# Patient Record
Sex: MALE | Race: OTHER | HISPANIC OR LATINO | Employment: OTHER | URBAN - METROPOLITAN AREA
[De-identification: names, ages, dates, MRNs, and addresses within clinical notes are randomized per-mention and may not be internally consistent; named-entity substitution may affect disease eponyms.]

---

## 2017-01-16 RX ORDER — FENOFIBRATE 160 MG/1
TABLET ORAL
Qty: 90 TAB | Refills: 0 | Status: SHIPPED | OUTPATIENT
Start: 2017-01-16 | End: 2017-04-18 | Stop reason: SDUPTHER

## 2017-02-18 ENCOUNTER — PATIENT OUTREACH (OUTPATIENT)
Dept: HEALTH INFORMATION MANAGEMENT | Facility: OTHER | Age: 77
End: 2017-02-18

## 2017-02-18 NOTE — PROGRESS NOTES
2/18/17  -  Annual Wellness Visit Scheduling  1. Scheduling Status:Scheduled     CONFIRMED WEB IZ          Care Gap Scheduling (Attempt to Schedule EACH Overdue Care Gap!)     Health Maintenance Due   Topic Date Due   • IMM ZOSTER VACCINE  Will get vaccines at later time   • IMM PNEUMOCOCCAL 65+ (ADULT) LOW/MEDIUM RISK SERIES (2 of 2 - PPSV23)    • Annual Wellness Visit  Scheduled         MyChart Activation: Requested info by mail

## 2017-03-03 ENCOUNTER — OFFICE VISIT (OUTPATIENT)
Dept: MEDICAL GROUP | Facility: PHYSICIAN GROUP | Age: 77
End: 2017-03-03
Payer: MEDICARE

## 2017-03-03 VITALS
SYSTOLIC BLOOD PRESSURE: 124 MMHG | TEMPERATURE: 98.2 F | HEIGHT: 66 IN | HEART RATE: 66 BPM | BODY MASS INDEX: 30.53 KG/M2 | OXYGEN SATURATION: 96 % | DIASTOLIC BLOOD PRESSURE: 62 MMHG | WEIGHT: 190 LBS | RESPIRATION RATE: 16 BRPM

## 2017-03-03 DIAGNOSIS — Z01.818 PREOPERATIVE CLEARANCE: ICD-10-CM

## 2017-03-03 DIAGNOSIS — Z87.891 FORMER HEAVY CIGARETTE SMOKER (20-39 PER DAY): ICD-10-CM

## 2017-03-03 PROCEDURE — 1101F PT FALLS ASSESS-DOCD LE1/YR: CPT | Performed by: NURSE PRACTITIONER

## 2017-03-03 PROCEDURE — 1036F TOBACCO NON-USER: CPT | Performed by: NURSE PRACTITIONER

## 2017-03-03 PROCEDURE — 99213 OFFICE O/P EST LOW 20 MIN: CPT | Performed by: NURSE PRACTITIONER

## 2017-03-03 PROCEDURE — G8432 DEP SCR NOT DOC, RNG: HCPCS | Performed by: NURSE PRACTITIONER

## 2017-03-03 PROCEDURE — G8482 FLU IMMUNIZE ORDER/ADMIN: HCPCS | Performed by: NURSE PRACTITIONER

## 2017-03-03 PROCEDURE — 4040F PNEUMOC VAC/ADMIN/RCVD: CPT | Performed by: NURSE PRACTITIONER

## 2017-03-03 PROCEDURE — G8419 CALC BMI OUT NRM PARAM NOF/U: HCPCS | Performed by: NURSE PRACTITIONER

## 2017-03-03 ASSESSMENT — PATIENT HEALTH QUESTIONNAIRE - PHQ9: CLINICAL INTERPRETATION OF PHQ2 SCORE: 0

## 2017-03-03 NOTE — Clinical Note
March 3, 2017       Patient: Brian Lopez   YOB: 1940   Date of Visit: 3/3/2017         To Whom It May Concern:    It is my medical opinion that Brian Lopez may be cleared for surgery for total knee arthroplasty based on labs and EKG to be interpreted by the orthopedic surgeon and anesthesiologist.    If you have any questions or concerns, please don't hesitate to call [phone]          Sincerely,          SWAPNA Gandhi.  Electronically Signed

## 2017-03-03 NOTE — MR AVS SNAPSHOT
"        Brian Lopez   3/3/2017 8:40 AM   Office Visit   MRN: [id number]    Department:  [facility]   Dept Phone:  [phone]    Description:  Male : 1940   Provider:  SUDHIR Gandhi           Reason for Visit     Medical Clearance           Allergies as of 3/3/2017     No Known Allergies      You were diagnosed with     Preoperative clearance   [391694]       Former heavy cigarette smoker (20-39 per day)   [027426]         Vital Signs     Blood Pressure Pulse Temperature Respirations Height Weight    124/62 mmHg 66 36.8 °C (98.2 °F) 16 1.676 m (5' 5.98\") 86.183 kg (190 lb)    Body Mass Index Oxygen Saturation Smoking Status             30.68 kg/m2 96% Former Smoker         Basic Information     Date Of Birth Sex Race Ethnicity Preferred Language    1940 Male  or   Origin (Macedonian,Portuguese,American,Pravin, etc) English      Your appointments     Mar 07, 2017 10:00 AM   ANNUAL WELLNESS with SUDHIR Gandhi, [facility]    [address] [address]   [phone]              Problem List              ICD-10-CM Priority Class Noted - Resolved    Hypertension (Chronic) I10   2014 - Present    Hyperlipidemia (Chronic) E78.5   2014 - Present    Hypertriglyceridemia (Chronic) E78.1   2014 - Present    BPH (benign prostatic hyperplasia) (Chronic) N40.0   2014 - Present    S/P knee replacement -- left Z96.659   2014 - Present    OA (osteoarthritis) of knee Right M17.9   2014 - Present    BMI 30.0-30.9,adult Z68.30   2014 - Present    Former heavy cigarette smoker (20-39 per day) Z87.891   2014 - Present    Impacted cerumen of left ear H61.22   2016 - Present    Preoperative clearance Z01.818   3/3/2017 - Present      Health Maintenance        Date Due Completion Dates    IMM ZOSTER VACCINE 3/17/2000 ---    IMM PNEUMOCOCCAL " 65+ (ADULT) LOW/MEDIUM RISK SERIES (2 of 2 - PPSV23) 2/4/2016 2/4/2015    IMM DTaP/Tdap/Td Vaccine (2 - Td) 10/11/2024 10/11/2014    COLONOSCOPY 6/23/2025 6/23/2015, 5/28/2014            Current Immunizations     13-VALENT PCV PREVNAR 2/4/2015    Influenza TIV (IM) 11/9/2015    Influenza Vaccine Quad Inj (Preserved) 11/15/2016, 11/9/2015    Tdap Vaccine 10/11/2014      Below and/or attached are the medications your provider expects you to take. Review all of your home medications and newly ordered medications with your provider and/or pharmacist. Follow medication instructions as directed by your provider and/or pharmacist. Please keep your medication list with you and share with your provider. Update the information when medications are discontinued, doses are changed, or new medications (including over-the-counter products) are added; and carry medication information at all times in the event of emergency situations     Allergies:  No Known Allergies          Medications  Valid as of: March 03, 2017 -  9:35 AM    Generic Name Brand Name Tablet Size Instructions for use    Atenolol (Tab) TENORMIN 50 MG TAKE  ONE TABLET BY MOUTH EVERY MORNING        Calcium Carb-Cholecalciferol   Take  by mouth.        Calcium Carbonate-Vitamin D (Tab) Calcium Carbonate-Vitamin D 600-200 MG-UNIT Take  by mouth every morning.        Fenofibrate (Tab) TRIGLIDE 160 MG TAKE  ONE TABLET BY MOUTH DAILY        Multiple Vitamins-Minerals (Tab) THERAGRAN-M  Take 1 Tab by mouth every morning.        Naproxen Sod-Diphenhydramine   Take  by mouth.        Pravastatin Sodium (Tab) PRAVACHOL 40 MG TAKE ONE TABLET BY MOUTH EVERY DAY        Tamsulosin HCl (Cap) FLOMAX 0.4 MG Take 1 Cap by mouth every day.        .                 Medicines prescribed today were sent to:     [id number] - [place], [place] - [address]    [address] [address]    Phone: [phone] Fax: [phone]    Open 24 Hours?: No      Medication refill  instructions:       If your prescription bottle indicates you have medication refills left, it is not necessary to call your provider’s office. Please contact your pharmacy and they will refill your medication.    If your prescription bottle indicates you do not have any refills left, you may request refills at any time through one of the following ways: The online [employer] system (except Urgent Care), by calling your provider’s office, or by asking your pharmacy to contact your provider’s office with a refill request. Medication refills are processed only during regular business hours and may not be available until the next business day. Your provider may request additional information or to have a follow-up visit with you prior to refilling your medication.   *Please Note: Medication refills are assigned a new Rx number when refilled electronically. Your pharmacy may indicate that no refills were authorized even though a new prescription for the same medication is available at the pharmacy. Please request the medicine by name with the pharmacy before contacting your provider for a refill.        Other Notes About Your Plan     Please obtain Pathology Result from Colonoscopy [facility] Dr Jules 06/23/2015 and status if colon cancer is still present. Suggested code if Malignant Neoplasm of [place] Unspecified C18.9           [employer] Access Code: Activation code not generated  Current [employer] Status: Active

## 2017-03-04 NOTE — PROGRESS NOTES
Subjective:     Chief Complaint   Patient presents with   • Medical Clearance       HPI:  Brian Lopez is a 76 y.o. male here today to discuss the following:    Preoperative clearance  Patient's being seen by orthopedics and scheduled for a knee arthroplasty. He is here for preoperative clearance, however labs and EKG are to be ordered by the surgeon. The patient denies any chest pain, diaphoresis, palpitations, shortness of breath, dyspnea, dizziness or headaches. Patient may be cleared for surgery at this time pending results of preop labs and EKG.           Current medicines (including changes today)  Current Outpatient Prescriptions   Medication Sig Dispense Refill   • fenofibrate (TRIGLIDE) 160 MG tablet TAKE  ONE TABLET BY MOUTH DAILY 90 Tab 0   • pravastatin (PRAVACHOL) 40 MG tablet TAKE ONE TABLET BY MOUTH EVERY DAY 90 Tab 0   • atenolol (TENORMIN) 50 MG Tab TAKE  ONE TABLET BY MOUTH EVERY MORNING 90 Tab 1   • tamsulosin (FLOMAX) 0.4 MG capsule Take 1 Cap by mouth every day. 90 Cap 1   • Naproxen Sod-Diphenhydramine (ALEVE PM PO) Take  by mouth.     • therapeutic multivitamin-minerals (THERAGRAN-M) TABS Take 1 Tab by mouth every morning.     • Calcium Carbonate-Vitamin D (CALCIUM 600+D) 600-200 MG-UNIT TABS Take  by mouth every morning.     • Calcium Carb-Cholecalciferol (CALCIUM + D3 PO) Take  by mouth.       No current facility-administered medications for this visit.       He  has a past medical history of Hypertension (4/17/2014); Hyperlipidemia (4/17/2014); Hypertriglyceridemia (4/17/2014); BPH (benign prostatic hyperplasia) (4/17/2014); S/P knee replacement -- left (4/17/2014); OA (osteoarthritis) of knee Right (4/17/2014); Unspecified cataract; Dental disorder; and Routine health maintenance (6/29/2015).    ROS   Review of Systems   Constitutional: Negative for fever, chills, weight loss and malaise/fatigue.   HENT: Negative for ear pain, nosebleeds, congestion, sore throat and neck pain.   "  Respiratory: Negative for cough, sputum production, shortness of breath and wheezing.    Cardiovascular: Negative for chest pain, palpitations,  and leg swelling.   Gastrointestinal: Negative for heartburn, nausea, vomiting, diarrhea and abdominal pain.   Neurological: Negative for dizziness, tingling, tremors, sensory change, focal weakness and headaches.   Psychiatric/Behavioral: Negative for depression, anxiety, suicidal ideas, insomnia and memory loss.    All other systems reviewed and are negative except as in HPI.     Objective:   Physical Exam:  Blood pressure 124/62, pulse 66, temperature 36.8 °C (98.2 °F), resp. rate 16, height 1.676 m (5' 5.98\"), weight 86.183 kg (190 lb), SpO2 96 %. Body mass index is 30.68 kg/(m^2).   Physical Exam:  Alert, oriented in no acute distress.  Eye contact is good, speech goal directed, affect calm  HEENT: conjunctiva non-injected, sclera non-icteric.  Pinna normal.   Neck No adenopathy or masses in the neck or supraclavicular regions.  Lungs: clear to auscultation bilaterally with good excursion.  CV: regular rate and rhythm.   Abdomen: soft, nontender, No CVAT. Normal bowel sounds.  Ext: no edema, color normal, vascularity normal, temperature normal      Assessment and Plan:   The following treatment plan was discussed   1. Preoperative clearance  CANCELED: CBC WITH DIFFERENTIAL    CANCELED: COMP METABOLIC PANEL    CANCELED: URINALYSIS,CULTURE IF INDICATED    CANCELED: POCT EKG   2. Former heavy cigarette smoker (20-39 per day)      I spoke with renal orthopedics who stated they would be ordering labs and EKG after I clear the patient for surgery. If I order any testing will be repeated.    Followup: Return if symptoms worsen or fail to improve.   Please note that this dictation was created using voice recognition software. I have made every reasonable attempt to correct obvious errors, but I expect that there are errors of grammar and possibly content that I did not " discover before finalizing the note.

## 2017-03-04 NOTE — ASSESSMENT & PLAN NOTE
Patient's being seen by orthopedics and scheduled for a knee arthroplasty. He is here for preoperative clearance, however labs and EKG are to be ordered by the surgeon. The patient denies any chest pain, diaphoresis, palpitations, shortness of breath, dyspnea, dizziness or headaches. Patient may be cleared for surgery at this time pending results of preop labs and EKG.

## 2017-03-07 ENCOUNTER — OFFICE VISIT (OUTPATIENT)
Dept: MEDICAL GROUP | Facility: PHYSICIAN GROUP | Age: 77
End: 2017-03-07
Payer: MEDICARE

## 2017-03-07 VITALS
WEIGHT: 190 LBS | DIASTOLIC BLOOD PRESSURE: 70 MMHG | OXYGEN SATURATION: 97 % | RESPIRATION RATE: 16 BRPM | HEIGHT: 66 IN | HEART RATE: 63 BPM | BODY MASS INDEX: 30.53 KG/M2 | TEMPERATURE: 98.1 F | SYSTOLIC BLOOD PRESSURE: 126 MMHG

## 2017-03-07 DIAGNOSIS — M17.11 OSTEOARTHRITIS OF RIGHT KNEE, UNSPECIFIED OSTEOARTHRITIS TYPE: ICD-10-CM

## 2017-03-07 DIAGNOSIS — I10 ESSENTIAL HYPERTENSION: Chronic | ICD-10-CM

## 2017-03-07 DIAGNOSIS — E78.1 HYPERTRIGLYCERIDEMIA: Chronic | ICD-10-CM

## 2017-03-07 DIAGNOSIS — N40.1 BENIGN PROSTATIC HYPERPLASIA WITH LOWER URINARY TRACT SYMPTOMS, UNSPECIFIED MORPHOLOGY: Chronic | ICD-10-CM

## 2017-03-07 DIAGNOSIS — E78.5 HYPERLIPIDEMIA, UNSPECIFIED HYPERLIPIDEMIA TYPE: Chronic | ICD-10-CM

## 2017-03-07 DIAGNOSIS — Z23 NEED FOR VACCINATION: ICD-10-CM

## 2017-03-07 DIAGNOSIS — E66.9 OBESITY (BMI 30-39.9): ICD-10-CM

## 2017-03-07 PROCEDURE — G0439 PPPS, SUBSEQ VISIT: HCPCS | Mod: 25 | Performed by: NURSE PRACTITIONER

## 2017-03-07 PROCEDURE — 90732 PPSV23 VACC 2 YRS+ SUBQ/IM: CPT | Performed by: NURSE PRACTITIONER

## 2017-03-07 PROCEDURE — 1036F TOBACCO NON-USER: CPT | Performed by: NURSE PRACTITIONER

## 2017-03-07 PROCEDURE — 99999 PR NO CHARGE: CPT | Performed by: NURSE PRACTITIONER

## 2017-03-07 PROCEDURE — G0009 ADMIN PNEUMOCOCCAL VACCINE: HCPCS | Performed by: NURSE PRACTITIONER

## 2017-03-07 PROCEDURE — G8432 DEP SCR NOT DOC, RNG: HCPCS | Performed by: NURSE PRACTITIONER

## 2017-03-07 ASSESSMENT — PAIN SCALES - GENERAL: PAINLEVEL: 1=MINIMAL PAIN

## 2017-03-07 ASSESSMENT — PATIENT HEALTH QUESTIONNAIRE - PHQ9: CLINICAL INTERPRETATION OF PHQ2 SCORE: 0

## 2017-03-07 NOTE — PROGRESS NOTES
Chief Complaint   Patient presents with   • Annual Wellness Visit         HPI:  Brian is a 76 y.o. male here for Medicare Annual Wellness Visit        Patient Active Problem List    Diagnosis Date Noted   • Preoperative clearance 03/03/2017   • Hypertension 04/17/2014   • Hyperlipidemia 04/17/2014   • Hypertriglyceridemia 04/17/2014   • BPH (benign prostatic hyperplasia) 04/17/2014   • S/P knee replacement -- left 04/17/2014   • OA (osteoarthritis) of knee Right 04/17/2014   • BMI 30.0-30.9,adult 04/17/2014   • Former heavy cigarette smoker (20-39 per day) 04/17/2014       Current Outpatient Prescriptions   Medication Sig Dispense Refill   • fenofibrate (TRIGLIDE) 160 MG tablet TAKE  ONE TABLET BY MOUTH DAILY 90 Tab 0   • pravastatin (PRAVACHOL) 40 MG tablet TAKE ONE TABLET BY MOUTH EVERY DAY 90 Tab 0   • atenolol (TENORMIN) 50 MG Tab TAKE  ONE TABLET BY MOUTH EVERY MORNING 90 Tab 1   • tamsulosin (FLOMAX) 0.4 MG capsule Take 1 Cap by mouth every day. 90 Cap 1   • Naproxen Sod-Diphenhydramine (ALEVE PM PO) Take  by mouth.     • therapeutic multivitamin-minerals (THERAGRAN-M) TABS Take 1 Tab by mouth every morning.     • Calcium Carbonate-Vitamin D (CALCIUM 600+D) 600-200 MG-UNIT TABS Take  by mouth every morning.     • Calcium Carb-Cholecalciferol (CALCIUM + D3 PO) Take  by mouth.       No current facility-administered medications for this visit.        The patient reports adherence to this regimen   Current supplements as per medication list.   Chronic narcotic pain medicines: no    Allergies: Review of patient's allergies indicates no known allergies.    Current social contact/activities: traveling, spend time gardening and at home     Is patient current with immunizations?  no   If no, due for ppsv23    He  reports that he quit smoking about 6 years ago. His smoking use included Cigarettes. He has a 50 pack-year smoking history. He has never used smokeless tobacco. He reports that he drinks alcohol. He reports  that he does not use illicit drugs.  Counseling given: Yes        DPA/Advanced Directive:  Patient do not have an advanced directive.  If not on file, instructed to bring in a copy to scan into his chart. If no advanced directive exists, a packet and workshop information was provided    ROS:    Gait: Uses no assistive device   Ostomy: no   Other tubes: no   Amputations: no   Chronic oxygen use no   Last eye exam 2013   : Denies incontinence.       Depression Screening    Little interest or pleasure in doing things?  0 - not at all  Feeling down, depressed, or hopeless?  0 - not at all  Patient Health Questionnaire Score: 0    If depressive symptoms identified deferred to follow up visit unless specifically addressed in assessment and plan.    Screening for Cognitive Impairment    Three Minute Recall (banana, sunrise, fence)  2/3    Draw clock face with all 12 numbers set to the hand to show 10 minutes past 11 o'clock  1 5/5  Cognitive concerns identified deferred for follow up unless specifically addressed in assessment and plan.    Fall Risk Assessment    Has the patient had two or more falls in the last year or any fall with injury in the last year?  No    Safety Assessment    Throw rugs on floor.  Yes  Handrails on all stairs.  Yes  Good lighting in all hallways.  Yes  Difficulty hearing.  No  Patient counseled about all safety risks that were identified.    Functional Assessment ADLs    Are there any barriers preventing you from cooking for yourself or meeting nutritional needs?  No.    Are there any barriers preventing you from driving safely or obtaining transportation?  No.    Are there any barriers preventing you from using a telephone or calling for help?  No.    Are there any barriers preventing you from shopping?  No.    Are there any barriers preventing you from taking care of your own finances?  No.    Are there any barriers preventing you from managing your medications?  No.    Are currently engaging  any exercise or physical activity?  No.       Health Maintenance Summary                IMM PNEUMOCOCCAL 65+ (ADULT) LOW/MEDIUM RISK SERIES Overdue 2/4/2016      Done 2/4/2015 Imm Admin: Pneumococcal Conjugate Vaccine (Prevnar/PCV-13)    Annual Wellness Visit Overdue 12/10/2016      Done 12/10/2015 Visit Dx: Medicare annual wellness visit, subsequent     Patient has more history with this topic...    IMM ZOSTER VACCINE Postponed 4/2/2018 Originally 3/17/2000. Patient Refused    IMM DTaP/Tdap/Td Vaccine Next Due 10/11/2024      Done 10/11/2014 Imm Admin: Tdap Vaccine    COLONOSCOPY Next Due 6/23/2025      Done 6/23/2015 AMB REFERRAL TO GI FOR COLONOSCOPY     Patient has more history with this topic...          Patient Care Team:  SUDHIR Gandhi as PCP - General (Family Medicine)  Carlos Jules M.D. as Consulting Physician (Gastroenterology)    Social History   Substance Use Topics   • Smoking status: Former Smoker -- 1.00 packs/day for 50 years     Types: Cigarettes     Quit date: 04/17/2010   • Smokeless tobacco: Never Used   • Alcohol Use: 0.0 oz/week     0 Standard drinks or equivalent per week      Comment: 12 beers per week     Family History   Problem Relation Age of Onset   • Stroke Mother    • Heart Attack Father 85   • Heart Disease Father    • Cancer Neg Hx      He  has a past medical history of Hypertension (4/17/2014); Hyperlipidemia (4/17/2014); Hypertriglyceridemia (4/17/2014); BPH (benign prostatic hyperplasia) (4/17/2014); S/P knee replacement -- left (4/17/2014); OA (osteoarthritis) of knee Right (4/17/2014); Unspecified cataract; Dental disorder; and Routine health maintenance (6/29/2015).   Past Surgical History   Procedure Laterality Date   • Athroplasty       left knee total   • Trigger finger release       multiple    • Appendectomy     • Cataract extraction with iol     • Colon resection robotic  6/16/2014     Performed by Ezra Burks M.D. at SUR[facility]  "          Exam:     Blood pressure 126/70, pulse 63, temperature 36.7 °C (98.1 °F), resp. rate 16, height 1.676 m (5' 5.98\"), weight 86.183 kg (190 lb), SpO2 97 %. Body mass index is 30.68 kg/(m^2).    Hearing good.    Dentition upper and lower dentures  Alert, oriented in no acute distress.  Eye contact is good, speech goal directed, affect calm      Assessment and Plan. The following treatment and monitoring plan is recommended:    1. BMI 30.0-30.9,adult     2. Benign prostatic hyperplasia with lower urinary tract symptoms, unspecified morphology     3. Hyperlipidemia, unspecified hyperlipidemia type     4. Essential hypertension     5. Hypertriglyceridemia     6. Osteoarthritis of right knee, unspecified osteoarthritis type      continue plan of care for all existing diagnoses      Services needed: No services needed at this time  Health Care Screening recommendations as per orders if indicated.  Referrals offered: PT/OT/Nutrition counseling/Behavioral Health/Smoking cessation as per orders if indicated.    Discussion today about general wellness and lifestyle habits:    · Prevent falls and reduce trip hazards; Cautioned about securing or removing rugs.  · Have a working fire alarm and carbon monoxide detector;   · Engage in regular physical activity and social activities       Follow-up: Return in about 6 months (around 9/7/2017) for HTN/Lipid.  "

## 2017-03-07 NOTE — MR AVS SNAPSHOT
"        Brian Lopez   3/7/2017 10:00 AM   Office Visit   MRN: [id number]    Department:  [facility]   Dept Phone:  [phone]    Description:  Male : 1940   Provider:  SUDHIR Gandhi; [facility]            Reason for Visit     Annual Wellness Visit           Allergies as of 3/7/2017     No Known Allergies      You were diagnosed with     BMI 30.0-30.9,adult   [020828]       Benign prostatic hyperplasia with lower urinary tract symptoms, unspecified morphology   [4170149]       Hyperlipidemia, unspecified hyperlipidemia type   [5508374]       Essential hypertension   [5747299]       Hypertriglyceridemia   [808347]       Osteoarthritis of right knee, unspecified osteoarthritis type   [6402812]       Need for vaccination   [369448]       Obesity (BMI 30-39.9)   [717494]         Vital Signs     Blood Pressure Pulse Temperature Respirations Height Weight    126/70 mmHg 63 36.7 °C (98.1 °F) 16 1.676 m (5' 5.98\") 86.183 kg (190 lb)    Body Mass Index Oxygen Saturation Smoking Status             30.68 kg/m2 97% Former Smoker         Basic Information     Date Of Birth Sex Race Ethnicity Preferred Language    1940 Male  or   Origin (Indonesian,Romanian,Polish,Pravin, etc) English      Problem List              ICD-10-CM Priority Class Noted - Resolved    Hypertension (Chronic) I10   2014 - Present    Hyperlipidemia (Chronic) E78.5   2014 - Present    Hypertriglyceridemia (Chronic) E78.1   2014 - Present    BPH (benign prostatic hyperplasia) (Chronic) N40.0   2014 - Present    S/P knee replacement -- left Z96.659   2014 - Present    OA (osteoarthritis) of knee Right M17.9   2014 - Present    Former heavy cigarette smoker (20-39 per day) Z87.891   2014 - Present    Preoperative clearance Z01.818   3/3/2017 - Present    Obesity (BMI 30-39.9) E66.9   3/7/2017 - Present      Health Maintenance        Date Due " Completion Dates    IMM PNEUMOCOCCAL 65+ (ADULT) LOW/MEDIUM RISK SERIES (2 of 2 - PPSV23) 2/4/2016 2/4/2015    IMM ZOSTER VACCINE 4/2/2018 (Originally 3/17/2000) ---    IMM DTaP/Tdap/Td Vaccine (2 - Td) 10/11/2024 10/11/2014    COLONOSCOPY 6/23/2025 6/23/2015, 5/28/2014            Current Immunizations     13-VALENT PCV PREVNAR 2/4/2015    Influenza TIV (IM) 11/9/2015    Influenza Vaccine Quad Inj (Preserved) 11/15/2016, 11/9/2015    Pneumococcal polysaccharide vaccine (PPSV-23) 3/7/2017    Tdap Vaccine 10/11/2014      Below and/or attached are the medications your provider expects you to take. Review all of your home medications and newly ordered medications with your provider and/or pharmacist. Follow medication instructions as directed by your provider and/or pharmacist. Please keep your medication list with you and share with your provider. Update the information when medications are discontinued, doses are changed, or new medications (including over-the-counter products) are added; and carry medication information at all times in the event of emergency situations     Allergies:  No Known Allergies          Medications  Valid as of: March 07, 2017 - 12:28 PM    Generic Name Brand Name Tablet Size Instructions for use    Atenolol (Tab) TENORMIN 50 MG TAKE  ONE TABLET BY MOUTH EVERY MORNING        Calcium Carb-Cholecalciferol   Take  by mouth.        Calcium Carbonate-Vitamin D (Tab) Calcium Carbonate-Vitamin D 600-200 MG-UNIT Take  by mouth every morning.        Fenofibrate (Tab) TRIGLIDE 160 MG TAKE  ONE TABLET BY MOUTH DAILY        Multiple Vitamins-Minerals (Tab) THERAGRAN-M  Take 1 Tab by mouth every morning.        Naproxen Sod-Diphenhydramine   Take  by mouth.        Pravastatin Sodium (Tab) PRAVACHOL 40 MG TAKE ONE TABLET BY MOUTH EVERY DAY        Tamsulosin HCl (Cap) FLOMAX 0.4 MG Take 1 Cap by mouth every day.        .                 Medicines prescribed today were sent to:     [id number] -  [place], [place] - [address]    [address] [place] [address]    Phone: [phone] Fax: [phone]    Open 24 Hours?: No      Medication refill instructions:       If your prescription bottle indicates you have medication refills left, it is not necessary to call your provider’s office. Please contact your pharmacy and they will refill your medication.    If your prescription bottle indicates you do not have any refills left, you may request refills at any time through one of the following ways: The online [employer] system (except Urgent Care), by calling your provider’s office, or by asking your pharmacy to contact your provider’s office with a refill request. Medication refills are processed only during regular business hours and may not be available until the next business day. Your provider may request additional information or to have a follow-up visit with you prior to refilling your medication.   *Please Note: Medication refills are assigned a new Rx number when refilled electronically. Your pharmacy may indicate that no refills were authorized even though a new prescription for the same medication is available at the pharmacy. Please request the medicine by name with the pharmacy before contacting your provider for a refill.        Other Notes About Your Plan     Please obtain Pathology Result from Colonoscopy [facility] Dr Jules 06/23/2015 and status if colon cancer is still present. Suggested code if Malignant Neoplasm of [place] Unspecified C18.9           [employer] Access Code: Activation code not generated  Current [employer] Status: Active

## 2017-03-27 DIAGNOSIS — I10 ESSENTIAL HYPERTENSION: Chronic | ICD-10-CM

## 2017-03-27 RX ORDER — ATENOLOL 50 MG/1
TABLET ORAL
Qty: 90 TAB | Refills: 1 | Status: SHIPPED | OUTPATIENT
Start: 2017-03-27 | End: 2017-10-23 | Stop reason: SDUPTHER

## 2017-03-27 RX ORDER — TAMSULOSIN HYDROCHLORIDE 0.4 MG/1
0.4 CAPSULE ORAL DAILY
Qty: 90 CAP | Refills: 1 | Status: SHIPPED | OUTPATIENT
Start: 2017-03-27 | End: 2017-08-22 | Stop reason: SDUPTHER

## 2017-03-27 NOTE — TELEPHONE ENCOUNTER
See MA's notes below, pt has met protocol, dx discussed at OV earlier this month.   BP Readings from Last 1 Encounters:   03/07/17 126/70

## 2017-03-27 NOTE — TELEPHONE ENCOUNTER
Was the patient seen in the last year in this department? Yes 03/07/2017    Does patient have an active prescription for medications requested? No     Received Request Via: Pharmacy

## 2017-03-27 NOTE — TELEPHONE ENCOUNTER
Refill X 6 months, sent to pharmacy.Pt. Seen in the last 6 months per protocol. Pt has pending labs  Lab Results   Component Value Date/Time    SODIUM 143 01/21/2016 07:25 AM    POTASSIUM 4.1 01/21/2016 07:25 AM    CHLORIDE 106 01/21/2016 07:25 AM    CO2 26 01/21/2016 07:25 AM    GLUCOSE 92 01/21/2016 07:25 AM    BUN 18 01/21/2016 07:25 AM    CREATININE 0.99 01/21/2016 07:25 AM

## 2017-03-27 NOTE — TELEPHONE ENCOUNTER
Was the patient seen in the last year in this department? Yes     Does patient have an active prescription for medications requested? No     Received Request Via: Pharmacy      Pt met protocol?: Yes, OV earlier this month

## 2017-04-04 RX ORDER — PRAVASTATIN SODIUM 40 MG
40 TABLET ORAL
Qty: 90 TAB | Refills: 1 | Status: SHIPPED | OUTPATIENT
Start: 2017-04-04 | End: 2017-09-27 | Stop reason: SDUPTHER

## 2017-04-04 NOTE — TELEPHONE ENCOUNTER
Was the patient seen in the last year in this department? Yes     Does patient have an active prescription for medications requested? No     Received Request Via: Pharmacy      Pt met protocol?: Yes    LAST OV 03/07/17    LAST LIPID PROFILE 01/21/16    BP Readings from Last 1 Encounters:   03/07/17 126/70

## 2017-04-18 NOTE — TELEPHONE ENCOUNTER
Was the patient seen in the last year in this department? Yes     Does patient have an active prescription for medications requested? No     Received Request Via: Pharmacy      Pt met protocol?: Yes, OV last month, lipid profile done 1/16.

## 2017-04-19 RX ORDER — FENOFIBRATE 160 MG/1
TABLET ORAL
Qty: 90 TAB | Refills: 0 | Status: SHIPPED | OUTPATIENT
Start: 2017-04-19 | End: 2017-07-17 | Stop reason: SDUPTHER

## 2017-04-21 ENCOUNTER — HOSPITAL ENCOUNTER (OUTPATIENT)
Dept: LAB | Facility: MEDICAL CENTER | Age: 77
End: 2017-04-21
Attending: NURSE PRACTITIONER
Payer: MEDICARE

## 2017-04-21 DIAGNOSIS — E78.5 HYPERLIPIDEMIA: Chronic | ICD-10-CM

## 2017-04-21 LAB
ALBUMIN SERPL BCP-MCNC: 4.3 G/DL (ref 3.2–4.9)
ALBUMIN/GLOB SERPL: 1.3 G/DL
ALP SERPL-CCNC: 45 U/L (ref 30–99)
ALT SERPL-CCNC: 16 U/L (ref 2–50)
ANION GAP SERPL CALC-SCNC: 9 MMOL/L (ref 0–11.9)
AST SERPL-CCNC: 15 U/L (ref 12–45)
BILIRUB SERPL-MCNC: 0.6 MG/DL (ref 0.1–1.5)
BUN SERPL-MCNC: 20 MG/DL (ref 8–22)
CALCIUM SERPL-MCNC: 9.5 MG/DL (ref 8.5–10.5)
CHLORIDE SERPL-SCNC: 107 MMOL/L (ref 96–112)
CHOLEST SERPL-MCNC: 141 MG/DL (ref 100–199)
CO2 SERPL-SCNC: 26 MMOL/L (ref 20–33)
CREAT SERPL-MCNC: 1 MG/DL (ref 0.5–1.4)
GFR SERPL CREATININE-BSD FRML MDRD: >60 ML/MIN/1.73 M 2
GLOBULIN SER CALC-MCNC: 3.4 G/DL (ref 1.9–3.5)
GLUCOSE SERPL-MCNC: 97 MG/DL (ref 65–99)
HDLC SERPL-MCNC: 47 MG/DL
LDLC SERPL CALC-MCNC: 65 MG/DL
POTASSIUM SERPL-SCNC: 4.1 MMOL/L (ref 3.6–5.5)
PROT SERPL-MCNC: 7.7 G/DL (ref 6–8.2)
SODIUM SERPL-SCNC: 142 MMOL/L (ref 135–145)
TRIGL SERPL-MCNC: 147 MG/DL (ref 0–149)

## 2017-04-21 PROCEDURE — 36415 COLL VENOUS BLD VENIPUNCTURE: CPT

## 2017-04-21 PROCEDURE — 80053 COMPREHEN METABOLIC PANEL: CPT

## 2017-04-21 PROCEDURE — 80061 LIPID PANEL: CPT

## 2017-04-24 ENCOUNTER — TELEPHONE (OUTPATIENT)
Dept: MEDICAL GROUP | Facility: PHYSICIAN GROUP | Age: 77
End: 2017-04-24

## 2017-04-24 NOTE — TELEPHONE ENCOUNTER
----- Message from Elizabeth Nelson sent at 4/21/2017  7:04 AM PDT -----  Regarding: refills  Contact: [phone]  Pt's refills were declined until after his lipids were done.  He is here today to do his labs. He only has enough meds to get him through Monday. Can you please see if we can get his cholesterol meds refilled?  Pt uses [facility] on [address].    Thank you,  Elizabeth :)

## 2017-05-24 DIAGNOSIS — Z01.812 PRE-OPERATIVE LABORATORY EXAMINATION: ICD-10-CM

## 2017-05-24 DIAGNOSIS — Z01.810 PRE-OPERATIVE CARDIOVASCULAR EXAMINATION: ICD-10-CM

## 2017-05-24 LAB
ANION GAP SERPL CALC-SCNC: 8 MMOL/L (ref 0–11.9)
APPEARANCE UR: CLEAR
APTT PPP: 31.3 SEC (ref 24.7–36)
BASOPHILS # BLD AUTO: 1 % (ref 0–1.8)
BASOPHILS # BLD: 0.05 K/UL (ref 0–0.12)
BILIRUB UR QL STRIP.AUTO: NEGATIVE
BUN SERPL-MCNC: 14 MG/DL (ref 8–22)
CALCIUM SERPL-MCNC: 9.4 MG/DL (ref 8.5–10.5)
CHLORIDE SERPL-SCNC: 106 MMOL/L (ref 96–112)
CO2 SERPL-SCNC: 27 MMOL/L (ref 20–33)
COLOR UR: NORMAL
CREAT SERPL-MCNC: 1.01 MG/DL (ref 0.5–1.4)
CULTURE IF INDICATED INDCX: NO UA CULTURE
EKG IMPRESSION: NORMAL
EOSINOPHIL # BLD AUTO: 0.09 K/UL (ref 0–0.51)
EOSINOPHIL NFR BLD: 1.7 % (ref 0–6.9)
ERYTHROCYTE [DISTWIDTH] IN BLOOD BY AUTOMATED COUNT: 44.7 FL (ref 35.9–50)
GFR SERPL CREATININE-BSD FRML MDRD: >60 ML/MIN/1.73 M 2
GLUCOSE SERPL-MCNC: 85 MG/DL (ref 65–99)
GLUCOSE UR STRIP.AUTO-MCNC: NEGATIVE MG/DL
HCT VFR BLD AUTO: 45.3 % (ref 42–52)
HGB BLD-MCNC: 14.8 G/DL (ref 14–18)
HIV 1+2 AB+HIV1 P24 AG SERPL QL IA: NON REACTIVE
IMM GRANULOCYTES # BLD AUTO: 0.02 K/UL (ref 0–0.11)
IMM GRANULOCYTES NFR BLD AUTO: 0.4 % (ref 0–0.9)
INR PPP: 1.06 (ref 0.87–1.13)
KETONES UR STRIP.AUTO-MCNC: NEGATIVE MG/DL
LEUKOCYTE ESTERASE UR QL STRIP.AUTO: NEGATIVE
LYMPHOCYTES # BLD AUTO: 1.66 K/UL (ref 1–4.8)
LYMPHOCYTES NFR BLD: 31.6 % (ref 22–41)
MCH RBC QN AUTO: 29.1 PG (ref 27–33)
MCHC RBC AUTO-ENTMCNC: 32.7 G/DL (ref 33.7–35.3)
MCV RBC AUTO: 89.2 FL (ref 81.4–97.8)
MICRO URNS: NORMAL
MONOCYTES # BLD AUTO: 0.46 K/UL (ref 0–0.85)
MONOCYTES NFR BLD AUTO: 8.7 % (ref 0–13.4)
NEUTROPHILS # BLD AUTO: 2.98 K/UL (ref 1.82–7.42)
NEUTROPHILS NFR BLD: 56.6 % (ref 44–72)
NITRITE UR QL STRIP.AUTO: NEGATIVE
NRBC # BLD AUTO: 0 K/UL
NRBC BLD AUTO-RTO: 0 /100 WBC
PH UR STRIP.AUTO: 6.5 [PH]
PLATELET # BLD AUTO: 241 K/UL (ref 164–446)
PMV BLD AUTO: 11.3 FL (ref 9–12.9)
POTASSIUM SERPL-SCNC: 4.1 MMOL/L (ref 3.6–5.5)
PROT UR QL STRIP: NEGATIVE MG/DL
PROTHROMBIN TIME: 14.1 SEC (ref 12–14.6)
RBC # BLD AUTO: 5.08 M/UL (ref 4.7–6.1)
RBC UR QL AUTO: NEGATIVE
SCCMEC + MECA PNL NOSE NAA+PROBE: NEGATIVE
SCCMEC + MECA PNL NOSE NAA+PROBE: NEGATIVE
SODIUM SERPL-SCNC: 141 MMOL/L (ref 135–145)
SP GR UR STRIP.AUTO: 1.01
WBC # BLD AUTO: 5.3 K/UL (ref 4.8–10.8)

## 2017-05-24 PROCEDURE — 83036 HEMOGLOBIN GLYCOSYLATED A1C: CPT

## 2017-05-24 PROCEDURE — 80048 BASIC METABOLIC PNL TOTAL CA: CPT

## 2017-05-24 PROCEDURE — 81003 URINALYSIS AUTO W/O SCOPE: CPT

## 2017-05-24 PROCEDURE — 85730 THROMBOPLASTIN TIME PARTIAL: CPT

## 2017-05-24 PROCEDURE — 36415 COLL VENOUS BLD VENIPUNCTURE: CPT

## 2017-05-24 PROCEDURE — 85025 COMPLETE CBC W/AUTO DIFF WBC: CPT

## 2017-05-24 PROCEDURE — 87389 HIV-1 AG W/HIV-1&-2 AB AG IA: CPT

## 2017-05-24 PROCEDURE — 87641 MR-STAPH DNA AMP PROBE: CPT

## 2017-05-24 PROCEDURE — 93005 ELECTROCARDIOGRAM TRACING: CPT

## 2017-05-24 PROCEDURE — 87640 STAPH A DNA AMP PROBE: CPT

## 2017-05-24 PROCEDURE — 93010 ELECTROCARDIOGRAM REPORT: CPT | Performed by: INTERNAL MEDICINE

## 2017-05-24 PROCEDURE — 85610 PROTHROMBIN TIME: CPT

## 2017-05-24 NOTE — DISCHARGE PLANNING
DISCHARGE PLANNING NOTE - TOTAL JOINT     Procedure: Procedure(s):  KNEE ARTHROPLASTY TOTAL  Procedure Date: 6/7/2017  Insurance:  Payor: [employer] / Plan: [employer] / Product Type: *No Product type* /   Equipment currently available at home? cane, front-wheel walker, raised toilet seat and shower chair  Steps into the home? 0  Steps within the home? 0  Toilet height? ADA  Type of shower? walk-in shower  Who will be with you during your recovery? spouse  Is Outpatient Physical Therapy set up after surgery? Yes   Did you take the Total Joint Class and where? Yes, at [facility]     Plan: Met pt in pre-admit.  PT has all equiptment he feels he needs.  Has pre-op apt at [facility] and will get rx for PT at [facility].  Wife will be caregiver.  No needs identified.

## 2017-05-26 LAB
EST. AVERAGE GLUCOSE BLD GHB EST-MCNC: 117 MG/DL
HBA1C MFR BLD: 5.7 % (ref 0–5.6)

## 2017-06-07 ENCOUNTER — APPOINTMENT (OUTPATIENT)
Dept: RADIOLOGY | Facility: MEDICAL CENTER | Age: 77
DRG: 470 | End: 2017-06-07
Attending: PHYSICIAN ASSISTANT
Payer: MEDICARE

## 2017-06-07 ENCOUNTER — HOSPITAL ENCOUNTER (INPATIENT)
Facility: MEDICAL CENTER | Age: 77
LOS: 1 days | DRG: 470 | End: 2017-06-08
Attending: ORTHOPAEDIC SURGERY | Admitting: ORTHOPAEDIC SURGERY
Payer: MEDICARE

## 2017-06-07 PROBLEM — M17.11 OSTEOARTHRITIS OF RIGHT KNEE: Status: ACTIVE | Noted: 2017-06-07

## 2017-06-07 PROCEDURE — 160022 HCHG BLOCK: Performed by: ORTHOPAEDIC SURGERY

## 2017-06-07 PROCEDURE — 501838 HCHG SUTURE GENERAL: Performed by: ORTHOPAEDIC SURGERY

## 2017-06-07 PROCEDURE — G8978 MOBILITY CURRENT STATUS: HCPCS | Mod: CK

## 2017-06-07 PROCEDURE — 700112 HCHG RX REV CODE 229: Performed by: PHYSICIAN ASSISTANT

## 2017-06-07 PROCEDURE — 700101 HCHG RX REV CODE 250

## 2017-06-07 PROCEDURE — 700105 HCHG RX REV CODE 258: Performed by: PHYSICIAN ASSISTANT

## 2017-06-07 PROCEDURE — 500811 HCHG LENS/HOOD FOR SPACESUIT: Performed by: ORTHOPAEDIC SURGERY

## 2017-06-07 PROCEDURE — 160009 HCHG ANES TIME/MIN: Performed by: ORTHOPAEDIC SURGERY

## 2017-06-07 PROCEDURE — G8979 MOBILITY GOAL STATUS: HCPCS | Mod: CJ

## 2017-06-07 PROCEDURE — 500093 HCHG BONE CEMENT MIXER: Performed by: ORTHOPAEDIC SURGERY

## 2017-06-07 PROCEDURE — 700111 HCHG RX REV CODE 636 W/ 250 OVERRIDE (IP)

## 2017-06-07 PROCEDURE — 160041 HCHG SURGERY MINUTES - EA ADDL 1 MIN LEVEL 4: Performed by: ORTHOPAEDIC SURGERY

## 2017-06-07 PROCEDURE — A9270 NON-COVERED ITEM OR SERVICE: HCPCS

## 2017-06-07 PROCEDURE — 73560 X-RAY EXAM OF KNEE 1 OR 2: CPT | Mod: RT

## 2017-06-07 PROCEDURE — 500054 HCHG BANDAGE, ELASTIC 6: Performed by: ORTHOPAEDIC SURGERY

## 2017-06-07 PROCEDURE — 500088 HCHG BLADE, SAGITTAL: Performed by: ORTHOPAEDIC SURGERY

## 2017-06-07 PROCEDURE — 502000 HCHG MISC OR IMPLANTS RC 0278: Performed by: ORTHOPAEDIC SURGERY

## 2017-06-07 PROCEDURE — 700101 HCHG RX REV CODE 250: Performed by: PHYSICIAN ASSISTANT

## 2017-06-07 PROCEDURE — 700111 HCHG RX REV CODE 636 W/ 250 OVERRIDE (IP): Performed by: PHYSICIAN ASSISTANT

## 2017-06-07 PROCEDURE — 160029 HCHG SURGERY MINUTES - 1ST 30 MINS LEVEL 4: Performed by: ORTHOPAEDIC SURGERY

## 2017-06-07 PROCEDURE — 160035 HCHG PACU - 1ST 60 MINS PHASE I: Performed by: ORTHOPAEDIC SURGERY

## 2017-06-07 PROCEDURE — 770006 HCHG ROOM/CARE - MED/SURG/GYN SEMI*

## 2017-06-07 PROCEDURE — 160036 HCHG PACU - EA ADDL 30 MINS PHASE I: Performed by: ORTHOPAEDIC SURGERY

## 2017-06-07 PROCEDURE — A9270 NON-COVERED ITEM OR SERVICE: HCPCS | Performed by: PHYSICIAN ASSISTANT

## 2017-06-07 PROCEDURE — 501487 HCHG STRYKER TIP: Performed by: ORTHOPAEDIC SURGERY

## 2017-06-07 PROCEDURE — 160002 HCHG RECOVERY MINUTES (STAT): Performed by: ORTHOPAEDIC SURGERY

## 2017-06-07 PROCEDURE — 0SRC0J9 REPLACEMENT OF RIGHT KNEE JOINT WITH SYNTHETIC SUBSTITUTE, CEMENTED, OPEN APPROACH: ICD-10-PCS | Performed by: ORTHOPAEDIC SURGERY

## 2017-06-07 PROCEDURE — 502579 HCHG PACK, TOTAL KNEE: Performed by: ORTHOPAEDIC SURGERY

## 2017-06-07 PROCEDURE — 700102 HCHG RX REV CODE 250 W/ 637 OVERRIDE(OP): Performed by: PHYSICIAN ASSISTANT

## 2017-06-07 PROCEDURE — 501486 HCHG STRYKER IRRIG SET HC W/TUBING: Performed by: ORTHOPAEDIC SURGERY

## 2017-06-07 PROCEDURE — 160048 HCHG OR STATISTICAL LEVEL 1-5: Performed by: ORTHOPAEDIC SURGERY

## 2017-06-07 PROCEDURE — 97161 PT EVAL LOW COMPLEX 20 MIN: CPT

## 2017-06-07 PROCEDURE — 501480 HCHG STOCKINETTE: Performed by: ORTHOPAEDIC SURGERY

## 2017-06-07 PROCEDURE — 700102 HCHG RX REV CODE 250 W/ 637 OVERRIDE(OP)

## 2017-06-07 DEVICE — IMPLANT GII OVAL RESURFACING PAT 32MM (1EA): Type: IMPLANTABLE DEVICE | Status: FUNCTIONAL

## 2017-06-07 DEVICE — IMPLANTABLE DEVICE: Type: IMPLANTABLE DEVICE | Status: FUNCTIONAL

## 2017-06-07 DEVICE — CEMENT ORTHOPEDIC HV US  (10/PK): Type: IMPLANTABLE DEVICE | Status: FUNCTIONAL

## 2017-06-07 RX ORDER — DIPHENHYDRAMINE HCL 25 MG
25 TABLET ORAL EVERY 6 HOURS PRN
Status: DISCONTINUED | OUTPATIENT
Start: 2017-06-07 | End: 2017-06-08 | Stop reason: HOSPADM

## 2017-06-07 RX ORDER — ONDANSETRON 4 MG/1
4 TABLET, ORALLY DISINTEGRATING ORAL EVERY 4 HOURS PRN
Status: DISCONTINUED | OUTPATIENT
Start: 2017-06-07 | End: 2017-06-08 | Stop reason: HOSPADM

## 2017-06-07 RX ORDER — LIDOCAINE HYDROCHLORIDE 10 MG/ML
INJECTION, SOLUTION INFILTRATION; PERINEURAL
Status: COMPLETED
Start: 2017-06-07 | End: 2017-06-07

## 2017-06-07 RX ORDER — EPINEPHRINE 1 MG/ML(1)
AMPUL (ML) INJECTION
Status: DISCONTINUED | OUTPATIENT
Start: 2017-06-07 | End: 2017-06-07 | Stop reason: HOSPADM

## 2017-06-07 RX ORDER — ATENOLOL 25 MG/1
50 TABLET ORAL
Status: DISCONTINUED | OUTPATIENT
Start: 2017-06-08 | End: 2017-06-08 | Stop reason: HOSPADM

## 2017-06-07 RX ORDER — SCOLOPAMINE TRANSDERMAL SYSTEM 1 MG/1
1 PATCH, EXTENDED RELEASE TRANSDERMAL
Status: DISCONTINUED | OUTPATIENT
Start: 2017-06-07 | End: 2017-06-08 | Stop reason: HOSPADM

## 2017-06-07 RX ORDER — AMOXICILLIN 250 MG
1 CAPSULE ORAL NIGHTLY
Status: DISCONTINUED | OUTPATIENT
Start: 2017-06-07 | End: 2017-06-08 | Stop reason: HOSPADM

## 2017-06-07 RX ORDER — CELECOXIB 200 MG/1
CAPSULE ORAL
Status: COMPLETED
Start: 2017-06-07 | End: 2017-06-07

## 2017-06-07 RX ORDER — DOCUSATE SODIUM 100 MG/1
100 CAPSULE, LIQUID FILLED ORAL 2 TIMES DAILY
Status: DISCONTINUED | OUTPATIENT
Start: 2017-06-07 | End: 2017-06-08 | Stop reason: HOSPADM

## 2017-06-07 RX ORDER — AMOXICILLIN 250 MG
1 CAPSULE ORAL
Status: DISCONTINUED | OUTPATIENT
Start: 2017-06-07 | End: 2017-06-08 | Stop reason: HOSPADM

## 2017-06-07 RX ORDER — OXYCODONE HYDROCHLORIDE 5 MG/1
5 TABLET ORAL
Status: DISCONTINUED | OUTPATIENT
Start: 2017-06-07 | End: 2017-06-08 | Stop reason: HOSPADM

## 2017-06-07 RX ORDER — HALOPERIDOL 5 MG/ML
1 INJECTION INTRAMUSCULAR EVERY 6 HOURS PRN
Status: DISCONTINUED | OUTPATIENT
Start: 2017-06-07 | End: 2017-06-08 | Stop reason: HOSPADM

## 2017-06-07 RX ORDER — DIPHENHYDRAMINE HCL 25 MG
25 TABLET ORAL NIGHTLY PRN
Status: DISCONTINUED | OUTPATIENT
Start: 2017-06-08 | End: 2017-06-08 | Stop reason: HOSPADM

## 2017-06-07 RX ORDER — DEXAMETHASONE SODIUM PHOSPHATE 4 MG/ML
8 INJECTION, SOLUTION INTRA-ARTICULAR; INTRALESIONAL; INTRAMUSCULAR; INTRAVENOUS; SOFT TISSUE
Status: DISCONTINUED | OUTPATIENT
Start: 2017-06-07 | End: 2017-06-08 | Stop reason: HOSPADM

## 2017-06-07 RX ORDER — KETOROLAC TROMETHAMINE 30 MG/ML
INJECTION, SOLUTION INTRAMUSCULAR; INTRAVENOUS
Status: DISCONTINUED | OUTPATIENT
Start: 2017-06-07 | End: 2017-06-07 | Stop reason: HOSPADM

## 2017-06-07 RX ORDER — TRANEXAMIC ACID
POWDER (GRAM) MISCELLANEOUS
Status: DISCONTINUED | OUTPATIENT
Start: 2017-06-07 | End: 2017-06-07 | Stop reason: HOSPADM

## 2017-06-07 RX ORDER — ENEMA 19; 7 G/133ML; G/133ML
1 ENEMA RECTAL
Status: DISCONTINUED | OUTPATIENT
Start: 2017-06-07 | End: 2017-06-08 | Stop reason: HOSPADM

## 2017-06-07 RX ORDER — BISACODYL 10 MG
10 SUPPOSITORY, RECTAL RECTAL
Status: DISCONTINUED | OUTPATIENT
Start: 2017-06-07 | End: 2017-06-08 | Stop reason: HOSPADM

## 2017-06-07 RX ORDER — ONDANSETRON 2 MG/ML
4 INJECTION INTRAMUSCULAR; INTRAVENOUS EVERY 4 HOURS PRN
Status: DISCONTINUED | OUTPATIENT
Start: 2017-06-07 | End: 2017-06-08 | Stop reason: HOSPADM

## 2017-06-07 RX ORDER — TAMSULOSIN HYDROCHLORIDE 0.4 MG/1
0.4 CAPSULE ORAL EVERY EVENING
Status: DISCONTINUED | OUTPATIENT
Start: 2017-06-07 | End: 2017-06-08 | Stop reason: HOSPADM

## 2017-06-07 RX ORDER — DEXTROSE MONOHYDRATE, SODIUM CHLORIDE, AND POTASSIUM CHLORIDE 50; 1.49; 4.5 G/1000ML; G/1000ML; G/1000ML
INJECTION, SOLUTION INTRAVENOUS CONTINUOUS
Status: DISCONTINUED | OUTPATIENT
Start: 2017-06-07 | End: 2017-06-08 | Stop reason: HOSPADM

## 2017-06-07 RX ORDER — ACETAMINOPHEN 500 MG
TABLET ORAL
Status: COMPLETED
Start: 2017-06-07 | End: 2017-06-07

## 2017-06-07 RX ORDER — TRAMADOL HYDROCHLORIDE 50 MG/1
50 TABLET ORAL EVERY 4 HOURS PRN
Status: DISCONTINUED | OUTPATIENT
Start: 2017-06-07 | End: 2017-06-08 | Stop reason: HOSPADM

## 2017-06-07 RX ORDER — DIAZEPAM 5 MG/1
5 TABLET ORAL EVERY 6 HOURS PRN
Status: DISCONTINUED | OUTPATIENT
Start: 2017-06-07 | End: 2017-06-08 | Stop reason: HOSPADM

## 2017-06-07 RX ORDER — FENOFIBRATE 134 MG/1
134 CAPSULE ORAL DAILY
Status: DISCONTINUED | OUTPATIENT
Start: 2017-06-07 | End: 2017-06-08 | Stop reason: HOSPADM

## 2017-06-07 RX ORDER — OXYCODONE HCL 10 MG/1
TABLET, FILM COATED, EXTENDED RELEASE ORAL
Status: COMPLETED
Start: 2017-06-07 | End: 2017-06-07

## 2017-06-07 RX ORDER — ROPIVACAINE HYDROCHLORIDE 5 MG/ML
INJECTION, SOLUTION EPIDURAL; INFILTRATION; PERINEURAL
Status: DISCONTINUED | OUTPATIENT
Start: 2017-06-07 | End: 2017-06-07 | Stop reason: HOSPADM

## 2017-06-07 RX ORDER — KETOROLAC TROMETHAMINE 30 MG/ML
15 INJECTION, SOLUTION INTRAMUSCULAR; INTRAVENOUS EVERY 6 HOURS
Status: COMPLETED | OUTPATIENT
Start: 2017-06-07 | End: 2017-06-08

## 2017-06-07 RX ORDER — PRAVASTATIN SODIUM 20 MG
40 TABLET ORAL EVERY EVENING
Status: DISCONTINUED | OUTPATIENT
Start: 2017-06-07 | End: 2017-06-08 | Stop reason: HOSPADM

## 2017-06-07 RX ORDER — DIPHENHYDRAMINE HYDROCHLORIDE 50 MG/ML
25 INJECTION INTRAMUSCULAR; INTRAVENOUS EVERY 6 HOURS PRN
Status: DISCONTINUED | OUTPATIENT
Start: 2017-06-07 | End: 2017-06-08 | Stop reason: HOSPADM

## 2017-06-07 RX ORDER — ACETAMINOPHEN 500 MG
1000 TABLET ORAL EVERY 6 HOURS
Status: DISCONTINUED | OUTPATIENT
Start: 2017-06-07 | End: 2017-06-08 | Stop reason: HOSPADM

## 2017-06-07 RX ORDER — ONDANSETRON 2 MG/ML
INJECTION INTRAMUSCULAR; INTRAVENOUS
Status: COMPLETED
Start: 2017-06-07 | End: 2017-06-07

## 2017-06-07 RX ORDER — POLYETHYLENE GLYCOL 3350 17 G/17G
1 POWDER, FOR SOLUTION ORAL 2 TIMES DAILY PRN
Status: DISCONTINUED | OUTPATIENT
Start: 2017-06-07 | End: 2017-06-08 | Stop reason: HOSPADM

## 2017-06-07 RX ORDER — SODIUM CHLORIDE, SODIUM LACTATE, POTASSIUM CHLORIDE, CALCIUM CHLORIDE 600; 310; 30; 20 MG/100ML; MG/100ML; MG/100ML; MG/100ML
1000 INJECTION, SOLUTION INTRAVENOUS
Status: DISCONTINUED | OUTPATIENT
Start: 2017-06-07 | End: 2017-06-07

## 2017-06-07 RX ORDER — CELECOXIB 200 MG/1
200 CAPSULE ORAL
Status: DISCONTINUED | OUTPATIENT
Start: 2017-06-08 | End: 2017-06-08 | Stop reason: HOSPADM

## 2017-06-07 RX ORDER — GABAPENTIN 300 MG/1
CAPSULE ORAL
Status: COMPLETED
Start: 2017-06-07 | End: 2017-06-07

## 2017-06-07 RX ADMIN — KETOROLAC TROMETHAMINE 15 MG: 30 INJECTION, SOLUTION INTRAMUSCULAR at 11:19

## 2017-06-07 RX ADMIN — FENOFIBRATE 134 MG: 134 CAPSULE ORAL at 20:21

## 2017-06-07 RX ADMIN — POTASSIUM CHLORIDE, DEXTROSE MONOHYDRATE AND SODIUM CHLORIDE: 150; 5; 450 INJECTION, SOLUTION INTRAVENOUS at 10:32

## 2017-06-07 RX ADMIN — FENTANYL CITRATE 50 MCG: 50 INJECTION, SOLUTION INTRAMUSCULAR; INTRAVENOUS at 10:05

## 2017-06-07 RX ADMIN — DOCUSATE SODIUM 100 MG: 100 CAPSULE, LIQUID FILLED ORAL at 20:14

## 2017-06-07 RX ADMIN — ACETAMINOPHEN 1000 MG: 500 TABLET ORAL at 11:20

## 2017-06-07 RX ADMIN — TAMSULOSIN HYDROCHLORIDE 0.4 MG: 0.4 CAPSULE ORAL at 20:13

## 2017-06-07 RX ADMIN — KETOROLAC TROMETHAMINE 15 MG: 30 INJECTION, SOLUTION INTRAMUSCULAR at 18:09

## 2017-06-07 RX ADMIN — DOCUSATE SODIUM AND SENNOSIDES 1 TABLET: 8.6; 5 TABLET, FILM COATED ORAL at 20:14

## 2017-06-07 RX ADMIN — CELECOXIB 200 MG: 200 CAPSULE ORAL at 05:50

## 2017-06-07 RX ADMIN — DOCUSATE SODIUM AND SENNOSIDES 1 TABLET: 8.6; 5 TABLET, FILM COATED ORAL at 20:16

## 2017-06-07 RX ADMIN — OXYCODONE HYDROCHLORIDE 10 MG: 10 TABLET, FILM COATED, EXTENDED RELEASE ORAL at 05:50

## 2017-06-07 RX ADMIN — ACETAMINOPHEN 1000 MG: 500 TABLET ORAL at 18:08

## 2017-06-07 RX ADMIN — ACETAMINOPHEN 1000 MG: 500 TABLET, COATED ORAL at 05:50

## 2017-06-07 RX ADMIN — GABAPENTIN 300 MG: 300 CAPSULE ORAL at 05:50

## 2017-06-07 RX ADMIN — ONDANSETRON 4 MG: 2 INJECTION INTRAMUSCULAR; INTRAVENOUS at 09:50

## 2017-06-07 RX ADMIN — POTASSIUM CHLORIDE, DEXTROSE MONOHYDRATE AND SODIUM CHLORIDE: 150; 5; 450 INJECTION, SOLUTION INTRAVENOUS at 19:00

## 2017-06-07 RX ADMIN — SODIUM CHLORIDE 2 G: 9 INJECTION, SOLUTION INTRAVENOUS at 15:43

## 2017-06-07 RX ADMIN — ONDANSETRON 4 MG: 4 TABLET, ORALLY DISINTEGRATING ORAL at 09:48

## 2017-06-07 RX ADMIN — PRAVASTATIN SODIUM 40 MG: 20 TABLET ORAL at 20:14

## 2017-06-07 ASSESSMENT — LIFESTYLE VARIABLES
EVER FELT BAD OR GUILTY ABOUT YOUR DRINKING: NO
CONSUMPTION TOTAL: NEGATIVE
ON A TYPICAL DAY WHEN YOU DRINK ALCOHOL HOW MANY DRINKS DO YOU HAVE: 1
TOTAL SCORE: 0
EVER HAD A DRINK FIRST THING IN THE MORNING TO STEADY YOUR NERVES TO GET RID OF A HANGOVER: NO
HOW MANY TIMES IN THE PAST YEAR HAVE YOU HAD 5 OR MORE DRINKS IN A DAY: 0
HAVE PEOPLE ANNOYED YOU BY CRITICIZING YOUR DRINKING: NO
EVER_SMOKED: YES
HAVE YOU EVER FELT YOU SHOULD CUT DOWN ON YOUR DRINKING: NO
AVERAGE NUMBER OF DAYS PER WEEK YOU HAVE A DRINK CONTAINING ALCOHOL: 5
TOTAL SCORE: 0
ALCOHOL_USE: YES
TOTAL SCORE: 0

## 2017-06-07 ASSESSMENT — PAIN SCALES - GENERAL
PAINLEVEL_OUTOF10: 0
PAINLEVEL_OUTOF10: 0
PAINLEVEL_OUTOF10: 3
PAINLEVEL_OUTOF10: 5

## 2017-06-07 ASSESSMENT — GAIT ASSESSMENTS
GAIT LEVEL OF ASSIST: CONTACT GUARD ASSIST
DEVIATION: STEP TO
DISTANCE (FEET): 80
ASSISTIVE DEVICE: FRONT WHEEL WALKER

## 2017-06-07 NOTE — PROGRESS NOTES
2 RN skin assessment complete, skin not WDL. Surgical incision to R knee s/p RTK this AM. See wound flowsheet.

## 2017-06-07 NOTE — OR NURSING
0551 Pt. Allergies and NPO status verified, home medications reconciled. Belongings secured. Pt. Verbalizes understanding of pain scale, expected course of stay and plan of care. Surgical site verified with pt. Pt. And family given home care instructions for discharge planning. IV access established. Sequentials placed on legs.

## 2017-06-07 NOTE — OR NURSING
0925: To PACU from OR via gurney, respirations spontaneous and non-labored. Polar Icepack applied over c/d/i L  Knee surgical dressings.  0930: No change in surgical site assessment.respirations spontaneous and non-labored, still sleepy.  0945: Still sleepy, pain tolerable, anti-nausea medication for nausea.  1000: Pain medication given per MAR.  1015: Resting comfortably.  1020: Gave report to April RN.  1035: Rcvd report from April RN and re-assumed care, Meets criteria to transfer to floor, report called and pt readied for transfer.  1045: Pt transported to floor via bed.

## 2017-06-07 NOTE — OP REPORT
Date of Surgery:  6-7-17  Pre-operative Diagnosis:  right knee arthritis    Post-operative Diagnosis:  right knee arthritis    Procedure:  right knee replacement    Implants used:  A Smith-Nephew Journey II BCS total knee arthroplasty system with the following components  Femur: 5 right oxinium BCS  Tibia: 4 Journey  Polyethylene: 11 mm BCS  Patella: 32 oval  Fixation: 2 packages Simplex HV    Surgeon:  Steffanie Del Angel MD    1st Assistant:   KOURTNEY Mcgovern    2nd Assistant:  Kali    Anesthesiologist:   Chauncey    EBL:  100    Specimen:  none    Indications for procedure:  This patient is a 77 y.o. male with a long standing history of right knee arthritis. He had failed conservative therapy including anti-inflammatory medications, activity modifications, injections, physical therapy and assistive devices. He was indicated for a right total knee replacement. The patient expressed an understanding of the risks of pain, bleeding, infection, dislocation, malalignment, need for further surgery, damage to surrounding structures, neurovascular compromise, blood clots, leg-length discrepancy both apparent and actual, anesthetic complications, and serious medical consequences including but not limited to heart attack, stroke or even death. It was explained that the implants are man-made products and thus subject to possible failure.  The patient expressed an understanding and wished to proceed. Specific risks based on the patient's medical history were addressed. A clearance was obtained by the medical physicians and the patient was taken to the operating room on the day of surgery for the above named procedure.     Description of procedure:  The patient was met in the pre-operative holding area. The right knee was marked as the appropriate surgical site with indelible ink. They were taken to the operating room where the anesthesia department started a adductor block and general for intraoperative and post-operative anesthesia  "and pain control. The patient was placed on the OR table and a tourniquet was placed high on the operative thigh. All bony prominences were padded appropriately. The right knee was prepped and draped in a standard sterile surgical fashion. A time out procedure was called to verify the side and site of surgery, the proposed surgical procedure, and the administration of pre-operative antibiotics. After successful completion of the time out procedure, our attention was turned to the right knee.  The tourniquet was inflated after exsanguination with an Esmarch bandage. The knee was flexed and a straight incision was made just medial to the midline. The capsule of the knee was cleared and a midvastus arthrotomy was performed. The patella was subluxated laterally and a medial release was performed to properly visualize the posteromedial aspect of the tibial plateau. The knee was extended, the patellar tendon was protected and the infrapatellar fat pad was excised. A lateral release was performed. The patella was turned on its side and held in place with two penetrating towel clips. A free-hand patellar cut was made, the patella was sized and the appropriate lug holes were drilled. The patella was subluxed, the knee was flexed. Each hemijoint was cleared of soft tissue. The ACL was removed. The femoral canal was entered with the step drill and the distal femoral cutting guide was pinned in place in 6 degrees of valgus. The distal cut was made and the bony pieces were removed. The femur was sized to a size 5 and the appropriate cutting jig was pinned in place in 3 degrees of external rotation. The bony cuts were made, we noted a \"grand piano sign\" anteriorly. The box for the PS post was cut.   Now the femur was retracted posteriorly with a lap sponge to protect the intercondylar area. The proximal tibia was exposed, the depth of our resection was based on taking 2 mm off the low side of the bone. We used the intramedullary " drill to enter the tibial canal and set our alignment based on the patient's anatomy.  We first set our depth and then our checked our varus/valgus alignment with the extramedullary guide nimisha. We made our bony cuts and removed the tibial piece en bloc. The laminar spreaders were placed and the hypercurved osteotomes were used to remove posterior femoral osteophytes. The ligaments were balanced in flexion and extension.   The tibia was then sized to a size 4 and the trial component was placed in the proper amount of external rotation. A trial femoral component was placed and with the 11 polyethylene we noted full extension without recurvatum and greater than 125 degrees of flexion with appropriate patellar tracking. At this point we removed the trial components and thoroughly irrigated the wound. Osteophytes were removed. The real components were opened in a sterile fashion on the back table and then cemented into place sequentially, first the femur, then the tibia, then the patella. The cement was allowed to cure with the trial polyethylene component in place. After the cement had hardened and all excess cement was removed, the knee was taken through a range of motion. Again we noted full extension and greater than 125 degrees of flexion with appropriate varus/valgus stability and patellar tracking. Therefore the real polyethylene was opened and inserted into the tibial tray. An audible click was heard as it engaged the tibia. Now a dilute betadine solution with 3 grams of tranexamic acid was instilled into the knee for 3 minutes before being pulse-lavaged out. The knee was flexed, the arthrotomy was closed with #1 Quill, followed by 2-0 Vicryl in the deep dermal layer in a buried interrupted fashion. The skin was closed with 3-0 monocryl, and a sterile silver dressing was applied. The patient is currently in the operating room awaiting reversal of anesthesia.

## 2017-06-07 NOTE — PROGRESS NOTES
Received report from [facility] PACU RN, assumed pt care. Pt A&Ox4, sitting up in bed with wife at bedside. Dressing to R knee CDI, +CMS, pt able to plantar/dorsi flex w/o difficulty. Pt c/o 3/10 pain and tolerable, polar ice to R knee in place. No c/o nausea or vomiting at this time. Pt taught to inform nurse if experiencing pain above comfort goal, SOB, chest pain, ambulating out of bed, or for any further assistance. Fall precautions in place, call light within reach. Will continue with care.

## 2017-06-07 NOTE — IP AVS SNAPSHOT
" Home Care Instructions                                                                                                                  Name:Brian Lopez  Medical Record Number:[id number]  CSN: [id number]    YOB: 1940   Age: 77 y.o.  Sex: male  HT:1.676 m (5' 5.98\") WT: 81.5 kg (179 lb 10.8 oz)          Admit Date: 6/7/2017     Discharge Date:   Today's Date: 6/8/2017  Attending Doctor:  Steffanie Del Angel M.D.                  Allergies:  Review of patient's allergies indicates no known allergies.            Discharge Instructions       Dr. Del Angel's Discharge Instructions:     The first week after your joint replacement is a time to recover from the surgery. We expect light exercise to keep you active and mobile. Dr. Del Angel requires a walker or cane for most patients in the first few days following surgery to try to prevent falls or complications.     Take your pain medication as appropriate to ensure that your pain is not limiting your recovery. You'll be seen in clinic in 7-10 days (this appointment has already been made, call our office if you're unsure of the time). At that time, we'll prescribe your physical therapy to help with the recovery phase.     -Keep dressing clean and dry. Leave dressing in place until follow up. If you have an incisional vac dressing, the battery may die before your first post operative appointment, but you can leave the dressing in place and it will be removed at your clinic visit     -OK to shower, keep dressing in place. Pat dressing dry, do not rub incision     -Do not soak incision in bath, hot tub or pool     -Follow up with Dr. Del Angel at regularly scheduled time     -Call [facility] office at [phone] for questions     -Weight bearing status: Weight Bearing as tolerated     -Take medication as prescribed for DVT prophylaxis    Discharge Instructions    Discharged to home by car with relative. Discharged via wheelchair, hospital escort: Yes.  Special equipment " needed: Not Applicable    Be sure to schedule a follow-up appointment with your primary care doctor or any specialists as instructed.     Discharge Plan:   Influenza Vaccine Indication: Not indicated: Previously immunized this influenza season and > 8 years of age    I understand that a diet low in cholesterol, fat, and sodium is recommended for good health. Unless I have been given specific instructions below for another diet, I accept this instruction as my diet prescription.   Other diet: Regular    Special Instructions: Discharge instructions for the Orthopedic Patient    Follow up with Primary Care Physician within 2 weeks of discharge to home, regarding:  Review of medications and diagnostic testing.  Surveillance for medical complications.  Workup and treatment of osteoporosis, if appropriate.     -Is this a Joint Replacement patient? Yes Total Joint Knee Replacement Discharge Instructions    Pain  - The goal is to slowly wean off the prescription pain medicine.  - Ice can be used for pain control.  20 minutes at a time is recommended, and never directly against your skin or incision.  - Most patients are off the pain pills by 3 weeks; others may require a low level of pain medications for many months.  If your pain continues to be severe, follow up with your physician.  Infection    Knee joint infections; occur in fewer than 2% of patients. The most common causes of infection following total knee replacement surgery are from bacteria that enter the bloodstream during dental procedures, urinary tract infections, or skin infections. These bacteria can lodge around your knee replacement and cause an infection.  - Keep the incision as clean and dry as possible.  - Always wash your hands before touching your incision.  - Skin infections tend to develop around 7-10 days after surgery; most can be treated with oral antibiotics.  - Dental Care should be delayed for 3 months after surgery, your surgeon recommends  taking a dose of antibiotics 1 hour prior to any dental procedure. After 2 years, most surgeons recommend antibiotics only before an extensive procedure.  Ask your surgeon what he recommends.  - Signs and symptoms of infection can include:  low grade fever, redness, pain, swelling and drainage from your incision.  Notify your surgeon immediately if you develop any of these symptoms.  Other instructions  - Bowel habits - constipation is extremely common and is caused by a combination of anesthesia, lack of mobility and pain medicine.  Use stool softeners or laxatives if necessary. It is important not to ignore this problem, as bowel obstructions can be a serious complication after joint replacement surgery.  - Mood/Energy Level - Many patients experience a lack of energy and endurance for up to 2-3 months after surgery.  Some may also feel down and can even become depressed.  This is likely due to the postoperative anemia, change in activity level, lack of sleep, pain medicine and just the emotional reaction to the surgery itself that is a big disruption in a person’s life.  This usually passes.  If symptoms persist, follow up with your primary physician.  - Returning to work - Your surgeon will give you more specific instructions. Depending on the type of activities you perform, it may be 6 to 8 weeks before you return to work.   Generally, if you work a sedentary job requiring little standing or walking, most patients may return within 2-6 weeks.  Manual labor jobs involving walking, lifting and standing may take longer. Your surgeon’s office can provide a release to part-time or light duty work early on in your recovery and progress you to full duty as able.    - Driving - If your left knee was replaced and you have an automatic transmission, you may be able to begin driving in a week or so, provided you are no longer taking narcotic pain medication. If your right knee was replaced, avoid driving for 6 to 8 weeks.  Remember that your reflexes may not be as sharp as before your surgery. Ask your surgeon for specific instructions.   - Avoiding falls - A fall during the first few weeks after surgery can damage your new knee and may result in a need for further surgery.   throw rugs and tack down loose carpeting.  Be aware of floor hazards such as pets, small objects or uneven surfaces.    - Airport Metal Detectors - The sensitivity of metal detectors varies and it is likely that your prosthesis will cause an alarm.  Inform the  of your artificial joint.  Diet  - Resume your normal diet as tolerated.  - It is important to achieve a healthy nutritional status by eating a well balanced diet on a regular basis.  - Your physician may recommend that you take iron and vitamin supplements.   - Continue to drink plenty of fluids.  Shower/Bathing  - You may shower as soon as you get home from the hospital unless otherwise instructed.  - Keep your incision out of water.  To keep the incision dry when showering, cover it with a plastic bag or plastic wrap.  - Pat incision dry if it gets wet.  Don’t rub.  - Do not submerge in a bath until staples are out and the incision is completely healed. (Approximately 6-8 weeks)  Dressing Change:  Procedure (if recommended by your physician)  - Wash hands.  - Open all new dressing change materials.  - Remove old dressing and discard.  - Inspect incision for redness, increase in clear drainage, yellow/green drainage, odor and surrounding skin hot to touch.  -  ABD (large gauze) pad or “island dressing” by one corner and lay over the incision.  Be careful not to touch the inside of the dressing that will lay over the incision.  - Secure in place as instructed (Ace wrap or tape).    Swelling/Bruising    - Swelling can last from 3-6 months.  - Elevate your leg higher than your heart while reclining.   The first week you are home you should elevate your leg an equal amount of  time, as you are active.    - Anti-inflammatory pills can be taken once you have stopped the blood thinners.  - The swelling is usually worse after you go home since you are upright for longer periods of time.  - Bruising is common and can involve the entire leg including the thigh, calf and even foot. Bruising often does not appear until after you arrive home and it can be quite dramatic- purple, black, and green.  The bruising you can see is not usually concerning and will subside without any treatment.      Blood Clot Prevention  Blood clots in the legs and the less common, but frightening, clots that travel to the lungs are a real focus of our preventative. Most patients are at standard risk for them, but those patients who are at higher risk include people who have had previous clots, a family history of clotting, smoking, diabetes, obesity, advanced age, use of estrogen and a sedentary lifestyle.    - Signs of blood clots in legs - Swelling in thigh, calf or ankle that does not go down with elevation.  Pain, heat and tenderness in calf, back of calf or groin area.  NOTE: blood clots can occur in either leg.  - You have been receiving anticoagulant therapy (blood thinners) in the hospital and you may be instructed to continue at home depending on your risk factors.  - Your risk for developing a clot continues for up to 2-3 months after surgery.  You should avoid prolonged sitting and dehydration during that time (long air trips and car trips).  If you do take a trip during this time, please get up and move around every 1- 1.5 hours.  - If you are prescribed blood-thinning medication for home, follow instructions as directed. (Handouts provided if applicable).      Activity  Once home, you should continue to stay active. The key is to remember not to overdo it! While you can expect some good days and some bad days, you should notice a gradual improvement and a gradual increase in your endurance over the next 6  to 12 months. Exercise is a critical component of home care, particularly during the first few weeks after surgery.     - Normal activities of daily living You should be able to resume most within 3 to 6 weeks following surgery. Some pain with activity and at night is common for several weeks after surgery  -  Physical Therapy Exercises - Continue to do the exercises prescribed for at least two months after surgery. Riding a stationary bicycle can help maintain muscle tone and keep your knee flexible. Try to achieve the maximum degree of bending and extension possible. (handout provided by Therapist).  - Sexual Activity -. Your surgeon can tell you when it safe to resume sexual activity.    - Sleeping Positions - You can safely sleep on your back, on either side, or on your stomach.   - Other Activities - Walk as much as you like, but remember that walking is no substitute for the exercises your doctor and physical therapist will prescribe. Lower impact activities are preferred.  If you have specific questions, consult your Surgeon.    When to Call the Doctor   Call the physician if:   - Fever over 100.5? F  - Increased pain, drainage, redness, odor or heat around the incision area  - Shaking chills  - Increased knee pain with activity and rest  - Increased pain in calf, tenderness or redness above or below the knee  - Increased swelling of calf, ankle, foot  - Sudden increased shortness of breath, sudden onset of chest pain, localized chest pain with coughing  - Incision opening  Or, if there are any questions or concerns about medications or care.       -Is this patient being discharged with medication to prevent blood clots?  Yes, Aspirin Aspirin, ASA oral tablets  What is this medicine?  ASPIRIN (AS pir in) is a pain reliever. It is used to treat mild pain and fever. This medicine is also used as directed by a doctor to prevent and to treat heart attacks, to prevent strokes, and to treat arthritis or  inflammation.  This medicine may be used for other purposes; ask your health care provider or pharmacist if you have questions.  COMMON BRAND NAME(S): Aspir-Low, Aspir-Latrice , Aspirtab , [place]und Advanced Aspirin, [place] Aspirin Extra Strength, [place] Aspirin Plus, [place] Aspirin, [place] Genuine Aspirin, Mukund Womens Aspirin , Bufferin Extra Strength, Bufferin Low Dose, Bufferin  What should I tell my health care provider before I take this medicine?  They need to know if you have any of these conditions:  -anemia  -asthma  -bleeding problems  -child with chickenpox, the flu, or other viral infection  -diabetes  -gout  -if you frequently drink alcohol containing drinks  -kidney disease  -liver disease  -low level of vitamin K  -lupus  -smoke tobacco  -stomach ulcers or other problems  -an unusual or allergic reaction to aspirin, tartrazine dye, other medicines, dyes, or preservatives  -pregnant or trying to get pregnant  -breast-feeding  How should I use this medicine?  Take this medicine by mouth with a glass of water. Follow the directions on the package or prescription label. You can take this medicine with or without food. If it upsets your stomach, take it with food. Do not take your medicine more often than directed.  Talk to your pediatrician regarding the use of this medicine in children. While this drug may be prescribed for children as young as 12 years of age for selected conditions, precautions do apply. Children and teenagers should not use this medicine to treat chicken pox or flu symptoms unless directed by a doctor.  Patients over 65 years old may have a stronger reaction and need a smaller dose.  Overdosage: If you think you have taken too much of this medicine contact a poison control center or emergency room at once.  NOTE: This medicine is only for you. Do not share this medicine with others.  What if I miss a dose?  If you are taking this medicine on a regular schedule and miss a dose, take it as soon  as you can. If it is almost time for your next dose, take only that dose. Do not take double or extra doses.  What may interact with this medicine?  Do not take this medicine with any of the following medications:  -cidofovir  -ketorolac  -probenecid  This medicine may also interact with the following medications:  -alcohol  -alendronate  -bismuth subsalicylate  -flavocoxid  -herbal supplements like feverfew, garlic, martina, ginkgo biloba, horse chestnut  -medicines for diabetes or glaucoma like acetazolamide, methazolamide  -medicines for gout  -medicines that treat or prevent blood clots like enoxaparin, heparin, ticlopidine, warfarin  -other aspirin and aspirin-like medicines  -NSAIDs, medicines for pain and inflammation, like ibuprofen or naproxen  -pemetrexed  -sulfinpyrazone  -varicella live vaccine  This list may not describe all possible interactions. Give your health care provider a list of all the medicines, herbs, non-prescription drugs, or dietary supplements you use. Also tell them if you smoke, drink alcohol, or use illegal drugs. Some items may interact with your medicine.  What should I watch for while using this medicine?  If you are treating yourself for pain, tell your doctor or health care professional if the pain lasts more than 10 days, if it gets worse, or if there is a new or different kind of pain. Tell your doctor if you see redness or swelling. Also, check with your doctor if you have a fever that lasts for more than 3 days. Only take this medicine to prevent heart attacks or blood clotting if prescribed by your doctor or health care professional.  Do not take aspirin or aspirin-like medicines with this medicine. Too much aspirin can be dangerous. Always read the labels carefully.  This medicine can irritate your stomach or cause bleeding problems. Do not smoke cigarettes or drink alcohol while taking this medicine. Do not lie down for 30 minutes after taking this medicine to prevent  irritation to your throat.  If you are scheduled for any medical or dental procedure, tell your healthcare provider that you are taking this medicine. You may need to stop taking this medicine before the procedure.  What side effects may I notice from receiving this medicine?  Side effects that you should report to your doctor or health care professional as soon as possible:  -allergic reactions like skin rash, itching or hives, swelling of the face, lips, or tongue  -black, tarry stools  -bloody, coffee ground-like vomit  -breathing problems  -changes in hearing, ringing in the ears  -confusion  -general ill feeling or flu-like symptoms  -pain on swallowing  -redness, blistering, peeling or loosening of the skin, including inside the mouth or nose  -trouble passing urine or change in the amount of urine  -unusual bleeding or bruising  -unusually weak or tired  -yellowing of the eyes or skin  Side effects that usually do not require medical attention (report to your doctor or health care professional if they continue or are bothersome):  -diarrhea or constipation  -nausea, vomiting  -stomach gas, heartburn  This list may not describe all possible side effects. Call your doctor for medical advice about side effects. You may report side effects to FDA at 9-759-FDA-2674.  Where should I keep my medicine?  Keep out of the reach of children.  Store at room temperature between 15 and 30 degrees C (59 and 86 degrees F). Protect from heat and moisture. Do not use this medicine if it has a strong vinegar smell. Throw away any unused medicine after the expiration date.  NOTE: This sheet is a summary. It may not cover all possible information. If you have questions about this medicine, talk to your doctor, pharmacist, or health care provider.  © 2014, [employer]/Gold Standard. (3/10/2009 10:44:17 AM)      · Is patient discharged on Warfarin / Coumadin?   No     · Is patient Post Blood Transfusion?  No    Depression / Suicide  Risk    As you are discharged from this [facility] facility, it is important to learn how to keep safe from harming yourself.    Recognize the warning signs:  · Abrupt changes in personality, positive or negative- including increase in energy   · Giving away possessions  · Change in eating patterns- significant weight changes-  positive or negative  · Change in sleeping patterns- unable to sleep or sleeping all the time   · Unwillingness or inability to communicate  · Depression  · Unusual sadness, discouragement and loneliness  · Talk of wanting to die  · Neglect of personal appearance   · Rebelliousness- reckless behavior  · Withdrawal from people/activities they love  · Confusion- inability to concentrate     If you or a loved one observes any of these behaviors or has concerns about self-harm, here's what you can do:  · Talk about it- your feelings and reasons for harming yourself  · Remove any means that you might use to hurt yourself (examples: pills, rope, extension cords, firearm)  · Get professional help from the community (Mental Health, Substance Abuse, psychological counseling)  · Do not be alone:Call your Safe Contact- someone whom you trust who will be there for you.  · Call your local CRISIS HOTLINE 099-5848 or 696-735-4430  · Call your local Children's Mobile Crisis Response Team [place] (567) 727-0376 or www.Qlue  · Call the toll free National Suicide Prevention Hotlines   · National Suicide Prevention Lifeline 705-826-GZSE (1599)  · National Hope Line Network 800-SUICIDE (567-2682)        Follow-up Information     1. Follow up with Steffanie Del Angel M.D..    Specialty:  Orthopaedics    Why:  Continue with current follow up appointment.    Contact information    [address]  [address]  [place] [address] [phone]           Discharge Medication Instructions:    Below are the medications your physician expects you to take upon discharge:    Review all your home medications and  newly ordered medications with your doctor and/or pharmacist. Follow medication instructions as directed by your doctor and/or pharmacist.    Please keep your medication list with you and share with your physician.               Medication List      START taking these medications        Instructions    Morning Afternoon Evening Bedtime    aspirin 81 MG EC tablet   Last time this was given:  81 mg on 6/8/2017  6:32 AM        Take 1 Tab by mouth 2 Times a Day.   Dose:  81 mg                        celecoxib 200 MG Caps   Last time this was given:  200 mg on 6/7/2017  5:50 AM   Commonly known as:  CELEBREX        Take 1 Cap by mouth every morning with breakfast.   Dose:  200 mg                        diazepam 5 MG Tabs   Commonly known as:  VALIUM        Take 1 Tab by mouth every 6 hours as needed.   Dose:  5 mg                         MG Caps   Last time this was given:  100 mg on 6/7/2017  8:14 PM        Take 100 mg by mouth 3 times a day as needed for Constipation.   Dose:  100 mg                        ondansetron 4 MG Tbdp   Last time this was given:  4 mg on 6/7/2017  9:48 AM   Commonly known as:  ZOFRAN ODT        Take 1 Tab by mouth every 8 hours as needed for Nausea/Vomiting.   Dose:  4 mg                        oxycodone immediate-release 5 MG Tabs   Commonly known as:  ROXICODONE        Take 1 Tab by mouth every 3 hours as needed (Moderate Pain (NRS Pain Scale 4-6; CPOT Pain Scale 3-5)).   Dose:  5 mg                        tramadol 50 MG Tabs   Commonly known as:  ULTRAM        Take 1 Tab by mouth every four hours as needed (Moderate Pain (NRS Pain Scale 4-6; CPOT Pain Scale 3-5) if opiates not ordered or tolerated).   Dose:  50 mg                          CHANGE how you take these medications        Instructions    Morning Afternoon Evening Bedtime    pravastatin 40 MG tablet   What changed:  when to take this   Last time this was given:  40 mg on 6/7/2017  8:14 PM   Commonly known as:  PRAVACHOL         Doctor's comments:  Authorization of a refill request.   Take 1 Tab by mouth every day.   Dose:  40 mg                        tamsulosin 0.4 MG capsule   What changed:  when to take this   Last time this was given:  0.4 mg on 6/7/2017  8:13 PM   Commonly known as:  FLOMAX        Doctor's comments:  This is an authorization for a requested refill   Take 1 Cap by mouth every day.   Dose:  0.4 mg                          CONTINUE taking these medications        Instructions    Morning Afternoon Evening Bedtime    atenolol 50 MG Tabs   Commonly known as:  TENORMIN        Doctor's comments:  Please remind pt to get labs done. This is an authorization for a requested refill   TAKE  ONE TABLET BY MOUTH EVERY MORNING                        CALCIUM + D3 PO        Take  by mouth every day.                        fenofibrate 160 MG tablet   Commonly known as:  TRIGLIDE        Doctor's comments:  Please remind pt to get labs done   TAKE  ONE TABLET BY MOUTH DAILY                        MULTIVITAMIN ADULT PO        Take  by mouth every day.                          STOP taking these medications     ALEVE PM PO                    Where to Get Your Medications      You can get these medications from any pharmacy     Bring a paper prescription for each of these medications    - aspirin 81 MG EC tablet  - celecoxib 200 MG Caps  - diazepam 5 MG Tabs  -  MG Caps  - ondansetron 4 MG Tbdp  - oxycodone immediate-release 5 MG Tabs  - tramadol 50 MG Tabs            Instructions           Diet / Nutrition:    Follow any diet instructions given to you by your doctor or the dietician, including how much salt (sodium) you are allowed each day.    If you are overweight, talk to your doctor about a weight reduction plan.    Activity:    Remain physically active following your doctor's instructions about exercise and activity.    Rest often.     Any time you become even a little tired or short of breath, SIT DOWN and rest.    Worsening  Symptoms:    Report any of the following signs and symptoms to the doctor's office immediately:    *Pain of jaw, arm, or neck  *Chest pain not relieved by medication                               *Dizziness or loss of consciousness  *Difficulty breathing even when at rest   *More tired than usual                                       *Bleeding drainage or swelling of surgical site  *Swelling of feet, ankles, legs or stomach                 *Fever (>100ºF)  *Pink or blood tinged sputum  *Weight gain (3lbs/day or 5lbs /week)           *Shock from internal defibrillator (if applicable)  *Palpitations or irregular heartbeats                *Cool and/or numb extremities    Stroke Awareness    Common Risk Factors for Stroke include:    Age  Atrial Fibrillation  Carotid Artery Stenosis  Diabetes Mellitus  Excessive alcohol consumption  High blood pressure  Overweight   Physical inactivity  Smoking    Warning signs and symptoms of a stroke include:    *Sudden numbness or weakness of the face, arm or leg (especially on one side of the body).  *Sudden confusion, trouble speaking or understanding.  *Sudden trouble seeing in one or both eyes.  *Sudden trouble walking, dizziness, loss of balance or coordination.Sudden severe headache with no known cause.    It is very important to get treatment quickly when a stroke occurs. If you experience any of the above warning signs, call 911 immediately.                   Disclaimer         Quit Smoking / Tobacco Use:    I understand the use of any tobacco products increases my chance of suffering from future heart disease or stroke and could cause other illnesses which may shorten my life. Quitting the use of tobacco products is the single most important thing I can do to improve my health. For further information on smoking / tobacco cessation call a Toll Free Quit Line at 1-657.149.8926 (*[employer] [place]) or 1-902.126.3866 (American [employer] Association) or you can access the web  based program at www.lungusa.org.    [place] Tobacco Users Help Line:  (371) 785-6552       Toll Free: 1-656.692.4118  Quit Tobacco Program [facility] (086)942-1755    Crisis Hotline:    [place] Crisis Hotline:  9-550-QGCURRD or 1-908.162.5327    [place] Crisis Hotline:    1-826.153.2770 or 680-721-1887    Discharge Survey:   [facility] We hope we did everything we could to make your hospital stay a pleasant one. You may be receiving a phone survey and we would appreciate your time and participation in answering the questions. Your input is very valuable to us in our efforts to improve our service to our patients and their families.        My signature on this form indicates that:    1. I have reviewed and understand the above information.  2. My questions regarding this information have been answered to my satisfaction.  3. I have formulated a plan with my discharge nurse to obtain my prescribed medications for home.                  Disclaimer         __________________________________                     __________       ________                       Patient Signature                                                 Date                    Time

## 2017-06-07 NOTE — IP AVS SNAPSHOT
Pinocular Access Code: Activation code not generated  Current Pinocular Status: Active    Enpockethart  A secure, online tool to manage your health information     [employer]’s Pinocular® is a secure, online tool that connects you to your personalized health information from the privacy of your home -- day or night - making it very easy for you to manage your healthcare. Once the activation process is completed, you can even access your medical information using the Pinocular mandi, which is available for free in the Apple Mandi store or Google Play store.     Pinocular provides the following levels of access (as shown below):   My Chart Features   [facility] Primary Care Doctor [facility]  Specialists [facility]  Urgent  Care Non-[facility]  Primary Care  Doctor   Email your healthcare team securely and privately 24/7 X X X X   Manage appointments: schedule your next appointment; view details of past/upcoming appointments X      Request prescription refills. X      View recent personal medical records, including lab and immunizations X X X X   View health record, including health history, allergies, medications X X X X   Read reports about your outpatient visits, procedures, consult and ER notes X X X X   See your discharge summary, which is a recap of your hospital and/or ER visit that includes your diagnosis, lab results, and care plan. X X       How to register for Pinocular:  1. Go to  [email or web address].  2. Click on the Sign Up Now box, which takes you to the New Member Sign Up page. You will need to provide the following information:  a. Enter your Pinocular Access Code exactly as it appears at the top of this page. (You will not need to use this code after you’ve completed the sign-up process. If you do not sign up before the expiration date, you must request a new code.)   b. Enter your date of birth.   c. Enter your home email address.   d. Click Submit, and follow the next screen’s instructions.  3. Create a Pinocular ID. This will  be your TripShake login ID and cannot be changed, so think of one that is secure and easy to remember.  4. Create a TripShake password. You can change your password at any time.  5. Enter your Password Reset Question and Answer. This can be used at a later time if you forget your password.   6. Enter your e-mail address. This allows you to receive e-mail notifications when new information is available in TripShake.  7. Click Sign Up. You can now view your health information.    For assistance activating your TripShake account, call [phone]

## 2017-06-07 NOTE — PROGRESS NOTES
Pt ambulated up OOB and in hallways with use of FWW, gait steady, standby assist from RN and PT. Pt voided 200ml urine in urinal. Pt up to recliner, pt states pain tolerable in R knee. Polar ice and SCDs in place. No further needs at this time, will continue with care.

## 2017-06-07 NOTE — THERAPY
"Physical Therapy Evaluation completed.   Bed Mobility:  Supine to Sit: Minimal Assist  Transfers: Sit to Stand: Contact Guard Assist  Gait: Level Of Assist: Contact Guard Assist with Front-Wheel Walker 80 ft. WBAT on RLE.       Plan of Care: Will benefit from Physical Therapy 7 times per week  Discharge Recommendations: Equipment: No Equipment Needed. Post-acute therapy Discharge to home with outpatient or home health for additional skilled therapy services.    78 yo male s/p R TKA (WBAT). PT completed initial supine and seated exercises w/ patient with good ROM 0-100 degrees noted. Pt able to ambulate in hallway w/ CGA and FWW 80 ft. Anticipate he will progress well with acute PT for ROM/strength and mobility training w/ goal of DC to home w/ spouse on Thursday. He will f/u with OP PT. No DME needs identified. RN present and aware.    See \"Rehab Therapy-Acute\" Patient Summary Report for complete documentation.     "

## 2017-06-07 NOTE — IP AVS SNAPSHOT
6/8/2017    Brian Yuanrick Jessica  [address] [facility] [address]    Dear Brian:    [facility] wants to ensure your discharge home is safe and you or your loved ones have had all of your questions answered regarding your care after you leave the hospital.    Below is a list of resources and contact information should you have any questions regarding your hospital stay, follow-up instructions, or active medical symptoms.    Questions or Concerns Regarding… Contact   Medical Questions Related to Your Discharge  (7 days a week, 8am-5pm) Contact a Nurse Care Coordinator   [phone]   Medical Questions Not Related to Your Discharge  (24 hours a day / 7 days a week)  Contact the Nurse Health Line   [phone]    Medications or Discharge Instructions Refer to your discharge packet   or contact your [facility] Primary Care Provider   [phone]   Follow-up Appointment(s) Schedule your appointment via [email or web address]   or contact Scheduling [phone]   Billing Review your statement via [email or web address]  or contact Billing [phone]   Medical Records Review your records via [email or web address]   or contact Medical Records [phone]     You may receive a telephone call within two days of discharge. This call is to make certain you understand your discharge instructions and have the opportunity to have any questions answered. You can also easily access your medical information, test results and upcoming appointments via the [email or web address] free online health management tool. You can learn more and sign up at [email or web address]. For assistance setting up your [email or web address] account, please call [phone].    Once again, we want to ensure your discharge home is safe and that you have a clear understanding of any next steps in your care. If you have any questions or concerns, please do not hesitate to contact us, we are here for you. Thank you for choosing [facility] for your healthcare needs.    Sincerely,    Your [facility] Healthcare Team

## 2017-06-08 VITALS
OXYGEN SATURATION: 93 % | HEIGHT: 66 IN | TEMPERATURE: 97.9 F | WEIGHT: 179.68 LBS | HEART RATE: 83 BPM | BODY MASS INDEX: 28.88 KG/M2 | RESPIRATION RATE: 20 BRPM | SYSTOLIC BLOOD PRESSURE: 145 MMHG | DIASTOLIC BLOOD PRESSURE: 84 MMHG

## 2017-06-08 PROBLEM — M17.11 OSTEOARTHRITIS OF RIGHT KNEE: Status: RESOLVED | Noted: 2017-06-07 | Resolved: 2017-06-08

## 2017-06-08 LAB
HCT VFR BLD AUTO: 34.6 % (ref 42–52)
HGB BLD-MCNC: 11.9 G/DL (ref 14–18)

## 2017-06-08 PROCEDURE — 97535 SELF CARE MNGMENT TRAINING: CPT

## 2017-06-08 PROCEDURE — G8989 SELF CARE D/C STATUS: HCPCS | Mod: CJ

## 2017-06-08 PROCEDURE — 700102 HCHG RX REV CODE 250 W/ 637 OVERRIDE(OP): Performed by: PHYSICIAN ASSISTANT

## 2017-06-08 PROCEDURE — 97165 OT EVAL LOW COMPLEX 30 MIN: CPT

## 2017-06-08 PROCEDURE — 85014 HEMATOCRIT: CPT

## 2017-06-08 PROCEDURE — G8987 SELF CARE CURRENT STATUS: HCPCS | Mod: CJ

## 2017-06-08 PROCEDURE — 700112 HCHG RX REV CODE 229: Performed by: PHYSICIAN ASSISTANT

## 2017-06-08 PROCEDURE — 85018 HEMOGLOBIN: CPT

## 2017-06-08 PROCEDURE — 700111 HCHG RX REV CODE 636 W/ 250 OVERRIDE (IP): Performed by: PHYSICIAN ASSISTANT

## 2017-06-08 PROCEDURE — A9270 NON-COVERED ITEM OR SERVICE: HCPCS | Performed by: PHYSICIAN ASSISTANT

## 2017-06-08 PROCEDURE — 700105 HCHG RX REV CODE 258: Performed by: PHYSICIAN ASSISTANT

## 2017-06-08 PROCEDURE — 97116 GAIT TRAINING THERAPY: CPT

## 2017-06-08 PROCEDURE — G8988 SELF CARE GOAL STATUS: HCPCS | Mod: CJ

## 2017-06-08 PROCEDURE — 97530 THERAPEUTIC ACTIVITIES: CPT

## 2017-06-08 PROCEDURE — 36415 COLL VENOUS BLD VENIPUNCTURE: CPT

## 2017-06-08 RX ORDER — ASPIRIN 81 MG/1
81 TABLET ORAL 2 TIMES DAILY
Qty: 60 TAB | Refills: 0 | Status: SHIPPED | OUTPATIENT
Start: 2017-06-08 | End: 2017-07-18 | Stop reason: SINTOL

## 2017-06-08 RX ORDER — TRAMADOL HYDROCHLORIDE 50 MG/1
50 TABLET ORAL EVERY 4 HOURS PRN
Qty: 90 TAB | Refills: 0 | Status: SHIPPED | OUTPATIENT
Start: 2017-06-08 | End: 2017-07-18

## 2017-06-08 RX ORDER — CELECOXIB 200 MG/1
200 CAPSULE ORAL
Qty: 60 CAP | Refills: 3 | Status: SHIPPED | OUTPATIENT
Start: 2017-06-08 | End: 2018-02-20

## 2017-06-08 RX ORDER — DIAZEPAM 5 MG/1
5 TABLET ORAL EVERY 6 HOURS PRN
Qty: 40 TAB | Refills: 0 | Status: SHIPPED | OUTPATIENT
Start: 2017-06-08 | End: 2017-07-18

## 2017-06-08 RX ORDER — OXYCODONE HYDROCHLORIDE 5 MG/1
5 TABLET ORAL
Qty: 90 TAB | Refills: 0 | Status: SHIPPED | OUTPATIENT
Start: 2017-06-08 | End: 2017-07-18

## 2017-06-08 RX ORDER — PSEUDOEPHEDRINE HCL 30 MG
100 TABLET ORAL 3 TIMES DAILY PRN
Qty: 90 CAP | Refills: 3 | Status: SHIPPED | OUTPATIENT
Start: 2017-06-08 | End: 2018-02-20

## 2017-06-08 RX ORDER — ONDANSETRON 4 MG/1
4 TABLET, ORALLY DISINTEGRATING ORAL EVERY 8 HOURS PRN
Qty: 40 TAB | Refills: 0 | Status: SHIPPED | OUTPATIENT
Start: 2017-06-08 | End: 2017-07-18

## 2017-06-08 RX ADMIN — KETOROLAC TROMETHAMINE 15 MG: 30 INJECTION, SOLUTION INTRAMUSCULAR at 00:34

## 2017-06-08 RX ADMIN — KETOROLAC TROMETHAMINE 15 MG: 30 INJECTION, SOLUTION INTRAMUSCULAR at 06:32

## 2017-06-08 RX ADMIN — ACETAMINOPHEN 1000 MG: 500 TABLET ORAL at 11:14

## 2017-06-08 RX ADMIN — DOCUSATE SODIUM 100 MG: 100 CAPSULE, LIQUID FILLED ORAL at 08:33

## 2017-06-08 RX ADMIN — SODIUM CHLORIDE 2 G: 9 INJECTION, SOLUTION INTRAVENOUS at 00:33

## 2017-06-08 RX ADMIN — ATENOLOL 50 MG: 25 TABLET ORAL at 08:33

## 2017-06-08 RX ADMIN — CELECOXIB 200 MG: 200 CAPSULE ORAL at 11:15

## 2017-06-08 RX ADMIN — FENOFIBRATE 134 MG: 134 CAPSULE ORAL at 08:33

## 2017-06-08 RX ADMIN — ASPIRIN 81 MG: 81 TABLET, COATED ORAL at 06:32

## 2017-06-08 RX ADMIN — ACETAMINOPHEN 1000 MG: 500 TABLET ORAL at 00:33

## 2017-06-08 RX ADMIN — OXYCODONE HYDROCHLORIDE 5 MG: 5 TABLET ORAL at 08:32

## 2017-06-08 RX ADMIN — ACETAMINOPHEN 1000 MG: 500 TABLET ORAL at 06:32

## 2017-06-08 ASSESSMENT — PAIN SCALES - GENERAL
PAINLEVEL_OUTOF10: 2
PAINLEVEL_OUTOF10: 0
PAINLEVEL_OUTOF10: 0

## 2017-06-08 ASSESSMENT — GAIT ASSESSMENTS
DEVIATION: STEP TO
GAIT LEVEL OF ASSIST: SUPERVISED
ASSISTIVE DEVICE: CRUTCHES
DISTANCE (FEET): 200

## 2017-06-08 ASSESSMENT — ACTIVITIES OF DAILY LIVING (ADL): TOILETING: INDEPENDENT

## 2017-06-08 NOTE — DISCHARGE INSTRUCTIONS
Dr. Del Angel's Discharge Instructions:     The first week after your joint replacement is a time to recover from the surgery. We expect light exercise to keep you active and mobile. Dr. Del Angel requires a walker or cane for most patients in the first few days following surgery to try to prevent falls or complications.     Take your pain medication as appropriate to ensure that your pain is not limiting your recovery. You'll be seen in clinic in 7-10 days (this appointment has already been made, call our office if you're unsure of the time). At that time, we'll prescribe your physical therapy to help with the recovery phase.     -Keep dressing clean and dry. Leave dressing in place until follow up. If you have an incisional vac dressing, the battery may die before your first post operative appointment, but you can leave the dressing in place and it will be removed at your clinic visit     -OK to shower, keep dressing in place. Pat dressing dry, do not rub incision     -Do not soak incision in bath, hot tub or pool     -Follow up with Dr. Del Angel at regularly scheduled time     -Call [facility] office at [phone] for questions     -Weight bearing status: Weight Bearing as tolerated     -Take medication as prescribed for DVT prophylaxis    Discharge Instructions    Discharged to home by car with relative. Discharged via wheelchair, hospital escort: Yes.  Special equipment needed: Not Applicable    Be sure to schedule a follow-up appointment with your primary care doctor or any specialists as instructed.     Discharge Plan:   Influenza Vaccine Indication: Not indicated: Previously immunized this influenza season and > 8 years of age    I understand that a diet low in cholesterol, fat, and sodium is recommended for good health. Unless I have been given specific instructions below for another diet, I accept this instruction as my diet prescription.   Other diet: Regular    Special Instructions: Discharge instructions for the  Orthopedic Patient    Follow up with Primary Care Physician within 2 weeks of discharge to home, regarding:  Review of medications and diagnostic testing.  Surveillance for medical complications.  Workup and treatment of osteoporosis, if appropriate.     -Is this a Joint Replacement patient? Yes Total Joint Knee Replacement Discharge Instructions    Pain  - The goal is to slowly wean off the prescription pain medicine.  - Ice can be used for pain control.  20 minutes at a time is recommended, and never directly against your skin or incision.  - Most patients are off the pain pills by 3 weeks; others may require a low level of pain medications for many months.  If your pain continues to be severe, follow up with your physician.  Infection    Knee joint infections; occur in fewer than 2% of patients. The most common causes of infection following total knee replacement surgery are from bacteria that enter the bloodstream during dental procedures, urinary tract infections, or skin infections. These bacteria can lodge around your knee replacement and cause an infection.  - Keep the incision as clean and dry as possible.  - Always wash your hands before touching your incision.  - Skin infections tend to develop around 7-10 days after surgery; most can be treated with oral antibiotics.  - Dental Care should be delayed for 3 months after surgery, your surgeon recommends taking a dose of antibiotics 1 hour prior to any dental procedure. After 2 years, most surgeons recommend antibiotics only before an extensive procedure.  Ask your surgeon what he recommends.  - Signs and symptoms of infection can include:  low grade fever, redness, pain, swelling and drainage from your incision.  Notify your surgeon immediately if you develop any of these symptoms.  Other instructions  - Bowel habits - constipation is extremely common and is caused by a combination of anesthesia, lack of mobility and pain medicine.  Use stool softeners or  laxatives if necessary. It is important not to ignore this problem, as bowel obstructions can be a serious complication after joint replacement surgery.  - Mood/Energy Level - Many patients experience a lack of energy and endurance for up to 2-3 months after surgery.  Some may also feel down and can even become depressed.  This is likely due to the postoperative anemia, change in activity level, lack of sleep, pain medicine and just the emotional reaction to the surgery itself that is a big disruption in a person’s life.  This usually passes.  If symptoms persist, follow up with your primary physician.  - Returning to work - Your surgeon will give you more specific instructions. Depending on the type of activities you perform, it may be 6 to 8 weeks before you return to work.   Generally, if you work a sedentary job requiring little standing or walking, most patients may return within 2-6 weeks.  Manual labor jobs involving walking, lifting and standing may take longer. Your surgeon’s office can provide a release to part-time or light duty work early on in your recovery and progress you to full duty as able.    - Driving - If your left knee was replaced and you have an automatic transmission, you may be able to begin driving in a week or so, provided you are no longer taking narcotic pain medication. If your right knee was replaced, avoid driving for 6 to 8 weeks. Remember that your reflexes may not be as sharp as before your surgery. Ask your surgeon for specific instructions.   - Avoiding falls - A fall during the first few weeks after surgery can damage your new knee and may result in a need for further surgery.   throw rugs and tack down loose carpeting.  Be aware of floor hazards such as pets, small objects or uneven surfaces.    - [employer] Detectors - The sensitivity of metal detectors varies and it is likely that your prosthesis will cause an alarm.  Inform the  of your artificial  joint.  Diet  - Resume your normal diet as tolerated.  - It is important to achieve a healthy nutritional status by eating a well balanced diet on a regular basis.  - Your physician may recommend that you take iron and vitamin supplements.   - Continue to drink plenty of fluids.  Shower/Bathing  - You may shower as soon as you get home from the hospital unless otherwise instructed.  - Keep your incision out of water.  To keep the incision dry when showering, cover it with a plastic bag or plastic wrap.  - Pat incision dry if it gets wet.  Don’t rub.  - Do not submerge in a bath until staples are out and the incision is completely healed. (Approximately 6-8 weeks)  Dressing Change:  Procedure (if recommended by your physician)  - Wash hands.  - Open all new dressing change materials.  - Remove old dressing and discard.  - Inspect incision for redness, increase in clear drainage, yellow/green drainage, odor and surrounding skin hot to touch.  -  ABD (large gauze) pad or “island dressing” by one corner and lay over the incision.  Be careful not to touch the inside of the dressing that will lay over the incision.  - Secure in place as instructed (Ace wrap or tape).    Swelling/Bruising    - Swelling can last from 3-6 months.  - Elevate your leg higher than your heart while reclining.   The first week you are home you should elevate your leg an equal amount of time, as you are active.    - Anti-inflammatory pills can be taken once you have stopped the blood thinners.  - The swelling is usually worse after you go home since you are upright for longer periods of time.  - Bruising is common and can involve the entire leg including the thigh, calf and even foot. Bruising often does not appear until after you arrive home and it can be quite dramatic- purple, black, and green.  The bruising you can see is not usually concerning and will subside without any treatment.      Blood Clot Prevention  Blood clots in the legs  and the less common, but frightening, clots that travel to the lungs are a real focus of our preventative. Most patients are at standard risk for them, but those patients who are at higher risk include people who have had previous clots, a family history of clotting, smoking, diabetes, obesity, advanced age, use of estrogen and a sedentary lifestyle.    - Signs of blood clots in legs - Swelling in thigh, calf or ankle that does not go down with elevation.  Pain, heat and tenderness in calf, back of calf or groin area.  NOTE: blood clots can occur in either leg.  - You have been receiving anticoagulant therapy (blood thinners) in the hospital and you may be instructed to continue at home depending on your risk factors.  - Your risk for developing a clot continues for up to 2-3 months after surgery.  You should avoid prolonged sitting and dehydration during that time (long air trips and car trips).  If you do take a trip during this time, please get up and move around every 1- 1.5 hours.  - If you are prescribed blood-thinning medication for home, follow instructions as directed. (Handouts provided if applicable).      Activity  Once home, you should continue to stay active. The key is to remember not to overdo it! While you can expect some good days and some bad days, you should notice a gradual improvement and a gradual increase in your endurance over the next 6 to 12 months. Exercise is a critical component of home care, particularly during the first few weeks after surgery.     - Normal activities of daily living You should be able to resume most within 3 to 6 weeks following surgery. Some pain with activity and at night is common for several weeks after surgery  -  Physical Therapy Exercises - Continue to do the exercises prescribed for at least two months after surgery. Riding a stationary bicycle can help maintain muscle tone and keep your knee flexible. Try to achieve the maximum degree of bending and  extension possible. (handout provided by Therapist).  - Sexual Activity -. Your surgeon can tell you when it safe to resume sexual activity.    - Sleeping Positions - You can safely sleep on your back, on either side, or on your stomach.   - Other Activities - Walk as much as you like, but remember that walking is no substitute for the exercises your doctor and physical therapist will prescribe. Lower impact activities are preferred.  If you have specific questions, consult your Surgeon.    When to Call the Doctor   Call the physician if:   - Fever over 100.5? F  - Increased pain, drainage, redness, odor or heat around the incision area  - Shaking chills  - Increased knee pain with activity and rest  - Increased pain in calf, tenderness or redness above or below the knee  - Increased swelling of calf, ankle, foot  - Sudden increased shortness of breath, sudden onset of chest pain, localized chest pain with coughing  - Incision opening  Or, if there are any questions or concerns about medications or care.       -Is this patient being discharged with medication to prevent blood clots?  Yes, Aspirin Aspirin, ASA oral tablets  What is this medicine?  ASPIRIN (AS pir in) is a pain reliever. It is used to treat mild pain and fever. This medicine is also used as directed by a doctor to prevent and to treat heart attacks, to prevent strokes, and to treat arthritis or inflammation.  This medicine may be used for other purposes; ask your health care provider or pharmacist if you have questions.  COMMON BRAND NAME(S): Aspir-Low, Aspir-Latrice , Aspirtab , [employer] Advanced Aspirin, [employer] Aspirin Extra Strength, [employer] Aspirin Plus, [employer] Aspirin, [employer] Genuine Aspirin, [employer] Womens Aspirin , Bufferin Extra Strength, Bufferin Low Dose, Bufferin  What should I tell my health care provider before I take this medicine?  They need to know if you have any of these conditions:  -anemia  -asthma  -bleeding problems  -child with chickenpox,  the flu, or other viral infection  -diabetes  -gout  -if you frequently drink alcohol containing drinks  -kidney disease  -liver disease  -low level of vitamin K  -lupus  -smoke tobacco  -stomach ulcers or other problems  -an unusual or allergic reaction to aspirin, tartrazine dye, other medicines, dyes, or preservatives  -pregnant or trying to get pregnant  -breast-feeding  How should I use this medicine?  Take this medicine by mouth with a glass of water. Follow the directions on the package or prescription label. You can take this medicine with or without food. If it upsets your stomach, take it with food. Do not take your medicine more often than directed.  Talk to your pediatrician regarding the use of this medicine in children. While this drug may be prescribed for children as young as 12 years of age for selected conditions, precautions do apply. Children and teenagers should not use this medicine to treat chicken pox or flu symptoms unless directed by a doctor.  Patients over 65 years old may have a stronger reaction and need a smaller dose.  Overdosage: If you think you have taken too much of this medicine contact a poison control center or emergency room at once.  NOTE: This medicine is only for you. Do not share this medicine with others.  What if I miss a dose?  If you are taking this medicine on a regular schedule and miss a dose, take it as soon as you can. If it is almost time for your next dose, take only that dose. Do not take double or extra doses.  What may interact with this medicine?  Do not take this medicine with any of the following medications:  -cidofovir  -ketorolac  -probenecid  This medicine may also interact with the following medications:  -alcohol  -alendronate  -bismuth subsalicylate  -flavocoxid  -herbal supplements like feverfew, garlic, martina, ginkgo biloba, horse chestnut  -medicines for diabetes or glaucoma like acetazolamide, methazolamide  -medicines for gout  -medicines that  treat or prevent blood clots like enoxaparin, heparin, ticlopidine, warfarin  -other aspirin and aspirin-like medicines  -NSAIDs, medicines for pain and inflammation, like ibuprofen or naproxen  -pemetrexed  -sulfinpyrazone  -varicella live vaccine  This list may not describe all possible interactions. Give your health care provider a list of all the medicines, herbs, non-prescription drugs, or dietary supplements you use. Also tell them if you smoke, drink alcohol, or use illegal drugs. Some items may interact with your medicine.  What should I watch for while using this medicine?  If you are treating yourself for pain, tell your doctor or health care professional if the pain lasts more than 10 days, if it gets worse, or if there is a new or different kind of pain. Tell your doctor if you see redness or swelling. Also, check with your doctor if you have a fever that lasts for more than 3 days. Only take this medicine to prevent heart attacks or blood clotting if prescribed by your doctor or health care professional.  Do not take aspirin or aspirin-like medicines with this medicine. Too much aspirin can be dangerous. Always read the labels carefully.  This medicine can irritate your stomach or cause bleeding problems. Do not smoke cigarettes or drink alcohol while taking this medicine. Do not lie down for 30 minutes after taking this medicine to prevent irritation to your throat.  If you are scheduled for any medical or dental procedure, tell your healthcare provider that you are taking this medicine. You may need to stop taking this medicine before the procedure.  What side effects may I notice from receiving this medicine?  Side effects that you should report to your doctor or health care professional as soon as possible:  -allergic reactions like skin rash, itching or hives, swelling of the face, lips, or tongue  -black, tarry stools  -bloody, coffee ground-like vomit  -breathing problems  -changes in hearing,  ringing in the ears  -confusion  -general ill feeling or flu-like symptoms  -pain on swallowing  -redness, blistering, peeling or loosening of the skin, including inside the mouth or nose  -trouble passing urine or change in the amount of urine  -unusual bleeding or bruising  -unusually weak or tired  -yellowing of the eyes or skin  Side effects that usually do not require medical attention (report to your doctor or health care professional if they continue or are bothersome):  -diarrhea or constipation  -nausea, vomiting  -stomach gas, heartburn  This list may not describe all possible side effects. Call your doctor for medical advice about side effects. You may report side effects to FDA at 7-967-GUF-8316.  Where should I keep my medicine?  Keep out of the reach of children.  Store at room temperature between 15 and 30 degrees C (59 and 86 degrees F). Protect from heat and moisture. Do not use this medicine if it has a strong vinegar smell. Throw away any unused medicine after the expiration date.  NOTE: This sheet is a summary. It may not cover all possible information. If you have questions about this medicine, talk to your doctor, pharmacist, or health care provider.  © 2014, [employer]. (3/10/2009 10:44:17 AM)      · Is patient discharged on Warfarin / Coumadin?   No     · Is patient Post Blood Transfusion?  No    Depression / Suicide Risk    As you are discharged from this [facility] facility, it is important to learn how to keep safe from harming yourself.    Recognize the warning signs:  · Abrupt changes in personality, positive or negative- including increase in energy   · Giving away possessions  · Change in eating patterns- significant weight changes-  positive or negative  · Change in sleeping patterns- unable to sleep or sleeping all the time   · Unwillingness or inability to communicate  · Depression  · Unusual sadness, discouragement and loneliness  · Talk of wanting to die  · Neglect  of personal appearance   · Rebelliousness- reckless behavior  · Withdrawal from people/activities they love  · Confusion- inability to concentrate     If you or a loved one observes any of these behaviors or has concerns about self-harm, here's what you can do:  · Talk about it- your feelings and reasons for harming yourself  · Remove any means that you might use to hurt yourself (examples: pills, rope, extension cords, firearm)  · Get professional help from the community (Mental Health, Substance Abuse, psychological counseling)  · Do not be alone:Call your Safe Contact- someone whom you trust who will be there for you.  · Call your local CRISIS HOTLINE 127-8353 or 111-501-0601  · Call your local Children's Mobile Crisis Response Team [place] (994) 674-5251 or www.[employer]  · Call the toll free National Suicide Prevention Hotlines   · National Suicide Prevention Lifeline 241-352-OMPS (4286)  · National Hope Line Network 800-SUICIDE (724-1564)

## 2017-06-08 NOTE — CARE PLAN
Problem: Safety  Goal: Will remain free from injury  Outcome: PROGRESSING AS EXPECTED  Bed in low position, treaded slipper socks on, and call light in reach. Pt instructed to call nurse if ambulating out of bed.     Problem: Mobility  Goal: Risk for activity intolerance will decrease  Outcome: PROGRESSING AS EXPECTED  Pt ambulating well post-op with x1 assist and FWW, gait steady, pt tolerating well.

## 2017-06-08 NOTE — PROGRESS NOTES
Discharging patient home per MD order.  Pt demonstrated understanding of discharge instructions, follow up appointments, home medications, prescriptions, home care for surgical wound, and nursing care instructions for total knee replacement.  Ambulating without assistance, voiding without difficulty, pain well controlled, tolerating oral medications, oxygen saturation greater than 90% , tolerating diet. Pt verbalized understanding of discharge instructions and educational handouts, all questions answered.  Pt discharged off unit with hospital escort at 1124.

## 2017-06-08 NOTE — DISCHARGE SUMMARY
Brian Lopez was admitted on 6/7/2017 for OA RT KNEE  Osteoarthritis of right knee  Patient was diagnosed with severe degenerative arthritis in the right knee and underwent a right total knee arthroplasty by Dr. Steffanie Del Angel on the date of admission. Please see dictated operative note for further information.    Hospital course: Standard.  Patient required straight catheterization in the AM on POD #1.  He has a history of BPH and takes Flomax.  He has been able to void since the straight catheterization was performed.    The patient has done well, with no complications.  Patient denies chest pain, calf pain or shortness of breath.   Pain is well-controlled at present.  Patient is ambulating well with the use of an assistive device, and progressing in physical therapy.   Patient is neurologically and vascularly intact with palpable pedal pulses bilaterally.      Discharge date: 6/8/17    Patient is being discharged to home after am physical therapy.     Allergies:  Review of patient's allergies indicates no known allergies.       Medication List      START taking these medications       Instructions    aspirin 81 MG EC tablet   Last time this was given:  81 mg on 6/8/2017  6:32 AM    Take 1 Tab by mouth 2 Times a Day.   Dose:  81 mg       celecoxib 200 MG Caps   Last time this was given:  200 mg on 6/7/2017  5:50 AM   Commonly known as:  CELEBREX    Take 1 Cap by mouth every morning with breakfast.   Dose:  200 mg       diazepam 5 MG Tabs   Commonly known as:  VALIUM    Take 1 Tab by mouth every 6 hours as needed.   Dose:  5 mg        MG Caps   Last time this was given:  100 mg on 6/7/2017  8:14 PM    Take 100 mg by mouth 3 times a day as needed for Constipation.   Dose:  100 mg       ondansetron 4 MG Tbdp   Last time this was given:  4 mg on 6/7/2017  9:48 AM   Commonly known as:  ZOFRAN ODT    Take 1 Tab by mouth every 8 hours as needed for Nausea/Vomiting.   Dose:  4 mg       oxycodone  immediate-release 5 MG Tabs   Commonly known as:  ROXICODONE    Take 1 Tab by mouth every 3 hours as needed (Moderate Pain (NRS Pain Scale 4-6; CPOT Pain Scale 3-5)).   Dose:  5 mg       tramadol 50 MG Tabs   Commonly known as:  ULTRAM    Take 1 Tab by mouth every four hours as needed (Moderate Pain (NRS Pain Scale 4-6; CPOT Pain Scale 3-5) if opiates not ordered or tolerated).   Dose:  50 mg         CHANGE how you take these medications       Instructions    pravastatin 40 MG tablet   What changed:  when to take this   Last time this was given:  40 mg on 6/7/2017  8:14 PM   Commonly known as:  PRAVACHOL    Doctor's comments:  Authorization of a refill request.   Take 1 Tab by mouth every day.   Dose:  40 mg       tamsulosin 0.4 MG capsule   What changed:  when to take this   Last time this was given:  0.4 mg on 6/7/2017  8:13 PM   Commonly known as:  FLOMAX    Doctor's comments:  This is an authorization for a requested refill   Take 1 Cap by mouth every day.   Dose:  0.4 mg         CONTINUE taking these medications       Instructions    atenolol 50 MG Tabs   Commonly known as:  TENORMIN    Doctor's comments:  Please remind pt to get labs done. This is an authorization for a requested refill   TAKE  ONE TABLET BY MOUTH EVERY MORNING       CALCIUM + D3 PO    Take  by mouth every day.       fenofibrate 160 MG tablet   Commonly known as:  TRIGLIDE    Doctor's comments:  Please remind pt to get labs done   TAKE  ONE TABLET BY MOUTH DAILY       MULTIVITAMIN ADULT PO    Take  by mouth every day.         STOP taking these medications          ALEVE PM PO             Discharge Instructions:     Patient is instructed to ambulate and weight bear as tolerated with the use of an assistive device, and to continue physical therapy exercises given during this hospital stay. Strict posterior hip precautions are to be observed for patients who underwent a total hip replacement.   Patient is to ice and elevate the surgical leg  regularly, with pillows under the ankle, nothing is to be placed under the knee.   Patient was given detailed wound care instructions, and will leave the silver dressing on until first post-op visit.   ASA BID x 30 days for DVT prophylaxis.  Patient is to follow up with Dr. Del Angel's office in 1-2 weeks.

## 2017-06-08 NOTE — PROGRESS NOTES
Patient urinating frequently with minimal urine output, complains of discomfort in lower abdomen, bladder scan performed = 999  Straight catheterization at 0140 with 1000ml.

## 2017-06-08 NOTE — THERAPY
"Occupational Therapy Evaluation completed.   Functional Status:  SBA and v/c for hand placement with sit to stand.  Able to don pants and slip on slippers with SBA.  Standing to urinate in BR with supervision and FWW.  Standing 5-6 min at sink for grooming with supervision.  Educated pt and spouse on role of OT and Total Knee Replacement Protocol.  Reviewed application of precautions and use of Adaptive Equipment for dressing, bathing, toileting, grooming, kitchen activities and general functional mobility in the home. Reviewed the use of reacher, long handled shoe horn, as well as shower chair to assist with ADL's.  Reviewed walk in shower transfers. Provided pt with handout and suggestions on where to obtain needed items. Educated on covering incision/dressing with extra plastic wrap and waterproof tape to ensure that incision does not get wet.  Educated on around the clock pain management strategies so that pt does not wake up in a pain crisis.  Pt verbalized understanding of all education provided.    Plan of Care: Patient with no further skilled OT needs in the acute care setting at this time  Discharge Recommendations:  Equipment: SAM farah shoehorn. Post-acute therapy Discharge to home with outpatient or home health for additional skilled therapy services.    See \"Rehab Therapy-Acute\" Patient Summary Report for complete documentation.    "

## 2017-06-08 NOTE — DISCHARGE PLANNING
Care Transition Team Assessment    Met with pt and wife at bedside. Pt discharging today. Pt has walker at home. Pt has spouse for support and transportation. Pt states he will do outpatient physical therapy.    Information Source  Orientation : Oriented x 4  Information Given By: Patient  Informant's Name: Brian Lopez  Who is responsible for making decisions for patient? : Patient    Readmission Evaluation  Is this a readmission?: No    Elopement Risk  Legal Hold: No  Ambulatory or Self Mobile in Wheelchair: Yes  Disoriented: No  Psychiatric Symptoms: None  History of Wandering: No  Elopement this Admit: No  Vocalizing Wanting to Leave: No  Displays Behaviors, Body Language Wanting to Leave: No-Not at Risk for Elopement  Elopement Risk: Not at Risk for Elopement    Interdisciplinary Discharge Planning  Does Admitting Nurse Feel This Could be a Complex Discharge?: No  Primary Care Physician: Dr. Melvina Claire  Lives with - Patient's Self Care Capacity: Spouse  Support Systems: Children, Family Member(s), Friends / Neighbors  Housing / Facility: [address] [place]  Do You Take your Prescribed Medications Regularly: Yes  Able to Return to Previous ADL's: Future Time w/Therapy  Mobility Issues: No  Prior Services: None, Home-Independent  Patient Expects to be Discharged to:: Home  Assistance Needed: No    Discharge Preparedness  What is your plan after discharge?: Home with help  What are your discharge supports?: Spouse  Prior Functional Level: Ambulatory, Drives Self, Independent with Activities of Daily Living, Independent with Medication Management  Difficulity with ADLs: None  Difficulity with IADLs: None    Functional Assesment  Prior Functional Level: Ambulatory, Drives Self, Independent with Activities of Daily Living, Independent with Medication Management    Finances  Financial Barriers to Discharge: No  Prescription Coverage: Yes ([facility]    Vision / Hearing Impairment  Vision  Impairment : Yes  Right Eye Vision: Wears Glasses  Left Eye Vision: Wears Glasses  Hearing Impairment : No    Values / Beliefs / Concerns  Values / Beliefs Concerns : No  Spiritual Requests During Hospitalization: No    Advance Directive  Advance Directive?: None  Advance Directive offered?: AD Booklet refused    Domestic Abuse  Have you ever been the victim of abuse or violence?: No  Physical Abuse or Sexual Abuse: No  Verbal Abuse or Emotional Abuse: No  Possible Abuse Reported to:: Not Applicable    Psychological Assessment  History of Substance Abuse: None  History of Psychiatric Problems: No  Non-compliant with Treatment: No  Newly Diagnosed Illness: No    Discharge Risks or Barriers  Discharge risks or barriers?: No    Anticipated Discharge Information  Anticipated discharge disposition: Home  Discharge Address: [address] [place] [id number]  Discharge Contact Phone Number: [phone]

## 2017-06-08 NOTE — PROGRESS NOTES
Received report from Tabitha MCCONNELL, assumed pt care. Pt A&Ox4, sitting up in bed. Pt ambulated OOB and up to recliner with x1 assist and use of FWW, gait steady. Dressing to R knee CDI, +CMS, polar ice in place. Pt c/o 2/10 pain to R knee and comfortable. Pt taught to inform nurse if experiencing pain above comfort goal, SOB, chest pain, ambulating out of bed, or for any further assistance. Fall precautions in place, call light within reach. Will continue with care.

## 2017-06-09 ENCOUNTER — HOSPITAL ENCOUNTER (EMERGENCY)
Facility: MEDICAL CENTER | Age: 77
End: 2017-06-09
Attending: EMERGENCY MEDICINE
Payer: MEDICARE

## 2017-06-09 VITALS
DIASTOLIC BLOOD PRESSURE: 87 MMHG | OXYGEN SATURATION: 93 % | WEIGHT: 175 LBS | HEART RATE: 78 BPM | BODY MASS INDEX: 29.16 KG/M2 | RESPIRATION RATE: 16 BRPM | HEIGHT: 65 IN | SYSTOLIC BLOOD PRESSURE: 132 MMHG | TEMPERATURE: 97.5 F

## 2017-06-09 DIAGNOSIS — R33.9 URINARY RETENTION: ICD-10-CM

## 2017-06-09 DIAGNOSIS — N40.0 PROSTATISM: ICD-10-CM

## 2017-06-09 LAB
APPEARANCE UR: CLEAR
BILIRUB UR QL STRIP.AUTO: NEGATIVE
COLOR UR: ABNORMAL
CULTURE IF INDICATED INDCX: NO UA CULTURE
GLUCOSE UR STRIP.AUTO-MCNC: ABNORMAL MG/DL
KETONES UR STRIP.AUTO-MCNC: NEGATIVE MG/DL
LEUKOCYTE ESTERASE UR QL STRIP.AUTO: NEGATIVE
MICRO URNS: ABNORMAL
NITRITE UR QL STRIP.AUTO: NEGATIVE
PH UR STRIP.AUTO: 7 [PH]
PROT UR QL STRIP: NEGATIVE MG/DL
RBC UR QL AUTO: NEGATIVE
SP GR UR STRIP.AUTO: 1.01

## 2017-06-09 PROCEDURE — 303105 HCHG CATHETER EXTRA

## 2017-06-09 PROCEDURE — 700102 HCHG RX REV CODE 250 W/ 637 OVERRIDE(OP): Performed by: EMERGENCY MEDICINE

## 2017-06-09 PROCEDURE — 51702 INSERT TEMP BLADDER CATH: CPT

## 2017-06-09 PROCEDURE — 99284 EMERGENCY DEPT VISIT MOD MDM: CPT

## 2017-06-09 PROCEDURE — A9270 NON-COVERED ITEM OR SERVICE: HCPCS | Performed by: EMERGENCY MEDICINE

## 2017-06-09 PROCEDURE — 81003 URINALYSIS AUTO W/O SCOPE: CPT

## 2017-06-09 PROCEDURE — 51798 US URINE CAPACITY MEASURE: CPT

## 2017-06-09 RX ORDER — CIPROFLOXACIN 500 MG/1
500 TABLET, FILM COATED ORAL ONCE
Status: COMPLETED | OUTPATIENT
Start: 2017-06-09 | End: 2017-06-09

## 2017-06-09 RX ORDER — CIPROFLOXACIN 500 MG/1
500 TABLET, FILM COATED ORAL 2 TIMES DAILY
Qty: 3 TAB | Refills: 0 | Status: SHIPPED | OUTPATIENT
Start: 2017-06-09 | End: 2017-06-12

## 2017-06-09 RX ADMIN — CIPROFLOXACIN HYDROCHLORIDE 500 MG: 500 TABLET, FILM COATED ORAL at 15:43

## 2017-06-09 ASSESSMENT — PAIN SCALES - GENERAL: PAINLEVEL_OUTOF10: 8

## 2017-06-09 NOTE — ED NOTES
Assist RN:  Luna catheter inserted using aseptic technique.  950 mL of clear, yellow urine out.  Urine sample sent to lab.  Pt reports much relief after catheter insertion.

## 2017-06-09 NOTE — ED NOTES
Pt verbalizes understanding of dc and f/u instructions.  Questions answered.  Released  Walker amb to self. Out w/ wife.

## 2017-06-09 NOTE — ED AVS SNAPSHOT
Home Care Instructions                                                                                                                Brian Lopez   MRN: [id number]    Department:  [facility], Emergency Dept   Date of Visit:  6/9/2017            [facility], Emergency Dept    [address]    [place] [address]    Phone:  [phone]      You were seen by     Holland Stevens M.D.      Your Diagnosis Was     Urinary retention     R33.9       These are the medications you received during your hospitalization from 06/09/2017 1341 to 06/09/2017 1557     Date/Time Order Dose Route Action    06/09/2017 1543 ciprofloxacin (CIPRO) tablet 500 mg 500 mg Oral Given      Follow-up Information     1. Follow up with Stoney Ayers M.D.. Schedule an appointment as soon as possible for a visit in 5 days.    Specialty:  Urology    Contact information    [address]  [place] [address] [phone]        Medication Information     Review all of your home medications and newly ordered medications with your primary doctor and/or pharmacist as soon as possible. Follow medication instructions as directed by your doctor and/or pharmacist.     Please keep your complete medication list with you and share with your physician. Update the information when medications are discontinued, doses are changed, or new medications (including over-the-counter products) are added; and carry medication information at all times in the event of emergency situations.               Medication List      START taking these medications        Instructions    Morning Afternoon Evening Bedtime    ciprofloxacin 500 MG Tabs   Last time this was given:  500 mg on 6/9/2017  3:43 PM   Commonly known as:  CIPRO        Take 1 Tab by mouth 2 times a day for 3 days.   Dose:  500 mg                          ASK your doctor about these medications        Instructions    Morning Afternoon Evening Bedtime    aspirin 81  MG EC tablet        Take 1 Tab by mouth 2 Times a Day.   Dose:  81 mg                        atenolol 50 MG Tabs   Commonly known as:  TENORMIN        Doctor's comments:  Please remind pt to get labs done. This is an authorization for a requested refill   TAKE  ONE TABLET BY MOUTH EVERY MORNING                        CALCIUM + D3 PO        Take  by mouth every day.                        celecoxib 200 MG Caps   Commonly known as:  CELEBREX        Take 1 Cap by mouth every morning with breakfast.   Dose:  200 mg                        diazepam 5 MG Tabs   Commonly known as:  VALIUM        Take 1 Tab by mouth every 6 hours as needed.   Dose:  5 mg                         MG Caps        Take 100 mg by mouth 3 times a day as needed for Constipation.   Dose:  100 mg                        fenofibrate 160 MG tablet   Commonly known as:  TRIGLIDE        Doctor's comments:  Please remind pt to get labs done   TAKE  ONE TABLET BY MOUTH DAILY                        MULTIVITAMIN ADULT PO        Take  by mouth every day.                        ondansetron 4 MG Tbdp   Commonly known as:  ZOFRAN ODT        Take 1 Tab by mouth every 8 hours as needed for Nausea/Vomiting.   Dose:  4 mg                        oxycodone immediate-release 5 MG Tabs   Commonly known as:  ROXICODONE        Take 1 Tab by mouth every 3 hours as needed (Moderate Pain (NRS Pain Scale 4-6; CPOT Pain Scale 3-5)).   Dose:  5 mg                        pravastatin 40 MG tablet   Commonly known as:  PRAVACHOL        Doctor's comments:  Authorization of a refill request.   Take 1 Tab by mouth every day.   Dose:  40 mg                        tamsulosin 0.4 MG capsule   Commonly known as:  FLOMAX        Doctor's comments:  This is an authorization for a requested refill   Take 1 Cap by mouth every day.   Dose:  0.4 mg                        tramadol 50 MG Tabs   Commonly known as:  ULTRAM        Take 1 Tab by mouth every four hours as needed (Moderate Pain  (NRS Pain Scale 4-6; CPOT Pain Scale 3-5) if opiates not ordered or tolerated).   Dose:  50 mg                             Where to Get Your Medications      These medications were sent to [id number] - [place] - [address]  [address] [place] [address]     Phone:  [phone]    - ciprofloxacin 500 MG Tabs            Procedures and tests performed during your visit     BLADDER SCAN    INSERT ARMENTA CATHETER    NURSING COMMUNICATION    URINALYSIS,CULTURE IF INDICATED        Discharge Instructions       Acute Urinary Retention, Male    Call urology Dr. Ayers for follow-up in 4-5 days to remove the catheter. Continue Flomax. Take ciprofloxacin for 2 days.    Acute urinary retention is when you are unable to pee (urinate). Acute urinary retention is common in older men. Prostates can get bigger, which blocks the flow of pee.   HOME CARE  · Drink enough fluids to keep your pee clear or pale yellow.  · If you are sent home with a tube that drains the bladder (catheter), there will be a drainage bag attached to it. There are two types of bags. One is big that you can wear at night without having to empty it. One is smaller and needs to be emptied more often.  ¨ Keep the drainage bag empty.  ¨ Keep the drainage bag lower than your catheter.  · Only take medicine as told by your doctor.  GET HELP IF:  · You have a low-grade fever.  · You have spasms or you are leaking pee when you have spasms.  GET HELP RIGHT AWAY IF:   · You have chills or a fever.  · Your catheter stops draining pee.  · Your catheter falls out.  · You have increased bleeding that does not stop after you have rested and increased the amount of fluids you had been drinking.  MAKE SURE YOU:   · Understand these instructions.  · Will watch your condition.  · Will get help right away if you are not doing well or get worse.     This information is not intended to replace advice given to you by your health care provider. Make sure  you discuss any questions you have with your health care provider.     Document Released: 06/05/2009 Document Revised: 05/03/2016 Document Reviewed: 05/29/2014  Elsevier Interactive Patient Education ©2016 Elsevier Inc.            Patient Information     Patient Information    Following emergency treatment: all patient requiring follow-up care must return either to a private physician or a clinic if your condition worsens before you are able to obtain further medical attention, please return to the emergency room.     Billing Information    At [facility], we work to make the billing process streamlined for our patients.  Our Representatives are here to answer any questions you may have regarding your hospital bill.  If you have insurance coverage and have supplied your insurance information to us, we will submit a claim to your insurer on your behalf.  Should you have any questions regarding your bill, we can be reached online or by phone as follows:  Online: You are able pay your bills online or live chat with our representatives about any billing questions you may have. We are here to help Monday - Friday from 8:00am to 7:30pm and 9:00am - 12:00pm on Saturdays.  Please visit [email or web address]  for more information.   Phone:  [phone] or [phone]    Please note that your emergency physician, surgeon, pathologist, radiologist, anesthesiologist, and other specialists are not employed by [facility] and will therefore bill separately for their services.  Please contact them directly for any questions concerning their bills at the numbers below:     Emergency Physician Services:  [phone]  [facility]:  [phone]  [facility]:  [phone]  [facility]:  [phone]    1. Your final bill may vary from the amount quoted upon discharge if all procedures are not complete at that time, or if your doctor has additional  procedures of which we are not aware. You will receive an additional bill if you return to the Emergency Department at Critical access hospital for suture removal regardless of the facility of which the sutures were placed.     2. Please arrange for settlement of this account at the emergency registration.    3. All self-pay accounts are due in full at the time of treatment.  If you are unable to meet this obligation then payment is expected within 4-5 days.     4. If you have had radiology studies (CT, X-ray, Ultrasound, MRI), you have received a preliminary result during your emergency department visit. Please contact the radiology department [phone] to receive a copy of your final result. Please discuss the Final result with your primary physician or with the follow up physician provided.     Crisis Hotline:  [place] Crisis Hotline:  8-194-XJWEOUG or 1-772.167.5940  [place] Crisis Hotline:    1-719.744.4818 or 096-541-2610         ED Discharge Follow Up Questions    1. In order to provide you with very good care, we would like to follow up with a phone call in the next few days.  May we have your permission to contact you?     YES /  NO    2. What is the best phone number to call you? (       )_____-__________    3. What is the best time to call you?      Morning  /  Afternoon  /  Evening                   Patient Signature:  ____________________________________________________________    Date:  ____________________________________________________________      Your appointments     Jun 12, 2017  2:40 PM   Established Patient with BRET Freeman   [facility])    [address] [place] [id number] [phone]           You will be receiving a confirmation call a few days before your appointment from our automated call confirmation system.

## 2017-06-09 NOTE — ED PROVIDER NOTES
"ED Provider Note    CHIEF COMPLAINT  Chief Complaint   Patient presents with   • Urinary Retention     Since surgery on Wednesday. Required catheter post-op. Reoccured since yesterday. \"only dribbles\"       HPI  Brian Lopez is a 77 y.o. male who presents for inability to urinate for the last 2 days except for dribbles. Patient had a total knee arthroplasty Wednesday and had an in and out cath in early Wednesday morning. Prostatism on Flomax. Denies fever or dysuria urgency or frequency.    REVIEW OF SYSTEMS  Pertinent positives include: Suprapubic abdominal pain and bloating.  Pertinent negatives include: Jackelyn, nausea, vomiting, chronic back pain, neurogenic bladder.    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    Current outpatient prescriptions:   •  oxycodone immediate-release (ROXICODONE) 5 MG Tab, Take 1 Tab by mouth every 3 hours as needed (Moderate Pain (NRS Pain Scale 4-6; CPOT Pain Scale 3-5))., Disp: 90 Tab, Rfl: 0  •  tramadol (ULTRAM) 50 MG Tab, Take 1 Tab by mouth every four hours as needed (Moderate Pain (NRS Pain Scale 4-6; CPOT Pain Scale 3-5) if opiates not ordered or tolerated)., Disp: 90 Tab, Rfl: 0  •  aspirin EC 81 MG EC tablet, Take 1 Tab by mouth 2 Times a Day., Disp: 60 Tab, Rfl: 0  •  celecoxib (CELEBREX) 200 MG Cap, Take 1 Cap by mouth every morning with breakfast., Disp: 60 Cap, Rfl: 3  •  diazepam (VALIUM) 5 MG Tab, Take 1 Tab by mouth every 6 hours as needed., Disp: 40 " "Tab, Rfl: 0  •  ondansetron (ZOFRAN ODT) 4 MG TABLET DISPERSIBLE, Take 1 Tab by mouth every 8 hours as needed for Nausea/Vomiting., Disp: 40 Tab, Rfl: 0  •  docusate sodium 100 MG Cap, Take 100 mg by mouth 3 times a day as needed for Constipation., Disp: 90 Cap, Rfl: 3  •  Multiple Vitamins-Minerals (MULTIVITAMIN ADULT PO), Take  by mouth every day., Disp: , Rfl:   •  fenofibrate (TRIGLIDE) 160 MG tablet, TAKE  ONE TABLET BY MOUTH DAILY, Disp: 90 Tab, Rfl: 0  •  pravastatin (PRAVACHOL) 40 MG tablet, Take 1 Tab by mouth every day. (Patient taking differently: Take 40 mg by mouth every evening.), Disp: 90 Tab, Rfl: 1  •  tamsulosin (FLOMAX) 0.4 MG capsule, Take 1 Cap by mouth every day. (Patient taking differently: Take 0.4 mg by mouth every evening.), Disp: 90 Cap, Rfl: 1  •  atenolol (TENORMIN) 50 MG Tab, TAKE  ONE TABLET BY MOUTH EVERY MORNING, Disp: 90 Tab, Rfl: 1  •  Calcium Carb-Cholecalciferol (CALCIUM + D3 PO), Take  by mouth every day., Disp: , Rfl:       ALLERGIES  No Known Allergies    PHYSICAL EXAM  VITAL SIGNS: /87 mmHg  Pulse 87  Temp(Src) 36.4 °C (97.5 °F)  Resp 18  Ht 1.651 m (5' 5\")  Wt 79.379 kg (175 lb)  BMI 29.12 kg/m2  SpO2 95%  Constitutional : Well developed, Well nourished, borderline elevated blood pressure, afebrile.   Cardiovascular: Regular, No murmurs, No rubs, No gallops.   Respiratory: Respiratory rate and excursion normal.   Abdomen: Soft, suprapubic tenderness without fullness. No rebound or guarding..  Skin: Warm, Dry, No erythema, No rash.   Back: Spine nontender.    LABORATORY:  Results for orders placed or performed during the hospital encounter of 06/09/17   URINALYSIS,CULTURE IF INDICATED   Result Value Ref Range    Color Lt. Yellow     Character Clear     Specific Gravity 1.009 <1.035    Ph 7.0 5.0-8.0    Glucose Trace (A) Negative mg/dL    Ketones Negative Negative mg/dL    Protein Negative Negative mg/dL    Bilirubin Negative Negative    Nitrite Negative Negative "    Leukocyte Esterase Negative Negative    Occult Blood Negative Negative    Micro Urine Req see below     Culture Indicated No UA Culture     Luna catheter placed by nursing and greater than 1000 mL of urine used. A repeat exam abdominal tenderness and suprapubic fullness resolved.    COURSE & MEDICAL DECISION MAKING;  Well-appearing patient presents with postoperative urinary retention and a history of prostatism. His artery on Flomax. There is no evidence of UTI. Clinical symptoms are not suggestive of prostatitis..    PLAN:  Luna catheter with leg bag  Ciprofloxacin 500 mg 4 doses 1st dose given here  Dr. Ayers 5 days  Urinary retention handout    CONDITION:  good.    FINAL IMPRESSION  1. Urinary retention    2. Prostatism            Electronically signed by: Holland Stevens,

## 2017-06-09 NOTE — ED AVS SNAPSHOT
6/9/2017    Brian Yuanrick Jessica  [address] [address]    Dear Brian:    [facility] wants to ensure your discharge home is safe and you or your loved ones have had all of your questions answered regarding your care after you leave the hospital.    Below is a list of resources and contact information should you have any questions regarding your hospital stay, follow-up instructions, or active medical symptoms.    Questions or Concerns Regarding… Contact   Medical Questions Related to Your Discharge  (7 days a week, 8am-5pm) Contact a Nurse Care Coordinator   [phone]   Medical Questions Not Related to Your Discharge  (24 hours a day / 7 days a week)  Contact the Nurse Health Line   [phone]    Medications or Discharge Instructions Refer to your discharge packet   or contact your [facility] Primary Care Provider   [phone]   Follow-up Appointment(s) Schedule your appointment via Blue Box   or contact Scheduling [phone]   Billing Review your statement via Blue Box  or contact Billing [phone]   Medical Records Review your records via Blue Box   or contact Medical Records [phone]     You may receive a telephone call within two days of discharge. This call is to make certain you understand your discharge instructions and have the opportunity to have any questions answered. You can also easily access your medical information, test results and upcoming appointments via the Blue Box free online health management tool. You can learn more and sign up at [email or web address]Blue Box. For assistance setting up your Blue Box account, please call [phone].    Once again, we want to ensure your discharge home is safe and that you have a clear understanding of any next steps in your care. If you have any questions or concerns, please do not hesitate to contact us, we are here for you. Thank you for choosing [facility] for your healthcare needs.    Sincerely,    Your [facility] Healthcare Team

## 2017-06-09 NOTE — DISCHARGE INSTRUCTIONS
Acute Urinary Retention, Male    Call urology Dr. Ayers for follow-up in 4-5 days to remove the catheter. Continue Flomax. Take ciprofloxacin for 2 days.    Acute urinary retention is when you are unable to pee (urinate). Acute urinary retention is common in older men. Prostates can get bigger, which blocks the flow of pee.   HOME CARE  · Drink enough fluids to keep your pee clear or pale yellow.  · If you are sent home with a tube that drains the bladder (catheter), there will be a drainage bag attached to it. There are two types of bags. One is big that you can wear at night without having to empty it. One is smaller and needs to be emptied more often.  ¨ Keep the drainage bag empty.  ¨ Keep the drainage bag lower than your catheter.  · Only take medicine as told by your doctor.  GET HELP IF:  · You have a low-grade fever.  · You have spasms or you are leaking pee when you have spasms.  GET HELP RIGHT AWAY IF:   · You have chills or a fever.  · Your catheter stops draining pee.  · Your catheter falls out.  · You have increased bleeding that does not stop after you have rested and increased the amount of fluids you had been drinking.  MAKE SURE YOU:   · Understand these instructions.  · Will watch your condition.  · Will get help right away if you are not doing well or get worse.     This information is not intended to replace advice given to you by your health care provider. Make sure you discuss any questions you have with your health care provider.     Document Released: 06/05/2009 Document Revised: 05/03/2016 Document Reviewed: 05/29/2014  [employer] Interactive Patient Education ©2016 [employer] Inc.

## 2017-06-09 NOTE — ED NOTES
"Chief Complaint   Patient presents with   • Urinary Retention     Since surgery on Wednesday. Required catheter post-op. Reoccured since yesterday. \"only dribbles\"     Appears uncomfortable in triage. VSS. Explained triage process, to waiting room. Asked to inform RN if questions or concerns arise.   "

## 2017-06-09 NOTE — ED AVS SNAPSHOT
FTAPI Software Access Code: Activation code not generated  Current FTAPI Software Status: Active    [employer]  A secure, online tool to manage your health information     [employer]’s FTAPI Software® is a secure, online tool that connects you to your personalized health information from the privacy of your home -- day or night - making it very easy for you to manage your healthcare. Once the activation process is completed, you can even access your medical information using the FTAPI Software mandi, which is available for free in the Apple Mandi store or Google Play store.     FTAPI Software provides the following levels of access (as shown below):   My Chart Features   [facility] Primary Care Doctor [facility]  Specialists [facility]  Urgent  Care Non-[facility]  Primary Care  Doctor   Email your healthcare team securely and privately 24/7 X X X X   Manage appointments: schedule your next appointment; view details of past/upcoming appointments X      Request prescription refills. X      View recent personal medical records, including lab and immunizations X X X X   View health record, including health history, allergies, medications X X X X   Read reports about your outpatient visits, procedures, consult and ER notes X X X X   See your discharge summary, which is a recap of your hospital and/or ER visit that includes your diagnosis, lab results, and care plan. X X       How to register for FTAPI Software:  1. Go to  [email or web address].  2. Click on the Sign Up Now box, which takes you to the New Member Sign Up page. You will need to provide the following information:  a. Enter your FTAPI Software Access Code exactly as it appears at the top of this page. (You will not need to use this code after you’ve completed the sign-up process. If you do not sign up before the expiration date, you must request a new code.)   b. Enter your date of birth.   c. Enter your home email address.   d. Click Submit, and follow the next screen’s instructions.  3. Create a FTAPI Software ID. This will  be your [employer] login ID and cannot be changed, so think of one that is secure and easy to remember.  4. Create a [employer] password. You can change your password at any time.  5. Enter your Password Reset Question and Answer. This can be used at a later time if you forget your password.   6. Enter your e-mail address. This allows you to receive e-mail notifications when new information is available in [employer].  7. Click Sign Up. You can now view your health information.    For assistance activating your [employer] account, call [phone]

## 2017-06-09 NOTE — ED NOTES
Leg bag applied.  Pt educated on use of leg bag and provided with paz drainage bag for night time use.

## 2017-06-12 ENCOUNTER — OFFICE VISIT (OUTPATIENT)
Dept: MEDICAL GROUP | Facility: PHYSICIAN GROUP | Age: 77
End: 2017-06-12
Payer: MEDICARE

## 2017-06-12 VITALS
DIASTOLIC BLOOD PRESSURE: 68 MMHG | SYSTOLIC BLOOD PRESSURE: 150 MMHG | WEIGHT: 183 LBS | HEART RATE: 82 BPM | BODY MASS INDEX: 29.41 KG/M2 | RESPIRATION RATE: 16 BRPM | OXYGEN SATURATION: 95 % | TEMPERATURE: 98.2 F | HEIGHT: 66 IN

## 2017-06-12 DIAGNOSIS — R33.9 URINARY RETENTION: ICD-10-CM

## 2017-06-12 PROCEDURE — 99214 OFFICE O/P EST MOD 30 MIN: CPT | Performed by: NURSE PRACTITIONER

## 2017-06-12 NOTE — PROGRESS NOTES
Chief Complaint   Patient presents with   • Other     difficulty Urinating        HISTORY OF PRESENT ILLNESS: Patient is a 77 y.o. male established patient who presents today to discuss the following issues:    Urinary retention  Had surgery on his knee on 6/7/17.  Had trouble urinating after surgery so had a catheter placed temporarily.  He was discharged home and had the same issue a few days later and another catheter was placed. He was sent home with the catheter and was told to follow up with urology within 5 days.  Patient's wife called for an appointment and was scheduled for July 7th.  Will place an urgent referral to urology to get him in sooner.    He is otherwise doing very well after surgery.  His pain is tolerable, and he is healing well.  He is ambulating some.  He was encouraged to keep all appointments with his surgeon.      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    Current Outpatient Prescriptions   Medication Sig Dispense Refill   • aspirin EC 81 MG EC tablet Take 1 Tab by mouth 2 Times a Day. 60 Tab 0   • Multiple Vitamins-Minerals (MULTIVITAMIN ADULT PO) Take  by mouth every day.     • fenofibrate (TRIGLIDE) 160 MG tablet TAKE  ONE TABLET BY MOUTH DAILY 90 Tab 0   • pravastatin (PRAVACHOL) 40 MG tablet Take 1 Tab by mouth every day. (Patient taking differently: Take 40 mg by mouth every evening.) 90 Tab 1   • tamsulosin (FLOMAX) 0.4 MG capsule Take 1 Cap by mouth every day. (Patient taking differently: Take 0.4  mg by mouth every evening.) 90 Cap 1   • atenolol (TENORMIN) 50 MG Tab TAKE  ONE TABLET BY MOUTH EVERY MORNING 90 Tab 1   • Calcium Carb-Cholecalciferol (CALCIUM + D3 PO) Take  by mouth every day.     • ciprofloxacin (CIPRO) 500 MG Tab Take 1 Tab by mouth 2 times a day for 3 days. 3 Tab 0   • oxycodone immediate-release (ROXICODONE) 5 MG Tab Take 1 Tab by mouth every 3 hours as needed (Moderate Pain (NRS Pain Scale 4-6; CPOT Pain Scale 3-5)). 90 Tab 0   • tramadol (ULTRAM) 50 MG Tab Take 1 Tab by mouth every four hours as needed (Moderate Pain (NRS Pain Scale 4-6; CPOT Pain Scale 3-5) if opiates not ordered or tolerated). 90 Tab 0   • celecoxib (CELEBREX) 200 MG Cap Take 1 Cap by mouth every morning with breakfast. 60 Cap 3   • diazepam (VALIUM) 5 MG Tab Take 1 Tab by mouth every 6 hours as needed. 40 Tab 0   • ondansetron (ZOFRAN ODT) 4 MG TABLET DISPERSIBLE Take 1 Tab by mouth every 8 hours as needed for Nausea/Vomiting. 40 Tab 0   • docusate sodium 100 MG Cap Take 100 mg by mouth 3 times a day as needed for Constipation. 90 Cap 3     No current facility-administered medications for this visit.       Social History   Substance Use Topics   • Smoking status: Former Smoker -- 1.00 packs/day for 50 years     Types: Cigarettes     Quit date: 2010   • Smokeless tobacco: Never Used   • Alcohol Use: 0.0 oz/week     0 Standard drinks or equivalent per week      Comment: 3 per day       Family Status   Relation Status Death Age   • Mother     • Father     • Sister Alive    • Brother Alive      Family History   Problem Relation Age of Onset   • Stroke Mother    • Heart Attack Father 85   • Heart Disease Father    • Cancer Neg Hx        Review of Systems:   Constitutional: Negative for fever, chills, weight loss and malaise/fatigue.   HENT: Negative for ear pain, nosebleeds, congestion, sore throat and neck pain.    Eyes: Negative for blurred vision.   Respiratory: Negative for cough, sputum production,  "shortness of breath and wheezing.    Cardiovascular: Negative for chest pain, palpitations, orthopnea and leg swelling.   Gastrointestinal: Negative for heartburn, nausea, vomiting and abdominal pain.   Genitourinary: Negative for dysuria, urgency and frequency. Positive for indwelling catheter secondary to acute post-op urinary retention.  Musculoskeletal: Negative for myalgias, joint pain, and back pain.  Skin: Negative for rash and itching.   Neurological: Negative for dizziness, tingling, tremors, sensory change, focal weakness and headaches.   Endo/Heme/Allergies: Does not bruise/bleed easily.   Psychiatric/Behavioral: Negative for depression, suicidal ideas and memory loss.  The patient is not nervous/anxious and does not have insomnia.    All other systems reviewed and are negative except as in HPI.    Exam:  Blood pressure 150/68, pulse 82, temperature 36.8 °C (98.2 °F), resp. rate 16, height 1.676 m (5' 6\"), weight 83.008 kg (183 lb), SpO2 95 %.  General:  Well nourished, well developed male in NAD  Head: Grossly normal.  Pulmonary: Clear to ausculation. Normal effort. No rales, ronchi, or wheezing.  Cardiovascular: Regular rate and rhythm without murmur.   Extremities: No clubbing, cyanosis, or edema.  Skin: Intact with no obvious rashes or lesions.  Neuro: Grossly intact.  Psych: Alert and oriented x 3.  Mood and affect appropriate.    Medical decision-making and discussion: Brian is here today to discuss urinary retention.  His catheter is currently working well, draining clear, yellow urine.  He is cleaning and emptying it as directed.  An urgent referral was placed to urology so that he can be reevaluated as directed by his physician on discharge.  If he isn't given an appointment before July 7th, they were instructed to call me.  They will plan to follow-up here as needed.          Assessment/Plan:  1. Urinary retention  REFERRAL TO UROLOGY       Return if symptoms worsen or fail to " improve.    Please note that this dictation was created using voice recognition software. I have made every reasonable attempt to correct obvious errors, but I expect that there are errors of grammar and possibly content that I did not discover before finalizing the note.

## 2017-06-12 NOTE — MR AVS SNAPSHOT
"        Brian Lopez   2017 2:40 PM   Office Visit   MRN: [id number]    Department:  [facility]   Dept Phone:  [phone]    Description:  Male : 1940   Provider:  BRET Freeman           Reason for Visit     Other difficulty Urinating       Allergies as of 2017     No Known Allergies      You were diagnosed with     Urinary retention   [2014]         Vital Signs     Blood Pressure Pulse Temperature Respirations Height Weight    150/68 mmHg 82 36.8 °C (98.2 °F) 16 1.676 m (5' 6\") 83.008 kg (183 lb)    Body Mass Index Oxygen Saturation Smoking Status             29.55 kg/m2 95% Former Smoker         Basic Information     Date Of Birth Sex Race Ethnicity Preferred Language    1940 Male  or   Origin (Yakut,Chinese,Nigerian,Pravin, etc) English      Problem List              ICD-10-CM Priority Class Noted - Resolved    Hypertension (Chronic) I10   2014 - Present    Hyperlipidemia (Chronic) E78.5   2014 - Present    Hypertriglyceridemia (Chronic) E78.1   2014 - Present    BPH (benign prostatic hyperplasia) (Chronic) N40.0   2014 - Present    S/P knee replacement -- left Z96.659   2014 - Present    OA (osteoarthritis) of knee Right M17.10   2014 - Present    Former heavy cigarette smoker (20-39 per day) Z87.891   2014 - Present    Preoperative clearance Z01.818   3/3/2017 - Present    Obesity (BMI 30-39.9) E66.9   3/7/2017 - Present    Urinary retention R33.9   2017 - Present      Health Maintenance        Date Due Completion Dates    IMM ZOSTER VACCINE 2018 (Originally 3/17/2000) ---    IMM DTaP/Tdap/Td Vaccine (2 - Td) 10/11/2024 10/11/2014    COLONOSCOPY 2025, 2014            Current Immunizations     13-VALENT PCV PREVNAR 2015    Influenza TIV (IM) 2015    Influenza Vaccine Quad Inj (Preserved) 11/15/2016, 2015    Pneumococcal polysaccharide vaccine (PPSV-23) " 3/7/2017    Tdap Vaccine 10/11/2014      Below and/or attached are the medications your provider expects you to take. Review all of your home medications and newly ordered medications with your provider and/or pharmacist. Follow medication instructions as directed by your provider and/or pharmacist. Please keep your medication list with you and share with your provider. Update the information when medications are discontinued, doses are changed, or new medications (including over-the-counter products) are added; and carry medication information at all times in the event of emergency situations     Allergies:  No Known Allergies          Medications  Valid as of: June 12, 2017 -  3:14 PM    Generic Name Brand Name Tablet Size Instructions for use    Aspirin (Tablet Delayed Response) aspirin 81 MG Take 1 Tab by mouth 2 Times a Day.        Atenolol (Tab) TENORMIN 50 MG TAKE  ONE TABLET BY MOUTH EVERY MORNING        Calcium Carb-Cholecalciferol   Take  by mouth every day.        Celecoxib (Cap) CELEBREX 200 MG Take 1 Cap by mouth every morning with breakfast.        Ciprofloxacin HCl (Tab) CIPRO 500 MG Take 1 Tab by mouth 2 times a day for 3 days.        DiazePAM (Tab) VALIUM 5 MG Take 1 Tab by mouth every 6 hours as needed.        Docusate Sodium (Cap)  MG Take 100 mg by mouth 3 times a day as needed for Constipation.        Fenofibrate (Tab) TRIGLIDE 160 MG TAKE  ONE TABLET BY MOUTH DAILY        Multiple Vitamins-Minerals   Take  by mouth every day.        Ondansetron (TABLET DISPERSIBLE) ZOFRAN ODT 4 MG Take 1 Tab by mouth every 8 hours as needed for Nausea/Vomiting.        OxyCODONE HCl (Tab) ROXICODONE 5 MG Take 1 Tab by mouth every 3 hours as needed (Moderate Pain (NRS Pain Scale 4-6; CPOT Pain Scale 3-5)).        Pravastatin Sodium (Tab) PRAVACHOL 40 MG Take 1 Tab by mouth every day.        Tamsulosin HCl (Cap) FLOMAX 0.4 MG Take 1 Cap by mouth every day.        TraMADol HCl (Tab) ULTRAM 50 MG Take 1 Tab  by mouth every four hours as needed (Moderate Pain (NRS Pain Scale 4-6; CPOT Pain Scale 3-5) if opiates not ordered or tolerated).        .                 Medicines prescribed today were sent to:     [id number] - [place], [place] - [address]    [address] [place] [address]    Phone: [phone] Fax: [phone]    Open 24 Hours?: No      Medication refill instructions:       If your prescription bottle indicates you have medication refills left, it is not necessary to call your provider’s office. Please contact your pharmacy and they will refill your medication.    If your prescription bottle indicates you do not have any refills left, you may request refills at any time through one of the following ways: The online [employer] system (except Urgent Care), by calling your provider’s office, or by asking your pharmacy to contact your provider’s office with a refill request. Medication refills are processed only during regular business hours and may not be available until the next business day. Your provider may request additional information or to have a follow-up visit with you prior to refilling your medication.   *Please Note: Medication refills are assigned a new Rx number when refilled electronically. Your pharmacy may indicate that no refills were authorized even though a new prescription for the same medication is available at the pharmacy. Please request the medicine by name with the pharmacy before contacting your provider for a refill.        Referral     A referral request has been sent to our patient care coordination department. Please allow 3-5 business days for us to process this request and contact you either by phone or mail. If you do not hear from us by the 5th business day, please call us at [phone].        Other Notes About Your Plan     Please obtain Pathology Result from Colonoscopy [facility] 06/23/2015 and status if colon cancer is still present.  Suggested code if Malignant Neoplasm of [place] Unspecified C18.9           MyChart Access Code: Activation code not generated  Current [employer] Status: Active

## 2017-06-12 NOTE — ASSESSMENT & PLAN NOTE
Had surgery on his knee on 6/7/17.  Had trouble urinating after surgery so had a catheter placed temporarily.  He was discharged home and had the same issue a few days later and another catheter was placed. He was sent home with the catheter and was told to follow up with urology within 5 days.  Patient's wife called for an appointment and was scheduled for July 7th.  Will place an urgent referral to urology to get him in sooner.    He is otherwise doing very well after surgery.  His pain is tolerable, and he is healing well.  He is ambulating some.  He was encouraged to keep all appointments with his surgeon.

## 2017-06-14 ENCOUNTER — TELEPHONE (OUTPATIENT)
Dept: MEDICAL GROUP | Facility: PHYSICIAN GROUP | Age: 77
End: 2017-06-14

## 2017-06-14 NOTE — TELEPHONE ENCOUNTER
1. Caller Name: Liliana/wife                                         Call Back Number: [phone] (home)         Patient approves a detailed voicemail message: N\A    Pt's wife states he was referred to urology and has appointment 07/07.  She states you were trying to get him seen sooner and to let you know that they have not heard anything back from them.  She is asking what he should do

## 2017-06-24 ENCOUNTER — HOSPITAL ENCOUNTER (EMERGENCY)
Facility: MEDICAL CENTER | Age: 77
End: 2017-06-24
Attending: EMERGENCY MEDICINE
Payer: MEDICARE

## 2017-06-24 VITALS
TEMPERATURE: 98.8 F | HEART RATE: 98 BPM | RESPIRATION RATE: 18 BRPM | OXYGEN SATURATION: 95 % | WEIGHT: 177 LBS | HEIGHT: 67 IN | DIASTOLIC BLOOD PRESSURE: 72 MMHG | SYSTOLIC BLOOD PRESSURE: 146 MMHG | BODY MASS INDEX: 27.78 KG/M2

## 2017-06-24 DIAGNOSIS — T83.511A URINARY TRACT INFECTION ASSOCIATED WITH INDWELLING URETHRAL CATHETER, INITIAL ENCOUNTER (HCC): ICD-10-CM

## 2017-06-24 DIAGNOSIS — N39.0 URINARY TRACT INFECTION ASSOCIATED WITH INDWELLING URETHRAL CATHETER, INITIAL ENCOUNTER (HCC): ICD-10-CM

## 2017-06-24 LAB
APPEARANCE UR: ABNORMAL
BACTERIA #/AREA URNS HPF: ABNORMAL /HPF
BILIRUB UR QL STRIP.AUTO: NEGATIVE
COLOR UR: YELLOW
CULTURE IF INDICATED INDCX: YES UA CULTURE
GLUCOSE UR STRIP.AUTO-MCNC: NEGATIVE MG/DL
KETONES UR STRIP.AUTO-MCNC: NEGATIVE MG/DL
LEUKOCYTE ESTERASE UR QL STRIP.AUTO: ABNORMAL
MICRO URNS: ABNORMAL
MUCOUS THREADS #/AREA URNS HPF: ABNORMAL /HPF
NITRITE UR QL STRIP.AUTO: POSITIVE
PH UR STRIP.AUTO: 6 [PH]
PROT UR QL STRIP: 50 MG/DL
RBC # URNS HPF: ABNORMAL /HPF
RBC UR QL AUTO: ABNORMAL
SP GR UR STRIP.AUTO: 1.02
WBC #/AREA URNS HPF: >150 /HPF

## 2017-06-24 PROCEDURE — 87077 CULTURE AEROBIC IDENTIFY: CPT

## 2017-06-24 PROCEDURE — 700111 HCHG RX REV CODE 636 W/ 250 OVERRIDE (IP): Performed by: EMERGENCY MEDICINE

## 2017-06-24 PROCEDURE — 81001 URINALYSIS AUTO W/SCOPE: CPT

## 2017-06-24 PROCEDURE — 99284 EMERGENCY DEPT VISIT MOD MDM: CPT

## 2017-06-24 PROCEDURE — 87086 URINE CULTURE/COLONY COUNT: CPT

## 2017-06-24 PROCEDURE — 700101 HCHG RX REV CODE 250: Performed by: EMERGENCY MEDICINE

## 2017-06-24 PROCEDURE — 87186 SC STD MICRODIL/AGAR DIL: CPT

## 2017-06-24 PROCEDURE — 96372 THER/PROPH/DIAG INJ SC/IM: CPT

## 2017-06-24 RX ORDER — CEFDINIR 300 MG/1
300 CAPSULE ORAL 2 TIMES DAILY
Qty: 18 CAP | Refills: 0 | Status: SHIPPED | OUTPATIENT
Start: 2017-06-25 | End: 2017-07-04

## 2017-06-24 RX ORDER — LIDOCAINE HYDROCHLORIDE 10 MG/ML
2.1 INJECTION, SOLUTION INFILTRATION; PERINEURAL ONCE
Status: COMPLETED | OUTPATIENT
Start: 2017-06-24 | End: 2017-06-24

## 2017-06-24 RX ORDER — CEFTRIAXONE 1 G/1
1000 INJECTION, POWDER, FOR SOLUTION INTRAMUSCULAR; INTRAVENOUS ONCE
Status: COMPLETED | OUTPATIENT
Start: 2017-06-24 | End: 2017-06-24

## 2017-06-24 RX ADMIN — CEFTRIAXONE SODIUM 1000 MG: 1 INJECTION, POWDER, FOR SOLUTION INTRAMUSCULAR; INTRAVENOUS at 19:45

## 2017-06-24 RX ADMIN — LIDOCAINE HYDROCHLORIDE 2.1 ML: 10 INJECTION, SOLUTION INFILTRATION; PERINEURAL at 19:45

## 2017-06-24 ASSESSMENT — PAIN SCALES - GENERAL
PAINLEVEL_OUTOF10: 0
PAINLEVEL_OUTOF10: 0

## 2017-06-24 NOTE — ED AVS SNAPSHOT
Helium Access Code: Activation code not generated  Current Helium Status: Active    [employer]  A secure, online tool to manage your health information     [facility]’s Helium® is a secure, online tool that connects you to your personalized health information from the privacy of your home -- day or night - making it very easy for you to manage your healthcare. Once the activation process is completed, you can even access your medical information using the Helium mandi, which is available for free in the [employer] Mandi store or Google Play store.     Helium provides the following levels of access (as shown below):   My Chart Features   [facility] Primary Care Doctor [facility]  Specialists [facility]  Urgent  Care Non-[facility]  Primary Care  Doctor   Email your healthcare team securely and privately 24/7 X X X X   Manage appointments: schedule your next appointment; view details of past/upcoming appointments X      Request prescription refills. X      View recent personal medical records, including lab and immunizations X X X X   View health record, including health history, allergies, medications X X X X   Read reports about your outpatient visits, procedures, consult and ER notes X X X X   See your discharge summary, which is a recap of your hospital and/or ER visit that includes your diagnosis, lab results, and care plan. X X       How to register for Helium:  1. Go to  [email or web address].  2. Click on the Sign Up Now box, which takes you to the New Member Sign Up page. You will need to provide the following information:  a. Enter your Helium Access Code exactly as it appears at the top of this page. (You will not need to use this code after you’ve completed the sign-up process. If you do not sign up before the expiration date, you must request a new code.)   b. Enter your date of birth.   c. Enter your home email address.   d. Click Submit, and follow the next screen’s instructions.  3. Create a Helium ID. This will  be your [employer] login ID and cannot be changed, so think of one that is secure and easy to remember.  4. Create a [employer] password. You can change your password at any time.  5. Enter your Password Reset Question and Answer. This can be used at a later time if you forget your password.   6. Enter your e-mail address. This allows you to receive e-mail notifications when new information is available in [employer].  7. Click Sign Up. You can now view your health information.    For assistance activating your [employer] account, call [phone]

## 2017-06-24 NOTE — ED AVS SNAPSHOT
Home Care Instructions                                                                                                                Brian Lopez   MRN: [id number]    Department:  [facility], Emergency Dept   Date of Visit:  6/24/2017            [facility], Emergency Dept    [address]    [place] [address]    Phone:  [phone]      You were seen by     Jeff Cobb M.D.      Your Diagnosis Was     Urinary tract infection associated with indwelling urethral catheter, initial encounter (CMS-[facility])     T83.511A, N39.0       These are the medications you received during your hospitalization from 06/24/2017 1603 to 06/24/2017 2005     Date/Time Order Dose Route Action    06/24/2017 1945 cefTRIAXone (ROCEPHIN) injection 1,000 mg 1,000 mg Intramuscular Given    06/24/2017 1945 lidocaine (XYLOCAINE) 1%  injection 2.1 mL Injection Given      Follow-up Information     1. Follow up with Keegan To M.D..    Specialty:  Urology    Why:  follow-up with your urologist as scheduled.  Return if worse or for any concerns    Contact information    [address]  [address]  [place] [address]  [phone]        Medication Information     Review all of your home medications and newly ordered medications with your primary doctor and/or pharmacist as soon as possible. Follow medication instructions as directed by your doctor and/or pharmacist.     Please keep your complete medication list with you and share with your physician. Update the information when medications are discontinued, doses are changed, or new medications (including over-the-counter products) are added; and carry medication information at all times in the event of emergency situations.               Medication List      START taking these medications        Instructions    Morning Afternoon Evening Bedtime    cefdinir 300 MG Caps   Start taking on:  6/25/2017   Commonly known as:  OMNICEF        Take 1  Cap by mouth 2 times a day for 9 days.   Dose:  300 mg                          ASK your doctor about these medications        Instructions    Morning Afternoon Evening Bedtime    aspirin 81 MG EC tablet        Take 1 Tab by mouth 2 Times a Day.   Dose:  81 mg                        atenolol 50 MG Tabs   Commonly known as:  TENORMIN        Doctor's comments:  Please remind pt to get labs done. This is an authorization for a requested refill   TAKE  ONE TABLET BY MOUTH EVERY MORNING                        CALCIUM + D3 PO        Take  by mouth every day.                        celecoxib 200 MG Caps   Commonly known as:  CELEBREX        Take 1 Cap by mouth every morning with breakfast.   Dose:  200 mg                        diazepam 5 MG Tabs   Commonly known as:  VALIUM        Take 1 Tab by mouth every 6 hours as needed.   Dose:  5 mg                         MG Caps        Take 100 mg by mouth 3 times a day as needed for Constipation.   Dose:  100 mg                        fenofibrate 160 MG tablet   Commonly known as:  TRIGLIDE        Doctor's comments:  Please remind pt to get labs done   TAKE  ONE TABLET BY MOUTH DAILY                        MULTIVITAMIN ADULT PO        Take  by mouth every day.                        ondansetron 4 MG Tbdp   Commonly known as:  ZOFRAN ODT        Take 1 Tab by mouth every 8 hours as needed for Nausea/Vomiting.   Dose:  4 mg                        oxycodone immediate-release 5 MG Tabs   Commonly known as:  ROXICODONE        Take 1 Tab by mouth every 3 hours as needed (Moderate Pain (NRS Pain Scale 4-6; CPOT Pain Scale 3-5)).   Dose:  5 mg                        pravastatin 40 MG tablet   Commonly known as:  PRAVACHOL        Doctor's comments:  Authorization of a refill request.   Take 1 Tab by mouth every day.   Dose:  40 mg                        tamsulosin 0.4 MG capsule   Commonly known as:  FLOMAX        Doctor's comments:  This is an authorization for a requested refill    "  Take 1 Cap by mouth every day.   Dose:  0.4 mg                        tramadol 50 MG Tabs   Commonly known as:  ULTRAM        Take 1 Tab by mouth every four hours as needed (Moderate Pain (NRS Pain Scale 4-6; CPOT Pain Scale 3-5) if opiates not ordered or tolerated).   Dose:  50 mg                             Where to Get Your Medications      You can get these medications from any pharmacy     Bring a paper prescription for each of these medications    - cefdinir 300 MG Caps            Procedures and tests performed during your visit     URINALYSIS,CULTURE IF INDICATED    URINE CULTURE(NEW)    URINE MICROSCOPIC (W/UA)        Discharge Instructions       Catheter-Associated Urinary Tract Infection FAQs  What is \"catheter-associated urinary tract infection\"?  A urinary tract infection (also called \"UTI\") is an infection in the urinary system, which includes the bladder (which stores the urine) and the kidneys (which filter the blood to make urine). Germs (for example, bacteria or yeasts) do not normally live in these areas; but if germs are introduced, an infection can occur.  If you have a urinary catheter, germs can travel along the catheter and cause an infection in your bladder or your kidney; in that case it is called a catheter-associated urinary tract infection (or \"CA-UTI\").   What is a urinary catheter?  A urinary catheter is a thin tube placed in the bladder to drain urine. Urine drains through the tube into a bag that collects the urine. A urinary catheter may be used:  · If you are not able to urinate on your own  · To measure the amount of urine that you make, for example, during intensive care  · During and after some types of surgery  · During some tests of the kidneys and bladder  People with urinary catheters have a much higher chance of getting a urinary tract infection than people who don't have a catheter.  How do I get a catheter-associated urinary tract infection (CA-UTI)?  If germs enter the " urinary tract, they may cause an infection. Many of the germs that cause a catheter-associated urinary tract infection are common germs found in your intestines that do not usually cause an infection there. Germs can enter the urinary tract when the catheter is being put in or while the catheter remains in the bladder.   What are the symptoms of a urinary tract infection?  Some of the common symptoms of a urinary tract infection are:  · Burning or pain in the lower abdomen (that is, below the stomach)  · Fever  · Bloody urine may be a sign of infection, but is also caused by other problems  · Burning during urination or an increase in the frequency of urination after the catheter is removed.  Sometimes people with catheter-associated urinary tract infections do not have these symptoms of infection.  Can catheter-associated urinary tract infections be treated?  Yes, most catheter-associated urinary tract infections can be treated with antibiotics and removal or change of the catheter. Your doctor will determine which antibiotic is best for you.   What are some of the things that hospitals are doing to prevent catheter-associated urinary tract infections?  To prevent urinary tract infections, doctors and nurses take the following actions.   Catheter insertion  · External catheters in men (these look like condoms and are placed over the penis rather than into the penis)  · Putting a temporary catheter in to drain the urine and removing it right away. This is called intermittent urethral catheterization.  Catheter care  What can I do to help prevent catheter-associated urinary tract infections if I have a catheter?  · Always clean your hands before and after doing catheter care.  · Always keep your urine bag below the level of your bladder.  · Do not tug or pull on the tubing.  · Do not twist or kink the catheter tubing.  · Ask your healthcare provider each day if you still need the catheter.  What do I need to do when  I go home from the hospital?  · If you will be going home with a catheter, your doctor or nurse should explain everything you need to know about taking care of the catheter. Make sure you understand how to care for it before you leave the hospital.  · If you develop any of the symptoms of a urinary tract infection, such as burning or pain in the lower abdomen, fever, or an increase in the frequency of urination, contact your doctor or nurse immediately.  · Before you go home, make sure you know who to contact if you have questions or problems after you get home.  If you have questions, please ask your doctor or nurse.  Developed and co-sponsored by The Society for Healthcare Epidemiology of [place] (SHEA); Infectious Diseases Society of [place] (IDSA); American Hospital Association; Association for Professionals in Infection Control and Epidemiology (APIC); Centers for Disease Control and Prevention (CDC); and The Joint Commission.     This information is not intended to replace advice given to you by your health care provider. Make sure you discuss any questions you have with your health care provider.     Document Released: 09/11/2013 Document Revised: 05/03/2016 Document Reviewed: 03/02/2016  [employer] Interactive Patient Education ©2016 [employer]            Patient Information     Patient Information    Following emergency treatment: all patient requiring follow-up care must return either to a private physician or a clinic if your condition worsens before you are able to obtain further medical attention, please return to the emergency room.     Billing Information    At [facility], we work to make the billing process streamlined for our patients.  Our Representatives are here to answer any questions you may have regarding your hospital bill.  If you have insurance coverage and have supplied your insurance information to us, we will submit a claim to your insurer on your behalf.  Should you have any  questions regarding your bill, we can be reached online or by phone as follows:  Online: You are able pay your bills online or live chat with our representatives about any billing questions you may have. We are here to help Monday - Friday from 8:00am to 7:30pm and 9:00am - 12:00pm on Saturdays.  Please visit [email or web address]  for more information.   Phone:  [phone] or [phone]    Please note that your emergency physician, surgeon, pathologist, radiologist, anesthesiologist, and other specialists are not employed by [facility] and will therefore bill separately for their services.  Please contact them directly for any questions concerning their bills at the numbers below:     Emergency Physician Services:  [phone]  [facility]:  [phone]  [facility]:  [phone]  [facility]:  [phone]    1. Your final bill may vary from the amount quoted upon discharge if all procedures are not complete at that time, or if your doctor has additional procedures of which we are not aware. You will receive an additional bill if you return to the Emergency Department at [facility] for suture removal regardless of the facility of which the sutures were placed.     2. Please arrange for settlement of this account at the emergency registration.    3. All self-pay accounts are due in full at the time of treatment.  If you are unable to meet this obligation then payment is expected within 4-5 days.     4. If you have had radiology studies (CT, X-ray, Ultrasound, MRI), you have received a preliminary result during your emergency department visit. Please contact the radiology department [phone] to receive a copy of your final result. Please discuss the Final result with your primary physician or with the follow up physician provided.     Crisis Hotline:  National Crisis Hotline:  8-230-MPHVIVU or 1-787.257.3581  [facility]  Hotline:    1-255.581.8513 or 086-112-3154         ED Discharge Follow Up Questions    1. In order to provide you with very good care, we would like to follow up with a phone call in the next few days.  May we have your permission to contact you?     YES /  NO    2. What is the best phone number to call you? (       )_____-__________    3. What is the best time to call you?      Morning  /  Afternoon  /  Evening                   Patient Signature:  ____________________________________________________________    Date:  ____________________________________________________________

## 2017-06-24 NOTE — ED NOTES
Pt ambulatory to triage c/o blood in urine this afternoon. Pt has a leg bag in place since June 9th awaiting a urology follow up for inability to urinate. Pt urine is pinkish in color. No clots noted

## 2017-06-24 NOTE — ED AVS SNAPSHOT
6/24/2017    Brian Yuanrick Jessica  [address] [address]    Dear Brian:    [facility] wants to ensure your discharge home is safe and you or your loved ones have had all of your questions answered regarding your care after you leave the hospital.    Below is a list of resources and contact information should you have any questions regarding your hospital stay, follow-up instructions, or active medical symptoms.    Questions or Concerns Regarding… Contact   Medical Questions Related to Your Discharge  (7 days a week, 8am-5pm) Contact a Nurse Care Coordinator   [phone]   Medical Questions Not Related to Your Discharge  (24 hours a day / 7 days a week)  Contact the Nurse Health Line   [phone]    Medications or Discharge Instructions Refer to your discharge packet   or contact your [facility] Primary Care Provider   [phone]   Follow-up Appointment(s) Schedule your appointment via [employer]   or contact Scheduling [phone]   Billing Review your statement via [employer]  or contact Billing [phone]   Medical Records Review your records via [employer]   or contact Medical Records [phone]     You may receive a telephone call within two days of discharge. This call is to make certain you understand your discharge instructions and have the opportunity to have any questions answered. You can also easily access your medical information, test results and upcoming appointments via the [employer] free online health management tool. You can learn more and sign up at [employer]. For assistance setting up your [employer] account, please call [phone].    Once again, we want to ensure your discharge home is safe and that you have a clear understanding of any next steps in your care. If you have any questions or concerns, please do not hesitate to contact us, we are here for you. Thank you for choosing [facility] for your healthcare needs.    Sincerely,    Your [facility] Healthcare Team

## 2017-06-25 NOTE — ED NOTES
Discharge instructions received and reviewed with pt and wife, pt and wife verbalized understanding of medication and follow up care.

## 2017-06-25 NOTE — ED NOTES
Drained urine from leg bag, approximately 200 ml of urine, removed bag from catheter, collected urine and sent to lab for culture, meatus is clean and no sign of infection noted.

## 2017-06-25 NOTE — ED PROVIDER NOTES
ED Provider Note    CHIEF COMPLAINT  Chief Complaint   Patient presents with   • Blood in Urine       HPI  Brian Lopez is a 77 y.o. male who presents with complaint of blood in his Luna catheter bag.  Patient felt ill last night, however no fever or dizziness.  Today feels better.  Patient had knee arthroscopy early June, was seen in the emergency department for difficulties voiding several days after the surgery.  Luna catheter was placed.  Review of chart shows history of benign prostatic hypertrophy however the patient and his wife do not know when that diagnosis was made for how.  After placement of the catheter in the ER, he was to follow up with urology, 1st available appointment was July 7th.  No back pain.  No abdominal pain.    REVIEW OF SYSTEMS  Constitutional: No fever  Respiratory: No shortness of breath  Cardiac: No chest pain or syncope  Gastrointestinal: No abdominal pain  Musculoskeletal: No back pain acutely  Neurologic: No headache  Genitourinary: Luna catheter, blood in the urine        PAST MEDICAL HISTORY  Past Medical History   Diagnosis Date   • Hypertension 4/17/2014   • Hyperlipidemia 4/17/2014   • Hypertriglyceridemia 4/17/2014   • BPH (benign prostatic hyperplasia) 4/17/2014   • S/P knee replacement -- left 4/17/2014     Done in 2007   • OA (osteoarthritis) of knee Right 4/17/2014   • Unspecified cataract    • Routine health maintenance 6/29/2015   • High cholesterol    • Heart burn    • Dental disorder      full dentures upper and lower       FAMILY HISTORY  Family History   Problem Relation Age of Onset   • Stroke Mother    • Heart Attack Father 85   • Heart Disease Father    • Cancer Neg Hx        SOCIAL HISTORY  Social History     Social History   • Marital Status:      Spouse Name: N/A   • Number of Children: N/A   • Years of Education: N/A     Social History Main Topics   • Smoking status: Former Smoker -- 1.00 packs/day for 50 years     Types: Cigarettes      "Quit date: 04/17/2010   • Smokeless tobacco: Never Used   • Alcohol Use: 0.0 oz/week     0 Standard drinks or equivalent per week      Comment: 3 per day   • Drug Use: No   • Sexual Activity:     Partners: Female      Comment:  / retired     Other Topics Concern   • Not on file     Social History Narrative       SURGICAL HISTORY  Past Surgical History   Procedure Laterality Date   • Knee arthroplasty total Left 2009   • Trigger finger release Bilateral last 2000's     multiple    • Appendectomy  1970's   • Cataract extraction with iol Bilateral 2011   • Colon resection robotic  6/16/2014     Performed by Ezra Burks M.D. at SURGERY [facility]   • Knee arthroplasty total Right 6/7/2017     Procedure: KNEE ARTHROPLASTY TOTAL;  Surgeon: Steffanie Del Angel M.D.;  Location: SURGERY [facility];  Service:        CURRENT MEDICATIONS  Home Medications     **Home medications have not yet been reviewed for this encounter**          ALLERGIES  No Known Allergies    PHYSICAL EXAM  VITAL SIGNS: /56 mmHg  Pulse 87  Temp(Src) 37.1 °C (98.8 °F) (Temporal)  Resp 18  Ht 1.702 m (5' 7\")  Wt 80.287 kg (177 lb)  BMI 27.72 kg/m2  SpO2 95%  Constitutional: Nontoxic in appearance, well-nourished  ENT: Nares clear, mucous membranes moist.  Eyes:  Conjunctiva normal, No discharge.    Lymphatic: No adenopathy.   Cardiovascular: Normal heart rate, Normal rhythm.   Pulmonary: No wheezing, no rales  Gastrointestinal: Abdomen is soft and nontender, no guarding  Skin: Warm, Dry, normal coloration   Musculoskeletal:  No CVA tenderness.   Neurologic: Alert, Normal motor and sensory function, No focal deficits noted.   Psychiatric:Normal affect, Normal mood.    RADIOLOGY/PROCEDURES/Labs  Results for orders placed or performed during the hospital encounter of 06/24/17   URINALYSIS,CULTURE IF INDICATED   Result Value Ref Range    Micro Urine Req Microscopic     Color Yellow     Character Sl Cloudy (A)     Specific " Gravity 1.016 <1.035    Ph 6.0 5.0-8.0    Glucose Negative Negative mg/dL    Ketones Negative Negative mg/dL    Protein 50 (A) Negative mg/dL    Bilirubin Negative Negative    Nitrite Positive (A) Negative    Leukocyte Esterase Large (A) Negative    Occult Blood Moderate (A) Negative    Culture Indicated Yes UA Culture   URINE MICROSCOPIC (W/UA)   Result Value Ref Range    WBC >150 (A) /hpf    RBC 20-50 (A) /hpf    Bacteria Many (A) None /hpf    Mucous Threads Few /hpf         COURSE & MEDICAL DECISION MAKING  Pertinent Labs & Imaging studies reviewed. (See chart for details)  Urine will be cultured.  After discussion with urology on-call, patient will be placed on a ten-day course of antibiotic.  He received Rocephin in the ER, he will continue on cefdinir.  Per urology, Luna catheter to be left in place.  Patient has agreed to return if worse or for any concerns.  Patient will keep his follow-up appointment with urology on July 7th    FINAL IMPRESSION  1. Urinary tract infection associated with indwelling urethral catheter, initial encounter (CMS-[facility])            Electronically signed by: Jeff Cobb, 6/24/2017 6:30 PM

## 2017-06-25 NOTE — DISCHARGE INSTRUCTIONS
"Catheter-Associated Urinary Tract Infection FAQs  What is \"catheter-associated urinary tract infection\"?  A urinary tract infection (also called \"UTI\") is an infection in the urinary system, which includes the bladder (which stores the urine) and the kidneys (which filter the blood to make urine). Germs (for example, bacteria or yeasts) do not normally live in these areas; but if germs are introduced, an infection can occur.  If you have a urinary catheter, germs can travel along the catheter and cause an infection in your bladder or your kidney; in that case it is called a catheter-associated urinary tract infection (or \"CA-UTI\").   What is a urinary catheter?  A urinary catheter is a thin tube placed in the bladder to drain urine. Urine drains through the tube into a bag that collects the urine. A urinary catheter may be used:  · If you are not able to urinate on your own  · To measure the amount of urine that you make, for example, during intensive care  · During and after some types of surgery  · During some tests of the kidneys and bladder  People with urinary catheters have a much higher chance of getting a urinary tract infection than people who don't have a catheter.  How do I get a catheter-associated urinary tract infection (CA-UTI)?  If germs enter the urinary tract, they may cause an infection. Many of the germs that cause a catheter-associated urinary tract infection are common germs found in your intestines that do not usually cause an infection there. Germs can enter the urinary tract when the catheter is being put in or while the catheter remains in the bladder.   What are the symptoms of a urinary tract infection?  Some of the common symptoms of a urinary tract infection are:  · Burning or pain in the lower abdomen (that is, below the stomach)  · Fever  · Bloody urine may be a sign of infection, but is also caused by other problems  · Burning during urination or an increase in the frequency of " urination after the catheter is removed.  Sometimes people with catheter-associated urinary tract infections do not have these symptoms of infection.  Can catheter-associated urinary tract infections be treated?  Yes, most catheter-associated urinary tract infections can be treated with antibiotics and removal or change of the catheter. Your doctor will determine which antibiotic is best for you.   What are some of the things that hospitals are doing to prevent catheter-associated urinary tract infections?  To prevent urinary tract infections, doctors and nurses take the following actions.   Catheter insertion  · External catheters in men (these look like condoms and are placed over the penis rather than into the penis)  · Putting a temporary catheter in to drain the urine and removing it right away. This is called intermittent urethral catheterization.  Catheter care  What can I do to help prevent catheter-associated urinary tract infections if I have a catheter?  · Always clean your hands before and after doing catheter care.  · Always keep your urine bag below the level of your bladder.  · Do not tug or pull on the tubing.  · Do not twist or kink the catheter tubing.  · Ask your healthcare provider each day if you still need the catheter.  What do I need to do when I go home from the hospital?  · If you will be going home with a catheter, your doctor or nurse should explain everything you need to know about taking care of the catheter. Make sure you understand how to care for it before you leave the hospital.  · If you develop any of the symptoms of a urinary tract infection, such as burning or pain in the lower abdomen, fever, or an increase in the frequency of urination, contact your doctor or nurse immediately.  · Before you go home, make sure you know who to contact if you have questions or problems after you get home.  If you have questions, please ask your doctor or nurse.  Developed and co-sponsored by The  Society for Healthcare Epidemiology of [place] (SHEA); Infectious Diseases Society of [place] (IDSA); American Hospital Association; Association for Professionals in Infection Control and Epidemiology (APIC); Centers for Disease Control and Prevention (CDC); and The Joint Commission.     This information is not intended to replace advice given to you by your health care provider. Make sure you discuss any questions you have with your health care provider.     Document Released: 09/11/2013 Document Revised: 05/03/2016 Document Reviewed: 03/02/2016  [employer] Interactive Patient Education ©2016 [employer]

## 2017-06-26 LAB
BACTERIA UR CULT: ABNORMAL
BACTERIA UR CULT: ABNORMAL
SIGNIFICANT IND 70042: ABNORMAL
SITE SITE: ABNORMAL
SOURCE SOURCE: ABNORMAL

## 2017-07-01 NOTE — ED NOTES
ED Positive Culture Follow-up/Notification Note:    Date: 7/1/17     Patient seen in the ED on 6/24/2017 for blood in his Luna catheter bag.   1. Urinary tract infection associated with indwelling urethral catheter, initial encounter (CMS-HCC)       Discharge Medication List as of 6/24/2017  8:05 PM      START taking these medications    Details   cefdinir (OMNICEF) 300 MG Cap Take 1 Cap by mouth 2 times a day for 9 days., Disp-18 Cap, R-0, Print Rx Paper             Allergies: Review of patient's allergies indicates no known allergies.     Final cultures:   Results     Procedure Component Value Units Date/Time    URINE CULTURE(NEW) [[id number]]  (Abnormal)  (Susceptibility) Collected:  06/24/17 1755    Order Status:  Completed Specimen Information:  Urine Updated:  06/26/17 0840     Significant Indicator POS (POS)      Source UR      Site --      Urine Culture Mixed skin thomas ,000 cfu/mL (A)      Urine Culture -- (A)      Result:        Enterobacter cloacae  >100,000 cfu/mL      Culture & Susceptibility     ENTEROBACTER CLOACAE     Antibiotic Sensitivity Microscan Unit Status    Ampicillin Resistant >16 mcg/mL Final    Cefepime Sensitive <=8 mcg/mL Final    Cefotaxime Sensitive <=2 mcg/mL Final    Cefotetan Intermediate 32 mcg/mL Final    Ceftazidime Sensitive 4 mcg/mL Final    Ceftriaxone Sensitive <=8 mcg/mL Final    Cefuroxime Resistant >16 mcg/mL Final    Cephalothin Resistant >16 mcg/mL Final    Ciprofloxacin Sensitive <=1 mcg/mL Final    Gentamicin Sensitive <=4 mcg/mL Final    Levofloxacin Sensitive <=2 mcg/mL Final    Nitrofurantoin Intermediate 64 mcg/mL Final    Pip/Tazobactam Resistant >64 mcg/mL Final    Piperacillin Resistant >64 mcg/mL Final    Tigecycline Intermediate 4 mcg/mL Final    Tobramycin Sensitive <=4 mcg/mL Final    Trimeth/Sulfa Sensitive <=2/38 mcg/mL Final                       URINALYSIS,CULTURE IF INDICATED [[id number]]  (Abnormal) Collected:  06/24/17 1755    Order Status:   Completed Specimen Information:  Urine from Urine, Luna Cath Updated:  06/24/17 1810     Micro Urine Req Microscopic      Color Yellow      Character Sl Cloudy (A)      Specific Gravity 1.016      Ph 6.0      Glucose Negative mg/dL      Ketones Negative mg/dL      Protein 50 (A) mg/dL      Bilirubin Negative      Nitrite Positive (A)      Leukocyte Esterase Large (A)      Occult Blood Moderate (A)      Culture Indicated Yes UA Culture           Plan:   Appropriate antibiotic therapy prescribed. No changes required based upon culture result.      Laura Golden

## 2017-07-11 ENCOUNTER — HOSPITAL ENCOUNTER (OUTPATIENT)
Dept: RADIOLOGY | Facility: MEDICAL CENTER | Age: 77
End: 2017-07-11
Attending: NURSE PRACTITIONER
Payer: MEDICARE

## 2017-07-11 ENCOUNTER — OFFICE VISIT (OUTPATIENT)
Dept: MEDICAL GROUP | Facility: PHYSICIAN GROUP | Age: 77
End: 2017-07-11
Payer: MEDICARE

## 2017-07-11 ENCOUNTER — HOSPITAL ENCOUNTER (OUTPATIENT)
Dept: LAB | Facility: MEDICAL CENTER | Age: 77
End: 2017-07-11
Attending: NURSE PRACTITIONER
Payer: MEDICARE

## 2017-07-11 VITALS
WEIGHT: 179.2 LBS | TEMPERATURE: 98.4 F | BODY MASS INDEX: 28.8 KG/M2 | OXYGEN SATURATION: 96 % | DIASTOLIC BLOOD PRESSURE: 68 MMHG | HEART RATE: 94 BPM | RESPIRATION RATE: 14 BRPM | HEIGHT: 66 IN | SYSTOLIC BLOOD PRESSURE: 132 MMHG

## 2017-07-11 DIAGNOSIS — R06.02 SHORTNESS OF BREATH: ICD-10-CM

## 2017-07-11 DIAGNOSIS — R06.01 ORTHOPNEA: ICD-10-CM

## 2017-07-11 DIAGNOSIS — R33.9 URINARY RETENTION: ICD-10-CM

## 2017-07-11 DIAGNOSIS — R06.00 DYSPNEA, UNSPECIFIED TYPE: ICD-10-CM

## 2017-07-11 LAB
ALBUMIN SERPL BCP-MCNC: 4.2 G/DL (ref 3.2–4.9)
ALBUMIN/GLOB SERPL: 1.1 G/DL
ALP SERPL-CCNC: 86 U/L (ref 30–99)
ALT SERPL-CCNC: 16 U/L (ref 2–50)
ANION GAP SERPL CALC-SCNC: 9 MMOL/L (ref 0–11.9)
APPEARANCE UR: ABNORMAL
AST SERPL-CCNC: 16 U/L (ref 12–45)
BACTERIA #/AREA URNS HPF: ABNORMAL /HPF
BASOPHILS # BLD AUTO: 0.8 % (ref 0–1.8)
BASOPHILS # BLD: 0.06 K/UL (ref 0–0.12)
BILIRUB SERPL-MCNC: 0.4 MG/DL (ref 0.1–1.5)
BILIRUB UR QL STRIP.AUTO: NEGATIVE
BUN SERPL-MCNC: 18 MG/DL (ref 8–22)
CALCIUM SERPL-MCNC: 10.2 MG/DL (ref 8.5–10.5)
CHLORIDE SERPL-SCNC: 105 MMOL/L (ref 96–112)
CO2 SERPL-SCNC: 25 MMOL/L (ref 20–33)
COLOR UR: YELLOW
CREAT SERPL-MCNC: 1.03 MG/DL (ref 0.5–1.4)
CULTURE IF INDICATED INDCX: YES UA CULTURE
EOSINOPHIL # BLD AUTO: 0.06 K/UL (ref 0–0.51)
EOSINOPHIL NFR BLD: 0.8 % (ref 0–6.9)
EPI CELLS #/AREA URNS HPF: NEGATIVE /HPF
ERYTHROCYTE [DISTWIDTH] IN BLOOD BY AUTOMATED COUNT: 46.2 FL (ref 35.9–50)
GFR SERPL CREATININE-BSD FRML MDRD: >60 ML/MIN/1.73 M 2
GLOBULIN SER CALC-MCNC: 3.7 G/DL (ref 1.9–3.5)
GLUCOSE SERPL-MCNC: 100 MG/DL (ref 65–99)
GLUCOSE UR STRIP.AUTO-MCNC: NEGATIVE MG/DL
HCT VFR BLD AUTO: 41.6 % (ref 42–52)
HGB BLD-MCNC: 13.3 G/DL (ref 14–18)
IMM GRANULOCYTES # BLD AUTO: 0.02 K/UL (ref 0–0.11)
IMM GRANULOCYTES NFR BLD AUTO: 0.3 % (ref 0–0.9)
KETONES UR STRIP.AUTO-MCNC: NEGATIVE MG/DL
LEUKOCYTE ESTERASE UR QL STRIP.AUTO: ABNORMAL
LYMPHOCYTES # BLD AUTO: 1.49 K/UL (ref 1–4.8)
LYMPHOCYTES NFR BLD: 18.7 % (ref 22–41)
MCH RBC QN AUTO: 28.3 PG (ref 27–33)
MCHC RBC AUTO-ENTMCNC: 32 G/DL (ref 33.7–35.3)
MCV RBC AUTO: 88.5 FL (ref 81.4–97.8)
MICRO URNS: ABNORMAL
MONOCYTES # BLD AUTO: 0.48 K/UL (ref 0–0.85)
MONOCYTES NFR BLD AUTO: 6 % (ref 0–13.4)
MUCOUS THREADS #/AREA URNS HPF: ABNORMAL /HPF
NEUTROPHILS # BLD AUTO: 5.84 K/UL (ref 1.82–7.42)
NEUTROPHILS NFR BLD: 73.4 % (ref 44–72)
NITRITE UR QL STRIP.AUTO: NEGATIVE
NRBC # BLD AUTO: 0 K/UL
NRBC BLD AUTO-RTO: 0 /100 WBC
PH UR STRIP.AUTO: 6 [PH]
PLATELET # BLD AUTO: 307 K/UL (ref 164–446)
PMV BLD AUTO: 10.7 FL (ref 9–12.9)
POTASSIUM SERPL-SCNC: 3.9 MMOL/L (ref 3.6–5.5)
PROT SERPL-MCNC: 7.9 G/DL (ref 6–8.2)
PROT UR QL STRIP: 30 MG/DL
RBC # BLD AUTO: 4.7 M/UL (ref 4.7–6.1)
RBC # URNS HPF: ABNORMAL /HPF
RBC UR QL AUTO: ABNORMAL
SODIUM SERPL-SCNC: 139 MMOL/L (ref 135–145)
SP GR UR STRIP.AUTO: 1.02
UROBILINOGEN UR STRIP.AUTO-MCNC: 0.2 MG/DL
WBC # BLD AUTO: 8 K/UL (ref 4.8–10.8)
WBC #/AREA URNS HPF: ABNORMAL /HPF

## 2017-07-11 PROCEDURE — 99214 OFFICE O/P EST MOD 30 MIN: CPT | Performed by: NURSE PRACTITIONER

## 2017-07-11 PROCEDURE — 71275 CT ANGIOGRAPHY CHEST: CPT

## 2017-07-11 PROCEDURE — 93000 ELECTROCARDIOGRAM COMPLETE: CPT | Performed by: NURSE PRACTITIONER

## 2017-07-11 NOTE — ASSESSMENT & PLAN NOTE
Patient reports shortness of breath when he bends over to tie his shoes for approximately 4 weeks. Denies any chest pain, diaphoresis, palpitations or headache. Has intermittent dizziness. Patient recently had right total knee arthroplasty and is doing physical therapy 3 times weekly. He also reports no fever, chills or symptoms of upper respiratory infection. Patient was previously a heavy smoker who quit in 2010.

## 2017-07-12 ENCOUNTER — OFFICE VISIT (OUTPATIENT)
Dept: URGENT CARE | Facility: PHYSICIAN GROUP | Age: 77
End: 2017-07-12
Payer: MEDICARE

## 2017-07-12 ENCOUNTER — HOSPITAL ENCOUNTER (OUTPATIENT)
Dept: RADIOLOGY | Facility: MEDICAL CENTER | Age: 77
End: 2017-07-12
Attending: FAMILY MEDICINE
Payer: MEDICARE

## 2017-07-12 VITALS
TEMPERATURE: 98.6 F | WEIGHT: 179 LBS | HEART RATE: 66 BPM | OXYGEN SATURATION: 94 % | RESPIRATION RATE: 16 BRPM | SYSTOLIC BLOOD PRESSURE: 104 MMHG | BODY MASS INDEX: 28.91 KG/M2 | DIASTOLIC BLOOD PRESSURE: 60 MMHG

## 2017-07-12 DIAGNOSIS — R22.1 NECK SWELLING: ICD-10-CM

## 2017-07-12 DIAGNOSIS — L08.9 SOFT TISSUE INFECTION: ICD-10-CM

## 2017-07-12 PROCEDURE — 99214 OFFICE O/P EST MOD 30 MIN: CPT | Performed by: FAMILY MEDICINE

## 2017-07-12 PROCEDURE — 76536 US EXAM OF HEAD AND NECK: CPT

## 2017-07-12 RX ORDER — METHYLPREDNISOLONE SODIUM SUCCINATE 125 MG/2ML
125 INJECTION, POWDER, LYOPHILIZED, FOR SOLUTION INTRAMUSCULAR; INTRAVENOUS ONCE
Status: COMPLETED | OUTPATIENT
Start: 2017-07-12 | End: 2017-07-12

## 2017-07-12 RX ORDER — CLINDAMYCIN HYDROCHLORIDE 300 MG/1
300 CAPSULE ORAL 3 TIMES DAILY
Qty: 21 CAP | Refills: 0 | Status: SHIPPED | OUTPATIENT
Start: 2017-07-12 | End: 2017-07-19

## 2017-07-12 RX ORDER — CEFTRIAXONE 1 G/1
1 INJECTION, POWDER, FOR SOLUTION INTRAMUSCULAR; INTRAVENOUS ONCE
Status: COMPLETED | OUTPATIENT
Start: 2017-07-12 | End: 2017-07-12

## 2017-07-12 RX ADMIN — METHYLPREDNISOLONE SODIUM SUCCINATE 125 MG: 125 INJECTION, POWDER, LYOPHILIZED, FOR SOLUTION INTRAMUSCULAR; INTRAVENOUS at 09:47

## 2017-07-12 RX ADMIN — CEFTRIAXONE 1 G: 1 INJECTION, POWDER, FOR SOLUTION INTRAMUSCULAR; INTRAVENOUS at 09:47

## 2017-07-12 ASSESSMENT — ENCOUNTER SYMPTOMS
ORTHOPNEA: 0
DIZZINESS: 0
CHILLS: 0
HEMOPTYSIS: 0
SHORTNESS OF BREATH: 0
FOCAL WEAKNESS: 0
FEVER: 0

## 2017-07-12 NOTE — PROGRESS NOTES
Subjective:     Chief Complaint   Patient presents with   • Tired     lack of energy        HPI:  Brian Lopez is a 77 y.o. male here today to discuss the following:    Shortness of breath  Patient reports shortness of breath when he bends over to tie his shoes for approximately 4 weeks. Denies any chest pain, diaphoresis, palpitations or headache. Has intermittent dizziness. Patient recently had right total knee arthroplasty and is doing physical therapy 3 times weekly. He also reports no fever, chills or symptoms of upper respiratory infection. Patient was previously a heavy smoker who quit in 2010.    Urinary retention  Patient reports that he previously had an indwelling catheter post surgery for urinary retention. He developed catheter infection which has since resolved. He denies any fever or chills, but reports shortness of breath.           Current medicines (including changes today)  Current Outpatient Prescriptions   Medication Sig Dispense Refill   • oxycodone immediate-release (ROXICODONE) 5 MG Tab Take 1 Tab by mouth every 3 hours as needed (Moderate Pain (NRS Pain Scale 4-6; CPOT Pain Scale 3-5)). 90 Tab 0   • tramadol (ULTRAM) 50 MG Tab Take 1 Tab by mouth every four hours as needed (Moderate Pain (NRS Pain Scale 4-6; CPOT Pain Scale 3-5) if opiates not ordered or tolerated). 90 Tab 0   • aspirin EC 81 MG EC tablet Take 1 Tab by mouth 2 Times a Day. 60 Tab 0   • celecoxib (CELEBREX) 200 MG Cap Take 1 Cap by mouth every morning with breakfast. 60 Cap 3   • diazepam (VALIUM) 5 MG Tab Take 1 Tab by mouth every 6 hours as needed. 40 Tab 0   • ondansetron (ZOFRAN ODT) 4 MG TABLET DISPERSIBLE Take 1 Tab by mouth every 8 hours as needed for Nausea/Vomiting. 40 Tab 0   • docusate sodium 100 MG Cap Take 100 mg by mouth 3 times a day as needed for Constipation. 90 Cap 3   • Multiple Vitamins-Minerals (MULTIVITAMIN ADULT PO) Take  by mouth every day.     • fenofibrate (TRIGLIDE) 160 MG tablet TAKE  ONE  "TABLET BY MOUTH DAILY 90 Tab 0   • pravastatin (PRAVACHOL) 40 MG tablet Take 1 Tab by mouth every day. (Patient taking differently: Take 40 mg by mouth every evening.) 90 Tab 1   • tamsulosin (FLOMAX) 0.4 MG capsule Take 1 Cap by mouth every day. (Patient taking differently: Take 0.4 mg by mouth 2 times a day.) 90 Cap 1   • atenolol (TENORMIN) 50 MG Tab TAKE  ONE TABLET BY MOUTH EVERY MORNING 90 Tab 1   • Calcium Carb-Cholecalciferol (CALCIUM + D3 PO) Take  by mouth every day.       No current facility-administered medications for this visit.       He  has a past medical history of Hypertension (4/17/2014); Hyperlipidemia (4/17/2014); Hypertriglyceridemia (4/17/2014); BPH (benign prostatic hyperplasia) (4/17/2014); S/P knee replacement -- left (4/17/2014); OA (osteoarthritis) of knee Right (4/17/2014); Unspecified cataract; Routine health maintenance (6/29/2015); High cholesterol; Heart burn; and Dental disorder.    ROS   Review of Systems   Constitutional: Negative for fever, chills, weight loss and malaise/fatigue.   HENT: Negative for ear pain, nosebleeds, congestion, sore throat and neck pain.    Respiratory: Negative for cough, sputum production, shortness of breath and wheezing.  Positive for shortness of breath  Cardiovascular: Negative for chest pain, palpitations,  and leg swelling.   Gastrointestinal: Negative for heartburn, nausea, vomiting, diarrhea and abdominal pain.   Neurological: Negative for dizziness, tingling, tremors, sensory change, focal weakness and headaches.   Psychiatric/Behavioral: Negative for depression, anxiety, suicidal ideas, insomnia and memory loss.    All other systems reviewed and are negative except as in HPI.     Objective:   Physical Exam:  Blood pressure 132/68, pulse 94, temperature 36.9 °C (98.4 °F), resp. rate 14, height 1.676 m (5' 5.98\"), weight 81.285 kg (179 lb 3.2 oz), SpO2 96 %. Body mass index is 28.94 kg/(m^2).   Physical Exam:  Alert, oriented in no acute " distress.overall tavia colored, no cyanosis  Eye contact is good, speech goal directed, affect calm  HEENT: conjunctiva non-injected, sclera non-icteric.  Pinna normal.   Neck No adenopathy or masses in the neck or supraclavicular regions.  Lungs: normal effort. clear to auscultation bilaterally with good excursion, no crackles or wheezes. Noticed orthopnea during exam, dyspnea when bending forward and after short walk  CV: regular rate and rhythm.   Abdomen: soft, nontender, No CVAT. Normal bowel sounds.  Ext: no edema, color normal, vascularity normal, temperature normal  MS: Normal gait and station. Healed scar right knee    Assessment and Plan:   The following treatment plan was discussed   1. Shortness of breath  CBC WITH DIFFERENTIAL    EKG    URINALYSIS,CULTURE IF INDICATED    CT-CTA CHEST PULMONARY ARTERY W/ RECONS    COMP METABOLIC PANEL    CANCELED: URINALYSIS,CULTURE IF INDICATED    CANCELED: COMP METABOLIC PANEL    CANCELED: DX-CHEST-2 VIEWS   2. Urinary retention     EKG Interpretation-HR is 74 normal EKG, normal sinus rhythm, Normal axis, normal intervals and no ST elevation        Followup: Return in about 2 weeks (around 7/25/2017) for Lab Review.   Please note that this dictation was created using voice recognition software. I have made every reasonable attempt to correct obvious errors, but I expect that there are errors of grammar and possibly content that I did not discover before finalizing the note.

## 2017-07-12 NOTE — MR AVS SNAPSHOT
Brian Flaherty Jessica   2017 8:45 AM   Office Visit   MRN: [id number]    Department:  [facility]   Dept Phone:  [phone]    Description:  Male : 1940   Provider:  Jose Gould M.D.           Reason for Visit     Neck Swelling neck swelling since this morning, had CT with contrast yesterday      Allergies as of 2017     No Known Allergies      You were diagnosed with     Neck swelling   [[id number]]         Vital Signs     Blood Pressure Pulse Temperature Respirations Weight Oxygen Saturation    104/60 mmHg 66 37 °C (98.6 °F) 16 81.194 kg (179 lb) 94%    Smoking Status                   Former Smoker           Basic Information     Date Of Birth Sex Race Ethnicity Preferred Language    1940 Male  or   Origin (Maltese,Peruvian,Afghan,C[place] Verdean, etc) English      Your appointments     2017  9:40 AM   Established Patient with SUDHIR Gandhi   [address] [id number] [phone]           You will be receiving a confirmation call a few days before your appointment from our automated call confirmation system.              Problem List              ICD-10-CM Priority Class Noted - Resolved    Hypertension (Chronic) I10   2014 - Present    Hyperlipidemia (Chronic) E78.5   2014 - Present    Hypertriglyceridemia (Chronic) E78.1   2014 - Present    BPH (benign prostatic hyperplasia) (Chronic) N40.0   2014 - Present    S/P knee replacement -- left Z96.659   2014 - Present    OA (osteoarthritis) of knee Right M17.10   2014 - Present    Former heavy cigarette smoker (20-39 per day) Z87.891   2014 - Present    Preoperative clearance Z01.818   3/3/2017 - Present    Obesity (BMI 30-39.9) E66.9   3/7/2017 - Present    Urinary retention R33.9   2017 - Present    Shortness of breath R06.02   2017 - Present      Health Maintenance        Date Due Completion Dates    IMM ZOSTER VACCINE 4/2/2018 (Originally 3/17/2000) ---    IMM INFLUENZA (1) 9/1/2017 11/15/2016, 11/9/2015, 11/9/2015    IMM DTaP/Tdap/Td Vaccine (2 - Td) 10/11/2024 10/11/2014    COLONOSCOPY 6/23/2025 6/23/2015, 5/28/2014            Current Immunizations     13-VALENT PCV PREVNAR 2/4/2015    Influenza TIV (IM) 11/9/2015    Influenza Vaccine Quad Inj (Preserved) 11/15/2016, 11/9/2015    Pneumococcal polysaccharide vaccine (PPSV-23) 3/7/2017    Tdap Vaccine 10/11/2014      Below and/or attached are the medications your provider expects you to take. Review all of your home medications and newly ordered medications with your provider and/or pharmacist. Follow medication instructions as directed by your provider and/or pharmacist. Please keep your medication list with you and share with your provider. Update the information when medications are discontinued, doses are changed, or new medications (including over-the-counter products) are added; and carry medication information at all times in the event of emergency situations     Allergies:  No Known Allergies          Medications  Valid as of: July 12, 2017 - 11:28 AM    Generic Name Brand Name Tablet Size Instructions for use    Aspirin (Tablet Delayed Response) aspirin 81 MG Take 1 Tab by mouth 2 Times a Day.        Atenolol (Tab) TENORMIN 50 MG TAKE  ONE TABLET BY MOUTH EVERY MORNING        Calcium Carb-Cholecalciferol   Take  by mouth every day.        Celecoxib (Cap) CELEBREX 200 MG Take 1 Cap by mouth every morning with breakfast.        DiazePAM (Tab) VALIUM 5 MG Take 1 Tab by mouth every 6 hours as needed.        Docusate Sodium (Cap)  MG Take 100 mg by mouth 3 times a day as needed for Constipation.        Fenofibrate (Tab) TRIGLIDE 160 MG TAKE  ONE TABLET BY MOUTH DAILY        Multiple Vitamins-Minerals   Take  by mouth every day.        Ondansetron (TABLET DISPERSIBLE) ZOFRAN ODT 4 MG Take 1 Tab by mouth every 8 hours as  needed for Nausea/Vomiting.        OxyCODONE HCl (Tab) ROXICODONE 5 MG Take 1 Tab by mouth every 3 hours as needed (Moderate Pain (NRS Pain Scale 4-6; CPOT Pain Scale 3-5)).        Pravastatin Sodium (Tab) PRAVACHOL 40 MG Take 1 Tab by mouth every day.        Tamsulosin HCl (Cap) FLOMAX 0.4 MG Take 1 Cap by mouth every day.        TraMADol HCl (Tab) ULTRAM 50 MG Take 1 Tab by mouth every four hours as needed (Moderate Pain (NRS Pain Scale 4-6; CPOT Pain Scale 3-5) if opiates not ordered or tolerated).        .                 Medicines prescribed today were sent to:     [id number] - [place], [place] - [address]    [address] [facility] [place] [address]    Phone: [phone] Fax: [phone]    Open 24 Hours?: No      Medication refill instructions:       If your prescription bottle indicates you have medication refills left, it is not necessary to call your provider’s office. Please contact your pharmacy and they will refill your medication.    If your prescription bottle indicates you do not have any refills left, you may request refills at any time through one of the following ways: The online [employer] system (except Urgent Care), by calling your provider’s office, or by asking your pharmacy to contact your provider’s office with a refill request. Medication refills are processed only during regular business hours and may not be available until the next business day. Your provider may request additional information or to have a follow-up visit with you prior to refilling your medication.   *Please Note: Medication refills are assigned a new Rx number when refilled electronically. Your pharmacy may indicate that no refills were authorized even though a new prescription for the same medication is available at the pharmacy. Please request the medicine by name with the pharmacy before contacting your provider for a refill.        Your To Do List     Future Labs/Procedures Complete By Expires    US-SOFT  TISSUES OF HEAD - NECK  As directed 7/12/2018      Other Notes About Your Plan     Please obtain Pathology Result from Colonoscopy [facility] 06/23/2015 and status if colon cancer is still present. Suggested code if Malignant Neoplasm of [place] Unspecified C18.9           MyChart Access Code: Activation code not generated  Current [employer] Status: Active

## 2017-07-12 NOTE — PROGRESS NOTES
Subjective:      Brian Lopez is a 77 y.o. male who presents with Neck Swelling    Chief Complaint   Patient presents with   • Neck Swelling     neck swelling since this morning, had CT with contrast yesterday        - This is a very pleasant 77 y.o. male with complaints of waking up w/ neck swelling this morning. No problems breathing swallowing speaking. No cp/fever diaphoresis nausea. No rash swelling elsewhere. Had a CT w/ contrast yesterday he says of his heart and he is worried he is having allergic reaction.                  ALLERGIES:  Review of patient's allergies indicates no known allergies.     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     Done in 2007   • OA (osteoarthritis) of knee Right 4/17/2014   • Unspecified cataract    • Routine health maintenance 6/29/2015   • High cholesterol    • Heart burn    • Dental disorder      full dentures upper and lower        MEDS:    Current outpatient prescriptions:   •  clindamycin (CLEOCIN) 300 MG Cap, Take 1 Cap by mouth 3 times a day for 7 days., Disp: 21 Cap, Rfl: 0  •  oxycodone immediate-release (ROXICODONE) 5 MG Tab, Take 1 Tab by mouth every 3 hours as needed (Moderate Pain (NRS Pain Scale 4-6; CPOT Pain Scale 3-5))., Disp: 90 Tab, Rfl: 0  •  tramadol (ULTRAM) 50 MG Tab, Take 1 Tab by mouth every four hours as needed (Moderate Pain (NRS Pain Scale 4-6; CPOT Pain Scale 3-5) if opiates not ordered or tolerated)., Disp: 90 Tab, Rfl: 0  •  aspirin EC 81 MG EC tablet, Take 1 Tab by mouth 2 Times a Day., Disp: 60 Tab, Rfl: 0  •  celecoxib (CELEBREX) 200 MG Cap, Take 1 Cap by mouth every morning with breakfast., Disp: 60 Cap, Rfl: 3  •  diazepam (VALIUM) 5 MG Tab, Take 1 Tab by mouth every 6 hours as needed., Disp: 40 Tab, Rfl: 0  •  ondansetron (ZOFRAN ODT) 4 MG TABLET  DISPERSIBLE, Take 1 Tab by mouth every 8 hours as needed for Nausea/Vomiting., Disp: 40 Tab, Rfl: 0  •  docusate sodium 100 MG Cap, Take 100 mg by mouth 3 times a day as needed for Constipation., Disp: 90 Cap, Rfl: 3  •  Multiple Vitamins-Minerals (MULTIVITAMIN ADULT PO), Take  by mouth every day., Disp: , Rfl:   •  fenofibrate (TRIGLIDE) 160 MG tablet, TAKE  ONE TABLET BY MOUTH DAILY, Disp: 90 Tab, Rfl: 0  •  pravastatin (PRAVACHOL) 40 MG tablet, Take 1 Tab by mouth every day. (Patient taking differently: Take 40 mg by mouth every evening.), Disp: 90 Tab, Rfl: 1  •  tamsulosin (FLOMAX) 0.4 MG capsule, Take 1 Cap by mouth every day. (Patient taking differently: Take 0.4 mg by mouth 2 times a day.), Disp: 90 Cap, Rfl: 1  •  atenolol (TENORMIN) 50 MG Tab, TAKE  ONE TABLET BY MOUTH EVERY MORNING, Disp: 90 Tab, Rfl: 1  •  Calcium Carb-Cholecalciferol (CALCIUM + D3 PO), Take  by mouth every day., Disp: , Rfl:     ** I have documented what I find to be significant in regards to past medical, social, family and surgical history  in my HPI or under PMH/PSH/FH review section, otherwise it is contributory **             HPI    Review of Systems   Constitutional: Negative for fever and chills.   Respiratory: Negative for hemoptysis and shortness of breath.    Cardiovascular: Negative for chest pain and orthopnea.   Neurological: Negative for dizziness and focal weakness.          Objective:     /60 mmHg  Pulse 66  Temp(Src) 37 °C (98.6 °F)  Resp 16  Wt 81.194 kg (179 lb)  SpO2 94%     Physical Exam   Constitutional: He appears well-developed. No distress.   HENT:   Head: Normocephalic and atraumatic.   Mouth/Throat: Oropharynx is clear and moist.   Eyes: Conjunctivae are normal.   Neck: Normal range of motion. Neck supple.       Cardiovascular: Regular rhythm.    No murmur heard.  Pulmonary/Chest: Effort normal and breath sounds normal. No respiratory distress.   Neurological: He is alert. He exhibits normal muscle  tone.   Skin: Skin is warm and dry.   Psychiatric: He has a normal mood and affect. Judgment normal.   Nursing note and vitals reviewed.  soft tissue edema of anterior neck submandibular/submental. Induration/tenderness Rt submand region. No warmth erythema                 Assessment/Plan:         1. Neck swelling  methylPREDNISolone sod succ (SOLU-MEDROL) 125 MG injection 125 mg    cefTRIAXone (ROCEPHIN) injection 1 g    US-SOFT TISSUES OF HEAD - NECK    clindamycin (CLEOCIN) 300 MG Cap             Dx & d/c instructions discussed w/ patient and/or family members. Follow up w/ Prvt Dr or here in 3-4 days if not getting better, sooner if needed,  ER if worse and UC/PCP unavailable.        Possible side effects (i.e. Rash, GI upset/constipation, sedation, elevation of BP or sugars) of any medications given discussed.         Addendum 7/12/2017: discussed US report w/ patient and possibly doing another CT w/ contrast, he prefers not to do another CT since he just had one yesterday. This swelling may be due to sialoadenitis. Will start abx, warm compress and 24hr recheck. Discussed strict precautions of going to ER w/ any increase in swelling, speaking/breathing/swallowing changes. He understands. He feels swelling has been stable since this morning

## 2017-07-12 NOTE — ASSESSMENT & PLAN NOTE
Patient reports that he previously had an indwelling catheter post surgery for urinary retention. He developed catheter infection which has since resolved. He denies any fever or chills, but reports shortness of breath.

## 2017-07-13 ENCOUNTER — OFFICE VISIT (OUTPATIENT)
Dept: PULMONOLOGY | Facility: HOSPICE | Age: 77
End: 2017-07-13
Payer: MEDICARE

## 2017-07-13 ENCOUNTER — OFFICE VISIT (OUTPATIENT)
Dept: URGENT CARE | Facility: PHYSICIAN GROUP | Age: 77
End: 2017-07-13
Payer: MEDICARE

## 2017-07-13 VITALS
WEIGHT: 179 LBS | DIASTOLIC BLOOD PRESSURE: 84 MMHG | OXYGEN SATURATION: 94 % | RESPIRATION RATE: 16 BRPM | SYSTOLIC BLOOD PRESSURE: 136 MMHG | HEART RATE: 83 BPM | TEMPERATURE: 98.1 F | BODY MASS INDEX: 28.91 KG/M2

## 2017-07-13 VITALS
DIASTOLIC BLOOD PRESSURE: 76 MMHG | OXYGEN SATURATION: 93 % | WEIGHT: 180 LBS | RESPIRATION RATE: 16 BRPM | SYSTOLIC BLOOD PRESSURE: 116 MMHG | HEART RATE: 66 BPM | TEMPERATURE: 98.1 F | HEIGHT: 66 IN | BODY MASS INDEX: 28.93 KG/M2

## 2017-07-13 DIAGNOSIS — E66.9 OBESITY (BMI 30-39.9): ICD-10-CM

## 2017-07-13 DIAGNOSIS — J44.9 CHRONIC OBSTRUCTIVE PULMONARY DISEASE, UNSPECIFIED COPD TYPE (HCC): ICD-10-CM

## 2017-07-13 DIAGNOSIS — L03.221 CELLULITIS, NECK: ICD-10-CM

## 2017-07-13 DIAGNOSIS — M17.11 OSTEOARTHRITIS OF RIGHT KNEE, UNSPECIFIED OSTEOARTHRITIS TYPE: ICD-10-CM

## 2017-07-13 DIAGNOSIS — G47.33 OBSTRUCTIVE SLEEP APNEA: ICD-10-CM

## 2017-07-13 DIAGNOSIS — Z87.891 FORMER HEAVY CIGARETTE SMOKER (20-39 PER DAY): ICD-10-CM

## 2017-07-13 DIAGNOSIS — R06.02 SHORTNESS OF BREATH: ICD-10-CM

## 2017-07-13 DIAGNOSIS — R22.1 NECK SWELLING: ICD-10-CM

## 2017-07-13 PROCEDURE — 99204 OFFICE O/P NEW MOD 45 MIN: CPT | Performed by: INTERNAL MEDICINE

## 2017-07-13 PROCEDURE — 99213 OFFICE O/P EST LOW 20 MIN: CPT | Performed by: PHYSICIAN ASSISTANT

## 2017-07-13 RX ORDER — MULTIVIT-MIN/IRON/FOLIC ACID/K 18-600-40
CAPSULE ORAL DAILY
COMMUNITY
End: 2020-02-04

## 2017-07-13 ASSESSMENT — ENCOUNTER SYMPTOMS
VOMITING: 0
HEADACHES: 0
NAUSEA: 0
ABDOMINAL PAIN: 0
PALPITATIONS: 0
CHILLS: 0
FEVER: 0
MYALGIAS: 0
COUGH: 0
SORE THROAT: 0
DIZZINESS: 0

## 2017-07-13 NOTE — MR AVS SNAPSHOT
"        Brian Lopez   2017 10:00 AM   Office Visit   MRN: [id number]    Department:  [facility]   Dept Phone:  [phone]    Description:  Male : 1940   Provider:  Merary Wang M.D.           Reason for Visit     New Patient Dyspnea      Allergies as of 2017     No Known Allergies      You were diagnosed with     Obstructive sleep apnea   [379739]       Chronic obstructive pulmonary disease, unspecified COPD type (CMS-[facility])   [2898185]       Shortness of breath   [786.05.ICD-9-CM]       Osteoarthritis of right knee, unspecified osteoarthritis type   [0295677]       Former heavy cigarette smoker (20-39 per day)   [774699]       Obesity (BMI 30-39.9)   [633055]         Vital Signs     Blood Pressure Pulse Temperature Respirations Height Weight    116/76 mmHg 66 36.7 °C (98.1 °F) 16 1.676 m (5' 5.98\") 81.647 kg (180 lb)    Body Mass Index Oxygen Saturation Smoking Status             29.07 kg/m2 93% Former Smoker         Basic Information     Date Of Birth Sex Race Ethnicity Preferred Language    1940 Male  or   Origin ([place],B[place]i,Cuban,Citizen of [place], etc) English      Your appointments     2017  9:40 AM   Established Patient with SUDHIR Gandhi   [facility]    [address] [place] [id number] [phone]           You will be receiving a confirmation call a few days before your appointment from our automated call confirmation system.            Aug 14, 2017  9:00 AM   Pulmonary Function Test with PFT-RM3   [facility] (--)    [address]  [address]  [place] [id number] [phone]            Sep 09, 2017  7:05 PM   Sleep Study Diagnostic with SLEEP TECH   [facility] (--)    [address]  [place] [id number] [phone]            Sep 12, 2017 10:40 AM   Follow UP with CHA Ferrari   [facility] (--)    [address] [place] [address]   [phone]              Problem List              ICD-10-CM Priority Class Noted - Resolved    Hypertension (Chronic) I10   4/17/2014 - Present    Hyperlipidemia (Chronic) E78.5   4/17/2014 - Present    Hypertriglyceridemia (Chronic) E78.1   4/17/2014 - Present    BPH (benign prostatic hyperplasia) (Chronic) N40.0   4/17/2014 - Present    S/P knee replacement -- left Z96.659   4/17/2014 - Present    OA (osteoarthritis) of knee Right M17.10   4/17/2014 - Present    Former heavy cigarette smoker (20-39 per day) Z87.891   4/17/2014 - Present    Preoperative clearance Z01.818   3/3/2017 - Present    Obesity (BMI 30-39.9) E66.9   3/7/2017 - Present    Urinary retention R33.9   6/12/2017 - Present    Shortness of breath R06.02   7/11/2017 - Present      Health Maintenance        Date Due Completion Dates    IMM ZOSTER VACCINE 4/2/2018 (Originally 3/17/2000) ---    IMM INFLUENZA (1) 9/1/2017 11/15/2016, 11/9/2015, 11/9/2015    IMM DTaP/Tdap/Td Vaccine (2 - Td) 10/11/2024 10/11/2014    COLONOSCOPY 6/23/2025 6/23/2015, 5/28/2014            Current Immunizations     13-VALENT PCV PREVNAR 2/4/2015    Influenza TIV (IM) 11/9/2015    Influenza Vaccine Quad Inj (Preserved) 11/15/2016, 11/9/2015    Pneumococcal polysaccharide vaccine (PPSV-23) 3/7/2017    Tdap Vaccine 10/11/2014      Below and/or attached are the medications your provider expects you to take. Review all of your home medications and newly ordered medications with your provider and/or pharmacist. Follow medication instructions as directed by your provider and/or pharmacist. Please keep your medication list with you and share with your provider. Update the information when medications are discontinued, doses are changed, or new medications (including over-the-counter products) are added; and carry medication information at all times in the event of emergency situations     Allergies:  No Known  Allergies          Medications  Valid as of: July 13, 2017 - 10:35 AM    Generic Name Brand Name Tablet Size Instructions for use    Aspirin (Tablet Delayed Response) aspirin 81 MG Take 1 Tab by mouth 2 Times a Day.        Atenolol (Tab) TENORMIN 50 MG TAKE  ONE TABLET BY MOUTH EVERY MORNING        Calcium Carb-Cholecalciferol   Take  by mouth every day.        Celecoxib (Cap) CELEBREX 200 MG Take 1 Cap by mouth every morning with breakfast.        Cholecalciferol (Cap) Vitamin D 2000 UNITS Take  by mouth.        Clindamycin HCl (Cap) CLEOCIN 300 MG Take 1 Cap by mouth 3 times a day for 7 days.        DiazePAM (Tab) VALIUM 5 MG Take 1 Tab by mouth every 6 hours as needed.        Docusate Sodium (Cap)  MG Take 100 mg by mouth 3 times a day as needed for Constipation.        Fenofibrate (Tab) TRIGLIDE 160 MG TAKE  ONE TABLET BY MOUTH DAILY        Multiple Vitamins-Minerals   Take  by mouth every day.        Ondansetron (TABLET DISPERSIBLE) ZOFRAN ODT 4 MG Take 1 Tab by mouth every 8 hours as needed for Nausea/Vomiting.        OxyCODONE HCl (Tab) ROXICODONE 5 MG Take 1 Tab by mouth every 3 hours as needed (Moderate Pain (NRS Pain Scale 4-6; CPOT Pain Scale 3-5)).        Pravastatin Sodium (Tab) PRAVACHOL 40 MG Take 1 Tab by mouth every day.        Tamsulosin HCl (Cap) FLOMAX 0.4 MG Take 1 Cap by mouth every day.        TraMADol HCl (Tab) ULTRAM 50 MG Take 1 Tab by mouth every four hours as needed (Moderate Pain (NRS Pain Scale 4-6; CPOT Pain Scale 3-5) if opiates not ordered or tolerated).        .                 Medicines prescribed today were sent to:     [id number] - [place], [place] - [address]    [address] [place] [address]    Phone: [phone] Fax: [phone]    Open 24 Hours?: No      Medication refill instructions:       If your prescription bottle indicates you have medication refills left, it is not necessary to call your provider’s office. Please contact your pharmacy and they  will refill your medication.    If your prescription bottle indicates you do not have any refills left, you may request refills at any time through one of the following ways: The online [employer] system (except Urgent Care), by calling your provider’s office, or by asking your pharmacy to contact your provider’s office with a refill request. Medication refills are processed only during regular business hours and may not be available until the next business day. Your provider may request additional information or to have a follow-up visit with you prior to refilling your medication.   *Please Note: Medication refills are assigned a new Rx number when refilled electronically. Your pharmacy may indicate that no refills were authorized even though a new prescription for the same medication is available at the pharmacy. Please request the medicine by name with the pharmacy before contacting your provider for a refill.        Your To Do List     Future Labs/Procedures Complete By Expires    AMB PULMONARY FUNCTION TEST/LAB  As directed 7/13/2018    POLYSOMNOGRAPHY, 4 OR MORE  As directed 7/13/2018      Instructions    This gentleman comes in for evaluation of shortness of breath and dyspnea on exertion. This was more prominent after knee surgery done in June 2017. There is no history of thromboembolic disease, and 8 CT angiogram done July 11 excluded pulmonary emboli. However he was found to have some changes consistent with emphysema. A 50-pack-year smoking history stopped 7 years ago, this is noted and encouraged. He has not had clinical asthma or COPD. There is no significant cough purulence or hemoptysis.    Yesterday he developed swelling and tenderness below his mandible, was seen in urgent care and placed on clindamycin. I don't palpate any cystic areas or distinct lymph nodes, thyroglossal duct cyst would be a consideration but has never been apparent before. He has follow-up with his primary care doctor tomorrow. He  also has a short chin, significant neck tissue, severe oropharyngeal crowding, snoring, periodic breathing and daytime sleepiness. His wife Liliana,  40 years indicates the nighttime problems and I explained to him the implications. I gave him a graphic description of the before and after impact of sleep apnea, low saturations, cardiac irritability, poor quality sleep, nicely corrected with CPAP. We will arrange for an overnight study, hopefully split and then reassess. Lung function testing can be done in the meantime, and I suspect we will find he has fairly significant COPD as well. Recent knee surgery precludes a 6 minute walk test, room air saturation today is 93%. He may well need home oxygen with his CPAP given the degree of smoking history, abdominal protuberance, I suspect supine hypoxemia from both elevated BMI, COPD, as well as sleep apnea. In-house study is recommended in view of this.       Other Notes About Your Plan     Please obtain Pathology Result from Colonoscopy [facility] Dr Jules 06/23/2015 and status if colon cancer is still present. Suggested code if Malignant Neoplasm of [place] Unspecified C18.9           [employer] Access Code: Activation code not generated  Current [employer] Status: Active

## 2017-07-13 NOTE — MR AVS SNAPSHOT
Brian Flaherty Jessica   2017 8:00 AM   Office Visit   MRN: [id number]    Department:  [facility]   Dept Phone:  [phone]    Description:  Male : 1940   Provider:  Dayron Shepherd PA-C           Reason for Visit     Follow-Up neck swelling, pt states better but has a lump on the R side      Allergies as of 2017     No Known Allergies      Vital Signs     Blood Pressure Pulse Temperature Respirations Weight Oxygen Saturation    136/84 mmHg 83 36.7 °C (98.1 °F) 16 81.194 kg (179 lb) 94%    Smoking Status                   Former Smoker           Basic Information     Date Of Birth Sex Race Ethnicity Preferred Language    1940 Male  or   Origin (Monegasque,Latvian,[place]ican,[place]ravin, etc) English      Your appointments     2017 10:00 AM   New Patient Pulmonary with A Rotation   [facility] (--)    [address]  [address]  [facility] [id number] [phone]            2017  9:40 AM   Established Patient with SUDHIR Gandhi   [facility] ([facility]    [address] [place] [id number] [phone]           You will be receiving a confirmation call a few days before your appointment from our automated call confirmation system.              Problem List              ICD-10-CM Priority Class Noted - Resolved    Hypertension (Chronic) I10   2014 - Present    Hyperlipidemia (Chronic) E78.5   2014 - Present    Hypertriglyceridemia (Chronic) E78.1   2014 - Present    BPH (benign prostatic hyperplasia) (Chronic) N40.0   2014 - Present    S/P knee replacement -- left Z96.659   2014 - Present    OA (osteoarthritis) of knee Right M17.10   2014 - Present    Former heavy cigarette smoker (20-39 per day) Z87.891   2014 - Present    Preoperative clearance Z01.818   3/3/2017 - Present    Obesity (BMI 30-39.9) E66.9   3/7/2017 - Present    Urinary  retention R33.9   6/12/2017 - Present    Shortness of breath R06.02   7/11/2017 - Present      Health Maintenance        Date Due Completion Dates    IMM ZOSTER VACCINE 4/2/2018 (Originally 3/17/2000) ---    IMM INFLUENZA (1) 9/1/2017 11/15/2016, 11/9/2015, 11/9/2015    IMM DTaP/Tdap/Td Vaccine (2 - Td) 10/11/2024 10/11/2014    COLONOSCOPY 6/23/2025 6/23/2015, 5/28/2014            Current Immunizations     13-VALENT PCV PREVNAR 2/4/2015    Influenza TIV (IM) 11/9/2015    Influenza Vaccine Quad Inj (Preserved) 11/15/2016, 11/9/2015    Pneumococcal polysaccharide vaccine (PPSV-23) 3/7/2017    Tdap Vaccine 10/11/2014      Below and/or attached are the medications your provider expects you to take. Review all of your home medications and newly ordered medications with your provider and/or pharmacist. Follow medication instructions as directed by your provider and/or pharmacist. Please keep your medication list with you and share with your provider. Update the information when medications are discontinued, doses are changed, or new medications (including over-the-counter products) are added; and carry medication information at all times in the event of emergency situations     Allergies:  No Known Allergies          Medications  Valid as of: July 13, 2017 -  8:28 AM    Generic Name Brand Name Tablet Size Instructions for use    Aspirin (Tablet Delayed Response) aspirin 81 MG Take 1 Tab by mouth 2 Times a Day.        Atenolol (Tab) TENORMIN 50 MG TAKE  ONE TABLET BY MOUTH EVERY MORNING        Calcium Carb-Cholecalciferol   Take  by mouth every day.        Celecoxib (Cap) CELEBREX 200 MG Take 1 Cap by mouth every morning with breakfast.        Clindamycin HCl (Cap) CLEOCIN 300 MG Take 1 Cap by mouth 3 times a day for 7 days.        DiazePAM (Tab) VALIUM 5 MG Take 1 Tab by mouth every 6 hours as needed.        Docusate Sodium (Cap)  MG Take 100 mg by mouth 3 times a day as needed for Constipation.        Fenofibrate  (Tab) TRIGLIDE 160 MG TAKE  ONE TABLET BY MOUTH DAILY        Multiple Vitamins-Minerals   Take  by mouth every day.        Ondansetron (TABLET DISPERSIBLE) ZOFRAN ODT 4 MG Take 1 Tab by mouth every 8 hours as needed for Nausea/Vomiting.        OxyCODONE HCl (Tab) ROXICODONE 5 MG Take 1 Tab by mouth every 3 hours as needed (Moderate Pain (NRS Pain Scale 4-6; CPOT Pain Scale 3-5)).        Pravastatin Sodium (Tab) PRAVACHOL 40 MG Take 1 Tab by mouth every day.        Tamsulosin HCl (Cap) FLOMAX 0.4 MG Take 1 Cap by mouth every day.        TraMADol HCl (Tab) ULTRAM 50 MG Take 1 Tab by mouth every four hours as needed (Moderate Pain (NRS Pain Scale 4-6; CPOT Pain Scale 3-5) if opiates not ordered or tolerated).        .                 Medicines prescribed today were sent to:     [id number] - [place], [place] - [address]    [address] [facility] [place] [address]    Phone: [phone] Fax: [phone]    Open 24 Hours?: No      Medication refill instructions:       If your prescription bottle indicates you have medication refills left, it is not necessary to call your provider’s office. Please contact your pharmacy and they will refill your medication.    If your prescription bottle indicates you do not have any refills left, you may request refills at any time through one of the following ways: The online [employer] system (except Urgent Care), by calling your provider’s office, or by asking your pharmacy to contact your provider’s office with a refill request. Medication refills are processed only during regular business hours and may not be available until the next business day. Your provider may request additional information or to have a follow-up visit with you prior to refilling your medication.   *Please Note: Medication refills are assigned a new Rx number when refilled electronically. Your pharmacy may indicate that no refills were authorized even though a new prescription for the same medication is  available at the pharmacy. Please request the medicine by name with the pharmacy before contacting your provider for a refill.        Other Notes About Your Plan     Please obtain Pathology Result from Colonoscopy [facility] Dr Jules 06/23/2015 and status if colon cancer is still present. Suggested code if Malignant Neoplasm of [place] Unspecified C18.9           MyChart Access Code: Activation code not generated  Current [employer] Status: Active

## 2017-07-13 NOTE — PROGRESS NOTES
Brian Lopez is a 77 y.o. male here for shortness of breath and dyspnea on exertion with underlying COPD, evaluation for pulmonary emboli, recent knee surgery, as well as high suspicion for sleep apnea. Patient was referred by his primary care.    History of Present Illness:      This gentleman comes in for evaluation of shortness of breath and dyspnea on exertion. This was more prominent after knee surgery done in June 2017. There is no history of thromboembolic disease, and  CT angiogram done July 11 excluded pulmonary emboli. However he was found to have some changes consistent with emphysema. A 50-pack-year smoking history stopped 7 years ago, this is noted and encouraged. He has not had clinical asthma or COPD. There is no significant cough purulence or hemoptysis.    Yesterday he developed swelling and tenderness below his mandible, was seen in urgent care and placed on clindamycin. I don't palpate any cystic areas or distinct lymph nodes, thyroglossal duct cyst would be a consideration but has never been apparent before. He has follow-up with his primary care doctor tomorrow. He also has a short chin, significant neck tissue, severe oropharyngeal crowding, snoring, periodic breathing and daytime sleepiness. His wife Liliana,  40 years indicates the nighttime problems and I explained to him the implications. I gave him a graphic description of the before and after impact of sleep apnea, low saturations, cardiac irritability, poor quality sleep, nicely corrected with CPAP. We will arrange for an overnight study, hopefully split and then reassess. Lung function testing can be done in the meantime, and I suspect we will find he has fairly significant COPD as well. Recent knee surgery precludes a 6 minute walk test, room air saturation today is 93%. He may well need home oxygen with his CPAP given the degree of smoking history, abdominal protuberance, I suspect supine hypoxemia from both elevated  BMI, COPD, as well as sleep apnea. In-house study is recommended in view of this.    Constitutional ROS: No unexpected change in weight, No unexplained fevers  Eyes: No change in vision or blurring or double vision  Mouth/Throat ROS: No sore throat, No recent change in voice or hoarseness  Pulmonary ROS: See present history for pertinent positives  Cardiovascular ROS: No chest pain to suggest acute coronary syndrome  Gastrointestinal ROS: No abdominal pain to suggest peptic disease  Musculoskeletal/Extremities ROS: no acute artritis or unusual swelling  Hematologic/Lymphatic ROS: No easy bleeding or unusual lymph node swelling  Neurologic ROS: No new or unusual weakness  Psychiatric ROS: No hallucinations  Allergic/Immunologic: No  urticaria or allergic rash      Current Outpatient Prescriptions   Medication Sig Dispense Refill   • Cholecalciferol (VITAMIN D) 2000 UNITS Cap Take  by mouth.     • clindamycin (CLEOCIN) 300 MG Cap Take 1 Cap by mouth 3 times a day for 7 days. 21 Cap 0   • celecoxib (CELEBREX) 200 MG Cap Take 1 Cap by mouth every morning with breakfast. 60 Cap 3   • docusate sodium 100 MG Cap Take 100 mg by mouth 3 times a day as needed for Constipation. 90 Cap 3   • Multiple Vitamins-Minerals (MULTIVITAMIN ADULT PO) Take  by mouth every day.     • fenofibrate (TRIGLIDE) 160 MG tablet TAKE  ONE TABLET BY MOUTH DAILY 90 Tab 0   • pravastatin (PRAVACHOL) 40 MG tablet Take 1 Tab by mouth every day. (Patient taking differently: Take 40 mg by mouth every evening.) 90 Tab 1   • tamsulosin (FLOMAX) 0.4 MG capsule Take 1 Cap by mouth every day. (Patient taking differently: Take 0.4 mg by mouth 2 times a day.) 90 Cap 1   • atenolol (TENORMIN) 50 MG Tab TAKE  ONE TABLET BY MOUTH EVERY MORNING 90 Tab 1   • Calcium Carb-Cholecalciferol (CALCIUM + D3 PO) Take  by mouth every day.     • oxycodone immediate-release (ROXICODONE) 5 MG Tab Take 1 Tab by mouth every 3 hours as needed (Moderate Pain (NRS Pain Scale 4-6;  CPOT Pain Scale 3-5)). (Patient not taking: Reported on 7/13/2017) 90 Tab 0   • tramadol (ULTRAM) 50 MG Tab Take 1 Tab by mouth every four hours as needed (Moderate Pain (NRS Pain Scale 4-6; CPOT Pain Scale 3-5) if opiates not ordered or tolerated). (Patient not taking: Reported on 7/13/2017) 90 Tab 0   • aspirin EC 81 MG EC tablet Take 1 Tab by mouth 2 Times a Day. (Patient not taking: Reported on 7/13/2017) 60 Tab 0   • diazepam (VALIUM) 5 MG Tab Take 1 Tab by mouth every 6 hours as needed. (Patient not taking: Reported on 7/13/2017) 40 Tab 0   • ondansetron (ZOFRAN ODT) 4 MG TABLET DISPERSIBLE Take 1 Tab by mouth every 8 hours as needed for Nausea/Vomiting. (Patient not taking: Reported on 7/13/2017) 40 Tab 0     No current facility-administered medications for this visit.       Social History   Substance Use Topics   • Smoking status: Former Smoker -- 1.00 packs/day for 50 years     Types: Cigarettes     Quit date: 04/17/2010   • Smokeless tobacco: Never Used   • Alcohol Use: 0.0 oz/week     0 Standard drinks or equivalent per week      Comment: 3 per day        Past Medical History   Diagnosis Date   • Hypertension 4/17/2014   • Hyperlipidemia 4/17/2014   • Hypertriglyceridemia 4/17/2014   • BPH (benign prostatic hyperplasia) 4/17/2014   • S/P knee replacement -- left 4/17/2014     Done in 2007   • OA (osteoarthritis) of knee Right 4/17/2014   • Unspecified cataract    • Routine health maintenance 6/29/2015   • High cholesterol    • Heart burn    • Dental disorder      full dentures upper and lower   • Back pain    • Fatigue    • Weight loss    • Wears glasses    • Daytime sleepiness    • Painful urination    • Blood in urine    • Weakness        Past Surgical History   Procedure Laterality Date   • Knee arthroplasty total Left 2009   • Trigger finger release Bilateral last 2000's     multiple    • Appendectomy  1970's   • Cataract extraction with iol Bilateral 2011   • Colon resection robotic  6/16/2014      "Performed by Ezra Burks M.D. at SURGERY [facility]   • Knee arthroplasty total Right 6/7/2017     Procedure: KNEE ARTHROPLASTY TOTAL;  Surgeon: Steffanie Del Angel M.D.;  Location: SURGERY [facility];  Service:    • Arthroscopy, knee     • Hemorrhoidectomy     • Cholecystectomy         Allergies: Review of patient's allergies indicates no known allergies.    Family History   Problem Relation Age of Onset   • Stroke Mother    • Heart Attack Father 85   • Heart Disease Father    • Cancer Neg Hx        Physical Examination    Filed Vitals:    07/13/17 0949   Height: 1.676 m (5' 5.98\")   Weight: 81.647 kg (180 lb)   Weight % change since last entry.: 0 %   BP: 116/76   Pulse: 66   BMI (Calculated): 29.07   Resp: 16   Temp: 36.7 °C (98.1 °F)       General Appearance: alert, no distress  Skin: Skin color, texture, turgor normal. No rashes or lesions.  Eyes: negative  Oropharynx: Lips, mucosa, and tongue normal. Teeth and gums normal. Oropharynx moist and without lesion  Lungs: positive findings: Diminished but clear  Heart: negative. RRR without murmur, gallop, or rubs.  No ectopy.  Abdomen: Abdomen soft, non-tender. . No masses,  No organomegaly  Extremities:  No deformities, edema, or skin discoloration  Joints: No acute arthritis  Peripheral Pulses:perfused  Neurologic: intact grossly  Oropharyngeal crowding without pathology. Edentulous. Prominent swelling under the mandible with possible thyroglossal duct cyst inflammation, on antibiotics    IV (only hard palate visible)    Imaging: As reviewed above    PFTS: Ordered      Assessment and Plan  1. Obstructive sleep apnea  - POLYSOMNOGRAPHY, 4 OR MORE; Future    2. Chronic obstructive pulmonary disease, unspecified COPD type (CMS-HCC)  - AMB PULMONARY FUNCTION TEST/LAB; Future    3. Shortness of breath    4. Osteoarthritis of right knee, replacement 6/17    5. Former heavy cigarette smoker (20-39 per day)    6. Obesity (BMI 30-39.9)    This gentleman comes " in for evaluation of shortness of breath and dyspnea on exertion. This was more prominent after knee surgery done in June 2017. There is no history of thromboembolic disease, and 8 CT angiogram done July 11 excluded pulmonary emboli. However he was found to have some changes consistent with emphysema. A 50-pack-year smoking history stopped 7 years ago, this is noted and encouraged. He has not had clinical asthma or COPD. There is no significant cough purulence or hemoptysis.    Yesterday he developed swelling and tenderness below his mandible, was seen in urgent care and placed on clindamycin. I don't palpate any cystic areas or distinct lymph nodes, thyroglossal duct cyst would be a consideration but has never been apparent before. He has follow-up with his primary care doctor tomorrow. He also has a short chin, significant neck tissue, severe oropharyngeal crowding, snoring, periodic breathing and daytime sleepiness. His wife Liliana,  40 years indicates the nighttime problems and I explained to him the implications. I gave him a graphic description of the before and after impact of sleep apnea, low saturations, cardiac irritability, poor quality sleep, nicely corrected with CPAP. We will arrange for an overnight study, hopefully split and then reassess. Lung function testing can be done in the meantime, and I suspect we will find he has fairly significant COPD as well. Recent knee surgery precludes a 6 minute walk test, room air saturation today is 93%. He may well need home oxygen with his CPAP given the degree of smoking history, abdominal protuberance, I suspect supine hypoxemia from both elevated BMI, COPD, as well as sleep apnea. In-house study is recommended in view of this.  Followup Return in about 6 weeks (around 8/24/2017) for after sleep study, follow up visit with sleep provider.

## 2017-07-13 NOTE — PATIENT INSTRUCTIONS
This gentleman comes in for evaluation of shortness of breath and dyspnea on exertion. This was more prominent after knee surgery done in June 2017. There is no history of thromboembolic disease, and 8 CT angiogram done July 11 excluded pulmonary emboli. However he was found to have some changes consistent with emphysema. A 50-pack-year smoking history stopped 7 years ago, this is noted and encouraged. He has not had clinical asthma or COPD. There is no significant cough purulence or hemoptysis.    Yesterday he developed swelling and tenderness below his mandible, was seen in urgent care and placed on clindamycin. I don't palpate any cystic areas or distinct lymph nodes, thyroglossal duct cyst would be a consideration but has never been apparent before. He has follow-up with his primary care doctor tomorrow. He also has a short chin, significant neck tissue, severe oropharyngeal crowding, snoring, periodic breathing and daytime sleepiness. His wife Liliana,  40 years indicates the nighttime problems and I explained to him the implications. I gave him a graphic description of the before and after impact of sleep apnea, low saturations, cardiac irritability, poor quality sleep, nicely corrected with CPAP. We will arrange for an overnight study, hopefully split and then reassess. Lung function testing can be done in the meantime, and I suspect we will find he has fairly significant COPD as well. Recent knee surgery precludes a 6 minute walk test, room air saturation today is 93%. He may well need home oxygen with his CPAP given the degree of smoking history, abdominal protuberance, I suspect supine hypoxemia from both elevated BMI, COPD, as well as sleep apnea. In-house study is recommended in view of this.

## 2017-07-13 NOTE — PROGRESS NOTES
Subjective:      Brian Lopez is a 77 y.o. male who presents with Follow-Up    No diagnosis found.      78 yo old male here today for reeval for cellulitis of soft tissue of neck, he reports swelling and redness have improved by greater than 50% from yesterday, pain has improved also.  He had a rocephine injection yesterday and was placed on clindamycin which he is tolerating well.        HPI    Review of Systems   Constitutional: Negative for fever and chills.   HENT: Negative for sore throat.    Respiratory: Negative for cough.    Cardiovascular: Negative for chest pain and palpitations.   Gastrointestinal: Negative for nausea, vomiting and abdominal pain.   Musculoskeletal: Negative for myalgias and joint pain.   Skin:        Soft tissue swelling anterior neck   Neurological: Negative for dizziness and headaches.   All other systems reviewed and are negative.         Objective:     /84 mmHg  Pulse 83  Temp(Src) 36.7 °C (98.1 °F)  Resp 16  Wt 81.194 kg (179 lb)  SpO2 94%     Physical Exam   Constitutional: He is oriented to person, place, and time. He appears well-developed and well-nourished.   Eyes: EOM are normal. Pupils are equal, round, and reactive to light.   Neck:       Moderate erythema of anterior neck with moderate inflammation/edema, marked enlargement of tonsillar nodes and right anterior cervical node with significant tenderness in right anterior cervical node. All nodes are freely movable. From documentation yesterday it is important to note the swelling/edema and erythema have decreased significantly as well as amount of tenderness.   Cardiovascular: Normal rate and normal heart sounds.    Pulmonary/Chest: Effort normal and breath sounds normal.   Neurological: He is alert and oriented to person, place, and time.   Skin: Skin is warm and dry.   Nursing note and vitals reviewed.              Assessment/Plan:     1. Neck swelling     2. Cellulitis, neck       Great improvement  from one day ago, instructed to continue abx and follow up with pcp.  ER precautions discussed with worsening symptoms or RTC in 2-4 days with non improving or worsening condition.  No additional testing ordered today, patient asked about CT scan, informed him with great improvement we will hold on further testing.  Patient reports understanding and agrees with plan.

## 2017-07-16 LAB
BACTERIA UR CULT: ABNORMAL
BACTERIA UR CULT: ABNORMAL
SIGNIFICANT IND 70042: ABNORMAL
SOURCE SOURCE: ABNORMAL

## 2017-07-17 ENCOUNTER — TELEPHONE (OUTPATIENT)
Dept: MEDICAL GROUP | Facility: PHYSICIAN GROUP | Age: 77
End: 2017-07-17

## 2017-07-17 NOTE — TELEPHONE ENCOUNTER
ESTABLISHED PATIENT PRE-VISIT PLANNING     Note: Patient will not be contacted if there is no indication to call.     1.  Reviewed notes from the last few office visits within the medical group: Yes    2.  If any orders were placed at last visit or intended to be done for this visit (i.e. 6 mos follow-up), do we have Results/Consult Notes?        •  Labs - Labs ordered, completed and results are in chart.       •  Imaging - Imaging ordered, completed and results are in chart.       •  Referrals - No referrals were ordered at last office visit.    3. Is this appointment scheduled as a Hospital Follow-Up? No    4.  Immunizations were updated in Epic using [employer]?: Epic matches [employer]       •   5.  Patient is due for the following Health Maintenance Topics:   Health Maintenance Due   Topic Date Due   • Annual Wellness Visit  12/10/2016           6.  Patient was informed to arrive 15 min prior to their scheduled appointment and bring in their medication bottles.

## 2017-07-17 NOTE — TELEPHONE ENCOUNTER
Was the patient seen in the last year in this department? Yes 07/13/17    Does patient have an active prescription for medications requested? No     Received Request Via: Pharmacy

## 2017-07-18 ENCOUNTER — OFFICE VISIT (OUTPATIENT)
Dept: MEDICAL GROUP | Facility: PHYSICIAN GROUP | Age: 77
End: 2017-07-18
Payer: MEDICARE

## 2017-07-18 VITALS
SYSTOLIC BLOOD PRESSURE: 140 MMHG | WEIGHT: 177 LBS | BODY MASS INDEX: 28.58 KG/M2 | DIASTOLIC BLOOD PRESSURE: 60 MMHG | HEART RATE: 70 BPM | OXYGEN SATURATION: 96 % | RESPIRATION RATE: 16 BRPM | TEMPERATURE: 98.6 F

## 2017-07-18 DIAGNOSIS — R06.02 SHORTNESS OF BREATH: ICD-10-CM

## 2017-07-18 DIAGNOSIS — I10 ESSENTIAL HYPERTENSION: Chronic | ICD-10-CM

## 2017-07-18 DIAGNOSIS — R06.00 DYSPNEA, UNSPECIFIED TYPE: ICD-10-CM

## 2017-07-18 DIAGNOSIS — L03.221 CELLULITIS OF NECK: ICD-10-CM

## 2017-07-18 PROCEDURE — 99214 OFFICE O/P EST MOD 30 MIN: CPT | Performed by: NURSE PRACTITIONER

## 2017-07-18 RX ORDER — FENOFIBRATE 160 MG/1
TABLET ORAL
Qty: 90 TAB | Refills: 1 | Status: SHIPPED | OUTPATIENT
Start: 2017-07-18 | End: 2018-01-02 | Stop reason: SDUPTHER

## 2017-07-18 NOTE — ASSESSMENT & PLAN NOTE
Patient was recently seen in urgent care for cellulitis of the neck and treated with antibiotics. He continues to have right submandibular node swelling, but denies any fever or chills, or dysphagia. There is no notable wound external or internal. Patient wears dentures.

## 2017-07-18 NOTE — MR AVS SNAPSHOT
Brian Yuanlin Lopez   2017 9:40 AM   Office Visit   MRN: [id number]    Department:  [facility]   Dept Phone:  [phone]    Description:  Male : 1940   Provider:  SUDHIR Gandhi           Reason for Visit     Follow-Up US results      Allergies as of 2017     No Known Allergies      You were diagnosed with     Dyspnea, unspecified type   [3338698]       Essential hypertension   [1026454]       Shortness of breath   [786.05.ICD-9-CM]         Vital Signs     Blood Pressure Pulse Temperature Respirations Weight Oxygen Saturation    140/60 mmHg 70 37 °C (98.6 °F) 16 80.287 kg (177 lb) 96%    Smoking Status                   Former Smoker           Basic Information     Date Of Birth Sex Race Ethnicity Preferred Language    1940 Male  or   Origin (Andorran,Moroccan,Thai,Luxembourger, etc) English      Your appointments     Aug 14, 2017  9:00 AM   Pulmonary Function Test with PFT-RM3   [facility] (--)    [address]  [address]  [place] [address]   [phone]            Sep 09, 2017  7:05 PM   Sleep Study Diagnostic with SLEEP TECH   [facility] (--)    [address]  [address]  [place] [id number] [phone]            Sep 12, 2017 10:40 AM   Follow UP with C Rotation   [facility] (--)    [address]  [address]  [place] [id number] [phone]              Problem List              ICD-10-CM Priority Class Noted - Resolved    Hypertension (Chronic) I10   2014 - Present    Hyperlipidemia (Chronic) E78.5   2014 - Present    Hypertriglyceridemia (Chronic) E78.1   2014 - Present    BPH (benign prostatic hyperplasia) (Chronic) N40.0   2014 - Present    S/P knee replacement -- left Z96.659   2014 - Present    OA (osteoarthritis) of knee Right M17.10   2014 - Present    Former heavy cigarette smoker (20-39 per day) Z87.891    4/17/2014 - Present    Preoperative clearance Z01.818   3/3/2017 - Present    Obesity (BMI 30-39.9) E66.9   3/7/2017 - Present    Urinary retention R33.9   6/12/2017 - Present    Shortness of breath R06.02   7/11/2017 - Present      Health Maintenance        Date Due Completion Dates    IMM ZOSTER VACCINE 4/2/2018 (Originally 3/17/2000) ---    IMM INFLUENZA (1) 9/1/2017 11/15/2016, 11/9/2015, 11/9/2015    IMM DTaP/Tdap/Td Vaccine (2 - Td) 10/11/2024 10/11/2014    COLONOSCOPY 6/23/2025 6/23/2015, 5/28/2014            Current Immunizations     13-VALENT PCV PREVNAR 2/4/2015    Influenza TIV (IM) 11/9/2015    Influenza Vaccine Quad Inj (Preserved) 11/15/2016, 11/9/2015    Pneumococcal polysaccharide vaccine (PPSV-23) 3/7/2017    Tdap Vaccine 10/11/2014      Below and/or attached are the medications your provider expects you to take. Review all of your home medications and newly ordered medications with your provider and/or pharmacist. Follow medication instructions as directed by your provider and/or pharmacist. Please keep your medication list with you and share with your provider. Update the information when medications are discontinued, doses are changed, or new medications (including over-the-counter products) are added; and carry medication information at all times in the event of emergency situations     Allergies:  No Known Allergies          Medications  Valid as of: July 18, 2017 -  9:52 AM    Generic Name Brand Name Tablet Size Instructions for use    Atenolol (Tab) TENORMIN 50 MG TAKE  ONE TABLET BY MOUTH EVERY MORNING        Calcium Carb-Cholecalciferol   Take  by mouth every day.        Celecoxib (Cap) CELEBREX 200 MG Take 1 Cap by mouth every morning with breakfast.        Cholecalciferol (Cap) Vitamin D 2000 UNITS Take  by mouth.        Clindamycin HCl (Cap) CLEOCIN 300 MG Take 1 Cap by mouth 3 times a day for 7 days.        Docusate Sodium (Cap)  MG Take 100 mg by mouth 3 times a day as needed  for Constipation.        Fenofibrate (Tab) TRIGLIDE 160 MG TAKE  ONE TABLET BY MOUTH DAILY        Multiple Vitamins-Minerals   Take  by mouth every day.        Pravastatin Sodium (Tab) PRAVACHOL 40 MG Take 1 Tab by mouth every day.        Tamsulosin HCl (Cap) FLOMAX 0.4 MG Take 1 Cap by mouth every day.        .                 Medicines prescribed today were sent to:     [id number] - [place], [place] - [address]    [address] [place] [address]    Phone: [phone] Fax: [phone]    Open 24 Hours?: No      Medication refill instructions:       If your prescription bottle indicates you have medication refills left, it is not necessary to call your provider’s office. Please contact your pharmacy and they will refill your medication.    If your prescription bottle indicates you do not have any refills left, you may request refills at any time through one of the following ways: The online [employer] system (except Urgent Care), by calling your provider’s office, or by asking your pharmacy to contact your provider’s office with a refill request. Medication refills are processed only during regular business hours and may not be available until the next business day. Your provider may request additional information or to have a follow-up visit with you prior to refilling your medication.   *Please Note: Medication refills are assigned a new Rx number when refilled electronically. Your pharmacy may indicate that no refills were authorized even though a new prescription for the same medication is available at the pharmacy. Please request the medicine by name with the pharmacy before contacting your provider for a refill.        Referral     A referral request has been sent to our patient care coordination department. Please allow 3-5 business days for us to process this request and contact you either by phone or mail. If you do not hear from us by the 5th business day, please call us at [phone].          Other Notes About Your Plan     Please obtain Pathology Result from Colonoscopy [facility] [address] 06/23/2015 and status if colon cancer is still present. Suggested code if Malignant Neoplasm of [place] Unspecified C18.9           MyChart Access Code: Activation code not generated  Current [facility] Status: Active

## 2017-07-18 NOTE — ASSESSMENT & PLAN NOTE
Chronic condition: Patient is treated for hypertension with atenolol 50 mg daily.  Blood pressure today is 140/60 He denies any chest pain, diaphoresis, shortness of breath, headaches, dizziness or blurred vision. He states that he feels fatigued.

## 2017-07-18 NOTE — PROGRESS NOTES
Subjective:     Chief Complaint   Patient presents with   • Follow-Up     US results       HPI:  Brian Lopez is a 77 y.o. male here today to discuss the following:    Hypertension  Chronic condition: Patient is treated for hypertension with atenolol 50 mg daily.  Blood pressure today is 140/60 He denies any chest pain, diaphoresis, shortness of breath, headaches, dizziness or blurred vision. He states that he feels fatigued.    Shortness of breath  Patient patient continues to have shortness of breath when he bends over to tie his shoes. This is a new finding for him for the past 4 weeks. He is status post right knee arthroplasty and is doing physical therapy 3 times weekly. He denies any chest pain, diaphoresis, palpitations or headache. He has intermittent dizziness. CTA lungs was negative for pulmonary embolism and positive for findings of emphysema. He has outstanding referrals to pulmonology and cardiology.    Cellulitis of neck  Patient was recently seen in urgent care for cellulitis of the neck and treated with antibiotics. He continues to have right submandibular node swelling, but denies any fever or chills, or dysphagia.     Current medicines (including changes today)  Current Outpatient Prescriptions   Medication Sig Dispense Refill   • Cholecalciferol (VITAMIN D) 2000 UNITS Cap Take  by mouth.     • Multiple Vitamins-Minerals (MULTIVITAMIN ADULT PO) Take  by mouth every day.     • fenofibrate (TRIGLIDE) 160 MG tablet TAKE  ONE TABLET BY MOUTH DAILY 90 Tab 0   • atenolol (TENORMIN) 50 MG Tab TAKE  ONE TABLET BY MOUTH EVERY MORNING 90 Tab 1   • Calcium Carb-Cholecalciferol (CALCIUM + D3 PO) Take  by mouth every day.     • clindamycin (CLEOCIN) 300 MG Cap Take 1 Cap by mouth 3 times a day for 7 days. 21 Cap 0   • celecoxib (CELEBREX) 200 MG Cap Take 1 Cap by mouth every morning with breakfast. 60 Cap 3   • docusate sodium 100 MG Cap Take 100 mg by mouth 3 times a day as needed for Constipation.  90 Cap 3   • pravastatin (PRAVACHOL) 40 MG tablet Take 1 Tab by mouth every day. (Patient taking differently: Take 40 mg by mouth every evening.) 90 Tab 1   • tamsulosin (FLOMAX) 0.4 MG capsule Take 1 Cap by mouth every day. (Patient taking differently: Take 0.4 mg by mouth 2 times a day.) 90 Cap 1     No current facility-administered medications for this visit.       He  has a past medical history of Hypertension (4/17/2014); Hyperlipidemia (4/17/2014); Hypertriglyceridemia (4/17/2014); BPH (benign prostatic hyperplasia) (4/17/2014); S/P knee replacement -- left (4/17/2014); OA (osteoarthritis) of knee Right (4/17/2014); Unspecified cataract; Routine health maintenance (6/29/2015); High cholesterol; Heart burn; Dental disorder; Back pain; Fatigue; Weight loss; Wears glasses; Daytime sleepiness; Painful urination; Blood in urine; and Weakness.    ROS   Review of Systems   Constitutional: Negative for fever, chills, weight loss and malaise/fatigue.   HENT: Negative for ear pain, nosebleeds, congestion, sore throat and neck pain. Positive for lump in neck   Respiratory: Negative for cough, sputum production,  and wheezing.  Positive for shortness of breath  Cardiovascular: Negative for chest pain, palpitations,  and leg swelling.   Gastrointestinal: Negative for heartburn, nausea, vomiting, diarrhea and abdominal pain.   Neurological: Negative for dizziness, tingling, tremors, sensory change, focal weakness and headaches.   Psychiatric/Behavioral: Negative for depression, anxiety, suicidal ideas, insomnia and memory loss.    All other systems reviewed and are negative except as in HPI.     Objective:   Physical Exam:  Blood pressure 140/60, pulse 70, temperature 37 °C (98.6 °F), resp. rate 16, weight 80.287 kg (177 lb), SpO2 96 %. Body mass index is 28.58 kg/(m^2).  Physical Exam:  Alert, oriented in no acute distress.  Eye contact is good, speech goal directed, affect calm  HEENT: conjunctiva non-injected, sclera  non-icteric.  Pinna normal. Oropharynx is clear without erythema, edema. No gingivitis noted.  Neck mild right submandibular adenopathy without erythema or warmth.   Lungs: clear to auscultation bilaterally with good excursion.  CV: regular rate and rhythm.   Ext: no edema, color normal, vascularity normal, temperature normal  MS: Normal gait and station    Assessment and Plan:   The following treatment plan was discussed   1. Dyspnea, unspecified type  REFERRAL TO CARDIOLOGY   2. Essential hypertension     3. Shortness of breath     4. Cellulitis of neck     Consider CT of the neck if symptoms  persist  I have reviewed all recent lab, US and CT with the patient and answered all questions.  Followup: Return if symptoms worsen or fail to improve, for following cardiology and pulmonology referral.   Please note that this dictation was created using voice recognition software. I have made every reasonable attempt to correct obvious errors, but I expect that there are errors of grammar and possibly content that I did not discover before finalizing the note.

## 2017-07-18 NOTE — ASSESSMENT & PLAN NOTE
Patient patient continues to have shortness of breath when he bends over to tie his shoes. This is a new finding for him for the past 4 weeks. He is status post right knee arthroplasty and is doing physical therapy 3 times weekly. He denies any chest pain, diaphoresis, palpitations or headache. He has intermittent dizziness. CTA lungs was negative for pulmonary embolism and positive for findings of emphysema. He has outstanding referrals to pulmonology and cardiology.

## 2017-07-18 NOTE — TELEPHONE ENCOUNTER
Pt has had OV within the 12 month protocol and lipid panel is current. 6 month supply sent to pharmacy.   Lab Results   Component Value Date/Time    CHOLESTEROL, 04/21/2017 08:15 AM    LDL 65 04/21/2017 08:15 AM    HDL 47 04/21/2017 08:15 AM    TRIGLYCERIDES 147 04/21/2017 08:15 AM       Lab Results   Component Value Date/Time    SODIUM 139 07/11/2017 12:38 PM    POTASSIUM 3.9 07/11/2017 12:38 PM    CHLORIDE 105 07/11/2017 12:38 PM    CO2 25 07/11/2017 12:38 PM    GLUCOSE 100* 07/11/2017 12:38 PM    BUN 18 07/11/2017 12:38 PM    CREATININE 1.03 07/11/2017 12:38 PM     Lab Results   Component Value Date/Time    ALKALINE PHOSPHATASE 86 07/11/2017 12:38 PM    AST(SGOT) 16 07/11/2017 12:38 PM    ALT(SGPT) 16 07/11/2017 12:38 PM    TOTAL BILIRUBIN 0.4 07/11/2017 12:38 PM

## 2017-07-21 ENCOUNTER — PATIENT OUTREACH (OUTPATIENT)
Dept: HEALTH INFORMATION MANAGEMENT | Facility: OTHER | Age: 77
End: 2017-07-21

## 2017-07-21 NOTE — PROGRESS NOTES
Brian Lopez was discharged on 6/8/2017  from [facility] for an elective knee arthroplasty with a LACE score of 26. They were discharged to home with instructions to follow up with their Surgeon as scheduled. [facility]’s Patient Advocate was able to confirm that the patient picked up all of their medications post-discharge and the patient did not have transportation barriers. Patient Advocate was able to engage with the patient several times after discharge and help with their hospital recovery.  Please see the patient’s appointments below:  • Surgeon Dr. Barreto scheduled 6/22 -  Kept  • Surgeon Dr. Barreto scheduled 7/20  • Physical Therapy started 6/23 2x a week

## 2017-08-14 ENCOUNTER — NON-PROVIDER VISIT (OUTPATIENT)
Dept: PULMONOLOGY | Facility: HOSPICE | Age: 77
End: 2017-08-14
Payer: MEDICARE

## 2017-08-14 VITALS — HEIGHT: 65 IN | WEIGHT: 180 LBS | BODY MASS INDEX: 29.99 KG/M2

## 2017-08-14 DIAGNOSIS — J44.9 CHRONIC OBSTRUCTIVE PULMONARY DISEASE, UNSPECIFIED COPD TYPE (HCC): ICD-10-CM

## 2017-08-14 PROCEDURE — 94060 EVALUATION OF WHEEZING: CPT | Performed by: INTERNAL MEDICINE

## 2017-08-14 PROCEDURE — 94729 DIFFUSING CAPACITY: CPT | Performed by: INTERNAL MEDICINE

## 2017-08-14 PROCEDURE — 94726 PLETHYSMOGRAPHY LUNG VOLUMES: CPT | Performed by: INTERNAL MEDICINE

## 2017-08-14 ASSESSMENT — PULMONARY FUNCTION TESTS
FEV1_PREDICTED: 2.36
FVC: 2.49
FEV1/FVC_PERCENT_PREDICTED: 71
FVC: 2.51
FEV1_PERCENT_CHANGE: 1
FEV1/FVC: 80.48
FVC_PERCENT_PREDICTED: 75
FEV1/FVC_PERCENT_CHANGE: 0
FEV1_PERCENT_CHANGE: 0
FEV1: 1.99
FEV1_PERCENT_PREDICTED: 84
FEV1_PERCENT_PREDICTED: 85
FEV1/FVC_PERCENT_PREDICTED: 114
FEV1: 2.02
FVC_PREDICTED: 3.33
FEV1/FVC_PERCENT_PREDICTED: 113
FVC_PERCENT_PREDICTED: 74
FEV1/FVC: 80

## 2017-08-14 NOTE — MR AVS SNAPSHOT
"        Brian Lopez   2017 9:00 AM   Non-Provider Visit   MRN: [id number]    Department:  [facility]   Dept Phone:  [phone]    Description:  Male : 1940   Provider:  [email or web address]           Reason for Visit     Shortness of Breath           Allergies as of 2017     No Known Allergies      You were diagnosed with     Chronic obstructive pulmonary disease, unspecified COPD type ([facility])   [[id number]]         Vital Signs     Height Weight Body Mass Index Smoking Status          1.651 m (5' 5\") 81.647 kg (180 lb) 29.95 kg/m2 Former Smoker        Basic Information     Date Of Birth Sex Race Ethnicity Preferred Language    1940 Male  or   Origin (German,[place], etc) English      Your appointments     Sep 09, 2017  7:05 PM   Sleep Study Diagnostic with SLEEP TECH   [facility] (--)    [email or web address]  [place] [address]   [phone]            Sep 12, 2017 10:40 AM   Follow UP with CHA Ferrari   [facility] (--)    [email or web address]  [place] [address]   [phone]            Sep 26, 2017 10:00 AM   NEW PATIENT with Janis Kee M.D.   [facility] (--)    [address]  [place] [address] [phone]              Problem List              ICD-10-CM Priority Class Noted - Resolved    Hypertension (Chronic) I10   2014 - Present    Hyperlipidemia (Chronic) E78.5   2014 - Present    Hypertriglyceridemia (Chronic) E78.1   2014 - Present    BPH (benign prostatic hyperplasia) (Chronic) N40.0   2014 - Present    S/P knee replacement -- left Z96.659   2014 - Present    OA (osteoarthritis) of knee Right M17.10   2014 - Present    Former heavy cigarette smoker (20-39 per day) Z87.891   2014 - Present    Preoperative clearance Z01.818   3/3/2017 - Present    Obesity (BMI 30-39.9) E66.9  "  3/7/2017 - Present    Urinary retention R33.9   6/12/2017 - Present    Shortness of breath R06.02   7/11/2017 - Present    Cellulitis of neck L03.221   7/18/2017 - Present      Health Maintenance        Date Due Completion Dates    IMM ZOSTER VACCINE 4/2/2018 (Originally 3/17/2000) ---    IMM INFLUENZA (1) 9/1/2017 11/15/2016, 11/9/2015, 11/9/2015    IMM DTaP/Tdap/Td Vaccine (2 - Td) 10/11/2024 10/11/2014    COLONOSCOPY 6/23/2025 6/23/2015, 5/28/2014            Current Immunizations     13-VALENT PCV PREVNAR 2/4/2015    Influenza TIV (IM) 11/9/2015    Influenza Vaccine Quad Inj (Preserved) 11/15/2016, 11/9/2015    Pneumococcal polysaccharide vaccine (PPSV-23) 3/7/2017    Tdap Vaccine 10/11/2014      Below and/or attached are the medications your provider expects you to take. Review all of your home medications and newly ordered medications with your provider and/or pharmacist. Follow medication instructions as directed by your provider and/or pharmacist. Please keep your medication list with you and share with your provider. Update the information when medications are discontinued, doses are changed, or new medications (including over-the-counter products) are added; and carry medication information at all times in the event of emergency situations     Allergies:  No Known Allergies          Medications  Valid as of: August 14, 2017 -  9:29 AM    Generic Name Brand Name Tablet Size Instructions for use    Atenolol (Tab) TENORMIN 50 MG TAKE  ONE TABLET BY MOUTH EVERY MORNING        Calcium Carb-Cholecalciferol   Take  by mouth every day.        Celecoxib (Cap) CELEBREX 200 MG Take 1 Cap by mouth every morning with breakfast.        Cholecalciferol (Cap) Vitamin D 2000 UNITS Take  by mouth.        Docusate Sodium (Cap)  MG Take 100 mg by mouth 3 times a day as needed for Constipation.        Fenofibrate (Tab) TRIGLIDE 160 MG TAKE  ONE TABLET BY MOUTH DAILY        Multiple Vitamins-Minerals   Take  by mouth  every day.        Pravastatin Sodium (Tab) PRAVACHOL 40 MG Take 1 Tab by mouth every day.        Tamsulosin HCl (Cap) FLOMAX 0.4 MG Take 1 Cap by mouth every day.        .                 Medicines prescribed today were sent to:     [id number] - [place], [place] - [address]    [address] [place] [address]    Phone: [phone] Fax: [phone]    Open 24 Hours?: No      Medication refill instructions:       If your prescription bottle indicates you have medication refills left, it is not necessary to call your provider’s office. Please contact your pharmacy and they will refill your medication.    If your prescription bottle indicates you do not have any refills left, you may request refills at any time through one of the following ways: The online [facility] system (except Urgent Care), by calling your provider’s office, or by asking your pharmacy to contact your provider’s office with a refill request. Medication refills are processed only during regular business hours and may not be available until the next business day. Your provider may request additional information or to have a follow-up visit with you prior to refilling your medication.   *Please Note: Medication refills are assigned a new Rx number when refilled electronically. Your pharmacy may indicate that no refills were authorized even though a new prescription for the same medication is available at the pharmacy. Please request the medicine by name with the pharmacy before contacting your provider for a refill.        Other Notes About Your Plan     Please obtain Pathology Result from Colonoscopy [facility] Dr Jules 06/23/2015 and status if colon cancer is still present. Suggested code if Malignant Neoplasm of [place] Unspecified C18.9           [employer] Access Code: Activation code not generated  Current [facility] Status: Active

## 2017-08-14 NOTE — PROCEDURES
Technician: KHUSHI Panda    Technician Comment:  Good patient effort & cooperation.  The results of this test meet the ATS/ERS standards for acceptability & reproducibility.  Test was performed on the [facility] Body Plethysmograph-Elite DX system.  Predicted values were [facility]-3 for spirometry, [facility] for DLCO, ITS for Lung Volumes.  The DLCO was uncorrected for Hgb.  A bronchodilator of Ventolin HFA -2puffs via spacer administered.      The FVC is 2.51 L or 75%, FEV1 is 2.02 L or 85%, FEV1/FVC 81%. TLC 82%. DLCO 94%. No bronchodilator response.     Interpretation:  Reduced vital capacity secondary to mild obesity.  No significant obstructive or restrictive ventilatory defects.  Normal gas transfer.  No bronchodilator response.

## 2017-08-22 ENCOUNTER — TELEPHONE (OUTPATIENT)
Dept: MEDICAL GROUP | Facility: PHYSICIAN GROUP | Age: 77
End: 2017-08-22

## 2017-08-22 DIAGNOSIS — N40.0 BENIGN NODULAR PROSTATIC HYPERPLASIA WITHOUT LOWER URINARY TRACT SYMPTOMS: ICD-10-CM

## 2017-08-22 RX ORDER — TAMSULOSIN HYDROCHLORIDE 0.4 MG/1
0.4 CAPSULE ORAL 2 TIMES DAILY
Qty: 60 CAP | Refills: 3 | Status: SHIPPED | OUTPATIENT
Start: 2017-08-22 | End: 2017-11-25 | Stop reason: SDUPTHER

## 2017-08-22 NOTE — TELEPHONE ENCOUNTER
Last refill from 3/27/17 Pt stated he is taking this medication twice daily. I will submit a refill request for the twice daily.

## 2017-08-22 NOTE — TELEPHONE ENCOUNTER
----- Message from Elizabeth Nelson sent at 8/21/2017 10:27 AM PDT -----  Regarding: question about meds  Pt was prescribed Flomax .4 mg. Orginally he was told to take one a day. He saw another Dr and they told him to up it to 2 a day.  Now he is almost out of meds and there are no refills.  Pt not sure how many he should be taking and is in need of refills. Please call pt @ [phone].     Thanks,    Elizabeth :)

## 2017-09-09 ENCOUNTER — SLEEP STUDY (OUTPATIENT)
Dept: SLEEP MEDICINE | Facility: MEDICAL CENTER | Age: 77
End: 2017-09-09
Attending: INTERNAL MEDICINE
Payer: MEDICARE

## 2017-09-09 DIAGNOSIS — G47.33 OBSTRUCTIVE SLEEP APNEA: ICD-10-CM

## 2017-09-09 PROCEDURE — 95810 POLYSOM 6/> YRS 4/> PARAM: CPT | Performed by: INTERNAL MEDICINE

## 2017-09-11 NOTE — PROCEDURES
Clinical Comments:  The patient underwent a comprehensive polysomnogram using the standard montage for measurement of parameters of sleep, respiratory events, movement abnormalities, heart rate and rhythm. A microphone was used to monitor snoring.      ANALYSIS:  The total recording time was 578.6 minutes with a sleep period of 516.9 minutes and the total sleep time was 325.5 minutes with a sleep efficiency of 56.3%.  The sleep latency was 61.7 minutes, and REM latency was 120.5 minutes.  The patient experienced 84 arousals in total, for an arousal index of 15.5    RESPIRATORY: The patient had 0 apneas in total.  Of these, 0 were obstructive apneas, and 0 were central apneas.  This resulted in an apnea index (AI) of 0.0.  The patient had 7 hypopneas, for a hypopnea index of 1.3.  The overall AHI was 1.3, while the AHI during Stage R sleep was 4.2.  AHI while supine was 0.0.    OXIMETRY: Oxygen saturation monitoring showed a mean SpO2 of 94.1%, with a minimum oxygen saturation of 89.0%.  Oxygen saturations were less than or = 89% for 0.0 minutes of sleep time.    CARDIAC: The highest heart rate during the recording was 97.0 beats per minute.  The average heart rate during sleep was 67.8 bpm.    LIMB MOVEMENTS: There were a total of 199 PLMs during sleep, of which 4 were PLMs arousals.  This resulted in a PLMS index of 36.7.      Interpretation:    Mr. Lopez resented with non-refreshing sleep and suspected sleep-disordered breathing.  This is a diagnostic study.    There is significant fragmentation of sleep with increased wake after sleep onset time totaling more than 3 hours, increased sleep onset latency totaling more than an hour, and an elevated arousal and awakening index.  Nonetheless significant REM sleep time is recorded.  There are frequent periodic limb movements but the periodic limb movement with arousal index is only 0.7 events per hour.    The apnea hypopnea index is 1.3 events per hour, consisting  exclusively of hypopnea events.  There are occasional respiratory effort related arousals and the respiratory disturbance index is 8.3 events per hour.  The lowest arterial oxygen saturation is 89% on room air.    Assessment:  Fragmentation of sleep, probably related at least in part to laboratory effect.  Chronic insomnia is also a possibility.  There is no evidence for clinically significant sleep-disordered breathing with a normal apnea hypopnea index of 1.3 events per hour and a minimal increase in the respiratory disturbance index with preserved arterial oxygen saturation on room air.    Recommendations:  Treatment for sleep disordered breathing is not required based on this study and supplemental nocturnal oxygen therapy is not indicated.  If the patient presents with a high clinical probability of sleep apnea hypopnea syndrome, repeat polysomnography might be considered.

## 2017-09-12 ENCOUNTER — SLEEP CENTER VISIT (OUTPATIENT)
Dept: SLEEP MEDICINE | Facility: MEDICAL CENTER | Age: 77
End: 2017-09-12
Payer: MEDICARE

## 2017-09-12 VITALS
TEMPERATURE: 98.5 F | OXYGEN SATURATION: 94 % | HEART RATE: 61 BPM | SYSTOLIC BLOOD PRESSURE: 130 MMHG | BODY MASS INDEX: 30.32 KG/M2 | HEIGHT: 65 IN | RESPIRATION RATE: 16 BRPM | WEIGHT: 182 LBS | DIASTOLIC BLOOD PRESSURE: 68 MMHG

## 2017-09-12 DIAGNOSIS — F51.04 CHRONIC INSOMNIA: ICD-10-CM

## 2017-09-12 DIAGNOSIS — G47.10 HYPERSOMNOLENCE: ICD-10-CM

## 2017-09-12 PROCEDURE — 99214 OFFICE O/P EST MOD 30 MIN: CPT | Performed by: INTERNAL MEDICINE

## 2017-09-19 NOTE — PROGRESS NOTES
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    HPI:   Mr. Lopez was evaluated here on July 13 for exertional dyspnea.  He is a former cigarette smoker and significant chronic obstructive pulmonary disease was suspected.  CT angiography did not suggest thromboembolic disease.  In the course of his evaluation, symptoms including non-refreshing sleep were identified.  He returns today to review the results of a polysomnogram.    The diagnostic polysomnogram on September 9, 2017 demonstrates fragmented sleep with a sleep efficiency of 56%, increased wake after sleep onset time totaling more than 3 hours, prolonged sleep onset latency, and an elevated arousal and awakening index.  Significant REM sleep time was identified.  There were periodic limb movements but the periodic limb movement with arousal index was only 0.7 events per hour.  The apnea hypopnea index was 1.3 events per hour, consisting exclusively of hypopnea episodes.  There were occasional respiratory effort related arousals and the respiratory disturbance index was 8.3 events per hour.  The lowest arterial oxygen saturation was 89% on room air.        Patient Active Problem List    Diagnosis Date Noted   • Cellulitis of neck 07/18/2017   • Shortness of breath 07/11/2017   • Urinary retention 06/12/2017   • Obesity (BMI 30-39.9) 03/07/2017   • Preoperative clearance 03/03/2017   • Hypertension 04/17/2014   • Hyperlipidemia 04/17/2014   • Hypertriglyceridemia 04/17/2014   • BPH (benign prostatic hyperplasia) 04/17/2014   • S/P knee replacement -- left 04/17/2014   • OA (osteoarthritis) of knee Right 04/17/2014   • Former heavy cigarette smoker (20-39 per day) 04/17/2014       Past Medical History:   Diagnosis Date   • Routine health maintenance 6/29/2015   • Hypertension 4/17/2014   • Hyperlipidemia 4/17/2014   • Hypertriglyceridemia 4/17/2014   • BPH (benign prostatic hyperplasia) 4/17/2014   • S/P knee replacement -- left 4/17/2014    Done in 2007   •  OA (osteoarthritis) of knee Right 4/17/2014   • Back pain    • Blood in urine    • Daytime sleepiness    • Dental disorder     full dentures upper and lower   • Fatigue    • Heart burn    • High cholesterol    • Painful urination    • Unspecified cataract    • Weakness    • Wears glasses    • Weight loss        Past Surgical History:   Procedure Laterality Date   • KNEE ARTHROPLASTY TOTAL Right 6/7/2017    Procedure: KNEE ARTHROPLASTY TOTAL;  Surgeon: Steffnaie Del Angel M.D.;  Location: SURGERY [facility];  Service:    • COLON RESECTION ROBOTIC  6/16/2014    Performed by Ezra Burks M.D. at SURGERY [facility]   • CATARACT EXTRACTION WITH IOL Bilateral 2011   • KNEE ARTHROPLASTY TOTAL Left 2009   • APPENDECTOMY  1970's   • ARTHROSCOPY, KNEE     • CHOLECYSTECTOMY     • HEMORRHOIDECTOMY     • TRIGGER FINGER RELEASE Bilateral last 2000's    multiple        Family History   Problem Relation Age of Onset   • Stroke Mother    • Heart Attack Father 85   • Heart Disease Father    • Cancer Neg Hx        Social History     Social History   • Marital status:      Spouse name: N/A   • Number of children: N/A   • Years of education: N/A     Occupational History   • Not on file.     Social History Main Topics   • Smoking status: Former Smoker     Packs/day: 1.00     Years: 50.00     Types: Cigarettes     Quit date: 4/17/2010   • Smokeless tobacco: Never Used   • Alcohol use 0.0 oz/week      Comment: 3 per day   • Drug use: No   • Sexual activity: Yes     Partners: Female      Comment:  / retired     Other Topics Concern   • Not on file     Social History Narrative   • No narrative on file       Current Outpatient Prescriptions   Medication Sig Dispense Refill   • tamsulosin (FLOMAX) 0.4 MG capsule Take 1 Cap by mouth 2 times a day. 60 Cap 3   • fenofibrate (TRIGLIDE) 160 MG tablet TAKE  ONE TABLET BY MOUTH DAILY 90 Tab 1   • Cholecalciferol (VITAMIN D) 2000 UNITS Cap Take  by mouth.     • celecoxib  "(CELEBREX) 200 MG Cap Take 1 Cap by mouth every morning with breakfast. 60 Cap 3   • docusate sodium 100 MG Cap Take 100 mg by mouth 3 times a day as needed for Constipation. 90 Cap 3   • Multiple Vitamins-Minerals (MULTIVITAMIN ADULT PO) Take  by mouth every day.     • pravastatin (PRAVACHOL) 40 MG tablet Take 1 Tab by mouth every day. (Patient taking differently: Take 40 mg by mouth every evening.) 90 Tab 1   • atenolol (TENORMIN) 50 MG Tab TAKE  ONE TABLET BY MOUTH EVERY MORNING 90 Tab 1   • Calcium Carb-Cholecalciferol (CALCIUM + D3 PO) Take  by mouth every day.       No current facility-administered medications for this visit.     \"CURRENT RX\"      Allergies: Review of patient's allergies indicates no known allergies.      ROS  Unchanged from prior and positive for the respiratory and sleep issues reviewed above as well as the problems in the past medical history and problem list.  All other aspects of the CPT review of systems process are negative.      Physical Exam:   /68   Pulse 61   Temp 36.9 °C (98.5 °F)   Resp 16   Ht 1.651 m (5' 5\")   Wt 82.6 kg (182 lb)   SpO2 94%   BMI 30.29 kg/m²    Head and neck examination demonstrates no mucosal lesion, purulent drainage or evident polyps. The pharynx is benign with a Mallampati II presentation. The neck is supple without thyromegaly. On chest examination there are symmetrical bilateral breath sounds without rales, wheezing or consolidation. On cardiac examination, the apical impulse and heart sounds are normal and the rhythm is regular. There is no murmur, gallop or rub and no jugular venous distention. The abdomen is soft with active bowel sounds and no palpable hepatosplenomegaly, mass, guarding or rebound. The extremities show no clubbing, cyanosis or edema and no signs of deep venous thrombosis. There is no warmth, redness, tenderness or palpable venous cord in the calves. The skin is clear, warm and dry. There is no unusual peripheral " lymphadenopathy. Peripheral pulses are palpable in all 4 extremities. On neurologic examination, cranial nerve function is intact, motor tone is symmetrical, and the patient is alert, oriented and responsive.       Problems:  1. Non-refreshing sleep.  The polysomnogram certainly indicates fragmented sleep which may be related to laboratory effect or to chronic insomnia.  He does not have clinically significant sleep-disordered breathing with an apnea hypopnea index of 1.3 events per hour and a lowest arterial oxygen saturation of 89% on room air.  He does have some periodic limb movements but they do not significantly affect sleep continuity.    2. Chronic insomnia    3.  Exertional dyspnea  This is probably related to significant chronic obstructive pulmonary disease.  There is little to suggest infection, thromboembolism or congestive heart failure.      Plan:   1.  Treatment for sleep disordered breathing is not required and nocturnal oxygen therapy is not indicated.    2.  We have reviewed the principles of sleep hygiene with particular attention to a regular schedule and sufficient time in bed.  Also talked about referral for cognitive behavioral therapy.  I don't think that soporific agents are required as the potential for adverse effects from such medications probably exceeds expected benefit.    3.  Pulmonary function study and follow-up in the pulmonary clinic as scheduled.    We appreciate the opportunity to assist in his care.

## 2017-09-26 ENCOUNTER — OFFICE VISIT (OUTPATIENT)
Dept: CARDIOLOGY | Facility: MEDICAL CENTER | Age: 77
End: 2017-09-26
Payer: MEDICARE

## 2017-09-26 VITALS
SYSTOLIC BLOOD PRESSURE: 160 MMHG | BODY MASS INDEX: 28.72 KG/M2 | HEIGHT: 67 IN | DIASTOLIC BLOOD PRESSURE: 80 MMHG | HEART RATE: 88 BPM | OXYGEN SATURATION: 95 % | WEIGHT: 183 LBS

## 2017-09-26 DIAGNOSIS — E78.01 FAMILIAL HYPERCHOLESTEROLEMIA: Chronic | ICD-10-CM

## 2017-09-26 DIAGNOSIS — R06.02 SOBOE (SHORTNESS OF BREATH ON EXERTION): ICD-10-CM

## 2017-09-26 DIAGNOSIS — I10 ESSENTIAL HYPERTENSION: Chronic | ICD-10-CM

## 2017-09-26 PROBLEM — L03.221 CELLULITIS OF NECK: Status: RESOLVED | Noted: 2017-07-18 | Resolved: 2017-09-26

## 2017-09-26 PROBLEM — R33.9 URINARY RETENTION: Status: RESOLVED | Noted: 2017-06-12 | Resolved: 2017-09-26

## 2017-09-26 PROCEDURE — 99204 OFFICE O/P NEW MOD 45 MIN: CPT | Performed by: INTERNAL MEDICINE

## 2017-09-26 ASSESSMENT — ENCOUNTER SYMPTOMS
NECK PAIN: 0
NERVOUS/ANXIOUS: 0
MYALGIAS: 0
NEUROLOGICAL NEGATIVE: 1
NAUSEA: 0
EYES NEGATIVE: 1
HEARTBURN: 0
WEIGHT LOSS: 0
COUGH: 0
WHEEZING: 0
INSOMNIA: 0
PALPITATIONS: 0

## 2017-09-26 NOTE — PROGRESS NOTES
Subjective:   Brian Lopez is a 77 y.o. male who presents today As a new patient. He was referred by Miss Perkins in regards to his dyspnea on exertion    He is not sure why he is here. He is in with his wife was my patient. Underwent uncomplicated knee arthroplasty. Had some urinary incontinence, resolved. Admits to some some heavy breathing with walking. No associated symptoms like chest pain. Admits to heavy smoking in the past and thinks this might be why    Workup with pulmonary unrevealing    Past Medical History:   Diagnosis Date   • Routine health maintenance 6/29/2015   • Hypertension 4/17/2014   • Hyperlipidemia 4/17/2014   • Hypertriglyceridemia 4/17/2014   • BPH (benign prostatic hyperplasia) 4/17/2014   • S/P knee replacement -- left 4/17/2014    Done in 2007   • OA (osteoarthritis) of knee Right 4/17/2014   • Back pain    • Blood in urine    • Daytime sleepiness    • Dental disorder     full dentures upper and lower   • Fatigue    • Heart burn    • High cholesterol    • Painful urination    • Unspecified cataract    • Weakness    • Wears glasses    • Weight loss      Past Surgical History:   Procedure Laterality Date   • KNEE ARTHROPLASTY TOTAL Right 6/7/2017    Procedure: KNEE ARTHROPLASTY TOTAL;  Surgeon: Steffanie Del Angel M.D.;  Location: SURGERY [facility];  Service:    • COLON RESECTION ROBOTIC  6/16/2014    Performed by Ezra Burks M.D. at SURGERY [facility]   • CATARACT EXTRACTION WITH IOL Bilateral 2011   • KNEE ARTHROPLASTY TOTAL Left 2009   • APPENDECTOMY  1970's   • ARTHROSCOPY, KNEE     • CHOLECYSTECTOMY     • HEMORRHOIDECTOMY     • TRIGGER FINGER RELEASE Bilateral last 2000's    multiple      Family History   Problem Relation Age of Onset   • Stroke Mother    • Heart Attack Father 85   • Heart Disease Father    • Cancer Neg Hx      History   Smoking Status   • Former Smoker   • Packs/day: 1.00   • Years: 50.00   • Types: Cigarettes   • Quit date: 4/17/2010  "  Smokeless Tobacco   • Never Used     No Known Allergies  Outpatient Encounter Prescriptions as of 9/26/2017   Medication Sig Dispense Refill   • Naproxen Sod-Diphenhydramine (ALEVE PM PO) Take  by mouth.     • tamsulosin (FLOMAX) 0.4 MG capsule Take 1 Cap by mouth 2 times a day. 60 Cap 3   • fenofibrate (TRIGLIDE) 160 MG tablet TAKE  ONE TABLET BY MOUTH DAILY 90 Tab 1   • Cholecalciferol (VITAMIN D) 2000 UNITS Cap Take  by mouth.     • celecoxib (CELEBREX) 200 MG Cap Take 1 Cap by mouth every morning with breakfast. 60 Cap 3   • Multiple Vitamins-Minerals (MULTIVITAMIN ADULT PO) Take  by mouth every day.     • pravastatin (PRAVACHOL) 40 MG tablet Take 1 Tab by mouth every day. (Patient taking differently: Take 40 mg by mouth every evening.) 90 Tab 1   • atenolol (TENORMIN) 50 MG Tab TAKE  ONE TABLET BY MOUTH EVERY MORNING 90 Tab 1   • Calcium Carb-Cholecalciferol (CALCIUM + D3 PO) Take  by mouth every day.     • docusate sodium 100 MG Cap Take 100 mg by mouth 3 times a day as needed for Constipation. (Patient not taking: Reported on 9/26/2017) 90 Cap 3     No facility-administered encounter medications on file as of 9/26/2017.      Review of Systems   Constitutional: Negative for malaise/fatigue and weight loss.   Eyes: Negative.    Respiratory: Negative for cough and wheezing.    Cardiovascular: Negative for chest pain, palpitations and leg swelling.   Gastrointestinal: Negative for heartburn and nausea.   Genitourinary: Negative.    Musculoskeletal: Negative for myalgias and neck pain.   Neurological: Negative.    Endo/Heme/Allergies: Negative.    Psychiatric/Behavioral: The patient is not nervous/anxious and does not have insomnia.    All other systems reviewed and are negative.       Objective:   /80   Pulse 88   Ht 1.702 m (5' 7\")   Wt 83 kg (183 lb)   SpO2 95%   BMI 28.66 kg/m²     Physical Exam   Constitutional: He is oriented to person, place, and time. He appears well-developed and " well-nourished.   HENT:   Head: Normocephalic and atraumatic.   Eyes: EOM are normal. Pupils are equal, round, and reactive to light. No scleral icterus.   Neck: No JVD present. No thyromegaly present.   Cardiovascular: Normal rate, regular rhythm and intact distal pulses.    No murmur heard.  Pulmonary/Chest: Breath sounds normal. No respiratory distress.   Abdominal: Bowel sounds are normal. He exhibits no distension.   Musculoskeletal: He exhibits no edema or tenderness.   Neurological: He is alert and oriented to person, place, and time. He exhibits normal muscle tone. Coordination normal.   Skin: Skin is warm and dry. No rash noted.   Psychiatric: He has a normal mood and affect. His behavior is normal.       Assessment:     1. Essential hypertension  NM-CARDIAC PET   2. Familial hypercholesterolemia     3. SOBOE (shortness of breath on exertion)  NM-CARDIAC PET    Echocardiogram Comp w/o Cont       Medical Decision Making:  Today's Assessment / Status / Plan:       EKG from July normal and reassuring. Sinus rhythm  My exam today is normal. We went over his lab work    Dyspnea on exertion. We talked about this at length. No evidence of high-grade emphysema or sleep apnea. We will do a PET scan because he is heavyset with a large abdomen. This is low radiation and very accurate. If positive we'll follow him up. We will also do an echocardiogram to evaluate diastolic function and pulmonary pressures    More than 30 minutes going over the records and another 30 minutes talking about above    RTC based on testing

## 2017-09-27 NOTE — TELEPHONE ENCOUNTER
Was the patient seen in the last year in this department? Yes 07/18/17    Does patient have an active prescription for medications requested? No     Received Request Via: Pharmacy

## 2017-09-28 RX ORDER — PRAVASTATIN SODIUM 40 MG
40 TABLET ORAL
Qty: 90 TAB | Refills: 1 | Status: SHIPPED | OUTPATIENT
Start: 2017-09-28 | End: 2018-01-15 | Stop reason: SDUPTHER

## 2017-09-29 NOTE — TELEPHONE ENCOUNTER
Pt has had OV within the 12 month protocol and lipid panel is current. 6 month supply sent to pharmacy.   Lab Results   Component Value Date/Time    CHOLSTRLTOT 141 04/21/2017 08:15 AM    LDL 65 04/21/2017 08:15 AM    HDL 47 04/21/2017 08:15 AM    TRIGLYCERIDE 147 04/21/2017 08:15 AM       Lab Results   Component Value Date/Time    SODIUM 139 07/11/2017 12:38 PM    POTASSIUM 3.9 07/11/2017 12:38 PM    CHLORIDE 105 07/11/2017 12:38 PM    CO2 25 07/11/2017 12:38 PM    GLUCOSE 100 (H) 07/11/2017 12:38 PM    BUN 18 07/11/2017 12:38 PM    CREATININE 1.03 07/11/2017 12:38 PM     Lab Results   Component Value Date/Time    ALKPHOSPHAT 86 07/11/2017 12:38 PM    ASTSGOT 16 07/11/2017 12:38 PM    ALTSGPT 16 07/11/2017 12:38 PM    TBILIRUBIN 0.4 07/11/2017 12:38 PM

## 2017-10-23 DIAGNOSIS — I10 ESSENTIAL HYPERTENSION: Chronic | ICD-10-CM

## 2017-10-23 NOTE — TELEPHONE ENCOUNTER
Was the patient seen in the last year in this department? Yes 09/26/17    Does patient have an active prescription for medications requested? No     Received Request Via: Pharmacy

## 2017-10-25 ENCOUNTER — HOSPITAL ENCOUNTER (OUTPATIENT)
Dept: CARDIOLOGY | Facility: MEDICAL CENTER | Age: 77
End: 2017-10-25
Attending: INTERNAL MEDICINE
Payer: MEDICARE

## 2017-10-25 ENCOUNTER — TELEPHONE (OUTPATIENT)
Dept: CARDIOLOGY | Facility: MEDICAL CENTER | Age: 77
End: 2017-10-25

## 2017-10-25 ENCOUNTER — HOSPITAL ENCOUNTER (OUTPATIENT)
Dept: RADIOLOGY | Facility: MEDICAL CENTER | Age: 77
End: 2017-10-25
Attending: INTERNAL MEDICINE
Payer: MEDICARE

## 2017-10-25 DIAGNOSIS — R06.02 SOBOE (SHORTNESS OF BREATH ON EXERTION): ICD-10-CM

## 2017-10-25 DIAGNOSIS — I10 ESSENTIAL HYPERTENSION: Chronic | ICD-10-CM

## 2017-10-25 LAB
LV EJECT FRACT  99904: 65
LV EJECT FRACT MOD 2C 99903: 69.93
LV EJECT FRACT MOD 4C 99902: 71.73
LV EJECT FRACT MOD BP 99901: 68.92

## 2017-10-25 PROCEDURE — A9555 RB82 RUBIDIUM: HCPCS

## 2017-10-25 PROCEDURE — 93306 TTE W/DOPPLER COMPLETE: CPT | Mod: 26 | Performed by: INTERNAL MEDICINE

## 2017-10-25 PROCEDURE — 93306 TTE W/DOPPLER COMPLETE: CPT

## 2017-10-25 PROCEDURE — 700111 HCHG RX REV CODE 636 W/ 250 OVERRIDE (IP)

## 2017-10-25 NOTE — TELEPHONE ENCOUNTER
----- Message from Janis Kee M.D. sent at 10/25/2017  2:04 PM PDT -----  Great news  Normal function on PET - and echo  Very small shadow, likley his belly/diaphrgam - would not work up further - unless he has more sxs  F/u apn prn

## 2017-10-25 NOTE — TELEPHONE ENCOUNTER
Was the patient seen in the last year in this department? Yes     Does patient have an active prescription for medications requested? No     Received Request Via: Pharmacy      Pt met protocol?: YES    LAST OV 07/18/2017    BP Readings from Last 1 Encounters:   09/26/17 160/80

## 2017-10-25 NOTE — PROCEDURES
REFERRING PHYSICIAN:       AGE:  77.  GENDER:  Male.  HEIGHT:  67 inches.  WEIGHT:  183 pounds.  BMI:    28.7.     INDICATIONS:  Shortness of breath with exertion.     MEDICATIONS:       PROCEDURE:  The patient reviewed and signed the acknowledgement for testing   form.  The patient was in a fasting state and was properly prepared for   testing.  An intravenous line was inserted and a flush of normal saline   followed to insure line patency.     A transmission scan was acquired for attenuation correction using the internal   Germanium sources.  The patient was then administered 25.1 mCi of   Rubidium-82.  Approximately 90 seconds after the infusion, resting imagine   were obtained with ECG-gating.  Following the resting series, the patient   administered 46 mg of dipyridamole over four minutes.  The blood pressure,   heart rate and ECG were monitored and recorded.  After the dipyridamole   infusion was completed, another transmission scan for attenuation correction   was obtained.  The patient was then administered 25.1 mCi of Rubidium-82.    Approximately 90 seconds after the infusion, Peak stress images were obtained   with ECG-gating.     CLINICAL RESPONSE:  Resting blood pressure was 124/99 with a heart rate of 69.    Immediately post-dipyridamole infusion the blood pressure was 145/62 with a   heart rate of 91.  After a recovery period the blood pressure was 138/57 with   a heart rate of 73.     The patient experienced chest pain symptoms during testing.     Aminophylline none was administered following the scan.     ELECTROCARDIOGRAPHIC FINDINGS:  EKG portions negative for inducible ischemia.     SCINTOGRAPHIC FINDINGS:  The QC data for the scan was reviewed and was within   acceptable limits.  The spectra image showed abnormal perfusion.  There are no   scars, but inducible ischemia.  There is a small area of the mid inferior   segment hypoperfusion with mild inducible ischemia.     GATED WALL MOTION  FINDINGS:  The resting wall motion was 61% ejection fraction   and stress ejection fraction 69%.  There is no obvious regional wall motion   abnormality.     CONCLUSIONS AND IMPRESSIONS:  Negative EKG changes for inducible ischemia and   small mild inducible ischemia at mid inferior segment, and adequate left   ventricular ejection fraction without regional wall motion abnormality.       ____________________________________     J Luis Benjamin MD, PhD, formerly [facility]    MARCELINO / WILLIS    DD:  10/25/2017 12:38:36  DT:  10/25/2017 13:12:45    D#:  [id number]  Job#:  [id number]

## 2017-10-26 RX ORDER — ATENOLOL 50 MG/1
TABLET ORAL
Qty: 90 TAB | Refills: 1 | Status: SHIPPED | OUTPATIENT
Start: 2017-10-26 | End: 2018-04-23 | Stop reason: SDUPTHER

## 2017-10-26 NOTE — TELEPHONE ENCOUNTER
Refill X 6 months, sent to pharmacy.Pt. Seen in the last 6 months per protocol.   Lab Results   Component Value Date/Time    SODIUM 139 07/11/2017 12:38 PM    POTASSIUM 3.9 07/11/2017 12:38 PM    CHLORIDE 105 07/11/2017 12:38 PM    CO2 25 07/11/2017 12:38 PM    GLUCOSE 100 (H) 07/11/2017 12:38 PM    BUN 18 07/11/2017 12:38 PM    CREATININE 1.03 07/11/2017 12:38 PM

## 2017-11-01 ENCOUNTER — OFFICE VISIT (OUTPATIENT)
Dept: MEDICAL GROUP | Facility: PHYSICIAN GROUP | Age: 77
End: 2017-11-01
Payer: MEDICARE

## 2017-11-01 VITALS
HEIGHT: 67 IN | BODY MASS INDEX: 29.03 KG/M2 | OXYGEN SATURATION: 94 % | WEIGHT: 185 LBS | SYSTOLIC BLOOD PRESSURE: 124 MMHG | TEMPERATURE: 97.4 F | DIASTOLIC BLOOD PRESSURE: 64 MMHG | HEART RATE: 70 BPM | RESPIRATION RATE: 14 BRPM

## 2017-11-01 DIAGNOSIS — H61.22 IMPACTED CERUMEN OF LEFT EAR: ICD-10-CM

## 2017-11-01 PROBLEM — Z23 NEED FOR INFLUENZA VACCINATION: Status: ACTIVE | Noted: 2017-11-01

## 2017-11-01 PROCEDURE — 99214 OFFICE O/P EST MOD 30 MIN: CPT | Performed by: NURSE PRACTITIONER

## 2017-11-01 NOTE — ASSESSMENT & PLAN NOTE
Left ear feels plugged.  He has been using drops in his ear at home with no relief.  Discussed plan for lavage.

## 2017-11-01 NOTE — PROGRESS NOTES
Chief Complaint   Patient presents with   • Ear Fullness     lft ear fullness       HISTORY OF PRESENT ILLNESS: Patient is a 77 y.o. male established patient who presents today to discuss the following issues:    Need for influenza vaccination  Would like a flu shot today.      Impacted cerumen of left ear  Left ear feels plugged.  He has been using drops in his ear at home with no relief.  Discussed plan for lavage.        Patient Active Problem List    Diagnosis Date Noted   • SOBOE (shortness of breath on exertion) 07/11/2017   • Obesity (BMI 30-39.9) 03/07/2017   • Impacted cerumen of left ear 08/24/2016   • Hypertension 04/17/2014   • Hypertriglyceridemia 04/17/2014   • BPH (benign prostatic hyperplasia) 04/17/2014   • S/P knee replacement -- left 04/17/2014   • OA (osteoarthritis) of knee Right 04/17/2014   • Former heavy cigarette smoker (20-39 per day) 04/17/2014       Allergies:Review of patient's allergies indicates no known allergies.    Current Outpatient Prescriptions   Medication Sig Dispense Refill   • atenolol (TENORMIN) 50 MG Tab TAKE  ONE TABLET BY MOUTH EVERY MORNING 90 Tab 1   • pravastatin (PRAVACHOL) 40 MG tablet Take 1 Tab by mouth every day. 90 Tab 1   • Naproxen Sod-Diphenhydramine (ALEVE PM PO) Take  by mouth.     • tamsulosin (FLOMAX) 0.4 MG capsule Take 1 Cap by mouth 2 times a day. 60 Cap 3   • fenofibrate (TRIGLIDE) 160 MG tablet TAKE  ONE TABLET BY MOUTH DAILY 90 Tab 1   • Cholecalciferol (VITAMIN D) 2000 UNITS Cap Take  by mouth.     • celecoxib (CELEBREX) 200 MG Cap Take 1 Cap by mouth every morning with breakfast. 60 Cap 3   • Multiple Vitamins-Minerals (MULTIVITAMIN ADULT PO) Take  by mouth every day.     • Calcium Carb-Cholecalciferol (CALCIUM + D3 PO) Take  by mouth every day.     • docusate sodium 100 MG Cap Take 100 mg by mouth 3 times a day as needed for Constipation. (Patient not taking: Reported on 9/26/2017) 90 Cap 3     No current facility-administered medications for this  "visit.        Social History   Substance Use Topics   • Smoking status: Former Smoker     Packs/day: 1.00     Years: 50.00     Types: Cigarettes     Quit date: 2010   • Smokeless tobacco: Never Used   • Alcohol use 0.0 oz/week      Comment: 3 per day       Family Status   Relation Status   • Mother    • Father    • Sister Alive   • Brother Alive   • Neg Hx      Family History   Problem Relation Age of Onset   • Stroke Mother    • Heart Attack Father 85   • Heart Disease Father    • Cancer Neg Hx        Review of Systems:   Constitutional: Negative for fever, chills, weight loss and malaise/fatigue.   HENT: Negative for ear pain, nosebleeds, congestion, sore throat and neck pain.  Left ear plugged.  Eyes: Negative for blurred vision.   Respiratory: Negative for cough, sputum production, shortness of breath and wheezing.    Cardiovascular: Negative for chest pain, palpitations, orthopnea and leg swelling.   Gastrointestinal: Negative for heartburn, nausea, vomiting and abdominal pain.   Genitourinary: Negative for dysuria, urgency and frequency.   Musculoskeletal: Negative for myalgias, joint pain, and back pain.  Skin: Negative for rash and itching.   Neurological: Negative for dizziness, tingling, tremors, sensory change, focal weakness and headaches.   Endo/Heme/Allergies: Does not bruise/bleed easily.   Psychiatric/Behavioral: Negative for depression, suicidal ideas and memory loss.  The patient is not nervous/anxious and does not have insomnia.    All other systems reviewed and are negative except as in HPI.    Exam:  Blood pressure 124/64, pulse 70, temperature 36.3 °C (97.4 °F), resp. rate 14, height 1.702 m (5' 7\"), weight 83.9 kg (185 lb), SpO2 94 %.  General:  Well nourished, well developed male in NAD  Head: Grossly normal.  Cerumen impaction left ear, clear after lavage.  Neck: Supple without JVD or bruit. Thyroid is not enlarged.  Pulmonary: Clear to ausculation. Normal effort. No " rales, ronchi, or wheezing.  Cardiovascular: Regular rate and rhythm without murmur.   Extremities: No clubbing, cyanosis, or edema.  Skin: Intact with no obvious rashes or lesions.  Neuro: Grossly intact.  Psych: Alert and oriented x 3.  Mood and affect appropriate.    Medical decision-making and discussion: Brian is here today to discuss his left ear.  It felt completely plugged, but now feels much better after lavage.  He will follow-up here as needed.                Assessment/Plan:  1. Impacted cerumen of left ear         Return if symptoms worsen or fail to improve.    Please note that this dictation was created using voice recognition software. I have made every reasonable attempt to correct obvious errors, but I expect that there are errors of grammar and possibly content that I did not discover before finalizing the note.

## 2017-11-02 DIAGNOSIS — Z23 NEED FOR VACCINATION: ICD-10-CM

## 2017-11-02 PROCEDURE — 90662 IIV NO PRSV INCREASED AG IM: CPT | Performed by: NURSE PRACTITIONER

## 2017-11-02 PROCEDURE — G0008 ADMIN INFLUENZA VIRUS VAC: HCPCS | Performed by: NURSE PRACTITIONER

## 2017-11-25 DIAGNOSIS — N40.0 BENIGN NODULAR PROSTATIC HYPERPLASIA WITHOUT LOWER URINARY TRACT SYMPTOMS: ICD-10-CM

## 2017-11-27 RX ORDER — TAMSULOSIN HYDROCHLORIDE 0.4 MG/1
CAPSULE ORAL
Qty: 60 CAP | Refills: 2 | Status: SHIPPED | OUTPATIENT
Start: 2017-11-27 | End: 2018-10-31 | Stop reason: SDUPTHER

## 2017-11-27 NOTE — TELEPHONE ENCOUNTER
Was the patient seen in the last year in this department? Yes     Does patient have an active prescription for medications requested? No     Received Request Via: Patient      Pt met protocol?: Yes    O.V 11/17

## 2018-01-02 NOTE — TELEPHONE ENCOUNTER
Was the patient seen in the last year in this department? Yes     Does patient have an active prescription for medications requested? No     Received Request Via: Pharmacy      Pt met protocol?: Yes, OV 11/17   Lab Results   Component Value Date/Time    CHOLSTRLTOT 141 04/21/2017 08:15 AM    LDL 65 04/21/2017 08:15 AM    HDL 47 04/21/2017 08:15 AM    TRIGLYCERIDE 147 04/21/2017 08:15 AM       Lab Results   Component Value Date/Time    SODIUM 139 07/11/2017 12:38 PM    POTASSIUM 3.9 07/11/2017 12:38 PM    CHLORIDE 105 07/11/2017 12:38 PM    CO2 25 07/11/2017 12:38 PM    GLUCOSE 100 (H) 07/11/2017 12:38 PM    BUN 18 07/11/2017 12:38 PM    CREATININE 1.03 07/11/2017 12:38 PM     Lab Results   Component Value Date/Time    ALKPHOSPHAT 86 07/11/2017 12:38 PM    ASTSGOT 16 07/11/2017 12:38 PM    ALTSGPT 16 07/11/2017 12:38 PM    TBILIRUBIN 0.4 07/11/2017 12:38 PM

## 2018-01-03 RX ORDER — FENOFIBRATE 160 MG/1
TABLET ORAL
Qty: 90 TAB | Refills: 1 | Status: SHIPPED | OUTPATIENT
Start: 2018-01-03 | End: 2018-07-30 | Stop reason: SDUPTHER

## 2018-01-15 NOTE — TELEPHONE ENCOUNTER
Was the patient seen in the last year in this department? Yes     Does patient have an active prescription for medications requested? No     Received Request Via: Patient      Pt met protocol?: Yes    OV 7/17

## 2018-01-16 RX ORDER — PRAVASTATIN SODIUM 40 MG
40 TABLET ORAL
Qty: 90 TAB | Refills: 1 | Status: SHIPPED | OUTPATIENT
Start: 2018-01-16 | End: 2018-10-29 | Stop reason: SDUPTHER

## 2018-02-15 ENCOUNTER — PATIENT OUTREACH (OUTPATIENT)
Dept: HEALTH INFORMATION MANAGEMENT | Facility: OTHER | Age: 78
End: 2018-02-15

## 2018-02-15 NOTE — PROGRESS NOTES
Attempt #:Final    [facility] Verified: yes  Verify PCP: yes    Communication Preference Obtained: yes     Review Care Team: no// appt scheduled with pt's wife.    Annual Wellness Visit Scheduling  1. Scheduling Status:Scheduled      Care Gap Scheduling (Attempt to Schedule EACH Overdue Care Gap!)  Health Maintenance Due   Topic Date Due   • Annual Wellness Visit  12/10/2016     MyChart Activation: already active

## 2018-02-20 ENCOUNTER — OFFICE VISIT (OUTPATIENT)
Dept: MEDICAL GROUP | Facility: PHYSICIAN GROUP | Age: 78
End: 2018-02-20
Payer: MEDICARE

## 2018-02-20 VITALS
BODY MASS INDEX: 29.82 KG/M2 | TEMPERATURE: 98.8 F | HEART RATE: 64 BPM | OXYGEN SATURATION: 95 % | DIASTOLIC BLOOD PRESSURE: 72 MMHG | HEIGHT: 67 IN | RESPIRATION RATE: 16 BRPM | WEIGHT: 190 LBS | SYSTOLIC BLOOD PRESSURE: 124 MMHG

## 2018-02-20 DIAGNOSIS — E78.1 HYPERTRIGLYCERIDEMIA: Chronic | ICD-10-CM

## 2018-02-20 DIAGNOSIS — I10 ESSENTIAL HYPERTENSION: Chronic | ICD-10-CM

## 2018-02-20 PROCEDURE — 99204 OFFICE O/P NEW MOD 45 MIN: CPT | Performed by: INTERNAL MEDICINE

## 2018-02-20 RX ORDER — COVID-19 ANTIGEN TEST
KIT MISCELLANEOUS
COMMUNITY
End: 2019-03-14

## 2018-02-20 ASSESSMENT — PATIENT HEALTH QUESTIONNAIRE - PHQ9: CLINICAL INTERPRETATION OF PHQ2 SCORE: 0

## 2018-02-20 NOTE — PROGRESS NOTES
PRIMARY CARE CLINIC NEW PATIENT H&P  Chief Complaint   Patient presents with   • Benign Prostatic Hypertrophy     History of Present Illness     Hypertension  On atenolol 50 mg for a long standing time.     Hypertriglyceridemia  On pravastatin 40 mg and fenofibrate for a long standing time.     BPH (benign prostatic hyperplasia)  Was taking flomax once daily prior to his knee replacement on the right summer 2017. Has post operative urinary retention and was increased to flomax bid and has been on that dose since.     Current Outpatient Prescriptions   Medication Sig Dispense Refill   • Naproxen Sodium 220 MG Cap Take  by mouth.     • pravastatin (PRAVACHOL) 40 MG tablet Take 1 Tab by mouth every day. 90 Tab 1   • fenofibrate (TRIGLIDE) 160 MG tablet TAKE  ONE TABLET BY MOUTH DAILY 90 Tab 1   • tamsulosin (FLOMAX) 0.4 MG capsule TAKE ONE CAPSULE BY MOUTH TWICE DAILY 60 Cap 2   • atenolol (TENORMIN) 50 MG Tab TAKE  ONE TABLET BY MOUTH EVERY MORNING 90 Tab 1   • Cholecalciferol (VITAMIN D) 2000 UNITS Cap Take  by mouth.     • Multiple Vitamins-Minerals (MULTIVITAMIN ADULT PO) Take  by mouth every day.     • Calcium Carb-Cholecalciferol (CALCIUM + D3 PO) Take  by mouth every day.       No current facility-administered medications for this visit.      Past Medical History:   Diagnosis Date   • BPH (benign prostatic hyperplasia) 4/17/2014   • Hypertension 4/17/2014   • Hypertriglyceridemia 4/17/2014   • Routine health maintenance 6/29/2015   • S/P knee replacement -- left 4/17/2014    Done in 2007     Past Surgical History:   Procedure Laterality Date   • KNEE ARTHROPLASTY TOTAL Right 6/7/2017    Procedure: KNEE ARTHROPLASTY TOTAL;  Surgeon: Steffanie Del Angel M.D.;  Location: SURGERY [facility];  Service:    • COLON RESECTION ROBOTIC  6/16/2014    Performed by Ezra Burks M.D. 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    ROS  Constitutional: Negative for fatigue/generalized weakness.   HEENT: Negative for  vision changes, hearing changes    Respiratory: Negative for shortness of breath  Cardiovascular: Negative for chest pain, palpitations  Gastrointestinal: Negative for blood in stool, constipation, diarrhea  Genitourinary: Negative for dysuria, polyuria  Musculoskeletal: Negative for myalgias, back pain, and joint pain.   Skin: Negative for rash  Neurological: Negative for numbness, tingling  Psychiatric/Behavioral: Negative for depression, anxiety     Objective   Blood pressure 124/72, pulse 64, temperature 37.1 °C (98.8 °F), resp. rate 16, height 1.702 m (5' 7\"), weight 86.2 kg (190 lb), SpO2 95 %. Body mass index is 29.76 kg/m².    General: Alert, oriented. In no acute distress   HEET: EOMI, PERRL, conjunctiva non-injected, sclera non-icteric.  Nares patent with no significant congestion or drainage.  Barbara pinnae, external auditory canals, TM pearly gray with normal light reflex bilaterally.Oral mucous membranes pink and moist with no lesions.  Neck: supple with no cervical, subclavicular lymphadenopathy, JVD, palpable thyroid nodules   Lungs: clear " to auscultation bilaterally with good excursion.  CV: regular rate and rhythm.  Abdomen soft, non-distended, non-tender with normal bowel sounds. No hepatosplenomegaly, no masses palpated  Skin: no lesions. Warm, dry   Psychiatric: appropriate mood and affect       Assessment and Plan   The following treatment plan was discussed     1. Essential hypertension  Well controlled on atenolol, blood pressure 124/72 today.     2. Hypertriglyceridemia  At goal as of 2017 on pravastatin and fenofibrate, continue at current doses and check lipid profile periodically.       Return in about 5 weeks (around 3/28/2018).    Health Maintenance      Health Maintenance Due   Topic Date Due   • Annual Wellness Visit  12/10/2016       Samir Muniz MD  Internal Medicine  [facility]

## 2018-02-20 NOTE — ASSESSMENT & PLAN NOTE
Was taking flomax once daily prior to his knee replacement on the right summer 2017. Has post operative urinary retention and was increased to flomax bid and has been on that dose since.

## 2018-03-20 ENCOUNTER — TELEPHONE (OUTPATIENT)
Dept: MEDICAL GROUP | Facility: PHYSICIAN GROUP | Age: 78
End: 2018-03-20

## 2018-03-20 NOTE — TELEPHONE ENCOUNTER
ANNUAL WELLNESS VISIT PRE-VISIT PLANNING WITH OUTREACH    1.  Immunizations were updated in Epic using WebIZ?:Yes       •  WebIZ Recommendations: ZOSTAVAX (Shingles) Pt refused       •  Is patient due for Tdap? NO       •  Is patient due for Shingles?NO    2.  MDX printed and highlighted for Provider? YES

## 2018-03-28 ENCOUNTER — OFFICE VISIT (OUTPATIENT)
Dept: MEDICAL GROUP | Facility: PHYSICIAN GROUP | Age: 78
End: 2018-03-28
Payer: MEDICARE

## 2018-03-28 VITALS
DIASTOLIC BLOOD PRESSURE: 70 MMHG | OXYGEN SATURATION: 95 % | BODY MASS INDEX: 29.98 KG/M2 | TEMPERATURE: 98.6 F | SYSTOLIC BLOOD PRESSURE: 116 MMHG | WEIGHT: 191 LBS | HEART RATE: 54 BPM | HEIGHT: 67 IN

## 2018-03-28 DIAGNOSIS — I10 ESSENTIAL HYPERTENSION: Chronic | ICD-10-CM

## 2018-03-28 DIAGNOSIS — E78.1 HYPERTRIGLYCERIDEMIA: Chronic | ICD-10-CM

## 2018-03-28 RX ORDER — CELECOXIB 200 MG/1
200 CAPSULE ORAL 2 TIMES DAILY
COMMUNITY
End: 2018-11-14

## 2018-03-28 ASSESSMENT — ACTIVITIES OF DAILY LIVING (ADL): BATHING_REQUIRES_ASSISTANCE: 0

## 2018-03-28 ASSESSMENT — PATIENT HEALTH QUESTIONNAIRE - PHQ9: CLINICAL INTERPRETATION OF PHQ2 SCORE: 0

## 2018-03-28 NOTE — PROGRESS NOTES
Chief Complaint   Patient presents with   • Annual Wellness Visit         HPI:  Brian is a 78 y.o. here for Medicare Annual Wellness Visit        Patient Active Problem List    Diagnosis Date Noted   • Obesity (BMI 30-39.9) 03/07/2017   • Hypertension 04/17/2014   • Hypertriglyceridemia 04/17/2014   • BPH (benign prostatic hyperplasia) 04/17/2014       Current Outpatient Prescriptions   Medication Sig Dispense Refill   • celecoxib (CELEBREX) 200 MG Cap Take 200 mg by mouth 2 times a day.     • Naproxen Sodium 220 MG Cap Take  by mouth.     • pravastatin (PRAVACHOL) 40 MG tablet Take 1 Tab by mouth every day. 90 Tab 1   • fenofibrate (TRIGLIDE) 160 MG tablet TAKE  ONE TABLET BY MOUTH DAILY 90 Tab 1   • tamsulosin (FLOMAX) 0.4 MG capsule TAKE ONE CAPSULE BY MOUTH TWICE DAILY 60 Cap 2   • atenolol (TENORMIN) 50 MG Tab TAKE  ONE TABLET BY MOUTH EVERY MORNING 90 Tab 1   • Cholecalciferol (VITAMIN D) 2000 UNITS Cap Take  by mouth.     • Multiple Vitamins-Minerals (MULTIVITAMIN ADULT PO) Take  by mouth every day.     • Calcium Carb-Cholecalciferol (CALCIUM + D3 PO) Take  by mouth every day.       No current facility-administered medications for this visit.         Patient is taking medications as noted in medication list.  Current supplements as per medication list.     Allergies: Patient has no known allergies.    Current social contact/activities: Friends and family   Patient's perception of their health: good    Is patient current with immunizations? No, due for ZOSTAVAX (Shingles). Patient is interested in receiving NONE today.    He  reports that he quit smoking about 7 years ago. His smoking use included Cigarettes. He has a 50.00 pack-year smoking history. He has never used smokeless tobacco. He reports that he drinks about 8.4 oz of alcohol per week . He reports that he does not use drugs.  Counseling given: Not Answered        DPA/Advanced directive: Patient does not have an Advanced Directive.  A packet and  workshop information was given on Advanced Directives.    ROS:    Gait: Uses no assistive device   Ostomy: no   Other tubes: no   Amputations: no   Chronic oxygen use no   Last eye exam 4 months   Wears hearing aids: no   : Denies any urinary leakage during the last 6 months        Depression Screening    Little interest or pleasure in doing things?  0 - not at all  Feeling down, depressed, or hopeless? 0 - not at all  Patient Health Questionnaire Score: 0    If depressive symptoms identified deferred to follow up visit unless specifically addressed in assessment and plan.    Interpretation of PHQ-9 Total Score   Score Severity   1-4 No Depression   5-9 Mild Depression   10-14 Moderate Depression   15-19 Moderately Severe Depression   20-27 Severe Depression    Screening for Cognitive Impairment    Three Minute Recall (apple, watch, genet)  3/3    Draw clock face with all 12 numbers set to the hand to show 10 minutes past 11 o'clock  1 5/5  If cognitive concerns identified, deferred for follow up unless specifically addressed in assessment and plan.    Fall Risk Assessment    Has the patient had two or more falls in the last year or any fall with injury in the last year?  No  If fall risk identified, deferred for follow up unless specifically addressed in assessment and plan.    Safety Assessment    Throw rugs on floor.  Yes  Handrails on all stairs.  Yes  Good lighting in all hallways.  Yes  Difficulty hearing.  No  Patient counseled about all safety risks that were identified.    Functional Assessment ADLs    Are there any barriers preventing you from cooking for yourself or meeting nutritional needs?  No.    Are there any barriers preventing you from driving safely or obtaining transportation?  No.    Are there any barriers preventing you from using a telephone or calling for help?  No.    Are there any barriers preventing you from shopping?  No.    Are there any barriers preventing you from taking care of your  own finances?  No.    Are there any barriers preventing you from managing your medications?  No.    Are there any barriers preventing you from showering, bathing or dressing yourself?  No.    Are you currently engaging any exercise or physical activity?  Yes.  walks and yard work    Health Maintenance Summary                Annual Wellness Visit Overdue 12/10/2016      Done 12/10/2015 Visit Dx: Medicare annual wellness visit, subsequent     Patient has more history with this topic...    IMM ZOSTER VACCINE Postponed 4/2/2018 Originally 3/17/2000. Patient Refused    IMM DTaP/Tdap/Td Vaccine Next Due 10/11/2024      Done 10/11/2014 Imm Admin: Tdap Vaccine    COLONOSCOPY Next Due 6/23/2025      Done 6/23/2015 AMB REFERRAL TO GI FOR COLONOSCOPY     Patient has more history with this topic...          Patient Care Team:  Samir Muniz M.D. as PCP - General (Internal Medicine)  Carlos Jules M.D. as Consulting Physician (Gastroenterology)    Social History   Substance Use Topics   • Smoking status: Former Smoker     Packs/day: 1.00     Years: 50.00     Types: Cigarettes     Quit date: 4/17/2010   • Smokeless tobacco: Never Used   • Alcohol use 8.4 oz/week     14 Cans of beer per week      Comment: 3 per day     Family History   Problem Relation Age of Onset   • Stroke Mother    • Heart Attack Father 85   • Heart Disease Father    • Cancer Neg Hx      He  has a past medical history of BPH (benign prostatic hyperplasia) (4/17/2014); Hypertension (4/17/2014); and Hypertriglyceridemia (4/17/2014).   Past Surgical History:   Procedure Laterality Date   • KNEE ARTHROPLASTY TOTAL Right 6/7/2017    Procedure: KNEE ARTHROPLASTY TOTAL;  Surgeon: Steffanie Del Angel M.D.;  Location: SURGERY [facility];  Service:    • COLON RESECTION ROBOTIC  6/16/2014    Performed by Ezra Burks M.D. at SURGERY [facility]   • CATARACT EXTRACTION WITH IOL Bilateral 2011   • KNEE ARTHROPLASTY TOTAL Left 2009   • APPENDECTOMY  1970's   •  "ARTHROSCOPY, KNEE     • CHOLECYSTECTOMY     • HEMORRHOIDECTOMY     • TRIGGER FINGER RELEASE Bilateral last 2000's    multiple            Exam:     Blood pressure 116/70, pulse (!) 54, temperature 37 °C (98.6 °F), height 1.689 m (5' 6.5\"), weight 86.6 kg (191 lb), SpO2 95 %. Body mass index is 30.37 kg/m².    Hearing good.    Dentition good  Alert, oriented in no acute distress.  Eye contact is good, speech goal directed, affect calm      Assessment and Plan. The following treatment and monitoring plan is recommended:    Hypertriglyceridemia  Controlled on pravastatin 40 mg daily and fenofibrate. Lipids at goal as of 4/2017.     BPH (benign prostatic hyperplasia)  Controlled on flomax 0.4 mg daily.     Hypertension  Controlled on atenolol 50 mg daily. Denies lightheadedness, dizziness.     Services suggested: No services needed at this time  Health Care Screening: Age-appropriate preventive services recommended by USPTF and ACIP covered by Medicare were discussed today. Services ordered if indicated and agreed upon by the patient.  Referrals offered: PT/OT/Nutrition counseling/Behavioral Health/Smoking cessation as per orders if indicated.    Discussion today about general wellness and lifestyle habits:    · Prevent falls and reduce trip hazards; Cautioned about securing or removing rugs.  · Have a working fire alarm and carbon monoxide detector;   · Engage in regular physical activity and social activities       Follow-up: Return in about 6 months (around 9/28/2018).     Dr. Muniz    "

## 2018-04-23 DIAGNOSIS — I10 ESSENTIAL HYPERTENSION: Chronic | ICD-10-CM

## 2018-04-23 RX ORDER — ATENOLOL 50 MG/1
TABLET ORAL
Qty: 90 TAB | Refills: 1 | Status: SHIPPED | OUTPATIENT
Start: 2018-04-23 | End: 2018-10-14 | Stop reason: SDUPTHER

## 2018-04-23 NOTE — TELEPHONE ENCOUNTER
Was the patient seen in the last year in this department? Yes     Does patient have an active prescription for medications requested? No     Received Request Via: Pharmacy      Pt met protocol?: Yes pt last ov 3/2018   BP Readings from Last 1 Encounters:   03/28/18 116/70

## 2018-05-19 ENCOUNTER — OFFICE VISIT (OUTPATIENT)
Dept: URGENT CARE | Facility: PHYSICIAN GROUP | Age: 78
End: 2018-05-19
Payer: MEDICARE

## 2018-05-19 VITALS
SYSTOLIC BLOOD PRESSURE: 124 MMHG | HEIGHT: 66 IN | RESPIRATION RATE: 16 BRPM | TEMPERATURE: 98.8 F | HEART RATE: 68 BPM | DIASTOLIC BLOOD PRESSURE: 72 MMHG | BODY MASS INDEX: 30.86 KG/M2 | OXYGEN SATURATION: 93 % | WEIGHT: 192 LBS
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
## 2018-05-19 PROCEDURE — 99213 OFFICE O/P EST LOW 20 MIN: CPT | Performed by: FAMILY MEDICINE

## 2018-05-19 RX ORDER — AMOXICILLIN AND CLAVULANATE POTASSIUM 875; 125 MG/1; MG/1
1 TABLET, FILM COATED ORAL 2 TIMES DAILY
Qty: 20 TAB | Refills: 0 | Status: SHIPPED | OUTPATIENT
Start: 2018-05-19 | End: 2018-05-29

## 2018-05-19 ASSESSMENT — ENCOUNTER SYMPTOMS
DIARRHEA: 0
SINUS PAIN: 0
ABDOMINAL PAIN: 0
CONSTIPATION: 0
CHILLS: 0
NAUSEA: 0
SPUTUM PRODUCTION: 0
SENSORY CHANGE: 0
PALPITATIONS: 0
COUGH: 0
WHEEZING: 0
FEVER: 0
EYE DISCHARGE: 0
SORE THROAT: 0
EYE REDNESS: 0

## 2018-05-19 NOTE — PROGRESS NOTES
Subjective:      Brian Lopez is a 78 y.o. male who presents with Dog Bite            HPI  Presented with CC of dog bite. It happened about 1 hr ago. He was bit by his own dog when he tried to lift him out of the car. The dog bit him on right hand around the thumb. There is laceration and puncture wound on both the dorsum and the plam side of the hand. initially he had significant bleeding which has stopped with pressure. They have washed it thoroughly at home with water and hypdrogen peroxide.  The dog's vaccinations are up to date. The last tetanus shot for the pt was 1-2 years ago.    PMH:  has a past medical history of BPH (benign prostatic hyperplasia) (4/17/2014); Hypertension (4/17/2014); and Hypertriglyceridemia (4/17/2014).  MEDS:   Current Outpatient Prescriptions:   •  amoxicillin-clavulanate (AUGMENTIN) 875-125 MG Tab, Take 1 Tab by mouth 2 times a day for 10 days. With food, Disp: 20 Tab, Rfl: 0  •  Naproxen Sodium 220 MG Cap, Take  by mouth., Disp: , Rfl:   •  pravastatin (PRAVACHOL) 40 MG tablet, Take 1 Tab by mouth every day., Disp: 90 Tab, Rfl: 1  •  fenofibrate (TRIGLIDE) 160 MG tablet, TAKE  ONE TABLET BY MOUTH DAILY, Disp: 90 Tab, Rfl: 1  •  tamsulosin (FLOMAX) 0.4 MG capsule, TAKE ONE CAPSULE BY MOUTH TWICE DAILY, Disp: 60 Cap, Rfl: 2  •  Cholecalciferol (VITAMIN D) 2000 UNITS Cap, Take  by mouth., Disp: , Rfl:   •  Multiple Vitamins-Minerals (MULTIVITAMIN ADULT PO), Take  by mouth every day., Disp: , Rfl:   •  Calcium Carb-Cholecalciferol (CALCIUM + D3 PO), Take  by mouth every day., Disp: , Rfl:   •  atenolol (TENORMIN) 50 MG Tab, TAKE  ONE TABLET BY MOUTH EVERY MORNING, Disp: 90 Tab, Rfl: 1  •  celecoxib (CELEBREX) 200 MG Cap, Take 200 mg by mouth 2 times a day., Disp: , Rfl:   ALLERGIES: No Known Allergies  SURGHX:   Past Surgical History:   Procedure Laterality Date   • KNEE ARTHROPLASTY TOTAL Right 6/7/2017    Procedure: KNEE ARTHROPLASTY TOTAL;  Surgeon: Steffanie Del Angel M.D.;   "Location: SURGERY [facility];  Service:    • COLON RESECTION ROBOTIC  6/16/2014    Performed by Ezra Burks M.D. at SURGERY [facility]   • CATARACT EXTRACTION WITH IOL Bilateral 2011   • KNEE ARTHROPLASTY TOTAL Left 2009   • APPENDECTOMY  1970's   • ARTHROSCOPY, KNEE     • CHOLECYSTECTOMY     • HEMORRHOIDECTOMY     • TRIGGER FINGER RELEASE Bilateral last 2000's    multiple      SOCHX:  reports that he quit smoking about 8 years ago. His smoking use included Cigarettes. He has a 50.00 pack-year smoking history. He has never used smokeless tobacco. He reports that he drinks about 8.4 oz of alcohol per week . He reports that he does not use drugs.  FH: Family history was reviewed, no pertinent findings to report    Review of Systems   Constitutional: Negative for chills, fever and malaise/fatigue.   HENT: Negative for congestion, sinus pain and sore throat.    Eyes: Negative for discharge and redness.   Respiratory: Negative for cough, sputum production and wheezing.    Cardiovascular: Negative for chest pain and palpitations.   Gastrointestinal: Negative for abdominal pain, constipation, diarrhea and nausea.   Genitourinary: Negative for dysuria and urgency.   Skin:        Laceration on right hand   Neurological: Negative for sensory change.          Objective:     /72   Pulse 68   Temp 37.1 °C (98.8 °F)   Resp 16   Ht 1.689 m (5' 6.5\")   Wt 87.1 kg (192 lb)   SpO2 93%   BMI 30.53 kg/m²      Physical Exam   Constitutional: He is oriented to person, place, and time. He appears well-developed and well-nourished.   HENT:   Head: Normocephalic and atraumatic.   Cardiovascular: Normal rate, regular rhythm and normal heart sounds.    Pulmonary/Chest: Effort normal and breath sounds normal. No respiratory distress. He has no wheezes.   Neurological: He is alert and oriented to person, place, and time. No sensory deficit.   Skin: Laceration noted.        Laceration on dorsum and palm side of the " right thenar area along with puncture wound.               Assessment/Plan:     1. Dog bite, initial encounter  amoxicillin-clavulanate (AUGMENTIN) 875-125 MG Tab     -Wound was through ly cleansed and dressed. The laceration and puncture wound was left open to heal with secondary intention   - starting on augmentin  - tetanus is UTD  - f/u in 2 days     Differential diagnoses, natural history, supportive care discussed  Indications for immediate care in an emergency department, when to return to the urgent care, and when to follow up with primary care provider discussed at length. Patient espressed understanding and agreed to do so.

## 2018-05-21 ENCOUNTER — APPOINTMENT (OUTPATIENT)
Dept: URGENT CARE | Facility: PHYSICIAN GROUP | Age: 78
End: 2018-05-21
Payer: MEDICARE

## 2018-05-21 ENCOUNTER — OFFICE VISIT (OUTPATIENT)
Dept: URGENT CARE | Facility: PHYSICIAN GROUP | Age: 78
End: 2018-05-21
Payer: MEDICARE

## 2018-05-21 VITALS
BODY MASS INDEX: 30.53 KG/M2 | DIASTOLIC BLOOD PRESSURE: 60 MMHG | HEART RATE: 64 BPM | WEIGHT: 192 LBS | OXYGEN SATURATION: 95 % | TEMPERATURE: 97.9 F | RESPIRATION RATE: 14 BRPM | SYSTOLIC BLOOD PRESSURE: 120 MMHG

## 2018-05-21 DIAGNOSIS — Z51.89 VISIT FOR WOUND CHECK: ICD-10-CM

## 2018-05-21 PROCEDURE — 99213 OFFICE O/P EST LOW 20 MIN: CPT | Performed by: NURSE PRACTITIONER

## 2018-05-21 ASSESSMENT — ENCOUNTER SYMPTOMS
RESPIRATORY NEGATIVE: 1
CONSTITUTIONAL NEGATIVE: 1
CARDIOVASCULAR NEGATIVE: 1
NEUROLOGICAL NEGATIVE: 1
ROS SKIN COMMENTS: DOG BITE

## 2018-05-21 NOTE — PROGRESS NOTES
Subjective:      Brian Lopez is a 78 y.o. male who presents with Wound Check (wound check on dog bite)            HPI  Patient presents for f/u of dog bite to pt's right hand. Pt was originally seen here 5/19/2018. Pt states overall his pain has decreased. Rates pain at 5/10 tolerable. Pt take aleve PRN  Pt states he changed the dressing yesterday. Pt states there was a little bit of bleeding that quickly resolved  Pt is denying  any swelling, redness, discharge or foul smell from wound  Pt states he has slight decrease in sensation at tip of thumb at medial aspect.  Pt state she has been using his hand at home to do suff around the house but has kept it dressing dry    Pt denies any fevers  Pt is on augmentin denies any side effects    Past medical history including medications and allergies have been reviewed.  Family history and review  Social history has been reviewed    PMH:  has a past medical history of BPH (benign prostatic hyperplasia) (4/17/2014); Hypertension (4/17/2014); and Hypertriglyceridemia (4/17/2014).  MEDS:   Current Outpatient Prescriptions:   •  amoxicillin-clavulanate (AUGMENTIN) 875-125 MG Tab, Take 1 Tab by mouth 2 times a day for 10 days. With food, Disp: 20 Tab, Rfl: 0  •  atenolol (TENORMIN) 50 MG Tab, TAKE  ONE TABLET BY MOUTH EVERY MORNING, Disp: 90 Tab, Rfl: 1  •  celecoxib (CELEBREX) 200 MG Cap, Take 200 mg by mouth 2 times a day., Disp: , Rfl:   •  Naproxen Sodium 220 MG Cap, Take  by mouth., Disp: , Rfl:   •  pravastatin (PRAVACHOL) 40 MG tablet, Take 1 Tab by mouth every day., Disp: 90 Tab, Rfl: 1  •  fenofibrate (TRIGLIDE) 160 MG tablet, TAKE  ONE TABLET BY MOUTH DAILY, Disp: 90 Tab, Rfl: 1  •  tamsulosin (FLOMAX) 0.4 MG capsule, TAKE ONE CAPSULE BY MOUTH TWICE DAILY, Disp: 60 Cap, Rfl: 2  •  Cholecalciferol (VITAMIN D) 2000 UNITS Cap, Take  by mouth., Disp: , Rfl:   •  Multiple Vitamins-Minerals (MULTIVITAMIN ADULT PO), Take  by mouth every day., Disp: , Rfl:   •   Calcium Carb-Cholecalciferol (CALCIUM + D3 PO), Take  by mouth every day., Disp: , Rfl:   ALLERGIES: No Known Allergies  SURGHX:   Past Surgical History:   Procedure Laterality Date   • KNEE ARTHROPLASTY TOTAL Right 6/7/2017    Procedure: KNEE ARTHROPLASTY TOTAL;  Surgeon: Steffanie Del Angel M.D.;  Location: SURGERY [facility];  Service:    • COLON RESECTION ROBOTIC  6/16/2014    Performed by Ezra Burks M.D. at SURGERY [facility]   • CATARACT EXTRACTION WITH IOL Bilateral 2011   • KNEE ARTHROPLASTY TOTAL Left 2009   • APPENDECTOMY  1970's   • ARTHROSCOPY, KNEE     • CHOLECYSTECTOMY     • HEMORRHOIDECTOMY     • TRIGGER FINGER RELEASE Bilateral last 2000's    multiple      SOCHX:  reports that he quit smoking about 8 years ago. His smoking use included Cigarettes. He has a 50.00 pack-year smoking history. He has never used smokeless tobacco. He reports that he drinks about 8.4 oz of alcohol per week . He reports that he does not use drugs.  FH: family history includes Heart Attack (age of onset: 85) in his father; Heart Disease in his father; Stroke in his mother.    Review of Systems   Constitutional: Negative.    Respiratory: Negative.    Cardiovascular: Negative.    Musculoskeletal:        Hand discomfort   Skin:        Dog bite   Neurological: Negative.           Objective:     /60   Pulse 64   Temp 36.6 °C (97.9 °F)   Resp 14   Wt 87.1 kg (192 lb)   SpO2 95%   BMI 30.53 kg/m²      Physical Exam   Constitutional: He is oriented to person, place, and time. He appears well-developed and well-nourished.   HENT:   Head: Normocephalic and atraumatic.   Eyes: Conjunctivae are normal.   Neck: Normal range of motion. Neck supple.   Cardiovascular: Normal rate, regular rhythm and normal heart sounds.    Pulmonary/Chest: Effort normal and breath sounds normal.   Musculoskeletal: Normal range of motion.   Neurological: He is alert and oriented to person, place, and time.   Skin: Skin is warm and  dry. Capillary refill takes less than 2 seconds.   Dog bite right hand: Dorsal aspect above thumb and voler aspect  At thenar eminence    Dressing removed no signs of infection  Healing nicely  2+ radial pulse  No numbness  No bleeding  No discharge     Psychiatric: He has a normal mood and affect. His behavior is normal. Judgment and thought content normal.               Assessment/Plan:     1. Visit for wound check       Site cleaned with normal saline  Dressing replaced  Keep area clean and dry  Monitor for signs and symptoms of infection as discussed  Follow-up on Friday for reevaluation  If if patient develops delayed wound healing may consider follow-up with wound clinic.  Patient can take Tylenol as needed for any her discomfort.

## 2018-05-25 ENCOUNTER — OFFICE VISIT (OUTPATIENT)
Dept: URGENT CARE | Facility: PHYSICIAN GROUP | Age: 78
End: 2018-05-25
Payer: MEDICARE

## 2018-05-25 VITALS
HEART RATE: 72 BPM | WEIGHT: 192 LBS | TEMPERATURE: 98.4 F | BODY MASS INDEX: 30.53 KG/M2 | SYSTOLIC BLOOD PRESSURE: 138 MMHG | OXYGEN SATURATION: 95 % | DIASTOLIC BLOOD PRESSURE: 68 MMHG | RESPIRATION RATE: 14 BRPM

## 2018-05-25 DIAGNOSIS — Z51.89 ENCOUNTER FOR POST-TRAUMATIC WOUND CHECK: ICD-10-CM

## 2018-05-25 ASSESSMENT — ENCOUNTER SYMPTOMS
SPEECH CHANGE: 0
CHILLS: 0
FEVER: 0
VOMITING: 0
COUGH: 0
NERVOUS/ANXIOUS: 0
SENSORY CHANGE: 0
ABDOMINAL PAIN: 0
NAUSEA: 0

## 2018-05-28 ENCOUNTER — OFFICE VISIT (OUTPATIENT)
Dept: URGENT CARE | Facility: PHYSICIAN GROUP | Age: 78
End: 2018-05-28
Payer: MEDICARE

## 2018-05-28 VITALS
HEIGHT: 67 IN | TEMPERATURE: 96.8 F | BODY MASS INDEX: 30.13 KG/M2 | SYSTOLIC BLOOD PRESSURE: 138 MMHG | RESPIRATION RATE: 12 BRPM | DIASTOLIC BLOOD PRESSURE: 74 MMHG | WEIGHT: 192 LBS | HEART RATE: 63 BPM | OXYGEN SATURATION: 95 %

## 2018-05-28 DIAGNOSIS — Z51.89 ENCOUNTER FOR POST-TRAUMATIC WOUND CHECK: ICD-10-CM

## 2018-05-28 ASSESSMENT — ENCOUNTER SYMPTOMS
CHILLS: 0
MYALGIAS: 0
HEADACHES: 0
FEVER: 0

## 2018-05-28 ASSESSMENT — PAIN SCALES - GENERAL: PAINLEVEL: NO PAIN

## 2018-05-28 NOTE — PROGRESS NOTES
"Subjective:      Brian Lopez is a 78 y.o. male who presents with Wound Check (Swellling on Rt  hand, numbness on side of thumb, x2weeks )            Medications, Allergies and Prior Medical Hx reviewed and updated in [facility].with patient/family today     Patient was bitten by his dog. He has been returning for wound checks. This is will be his fourth visit for this injury.      Wound Check   He was originally treated 5 to 10 days ago. Previous treatment included wound cleansing or irrigation. The maximum temperature noted was less than 100.4 F. There has been no drainage from the wound. The redness has improved. The swelling has improved. The pain has improved. Difficulty Moving Extremity/Digit: due to swelling and pain.       Review of Systems   Constitutional: Negative for chills and fever.   Musculoskeletal: Positive for joint pain. Negative for myalgias.   Neurological: Negative for headaches.          Objective:     /74   Pulse 63   Temp 36 °C (96.8 °F)   Resp 12   Ht 1.689 m (5' 6.5\")   Wt 87.1 kg (192 lb)   SpO2 95%   BMI 30.53 kg/m²      Physical Exam   Constitutional: He appears well-developed and well-nourished. No distress.   HENT:   Head: Normocephalic and atraumatic.   Eyes: Conjunctivae are normal. Pupils are equal, round, and reactive to light.   Neck: Neck supple.   Cardiovascular: Normal rate.    Pulmonary/Chest: Effort normal. No respiratory distress.   Musculoskeletal:        Right hand: He exhibits decreased range of motion, tenderness and swelling.        Hands:  . There is mild ecchymosis along the thenar prominence. The wound remains open. There is no erythema. Moderate swelling There is no purulent drainage. It is tender to touch. On the dorsal base of the thumb wounds are clean and dry. No purulent drainage, no erythema, no ecchymosis. Moderate swelling. Limited range of motion mainly with flexion of the thumb. Patient complains of tingling along the medial aspect of his " thumb. He can feel sharp touch.   Neurological: He is alert.   Awake, alert, answering questions appropriately, moving all extremeties   Skin: Skin is warm and dry. Capillary refill takes less than 2 seconds. No erythema.   Psychiatric: He has a normal mood and affect. His behavior is normal.   Vitals reviewed.              Assessment/Plan:       1. Encounter for post-traumatic wound check         Wound was dressed by the provider with antibiotic ointment, Telfa and. Patient declines to return for recheck. We discussed wound care.  Patient will return immediately for any redness, swelling or purulent drainage.Discharge instructions discussed with pt/family who verbalize understanding and agreement with poc

## 2018-07-30 NOTE — TELEPHONE ENCOUNTER
Was the patient seen in the last year in this department? Yes 02/20/2018    Does patient have an active prescription for medications requested? No     Received Request Via: Patient [employer]

## 2018-07-31 RX ORDER — FENOFIBRATE 160 MG/1
TABLET ORAL
Qty: 90 TAB | Refills: 1 | Status: SHIPPED | OUTPATIENT
Start: 2018-07-31 | End: 2019-01-28 | Stop reason: SDUPTHER

## 2018-10-29 RX ORDER — PRAVASTATIN SODIUM 40 MG
40 TABLET ORAL
Qty: 90 TAB | Refills: 0 | Status: SHIPPED | OUTPATIENT
Start: 2018-10-29 | End: 2019-01-28 | Stop reason: SDUPTHER

## 2018-10-29 NOTE — TELEPHONE ENCOUNTER
Was the patient seen in the last year in this department? Yes    Does patient have an active prescription for medications requested? No     Received Request Via: Pharmacy    Pt met protocol?: Yes     Last OV  03/2018        Lab Results  Component Value Date/Time   CHOLSTRLTOT 141 04/21/2017 0815   TRIGLYCERIDE 147 04/21/2017 0815   HDL 47 04/21/2017 0815   LDL 65 04/21/2017 0815

## 2018-10-30 DIAGNOSIS — I10 ESSENTIAL HYPERTENSION: Chronic | ICD-10-CM

## 2018-10-31 DIAGNOSIS — N40.0 BENIGN NODULAR PROSTATIC HYPERPLASIA WITHOUT LOWER URINARY TRACT SYMPTOMS: ICD-10-CM

## 2018-10-31 RX ORDER — ATENOLOL 50 MG/1
TABLET ORAL
Refills: 0 | Status: CANCELLED | OUTPATIENT
Start: 2018-10-31

## 2018-10-31 NOTE — TELEPHONE ENCOUNTER
*LAB NEEDS AN UPDATE*  Was the patient seen in the last year in this department? Yes    Does patient have an active prescription for medications requested? No     Received Request Via: Pharmacy      Pt met protocol?: Yes    LAST OV 03/28/2018      Lab Results  Component Value Date/Time   PSATOTAL 2.79 01/21/2016 0725

## 2018-10-31 NOTE — TELEPHONE ENCOUNTER
Was the patient seen in the last year in this department? Yes    Does patient have an active prescription for medications requested? No     Received Request Via: Pharmacy      Pt met protocol?: Yes    OV 3/18     SENT 90 DAY SUPPLY 10/16

## 2018-11-01 RX ORDER — TAMSULOSIN HYDROCHLORIDE 0.4 MG/1
0.4 CAPSULE ORAL 2 TIMES DAILY
Qty: 60 CAP | Refills: 1 | Status: SHIPPED | OUTPATIENT
Start: 2018-11-01 | End: 2018-11-14

## 2018-11-14 ENCOUNTER — OFFICE VISIT (OUTPATIENT)
Dept: MEDICAL GROUP | Facility: PHYSICIAN GROUP | Age: 78
End: 2018-11-14
Payer: MEDICARE

## 2018-11-14 VITALS
HEART RATE: 61 BPM | WEIGHT: 192 LBS | RESPIRATION RATE: 16 BRPM | TEMPERATURE: 97 F | BODY MASS INDEX: 30.13 KG/M2 | DIASTOLIC BLOOD PRESSURE: 78 MMHG | SYSTOLIC BLOOD PRESSURE: 138 MMHG | HEIGHT: 67 IN | OXYGEN SATURATION: 96 %

## 2018-11-14 DIAGNOSIS — R22.1 NECK SWELLING: ICD-10-CM

## 2018-11-14 DIAGNOSIS — Z96.651 STATUS POST RIGHT KNEE REPLACEMENT: ICD-10-CM

## 2018-11-14 PROCEDURE — 99213 OFFICE O/P EST LOW 20 MIN: CPT | Performed by: INTERNAL MEDICINE

## 2018-11-14 RX ORDER — TAMSULOSIN HYDROCHLORIDE 0.4 MG/1
0.4 CAPSULE ORAL
COMMUNITY
End: 2019-06-10 | Stop reason: SDUPTHER

## 2018-11-14 NOTE — ASSESSMENT & PLAN NOTE
He was on flomax about twice a day after post operative urinary retention but is now on flomax just once a day.

## 2018-11-14 NOTE — ASSESSMENT & PLAN NOTE
Since his right knee replacement 6/2017 he started noting some cracking of his right knee with straightening it out. Denies pain.

## 2018-11-14 NOTE — PROGRESS NOTES
PRIMARY CARE CLINIC FOLLOW UP VISIT  Chief Complaint   Patient presents with   • Other     neck swelling   • Other     knee crepitus      History of Present Illness     BPH (benign prostatic hyperplasia)  He was on flomax about twice a day after post operative urinary retention but is now on flomax just once a day.     Status post right knee replacement  Since his right knee replacement 6/2017 he started noting some cracking of his right knee with straightening it out. Denies pain.     Neck swelling  Says he's been noting some right sided neck swelling, worse in the mornings, for the past year. Denies night sweats, unintentional weight loss, fevers, chills. Had this evaluated with an US 7/2017 at urgent care before with negative US neck.     Current Outpatient Prescriptions   Medication Sig Dispense Refill   • tamsulosin (FLOMAX) 0.4 MG capsule Take 0.4 mg by mouth ONE-HALF HOUR AFTER BREAKFAST.     • pravastatin (PRAVACHOL) 40 MG tablet Take 1 Tab by mouth every day. 90 Tab 0   • atenolol (TENORMIN) 50 MG Tab TAKE  ONE TABLET BY MOUTH EVERY MORNING 90 Tab 0   • fenofibrate (TRIGLIDE) 160 MG tablet TAKE  ONE TABLET BY MOUTH DAILY 90 Tab 1   • Cholecalciferol (VITAMIN D) 2000 UNITS Cap Take  by mouth.     • Multiple Vitamins-Minerals (MULTIVITAMIN ADULT PO) Take  by mouth every day.     • Calcium Carb-Cholecalciferol (CALCIUM + D3 PO) Take  by mouth every day.     • Naproxen Sodium 220 MG Cap Take  by mouth.       No current facility-administered medications for this visit.      Past Medical History:   Diagnosis Date   • BPH (benign prostatic hyperplasia) 4/17/2014   • Hypertension 4/17/2014   • Hypertriglyceridemia 4/17/2014     Past Surgical History:   Procedure Laterality Date   • KNEE ARTHROPLASTY TOTAL Right 6/7/2017    Procedure: KNEE ARTHROPLASTY TOTAL;  Surgeon: Steffanie Del Angel M.D.;  Location: SURGERY [facility];  Service:    • COLON RESECTION ROBOTIC  6/16/2014    Performed by Ezra Burks M.D.  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    ROS  As per HPI above. All other systems reviewed and negative.        Objective   Blood pressure 138/78, pulse 61, temperature 36.1 °C (97 °F), temperature source Temporal, resp. rate 16, height 1.689 m (5' 6.5\"), weight 87.1 kg (192 lb), SpO2 96 %. Body mass index is 30.53 kg/m².    General: alert and oriented, pleasant, cooperative  HEENT: Normocephalic, atraumatic. No submandibular or cervical lymphadenopathy   MSK: crepitus of right knee   Skin: warm and dry, no lesions or rashes  Psychiatric: appropriate mood and affect. Good insight and appropriate judgment     Assessment and Plan   The following treatment plan was discussed     1. Status post right knee replacement  Has some crepitus but denies pain, continue to monitor.     2. Neck swelling  Neck symmetric without any lymphadenopathy on my exam today. Review of records shows that he was seen in urgent care with neck swelling 2017 a day after he had had a CT scan with contrast. His US neck then was unremarkable. " Offered reassurance, perhaps has asymmetric lymph node drainage since his swelling is only notable when he first wakes in the mornings.       Healthcare maintenance     There are no preventive care reminders to display for this patient.    Return in about 6 months (around 5/14/2019).    Samir Muniz MD  Internal Medicine  [facility]

## 2018-11-14 NOTE — ASSESSMENT & PLAN NOTE
Says he's been noting some right sided neck swelling, worse in the mornings, for the past year. Denies night sweats, unintentional weight loss, fevers, chills. Had this evaluated with an US 7/2017 at urgent care before with negative US neck.

## 2018-11-15 ENCOUNTER — HOSPITAL ENCOUNTER (OUTPATIENT)
Dept: LAB | Facility: MEDICAL CENTER | Age: 78
End: 2018-11-15
Attending: INTERNAL MEDICINE
Payer: MEDICARE

## 2018-11-15 DIAGNOSIS — I10 ESSENTIAL HYPERTENSION: Chronic | ICD-10-CM

## 2018-11-15 DIAGNOSIS — E78.1 HYPERTRIGLYCERIDEMIA: Chronic | ICD-10-CM

## 2018-11-15 LAB
ALBUMIN SERPL BCP-MCNC: 4.6 G/DL (ref 3.2–4.9)
ALBUMIN/GLOB SERPL: 1.6 G/DL
ALP SERPL-CCNC: 45 U/L (ref 30–99)
ALT SERPL-CCNC: 16 U/L (ref 2–50)
ANION GAP SERPL CALC-SCNC: 7 MMOL/L (ref 0–11.9)
AST SERPL-CCNC: 17 U/L (ref 12–45)
BILIRUB SERPL-MCNC: 0.6 MG/DL (ref 0.1–1.5)
BUN SERPL-MCNC: 17 MG/DL (ref 8–22)
CALCIUM SERPL-MCNC: 9.4 MG/DL (ref 8.5–10.5)
CHLORIDE SERPL-SCNC: 105 MMOL/L (ref 96–112)
CHOLEST SERPL-MCNC: 142 MG/DL (ref 100–199)
CO2 SERPL-SCNC: 27 MMOL/L (ref 20–33)
CREAT SERPL-MCNC: 0.96 MG/DL (ref 0.5–1.4)
ERYTHROCYTE [DISTWIDTH] IN BLOOD BY AUTOMATED COUNT: 45.1 FL (ref 35.9–50)
FASTING STATUS PATIENT QL REPORTED: NORMAL
GLOBULIN SER CALC-MCNC: 2.8 G/DL (ref 1.9–3.5)
GLUCOSE SERPL-MCNC: 84 MG/DL (ref 65–99)
HCT VFR BLD AUTO: 48 % (ref 42–52)
HDLC SERPL-MCNC: 51 MG/DL
HGB BLD-MCNC: 15.6 G/DL (ref 14–18)
LDLC SERPL CALC-MCNC: 64 MG/DL
MCH RBC QN AUTO: 29.3 PG (ref 27–33)
MCHC RBC AUTO-ENTMCNC: 32.5 G/DL (ref 33.7–35.3)
MCV RBC AUTO: 90.2 FL (ref 81.4–97.8)
PLATELET # BLD AUTO: 220 K/UL (ref 164–446)
PMV BLD AUTO: 11 FL (ref 9–12.9)
POTASSIUM SERPL-SCNC: 3.9 MMOL/L (ref 3.6–5.5)
PROT SERPL-MCNC: 7.4 G/DL (ref 6–8.2)
RBC # BLD AUTO: 5.32 M/UL (ref 4.7–6.1)
SODIUM SERPL-SCNC: 139 MMOL/L (ref 135–145)
TRIGL SERPL-MCNC: 136 MG/DL (ref 0–149)
WBC # BLD AUTO: 5.8 K/UL (ref 4.8–10.8)

## 2018-11-15 PROCEDURE — 80053 COMPREHEN METABOLIC PANEL: CPT

## 2018-11-15 PROCEDURE — 36415 COLL VENOUS BLD VENIPUNCTURE: CPT

## 2018-11-15 PROCEDURE — 85027 COMPLETE CBC AUTOMATED: CPT

## 2018-11-15 PROCEDURE — 80061 LIPID PANEL: CPT

## 2019-01-15 DIAGNOSIS — I10 ESSENTIAL HYPERTENSION: Chronic | ICD-10-CM

## 2019-01-15 RX ORDER — ATENOLOL 50 MG/1
TABLET ORAL
Qty: 90 TAB | Refills: 1 | Status: SHIPPED | OUTPATIENT
Start: 2019-01-15 | End: 2019-07-13 | Stop reason: SDUPTHER

## 2019-01-15 NOTE — TELEPHONE ENCOUNTER
Refill X 6 months, sent to pharmacy.Pt. Seen in the last 6 months per protocol.   Lab Results   Component Value Date/Time    SODIUM 139 11/15/2018 06:57 AM    POTASSIUM 3.9 11/15/2018 06:57 AM    CHLORIDE 105 11/15/2018 06:57 AM    CO2 27 11/15/2018 06:57 AM    GLUCOSE 84 11/15/2018 06:57 AM    BUN 17 11/15/2018 06:57 AM    CREATININE 0.96 11/15/2018 06:57 AM

## 2019-01-15 NOTE — TELEPHONE ENCOUNTER
Was the patient seen in the last year in this department? Yes    Does patient have an active prescription for medications requested? No     Received Request Via: Pharmacy      Pt met protocol?: Yes    LAST OV 11/14/2018    BP Readings from Last 1 Encounters:   11/14/18 138/78     Lab Results   Component Value Date/Time    CHOLSTRLTOT 142 11/15/2018 06:57 AM    LDL 64 11/15/2018 06:57 AM    HDL 51 11/15/2018 06:57 AM    TRIGLYCERIDE 136 11/15/2018 06:57 AM       Lab Results   Component Value Date/Time    SODIUM 139 11/15/2018 06:57 AM    POTASSIUM 3.9 11/15/2018 06:57 AM    CHLORIDE 105 11/15/2018 06:57 AM    CO2 27 11/15/2018 06:57 AM    GLUCOSE 84 11/15/2018 06:57 AM    BUN 17 11/15/2018 06:57 AM    CREATININE 0.96 11/15/2018 06:57 AM     Lab Results   Component Value Date/Time    ALKPHOSPHAT 45 11/15/2018 06:57 AM    ASTSGOT 17 11/15/2018 06:57 AM    ALTSGPT 16 11/15/2018 06:57 AM    TBILIRUBIN 0.6 11/15/2018 06:57 AM

## 2019-01-28 RX ORDER — PRAVASTATIN SODIUM 40 MG
40 TABLET ORAL
Qty: 90 TAB | Refills: 2 | Status: SHIPPED | OUTPATIENT
Start: 2019-01-28 | End: 2019-05-01 | Stop reason: SDUPTHER

## 2019-01-28 RX ORDER — FENOFIBRATE 160 MG/1
TABLET ORAL
Qty: 90 TAB | Refills: 2 | Status: SHIPPED | OUTPATIENT
Start: 2019-01-28 | End: 2019-09-19 | Stop reason: SDUPTHER

## 2019-01-28 NOTE — TELEPHONE ENCOUNTER
Was the patient seen in the last year in this department? Yes    Does patient have an active prescription for medications requested? No     Received Request Via: Pharmacy      Pt met protocol?: Yes pt last ov 11/2018   BP Readings from Last 1 Encounters:   11/14/18 138/78       Lab Results  Component Value Date/Time   CHOLSTRLTOT 142 11/15/2018 0657   TRIGLYCERIDE 136 11/15/2018 0657   HDL 51 11/15/2018 0657   LDL 64 11/15/2018 0657       Lab Results   Component Value Date/Time    HBA1C 5.7 (H) 05/24/2017 10:49 AM

## 2019-01-29 NOTE — TELEPHONE ENCOUNTER
Pt has had OV within the 12 month protocol and lipid panel is current. 9 month supply sent to pharmacy.   Lab Results   Component Value Date/Time    CHOLSTRLTOT 142 11/15/2018 06:57 AM    LDL 64 11/15/2018 06:57 AM    HDL 51 11/15/2018 06:57 AM    TRIGLYCERIDE 136 11/15/2018 06:57 AM       Lab Results   Component Value Date/Time    SODIUM 139 11/15/2018 06:57 AM    POTASSIUM 3.9 11/15/2018 06:57 AM    CHLORIDE 105 11/15/2018 06:57 AM    CO2 27 11/15/2018 06:57 AM    GLUCOSE 84 11/15/2018 06:57 AM    BUN 17 11/15/2018 06:57 AM    CREATININE 0.96 11/15/2018 06:57 AM     Lab Results   Component Value Date/Time    ALKPHOSPHAT 45 11/15/2018 06:57 AM    ASTSGOT 17 11/15/2018 06:57 AM    ALTSGPT 16 11/15/2018 06:57 AM    TBILIRUBIN 0.6 11/15/2018 06:57 AM

## 2019-03-12 ENCOUNTER — TELEPHONE (OUTPATIENT)
Dept: MEDICAL GROUP | Facility: PHYSICIAN GROUP | Age: 79
End: 2019-03-12

## 2019-03-12 NOTE — TELEPHONE ENCOUNTER
Future Appointments       Provider Department Center    3/14/2019 1:15 PM Samir Muniz M.D. [facility]        ESTABLISHED PATIENT PRE-VISIT PLANNING     Patient was NOT contacted to complete PVP.       1.  Reviewed notes from the last few office visits within the medical group: Yes    2.  If any orders were placed at last visit or intended to be done for this visit (i.e. 6 mos follow-up), do we have Results/Consult Notes?        •  Labs - Labs ordered, completed on 11/15/2018 and results are in chart.       •  Imaging - Imaging was not ordered at last office visit.       •  Referrals - No referrals were ordered at last office visit.    3. Is this appointment scheduled as a Hospital Follow-Up? No    4.  Immunizations were updated in Netlist using WebIZ?: Yes       •  Web Iz Recommendations: TD, VARICELLA (Chicken Pox)  and SHINGRIX (Shingles)    5.  Patient is due for the following Health Maintenance Topics:   There are no preventive care reminders to display for this patient.    6. Orders for overdue Health Maintenance topics pended in Pre-Charting? NO    7.  AHA (MDX) form printed for Provider? YES    8.  Patient was NOT informed to arrive 15 min prior to their scheduled appointment and bring in their medication bottles.

## 2019-03-14 ENCOUNTER — OFFICE VISIT (OUTPATIENT)
Dept: MEDICAL GROUP | Facility: PHYSICIAN GROUP | Age: 79
End: 2019-03-14
Payer: MEDICARE

## 2019-03-14 VITALS
TEMPERATURE: 98.1 F | HEART RATE: 70 BPM | BODY MASS INDEX: 30.13 KG/M2 | SYSTOLIC BLOOD PRESSURE: 146 MMHG | OXYGEN SATURATION: 92 % | RESPIRATION RATE: 14 BRPM | DIASTOLIC BLOOD PRESSURE: 82 MMHG | HEIGHT: 67 IN | WEIGHT: 192 LBS

## 2019-03-14 DIAGNOSIS — M25.50 ARTHRALGIA, UNSPECIFIED JOINT: ICD-10-CM

## 2019-03-14 PROCEDURE — 99213 OFFICE O/P EST LOW 20 MIN: CPT | Performed by: INTERNAL MEDICINE

## 2019-03-14 PROCEDURE — 8041 PR SCP AHA: Performed by: INTERNAL MEDICINE

## 2019-03-14 RX ORDER — MELOXICAM 7.5 MG/1
7.5 TABLET ORAL
Qty: 90 TAB | Refills: 1 | Status: SHIPPED | OUTPATIENT
Start: 2019-03-14 | End: 2019-05-29

## 2019-03-14 ASSESSMENT — PATIENT HEALTH QUESTIONNAIRE - PHQ9: CLINICAL INTERPRETATION OF PHQ2 SCORE: 0

## 2019-03-14 ASSESSMENT — PAIN SCALES - GENERAL: PAINLEVEL: 8=MODERATE-SEVERE PAIN

## 2019-03-14 NOTE — ASSESSMENT & PLAN NOTE
Has a history of trigger fingers on his right. Now has pain at the base of his left ring finger where he was operated on. He cannot make a fist. He takes aleve every night.     The right knee pops every once in a while. Sometimes has knee pain. When he turns in bed a certain way the knee pain will wake him up. He takes aleve every night. No issues with walking and sitting. When he gets up from a sitting position he has occassional pain and hears a crunch as he gets up.

## 2019-03-14 NOTE — PROGRESS NOTES
Annual Health Assessment Questions:    1.  Are you currently engaging in any exercise or physical activity? No    2.  How would you describe your mood or emotional well-being today? good    3.  Have you had any falls in the last year? No    4.  Have you noticed any problems with your balance or had difficulty walking? No    5.  In the last six months have you experienced any leakage of urine? No    6. DPA/Advanced Directive: Patient does not have an Advanced Directive.  A packet and workshop information was given on Advanced Directives.     PRIMARY CARE CLINIC FOLLOW UP VISIT  Chief Complaint   Patient presents with   • Knee Pain     Right   • Hand Pain     Right     History of Present Illness     Joint pain  Has a history of trigger fingers on his right. Now has pain at the base of his left ring finger where he was operated on. He cannot make a fist. He takes aleve every night.     The right knee pops every once in a while. Sometimes has knee pain. When he turns in bed a certain way the knee pain will wake him up. He takes aleve every night. No issues with walking and sitting. When he gets up from a sitting position he has occassional pain and hears a crunch as he gets up.     Current Outpatient Prescriptions   Medication Sig Dispense Refill   • meloxicam (MOBIC) 7.5 MG Tab Take 1 Tab by mouth 1 time daily as needed. 90 Tab 1   • pravastatin (PRAVACHOL) 40 MG tablet Take 1 Tab by mouth every day. 90 Tab 2   • fenofibrate (TRIGLIDE) 160 MG tablet TAKE  ONE TABLET BY MOUTH DAILY 90 Tab 2   • atenolol (TENORMIN) 50 MG Tab TAKE  ONE TABLET BY MOUTH EVERY MORNING 90 Tab 1   • tamsulosin (FLOMAX) 0.4 MG capsule Take 0.4 mg by mouth ONE-HALF HOUR AFTER BREAKFAST.     • Cholecalciferol (VITAMIN D) 2000 UNITS Cap Take  by mouth.     • Multiple Vitamins-Minerals (MULTIVITAMIN ADULT PO) Take  by mouth every day.     • Calcium Carb-Cholecalciferol (CALCIUM + D3 PO) Take  by mouth every day.       No current  "facility-administered medications for this visit.      Past Medical History:   Diagnosis Date   • BPH (benign prostatic hyperplasia) 2014   • Hypertension 2014   • Hypertriglyceridemia 2014     Past Surgical History:   Procedure Laterality Date   • KNEE ARTHROPLASTY TOTAL Right 2017    Procedure: KNEE ARTHROPLASTY TOTAL;  Surgeon: Steffanie Del Angel M.D.;  Location: SURGERY [facility];  Service:    • COLON RESECTION ROBOTIC  2014    Performed by Ezra Burks M.D. 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    ROS  As per HPI above. All other systems reviewed and negative.        Objective   Blood pressure 146/82, pulse 70, temperature 36.7 °C (98.1 °F), temperature source Temporal, resp. rate 14, height 1.689 m (5' 6.5\"), weight 87.1 kg (192 lb), SpO2 92 %. Body mass index is 30.53 kg/m².    General: alert and oriented, pleasant, cooperative  HEENT: Normocephalic, atraumatic.   MSK: tightening of right . No crepitus of right knee   Psychiatric: appropriate mood and affect. Good insight " and appropriate judgment       Assessment and Plan   The following treatment plan was discussed     1. 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 Given packet of exercises to try for right knee strengthening.   - meloxicam (MOBIC) 7.5 MG Tab; Take 1 Tab by mouth 1 time daily as needed.  Dispense: 90 Tab; Refill: 1      Healthcare maintenance     There are no preventive care reminders to display for this patient.    Return in about 6 months (around 9/14/2019).    Samir Muniz MD  Internal Medicine  [facility]

## 2019-03-25 ENCOUNTER — OFFICE VISIT (OUTPATIENT)
Dept: URGENT CARE | Facility: PHYSICIAN GROUP | Age: 79
End: 2019-03-25
Payer: MEDICARE

## 2019-03-25 VITALS
RESPIRATION RATE: 16 BRPM | DIASTOLIC BLOOD PRESSURE: 62 MMHG | TEMPERATURE: 97.5 F | SYSTOLIC BLOOD PRESSURE: 126 MMHG | HEIGHT: 67 IN | HEART RATE: 64 BPM | WEIGHT: 192 LBS | BODY MASS INDEX: 30.13 KG/M2 | OXYGEN SATURATION: 92 %

## 2019-03-25 DIAGNOSIS — H91.92 HEARING LOSS OF LEFT EAR, UNSPECIFIED HEARING LOSS TYPE: ICD-10-CM

## 2019-03-25 DIAGNOSIS — H61.22 IMPACTED CERUMEN OF LEFT EAR: ICD-10-CM

## 2019-03-25 PROCEDURE — 99213 OFFICE O/P EST LOW 20 MIN: CPT | Performed by: PHYSICIAN ASSISTANT

## 2019-03-25 ASSESSMENT — ENCOUNTER SYMPTOMS
VERTIGO: 0
CHILLS: 0
NECK PAIN: 0
SORE THROAT: 0
VISUAL CHANGE: 0
VOMITING: 0
NAUSEA: 0
FATIGUE: 0
COUGH: 0

## 2019-03-25 NOTE — PROGRESS NOTES
Subjective:   Brian Lopez is a 79 y.o. male who presents for Ear Fullness (left ear )        Ear Fullness   This is a new problem. The current episode started 1 to 4 weeks ago. The problem occurs constantly. The problem has been unchanged. Pertinent negatives include no chills, congestion, coughing, fatigue, nausea, neck pain, sore throat, vertigo, visual change or vomiting. Nothing aggravates the symptoms. He has tried nothing for the symptoms.     Review of Systems   Constitutional: Negative for chills and fatigue.   HENT: Negative for congestion and sore throat.    Respiratory: Negative for cough.    Gastrointestinal: Negative for nausea and vomiting.   Musculoskeletal: Negative for neck pain.   Neurological: Negative for vertigo.       PMH:  has a past medical history of BPH (benign prostatic hyperplasia) (4/17/2014); Hypertension (4/17/2014); and Hypertriglyceridemia (4/17/2014).  MEDS:   Current Outpatient Prescriptions:   •  meloxicam (MOBIC) 7.5 MG Tab, Take 1 Tab by mouth 1 time daily as needed., Disp: 90 Tab, Rfl: 1  •  pravastatin (PRAVACHOL) 40 MG tablet, Take 1 Tab by mouth every day., Disp: 90 Tab, Rfl: 2  •  fenofibrate (TRIGLIDE) 160 MG tablet, TAKE  ONE TABLET BY MOUTH DAILY, Disp: 90 Tab, Rfl: 2  •  atenolol (TENORMIN) 50 MG Tab, TAKE  ONE TABLET BY MOUTH EVERY MORNING, Disp: 90 Tab, Rfl: 1  •  tamsulosin (FLOMAX) 0.4 MG capsule, Take 0.4 mg by mouth ONE-HALF HOUR AFTER BREAKFAST., Disp: , Rfl:   •  Cholecalciferol (VITAMIN D) 2000 UNITS Cap, Take  by mouth., Disp: , Rfl:   •  Multiple Vitamins-Minerals (MULTIVITAMIN ADULT PO), Take  by mouth every day., Disp: , Rfl:   •  Calcium Carb-Cholecalciferol (CALCIUM + D3 PO), Take  by mouth every day., Disp: , Rfl:   •  Diclofenac Sodium 1 % Gel, , Disp: , Rfl:   ALLERGIES: No Known Allergies  SURGHX:   Past Surgical History:   Procedure Laterality Date   • KNEE ARTHROPLASTY TOTAL Right 6/7/2017    Procedure: KNEE ARTHROPLASTY TOTAL;  Surgeon:  "Steffanie Del Angel M.D.;  Location: SURGERY [facility];  Service:    • COLON RESECTION ROBOTIC  6/16/2014    Performed by Ezra Burks M.D. at SURGERY [facility]   • CATARACT EXTRACTION WITH IOL Bilateral 2011   • KNEE ARTHROPLASTY TOTAL Left 2009   • APPENDECTOMY  1970's   • ARTHROSCOPY, KNEE     • CHOLECYSTECTOMY     • HEMORRHOIDECTOMY     • TRIGGER FINGER RELEASE Bilateral last 2000's    multiple      SOCHX:  reports that he quit smoking about 8 years ago. His smoking use included Cigarettes. He has a 50.00 pack-year smoking history. He has never used smokeless tobacco. He reports that he drinks about 8.4 oz of alcohol per week . He reports that he does not use drugs.  FH: Family history was reviewed, no pertinent findings to report   Objective:   /62 (BP Location: Left arm, Patient Position: Sitting, BP Cuff Size: Large adult)   Pulse 64   Temp 36.4 °C (97.5 °F) (Temporal)   Resp 16   Ht 1.689 m (5' 6.5\")   Wt 87.1 kg (192 lb)   SpO2 92%   BMI 30.53 kg/m²   Physical Exam   Constitutional: He appears well-developed and well-nourished.  Non-toxic appearance. No distress.   HENT:   Head: Normocephalic and atraumatic.   Right Ear: Tympanic membrane, external ear and ear canal normal.   Left Ear: Tympanic membrane, external ear and ear canal normal.   Nose: Nose normal.   Partial cerumen impaction on right side.  Complete cerumen impaction on left side.  Patient has decreased hearing on left side.  Earwax removed revealing pearly white TM with positive cone of light.    Neck: Neck supple.   Pulmonary/Chest: Effort normal. No respiratory distress.   Neurological: He is alert.   Skin: Skin is warm and dry. Capillary refill takes less than 2 seconds.   Psychiatric: He has a normal mood and affect. His speech is normal and behavior is normal. Judgment and thought content normal. Cognition and memory are normal.   Vitals reviewed.        Assessment/Plan:   1. Hearing loss of left ear, unspecified " hearing loss type    2. Impacted cerumen of left ear    Other orders  - Diclofenac Sodium 1 % Gel;     I advised patient that I would like him to obtain Debrox drops.  If he feels that he is developing another wax buildup I would like him to use the drops as instructed.  If symptoms worsen despite use of drops he should return to clinic for reevaluation.    Differential diagnosis, natural history, supportive care, and indications for immediate follow-up discussed.

## 2019-05-01 RX ORDER — PRAVASTATIN SODIUM 40 MG
40 TABLET ORAL
Qty: 100 TAB | Refills: 2 | Status: SHIPPED | OUTPATIENT
Start: 2019-05-01 | End: 2019-09-19 | Stop reason: SDUPTHER

## 2019-05-01 NOTE — TELEPHONE ENCOUNTER
*Patient is [facility] plus,100 day supply if possible*    Was the patient seen in the last year in this department? Yes    Does patient have an active prescription for medications requested? Yes    Received Request Via: [employer]    Pt met protocol?: Yes     Last OV 03/14/2019      Lab Results  Component Value Date/Time   CHOLSTRLTOT 142 11/15/2018 0657   TRIGLYCERIDE 136 11/15/2018 0657   HDL 51 11/15/2018 0657   LDL 64 11/15/2018 0657

## 2019-05-29 ENCOUNTER — APPOINTMENT (OUTPATIENT)
Dept: ADMISSIONS | Facility: MEDICAL CENTER | Age: 79
End: 2019-05-29
Attending: ORTHOPAEDIC SURGERY
Payer: MEDICARE

## 2019-05-29 DIAGNOSIS — Z01.810 PRE-OPERATIVE CARDIOVASCULAR EXAMINATION: ICD-10-CM

## 2019-05-29 LAB — EKG IMPRESSION: NORMAL

## 2019-05-31 ENCOUNTER — ANESTHESIA (OUTPATIENT)
Dept: SURGERY | Facility: MEDICAL CENTER | Age: 79
End: 2019-05-31
Payer: MEDICARE

## 2019-05-31 ENCOUNTER — HOSPITAL ENCOUNTER (OUTPATIENT)
Facility: MEDICAL CENTER | Age: 79
End: 2019-05-31
Attending: ORTHOPAEDIC SURGERY | Admitting: ORTHOPAEDIC SURGERY
Payer: MEDICARE

## 2019-05-31 ENCOUNTER — ANESTHESIA EVENT (OUTPATIENT)
Dept: SURGERY | Facility: MEDICAL CENTER | Age: 79
End: 2019-05-31
Payer: MEDICARE

## 2019-05-31 VITALS
BODY MASS INDEX: 29.58 KG/M2 | TEMPERATURE: 98 F | RESPIRATION RATE: 18 BRPM | HEIGHT: 67 IN | DIASTOLIC BLOOD PRESSURE: 88 MMHG | OXYGEN SATURATION: 98 % | SYSTOLIC BLOOD PRESSURE: 161 MMHG | WEIGHT: 188.49 LBS | HEART RATE: 62 BPM

## 2019-05-31 PROCEDURE — A9270 NON-COVERED ITEM OR SERVICE: HCPCS | Performed by: ANESTHESIOLOGY

## 2019-05-31 PROCEDURE — 160028 HCHG SURGERY MINUTES - 1ST 30 MINS LEVEL 3: Performed by: ORTHOPAEDIC SURGERY

## 2019-05-31 PROCEDURE — 700101 HCHG RX REV CODE 250: Performed by: ANESTHESIOLOGY

## 2019-05-31 PROCEDURE — 501838 HCHG SUTURE GENERAL: Performed by: ORTHOPAEDIC SURGERY

## 2019-05-31 PROCEDURE — 160039 HCHG SURGERY MINUTES - EA ADDL 1 MIN LEVEL 3: Performed by: ORTHOPAEDIC SURGERY

## 2019-05-31 PROCEDURE — 160025 RECOVERY II MINUTES (STATS): Performed by: ORTHOPAEDIC SURGERY

## 2019-05-31 PROCEDURE — 160048 HCHG OR STATISTICAL LEVEL 1-5: Performed by: ORTHOPAEDIC SURGERY

## 2019-05-31 PROCEDURE — 160046 HCHG PACU - 1ST 60 MINS PHASE II: Performed by: ORTHOPAEDIC SURGERY

## 2019-05-31 PROCEDURE — 700111 HCHG RX REV CODE 636 W/ 250 OVERRIDE (IP): Performed by: ANESTHESIOLOGY

## 2019-05-31 PROCEDURE — 500881 HCHG PACK, EXTREMITY: Performed by: ORTHOPAEDIC SURGERY

## 2019-05-31 PROCEDURE — 700102 HCHG RX REV CODE 250 W/ 637 OVERRIDE(OP): Performed by: ANESTHESIOLOGY

## 2019-05-31 PROCEDURE — 160009 HCHG ANES TIME/MIN: Performed by: ORTHOPAEDIC SURGERY

## 2019-05-31 PROCEDURE — 160035 HCHG PACU - 1ST 60 MINS PHASE I: Performed by: ORTHOPAEDIC SURGERY

## 2019-05-31 PROCEDURE — 700105 HCHG RX REV CODE 258: Performed by: ORTHOPAEDIC SURGERY

## 2019-05-31 PROCEDURE — 160002 HCHG RECOVERY MINUTES (STAT): Performed by: ORTHOPAEDIC SURGERY

## 2019-05-31 RX ORDER — OXYCODONE HCL 5 MG/5 ML
5 SOLUTION, ORAL ORAL
Status: DISCONTINUED | OUTPATIENT
Start: 2019-05-31 | End: 2019-05-31 | Stop reason: HOSPADM

## 2019-05-31 RX ORDER — BUPIVACAINE HYDROCHLORIDE AND EPINEPHRINE 5; 5 MG/ML; UG/ML
INJECTION, SOLUTION EPIDURAL; INTRACAUDAL; PERINEURAL
Status: DISCONTINUED
Start: 2019-05-31 | End: 2019-05-31 | Stop reason: HOSPADM

## 2019-05-31 RX ORDER — HALOPERIDOL 5 MG/ML
1 INJECTION INTRAMUSCULAR
Status: DISCONTINUED | OUTPATIENT
Start: 2019-05-31 | End: 2019-05-31 | Stop reason: HOSPADM

## 2019-05-31 RX ORDER — HYDRALAZINE HYDROCHLORIDE 20 MG/ML
5 INJECTION INTRAMUSCULAR; INTRAVENOUS
Status: DISCONTINUED | OUTPATIENT
Start: 2019-05-31 | End: 2019-05-31 | Stop reason: HOSPADM

## 2019-05-31 RX ORDER — SODIUM CHLORIDE, SODIUM LACTATE, POTASSIUM CHLORIDE, CALCIUM CHLORIDE 600; 310; 30; 20 MG/100ML; MG/100ML; MG/100ML; MG/100ML
INJECTION, SOLUTION INTRAVENOUS CONTINUOUS
Status: DISCONTINUED | OUTPATIENT
Start: 2019-05-31 | End: 2019-05-31 | Stop reason: HOSPADM

## 2019-05-31 RX ORDER — BUPIVACAINE HYDROCHLORIDE 5 MG/ML
INJECTION, SOLUTION EPIDURAL; INTRACAUDAL
Status: DISCONTINUED | OUTPATIENT
Start: 2019-05-31 | End: 2019-05-31 | Stop reason: HOSPADM

## 2019-05-31 RX ORDER — ACETAMINOPHEN 500 MG
1000 TABLET ORAL ONCE
Status: COMPLETED | OUTPATIENT
Start: 2019-05-31 | End: 2019-05-31

## 2019-05-31 RX ORDER — DIPHENHYDRAMINE HYDROCHLORIDE 50 MG/ML
12.5 INJECTION INTRAMUSCULAR; INTRAVENOUS
Status: DISCONTINUED | OUTPATIENT
Start: 2019-05-31 | End: 2019-05-31 | Stop reason: HOSPADM

## 2019-05-31 RX ORDER — ONDANSETRON 2 MG/ML
INJECTION INTRAMUSCULAR; INTRAVENOUS PRN
Status: DISCONTINUED | OUTPATIENT
Start: 2019-05-31 | End: 2019-05-31 | Stop reason: SURG

## 2019-05-31 RX ORDER — CEFAZOLIN SODIUM 1 G/3ML
INJECTION, POWDER, FOR SOLUTION INTRAMUSCULAR; INTRAVENOUS PRN
Status: DISCONTINUED | OUTPATIENT
Start: 2019-05-31 | End: 2019-05-31 | Stop reason: SURG

## 2019-05-31 RX ORDER — METOPROLOL TARTRATE 1 MG/ML
1 INJECTION, SOLUTION INTRAVENOUS
Status: DISCONTINUED | OUTPATIENT
Start: 2019-05-31 | End: 2019-05-31 | Stop reason: HOSPADM

## 2019-05-31 RX ORDER — ONDANSETRON 2 MG/ML
4 INJECTION INTRAMUSCULAR; INTRAVENOUS
Status: DISCONTINUED | OUTPATIENT
Start: 2019-05-31 | End: 2019-05-31 | Stop reason: HOSPADM

## 2019-05-31 RX ORDER — DEXAMETHASONE SODIUM PHOSPHATE 4 MG/ML
INJECTION, SOLUTION INTRA-ARTICULAR; INTRALESIONAL; INTRAMUSCULAR; INTRAVENOUS; SOFT TISSUE PRN
Status: DISCONTINUED | OUTPATIENT
Start: 2019-05-31 | End: 2019-05-31 | Stop reason: SURG

## 2019-05-31 RX ORDER — OXYCODONE HCL 5 MG/5 ML
10 SOLUTION, ORAL ORAL
Status: DISCONTINUED | OUTPATIENT
Start: 2019-05-31 | End: 2019-05-31 | Stop reason: HOSPADM

## 2019-05-31 RX ADMIN — CEFAZOLIN 2 G: 330 INJECTION, POWDER, FOR SOLUTION INTRAMUSCULAR; INTRAVENOUS at 08:52

## 2019-05-31 RX ADMIN — MIDAZOLAM HYDROCHLORIDE 2 MG: 1 INJECTION, SOLUTION INTRAMUSCULAR; INTRAVENOUS at 08:51

## 2019-05-31 RX ADMIN — SODIUM CHLORIDE, POTASSIUM CHLORIDE, SODIUM LACTATE AND CALCIUM CHLORIDE: 600; 310; 30; 20 INJECTION, SOLUTION INTRAVENOUS at 08:52

## 2019-05-31 RX ADMIN — FENTANYL CITRATE 100 MCG: 50 INJECTION, SOLUTION INTRAMUSCULAR; INTRAVENOUS at 09:04

## 2019-05-31 RX ADMIN — ACETAMINOPHEN 1000 MG: 500 TABLET ORAL at 08:04

## 2019-05-31 RX ADMIN — DEXAMETHASONE SODIUM PHOSPHATE 8 MG: 4 INJECTION, SOLUTION INTRA-ARTICULAR; INTRALESIONAL; INTRAMUSCULAR; INTRAVENOUS; SOFT TISSUE at 09:11

## 2019-05-31 RX ADMIN — SODIUM CHLORIDE, POTASSIUM CHLORIDE, SODIUM LACTATE AND CALCIUM CHLORIDE: 600; 310; 30; 20 INJECTION, SOLUTION INTRAVENOUS at 08:00

## 2019-05-31 RX ADMIN — ONDANSETRON 4 MG: 2 INJECTION INTRAMUSCULAR; INTRAVENOUS at 09:11

## 2019-05-31 RX ADMIN — PROPOFOL 200 MG: 10 INJECTION, EMULSION INTRAVENOUS at 09:04

## 2019-05-31 ASSESSMENT — PAIN SCALES - GENERAL: PAIN_LEVEL: 0

## 2019-05-31 NOTE — ANESTHESIA PREPROCEDURE EVALUATION
Relevant Problems   (+) Hypertension       Physical Exam    Airway   Mallampati: III  TM distance: >3 FB  Neck ROM: full       Cardiovascular - normal exam  Rhythm: regular  Rate: normal  (-) murmur     Dental - normal exam         Pulmonary - normal exam  Breath sounds clear to auscultation     Abdominal    Neurological - normal exam                 Anesthesia Plan    ASA 2       Plan - general       Airway plan will be LMA        Induction: intravenous    Postoperative Plan: Postoperative administration of opioids is intended.    Pertinent diagnostic labs and testing reviewed    Informed Consent:    Anesthetic plan and risks discussed with patient.    Use of blood products discussed with: patient whom consented to blood products.

## 2019-05-31 NOTE — OR SURGEON
Immediate Post OP Note    PreOp Diagnosis: R rg trig f andthick palmar fascia    PostOp Diagnosis: same    Procedure(s):  RELEASE, TRIGGER FINGER-  RING - Wound Class: Clean  fasciatomy of palm - Wound Class: Clean    Surgeon(s):  Simon Altamirano M.D.    Anesthesiologist/Type of Anesthesia:  Anesthesiologist: Carlos Cobb M.D./General    Surgical Staff:  Circulator: Dona Sarabia R.N.; Lainey Kenney R.N.  Scrub Person: Neema Doty    Specimens removed if any:  * No specimens in log *    Estimated Blood Loss: 0    Findings: 0    Complications: 0        5/31/2019 9:45 AM Simon Altamirano M.D.

## 2019-05-31 NOTE — ANESTHESIA TIME REPORT
Anesthesia Start and Stop Event Times     Date Time Event    5/31/2019 0852 Anesthesia Start     0937 Anesthesia Stop        Responsible Staff  05/31/19    Name Role Begin End    Carlos Cobb M.D. Anesth 0852 0937        Preop Diagnosis (Free Text):  Pre-op Diagnosis     HAND PAIN RIGHT         Preop Diagnosis (Codes):  Diagnosis Information     Diagnosis Code(s): Hand pain, right [M79.641]        Post op Diagnosis  Trigger finger      Premium Reason  Non-Premium    Comments:

## 2019-05-31 NOTE — DISCHARGE INSTRUCTIONS
ACTIVITY: Rest and take it easy for the first 24 hours.  A responsible adult is recommended to remain with you during that time.  It is normal to feel sleepy.  We encourage you to not do anything that requires balance, judgment or coordination.    MILD FLU-LIKE SYMPTOMS ARE NORMAL. YOU MAY EXPERIENCE GENERALIZED MUSCLE ACHES, THROAT IRRITATION, HEADACHE AND/OR SOME NAUSEA.    FOR 24 HOURS DO NOT:  Drive, operate machinery or run household appliances.  Drink beer or alcoholic beverages.   Make important decisions or sign legal documents.    SPECIAL INSTRUCTIONS: Follow DR Altamirano's instructions regarding stretching and keep dressing clean and dry until next appointment.    DIET: To avoid nausea, slowly advance diet as tolerated, avoiding spicy or greasy foods for the first day.  Add more substantial food to your diet according to your physician's instructions.  Babies can be fed formula or breast milk as soon as they are hungry.  INCREASE FLUIDS AND FIBER TO AVOID CONSTIPATION.    SURGICAL DRESSING/BATHING: Keep dressing clean and dry until next appointment.    FOLLOW-UP APPOINTMENT:  A follow-up appointment should be arranged with your doctor June 6.    You should CALL YOUR PHYSICIAN if you develop:  Fever greater than 101 degrees F.  Pain not relieved by medication, or persistent nausea or vomiting.  Excessive bleeding (blood soaking through dressing) or unexpected drainage from the wound.  Extreme redness or swelling around the incision site, drainage of pus or foul smelling drainage.  Inability to urinate or empty your bladder within 8 hours.  Problems with breathing or chest pain.    You should call 911 if you develop problems with breathing or chest pain.  If you are unable to contact your doctor or surgical center, you should go to the nearest emergency room or urgent care center.  Physician's telephone #: [phone]    If any questions arise, call your doctor.  If your doctor is not available,  please feel free to call the Surgical Center at [phone].  The Center is open Monday through Friday from 7AM to 7PM.  You can also call the HEALTH HOTLINE open 24 hours/day, 7 days/week and speak to a nurse at [phone], or toll free at [phone].    A registered nurse may call you a few days after your surgery to see how you are doing after your procedure.    MEDICATIONS: Resume taking daily medication.  Take prescribed pain medication with food.  If no medication is prescribed, you may take non-aspirin pain medication if needed.  PAIN MEDICATION CAN BE VERY CONSTIPATING.  Take a stool softener or laxative such as senokot, pericolace, or milk of magnesia if needed.    Prescription given for Percocet.  Last pain medication given at -.    If your physician has prescribed pain medication that includes Acetaminophen (Tylenol), do not take additional Acetaminophen (Tylenol) while taking the prescribed medication.    Depression / Suicide Risk    As you are discharged from this [facility] facility, it is important to learn how to keep safe from harming yourself.    Recognize the warning signs:  · Abrupt changes in personality, positive or negative- including increase in energy   · Giving away possessions  · Change in eating patterns- significant weight changes-  positive or negative  · Change in sleeping patterns- unable to sleep or sleeping all the time   · Unwillingness or inability to communicate  · Depression  · Unusual sadness, discouragement and loneliness  · Talk of wanting to die  · Neglect of personal appearance   · Rebelliousness- reckless behavior  · Withdrawal from people/activities they love  · Confusion- inability to concentrate     If you or a loved one observes any of these behaviors or has concerns about self-harm, here's what you can do:  · Talk about it- your feelings and reasons for harming yourself  · Remove any means that you might use to hurt yourself (examples: pills, rope,  extension cords, firearm)  · Get professional help from the community (Mental Health, Substance Abuse, psychological counseling)  · Do not be alone:Call your Safe Contact- someone whom you trust who will be there for you.  · Call your local CRISIS HOTLINE 659-5078 or 806-089-3853  · Call your local Children's Mobile Crisis Response Team [place] (993) 864-3411 or www.[employer]  · Call the toll free National Suicide Prevention Hotlines   · National Suicide Prevention Lifeline 701-981-TSOB (7564)  · National Hope Line Network 800-SUICIDE (257-8342)

## 2019-05-31 NOTE — ANESTHESIA POSTPROCEDURE EVALUATION
Patient: Brian Lopez    Procedure Summary     Date:  05/31/19 Room / Location:  [facility] ROOM 23 / SURGERY SAME DAY [facility]    Anesthesia Start:  0852 Anesthesia Stop:  0937    Procedures:       RELEASE, TRIGGER FINGER-  RING (Right Finger)      fasciatomy of palm (Right Hand) Diagnosis:       Hand pain, right      (HAND PAIN RIGHT )    Surgeon:  Simon Altamirano M.D. Responsible Provider:  Carlos Cobb M.D.    Anesthesia Type:  general ASA Status:  2          Final Anesthesia Type: general  Last vitals  BP   Blood Pressure : (!) 161/88    Temp   37.1 °C (98.8 °F)    Pulse   Pulse: 62   Resp   18    SpO2   95 %      Anesthesia Post Evaluation    Patient location during evaluation: PACU  Patient participation: complete - patient participated  Level of consciousness: awake and alert  Pain score: 0    Airway patency: patent  Anesthetic complications: no  Cardiovascular status: hemodynamically stable  Respiratory status: acceptable  Hydration status: euvolemic    PONV: none           Nurse Pain Score: 0 (NPRS)

## 2019-05-31 NOTE — ANESTHESIA PROCEDURE NOTES
Airway  Date/Time: 5/31/2019 8:58 AM  Performed by: MARIUM JOSHUA  Authorized by: MARIUM JOSHUA     Location:  OR  Urgency:  Elective  Indications for Airway Management:  Anesthesia  Spontaneous Ventilation: absent    Sedation Level:  Deep  Preoxygenated: Yes    Final Airway Type:  Supraglottic airway  Final Supraglottic Airway:  Standard LMA  SGA Size:  4  Number of Attempts at Approach:  1

## 2019-05-31 NOTE — OR NURSING
0936  Pt arrived to PACU from OR on 2L NC awake and oriented.  Right had soft cast drsg C,D&I.  Pt denies pain.  VS stable.    0955  Pt taking PO well, denies pain.  Spouse, Liliana brought to bs.  VS stable on 1L NC    1010  Instructions read to pt and wife, Liliana.  VS stable on RA.    1025  Pt states he is ready to go home.  PIV d/cd, pt dressing.    1037  Pt discharged home with wife in wheelchair, meets d/c criteria.

## 2019-06-03 NOTE — OP REPORT
DATE OF SERVICE:  05/31/2019    PREOPERATIVE DIAGNOSES:  Right ring trigger.    POSTOPERATIVE DIAGNOSES:  Right ring trigger with tenosynovitis and thickened   palmar fascia.    PROCEDURE:  1.  Right ring trigger finger release.  2. Fasciectomy of the right ring finger and the palm.    ANESTHESIA:  General.    SURGEON:  Dr. Altamirano.    OPERATIVE PROCEDURE:  Patient taken to the operating room following induction   of general anesthesia.  Right lower extremity was prepped and draped in   routine fashion.  Limb was exsanguinated with an elastic bandage.  Tourniquet   inflated to 250 mmHg.  The right ring finger had a 10-15 degree contracture at   the MP joint and 20-30 degree contracture at the PIP joint.  There was a   prior scar from prior trigger finger release and there was obviously a   thickened palmar fascia.  The prior incision was opened.  It was extended   proximally and distally in a zigzag fashion.  Full thickness skin flaps were   developed.  Care was taken to identify and protect the neurovascular   structures and a fasciectomy from the proximal palmar crease out past the MP   crease was carried out excising the fascial pulley and excising thickened   palmar fascia overlying the flexor tendon.  The first extensor compartment was   then released and a portion of the pulley was excised.  There was some   thickened tenosynovium on the profundus and sublimis and this was debrided   back to healthy tissue.  Once this was carried out, the finger came out into   full extension.  The skin edges were infiltrated with 0.5% Marcaine,   tourniquet released, hemostasis obtained with electrocautery.  The wound   irrigated copiously, skin closed with 4-0 nylon.  Compressive dressing applied   to the hand.  The arm was elevated and the patient was taken to recovery room   in satisfactory condition.       ____________________________________     MD SAIRA BARTH / WILLIS    DD:  06/02/2019 18:35:20  DT:   06/02/2019 19:17:35    D#:  [id number]  Job#:  [id number]

## 2019-06-10 RX ORDER — TAMSULOSIN HYDROCHLORIDE 0.4 MG/1
0.4 CAPSULE ORAL
Status: CANCELLED | OUTPATIENT
Start: 2019-06-10

## 2019-06-10 RX ORDER — TAMSULOSIN HYDROCHLORIDE 0.4 MG/1
0.4 CAPSULE ORAL
Qty: 90 CAP | Refills: 1 | Status: SHIPPED | OUTPATIENT
Start: 2019-06-10 | End: 2019-09-19 | Stop reason: SDUPTHER

## 2019-06-10 NOTE — TELEPHONE ENCOUNTER
*HISTORICAL MEDICATION, PT ALSO NEEDS TO GET LAB UPDATED*  Was the patient seen in the last year in this department? Yes    Does patient have an active prescription for medications requested? No     Received Request Via: Pharmacy      Pt met protocol?: NO    LAST OV 03/14/2019      Lab Results  Component Value Date/Time   PSATOTAL 2.79 01/21/2016 0725

## 2019-06-10 NOTE — TELEPHONE ENCOUNTER
Was the patient seen in the last year in this department? Yes    Does patient have an active prescription for medications requested? No     Received Request Via: Pharmacy      Pt met protocol?: Yes, labs 11/18 ov 3/19     100ds

## 2019-07-13 DIAGNOSIS — I10 ESSENTIAL HYPERTENSION: Chronic | ICD-10-CM

## 2019-07-15 RX ORDER — ATENOLOL 50 MG/1
TABLET ORAL
Qty: 90 TAB | Refills: 1 | Status: SHIPPED | OUTPATIENT
Start: 2019-07-15 | End: 2019-09-19 | Stop reason: SDUPTHER

## 2019-07-15 NOTE — TELEPHONE ENCOUNTER
Was the patient seen in the last year in this department? Yes    Does patient have an active prescription for medications requested? No     Received Request Via: Pharmacy    Pt met protocol?: Yes     Last OV 03/14/19    BP Readings from Last 1 Encounters:   05/31/19 (!) 161/88

## 2019-09-17 ENCOUNTER — TELEPHONE (OUTPATIENT)
Dept: MEDICAL GROUP | Facility: PHYSICIAN GROUP | Age: 79
End: 2019-09-17

## 2019-09-17 NOTE — TELEPHONE ENCOUNTER
ESTABLISHED PATIENT PRE-VISIT PLANNING     Patient was NOT contacted to complete PVP.    1.  Reviewed notes from the last few office visits within the medical group: Yes    2.  If any orders were placed at last visit or intended to be done for this visit (i.e. 6 mos follow-up), do we have Results/Consult Notes?        •  Labs - Labs were not ordered at last office visit.       •  Imaging - Imaging was not ordered at last office visit.       •  Referrals - No referrals were ordered at last office visit.    3. Is this appointment scheduled as a Hospital Follow-Up? No    4.  Immunizations were updated in [facility] using WebIZ?: Yes       •  Web Iz Recommendations: FLU, TD, VARICELLA (Chicken Pox)  and SHINGRIX (Shingles)    5.  Patient is due for the following Health Maintenance Topics:   Health Maintenance Due   Topic Date Due   • IMM ZOSTER VACCINES (1 of 2) 03/17/1990   • Annual Wellness Visit  03/29/2019   • IMM INFLUENZA (1) 09/01/2019     6. Orders for overdue Health Maintenance topics pended in Pre-Charting? NO    7.  AHA (MDX) form printed for Provider? No, already completed    8.  Patient was NOT informed to arrive 15 min prior to their scheduled appointment and bring in their medication bottles.

## 2019-09-19 ENCOUNTER — OFFICE VISIT (OUTPATIENT)
Dept: MEDICAL GROUP | Facility: PHYSICIAN GROUP | Age: 79
End: 2019-09-19
Payer: MEDICARE

## 2019-09-19 VITALS
WEIGHT: 192 LBS | BODY MASS INDEX: 30.13 KG/M2 | OXYGEN SATURATION: 94 % | RESPIRATION RATE: 16 BRPM | SYSTOLIC BLOOD PRESSURE: 132 MMHG | HEART RATE: 64 BPM | TEMPERATURE: 98.7 F | DIASTOLIC BLOOD PRESSURE: 72 MMHG | HEIGHT: 67 IN

## 2019-09-19 DIAGNOSIS — Z23 NEED FOR VACCINATION: ICD-10-CM

## 2019-09-19 DIAGNOSIS — I10 ESSENTIAL HYPERTENSION: Chronic | ICD-10-CM

## 2019-09-19 DIAGNOSIS — E78.1 HYPERTRIGLYCERIDEMIA: Chronic | ICD-10-CM

## 2019-09-19 DIAGNOSIS — Z13.21 ENCOUNTER FOR VITAMIN DEFICIENCY SCREENING: ICD-10-CM

## 2019-09-19 PROCEDURE — G0439 PPPS, SUBSEQ VISIT: HCPCS | Mod: 25 | Performed by: INTERNAL MEDICINE

## 2019-09-19 PROCEDURE — 90662 IIV NO PRSV INCREASED AG IM: CPT | Performed by: INTERNAL MEDICINE

## 2019-09-19 PROCEDURE — G0008 ADMIN INFLUENZA VIRUS VAC: HCPCS | Performed by: INTERNAL MEDICINE

## 2019-09-19 RX ORDER — FENOFIBRATE 160 MG/1
TABLET ORAL
Qty: 90 TAB | Refills: 3 | Status: SHIPPED | OUTPATIENT
Start: 2019-09-19 | End: 2020-03-30 | Stop reason: CLARIF

## 2019-09-19 RX ORDER — TAMSULOSIN HYDROCHLORIDE 0.4 MG/1
0.4 CAPSULE ORAL
Qty: 90 CAP | Refills: 3 | Status: SHIPPED | OUTPATIENT
Start: 2019-09-19 | End: 2020-12-10 | Stop reason: SDUPTHER

## 2019-09-19 RX ORDER — MELOXICAM 7.5 MG/1
7.5 TABLET ORAL 2 TIMES DAILY PRN
Qty: 90 TAB | Refills: 1 | Status: SHIPPED | OUTPATIENT
Start: 2019-09-19 | End: 2020-04-13

## 2019-09-19 RX ORDER — PRAVASTATIN SODIUM 40 MG
40 TABLET ORAL
Qty: 100 TAB | Refills: 3 | Status: SHIPPED | OUTPATIENT
Start: 2019-09-19 | End: 2020-12-10 | Stop reason: SDUPTHER

## 2019-09-19 RX ORDER — ATENOLOL 50 MG/1
50 TABLET ORAL DAILY
Qty: 90 TAB | Refills: 3 | Status: SHIPPED | OUTPATIENT
Start: 2019-09-19 | End: 2020-02-04 | Stop reason: SDUPTHER

## 2019-09-19 ASSESSMENT — ACTIVITIES OF DAILY LIVING (ADL): BATHING_REQUIRES_ASSISTANCE: 0

## 2019-09-19 ASSESSMENT — ENCOUNTER SYMPTOMS: GENERAL WELL-BEING: GOOD

## 2019-09-19 ASSESSMENT — PATIENT HEALTH QUESTIONNAIRE - PHQ9: CLINICAL INTERPRETATION OF PHQ2 SCORE: 0

## 2019-09-19 NOTE — PROGRESS NOTES
Chief Complaint   Patient presents with   • Annual Wellness Visit         HPI:  Brian is a 79 y.o. here for Medicare Annual Wellness Visit        Patient Active Problem List    Diagnosis Date Noted   • Joint pain 03/14/2019   • Status post right knee replacement 11/14/2018   • Obesity (BMI 30-39.9) 03/07/2017   • Hypertension 04/17/2014   • Hypertriglyceridemia 04/17/2014   • BPH (benign prostatic hyperplasia) 04/17/2014       Current Outpatient Medications   Medication Sig Dispense Refill   • atenolol (TENORMIN) 50 MG Tab Take 1 Tab by mouth every day. 90 Tab 3   • tamsulosin (FLOMAX) 0.4 MG capsule Take 1 Cap by mouth every bedtime. 90 Cap 3   • pravastatin (PRAVACHOL) 40 MG tablet Take 1 Tab by mouth every day. 100 Tab 3   • fenofibrate (TRIGLIDE) 160 MG tablet TAKE  ONE TABLET BY MOUTH DAILY 90 Tab 3   • meloxicam (MOBIC) 7.5 MG Tab Take 1 Tab by mouth 2 times a day as needed. 90 Tab 1   • Naproxen Sod-Diphenhydramine (ALEVE PM PO) Take  by mouth at bedtime as needed.     • Cholecalciferol (VITAMIN D) 2000 UNITS Cap Take  by mouth every day.     • Multiple Vitamins-Minerals (MULTIVITAMIN ADULT PO) Take  by mouth every day.     • Calcium Carb-Cholecalciferol (CALCIUM + D3 PO) Take  by mouth every day.       No current facility-administered medications for this visit.         Patient is taking medications as noted in medication list.  Current supplements as per medication list.     Allergies: Patient has no known allergies.    Current social contact/activities: Visiting with friends and family     Is patient current with immunizations? No, due for FLU. Patient is interested in receiving FLU today.    He  reports that he quit smoking about 9 years ago. His smoking use included cigarettes. He has a 50.00 pack-year smoking history. He has never used smokeless tobacco. He reports that he drinks about 8.4 oz of alcohol per week. He reports that he does not use drugs.  Counseling given: Not  Answered        DPA/Advanced directive: Patient does not have an Advanced Directive.  A packet and workshop information was given on Advanced Directives.    ROS:    Gait: Uses no assistive device   Ostomy: No   Other tubes: No   Amputations: No   Chronic oxygen use No   Last eye exam 2018   Wears hearing aids: No   : Denies any urinary leakage during the last 6 months    Annual Health Assessment Questions:    1.  Are you currently engaging in any exercise or physical activity? Yes    2.  How would you describe your mood or emotional well-being today? good    3.  Have you had any falls in the last year? No    4.  Have you noticed any problems with your balance or had difficulty walking? No    5.  In the last six months have you experienced any leakage of urine? No    6. DPA/Advanced Directive: Patient does not have an Advanced Directive.  A packet and workshop information was given on Advanced Directives.      Screening:        Depression Screening    Little interest or pleasure in doing things?  0 - not at all  Feeling down, depressed, or hopeless? 0 - not at all  Patient Health Questionnaire Score: 0    If depressive symptoms identified deferred to follow up visit unless specifically addressed in assessment and plan.    Interpretation of PHQ-9 Total Score   Score Severity   1-4 No Depression   5-9 Mild Depression   10-14 Moderate Depression   15-19 Moderately Severe Depression   20-27 Severe Depression    Screening for Cognitive Impairment    Three Minute Recall (village, kitchen, baby)  3/3 Village Kitchen Baby  Nav[place]et clock face with all 12 numbers and set the hands to show 10 past 10.  Yes 10:10  5/5  If cognitive concerns identified, deferred for follow up unless specifically addressed in assessment and plan.    Fall Risk Assessment    Has the patient had two or more falls in the last year or any fall with injury in the last year?  No  If fall risk identified, deferred for follow up unless specifically  addressed in assessment and plan.    Safety Assessment    Throw rugs on floor.  Yes  Handrails on all stairs.  Yes  Good lighting in all hallways.  Yes  Difficulty hearing.  No  Patient counseled about all safety risks that were identified.    Functional Assessment ADLs    Are there any barriers preventing you from cooking for yourself or meeting nutritional needs?  No.    Are there any barriers preventing you from driving safely or obtaining transportation?  No.    Are there any barriers preventing you from using a telephone or calling for help?  No.    Are there any barriers preventing you from shopping?  No.    Are there any barriers preventing you from taking care of your own finances?  No.    Are there any barriers preventing you from managing your medications?  No.    Are there any barriers preventing you from showering, bathing or dressing yourself?  No.    Are you currently engaging in any exercise or physical activity?  Yes.  Walking, Yard Work  What is your perception of your health?  Good.    Health Maintenance Summary                IMM INFLUENZA Overdue 9/1/2019      Done 11/14/2018 Imm Admin: Influenza Vaccine Adult HD     Patient has more history with this topic...    IMM ZOSTER VACCINES Postponed 2/19/2020 Originally [birth year]17/1990. System: vaccine not available, other system reasons    Annual Wellness Visit Next Due 9/19/2020      Done 9/19/2019      Patient has more history with this topic...    COLONOSCOPY Next Due 8/22/2023      Done 8/22/2018 REFERRAL TO GI FOR COLONOSCOPY     Patient has more history with this topic...    IMM DTaP/Tdap/Td Vaccine Next Due 10/11/2024      Done 10/11/2014 Imm Admin: Tdap Vaccine          Patient Care Team:  Samir Muniz M.D. as PCP - General (Internal Medicine)  Carlos Jules M.D. as Consulting Physician (Gastroenterology)    Social History     Tobacco Use   • Smoking status: Former Smoker     Packs/day: 1.00     Years: 50.00     Pack years: 50.00     Types:  "Cigarettes     Last attempt to quit: 2010     Years since quittin.4   • Smokeless tobacco: Never Used   Substance Use Topics   • Alcohol use: Yes     Alcohol/week: 8.4 oz     Types: 14 Cans of beer per week     Comment: 3 per day   • Drug use: No     Family History   Problem Relation Age of Onset   • Stroke Mother    • Heart Attack Father 85   • Heart Disease Father    • Cancer Neg Hx      He  has a past medical history of Arthritis, BPH (benign prostatic hyperplasia) (2014), Cataract, Dental disorder, High cholesterol, Hypertension (2014), Hypertriglyceridemia (2014), Pain (2019), and Snoring.   Past Surgical History:   Procedure Laterality Date   • PB INCISE FINGER TENDON SHEATH Right 2019    Procedure: RELEASE, TRIGGER FINGER-  RING;  Surgeon: Simon Altamirano M.D.;  Location: SURGERY SAME DAY [facility];  Service: Orthopedics   • SYNOVECTOMY Right 2019    Procedure: fasciatomy of palm;  Surgeon: Simon Altamirano M.D.;  Location: SURGERY SAME DAY [facility];  Service: Orthopedics   • KNEE ARTHROPLASTY TOTAL Right 2017    Procedure: KNEE ARTHROPLASTY TOTAL;  Surgeon: Steffanie Del Angel M.D.;  Location: SURGERY [facility];  Service:    • COLON RESECTION ROBOTIC  2014    Performed by Ezra Burks M.D. at SURGERY [facility]   • CATARACT EXTRACTION WITH IOL Bilateral    • KNEE ARTHROPLASTY TOTAL Left    • APPENDECTOMY  's   • ARTHROSCOPY, KNEE     • CHOLECYSTECTOMY     • HEMORRHOIDECTOMY     • TRIGGER FINGER RELEASE Bilateral last     multiple            Exam:     /72   Pulse 64   Temp 37.1 °C (98.7 °F)   Resp 16   Ht 1.689 m (5' 6.5\")   Wt 87.1 kg (192 lb)   SpO2 94%  Body mass index is 30.53 kg/m².    Hearing good.    Dentition good  Alert, oriented in no acute distress.  Eye contact is good, speech goal directed, affect calm      Assessment and Plan. The following treatment and monitoring plan is recommended:  "   BPH (benign prostatic hyperplasia)  Well controlled on flomax.     Hypertension  Well controlled on atenolol 50 mg daily.     Hypertriglyceridemia  Controlled on pravastatin and fenofibrate.       Services suggested: No services needed at this time  Health Care Screening recommendations as per orders if indicated.  Referrals offered: PT/OT/Nutrition counseling/Behavioral Health/Smoking cessation as per orders if indicated.    Discussion today about general wellness and lifestyle habits:    · Prevent falls and reduce trip hazards; Cautioned about securing or removing rugs.  · Have a working fire alarm and carbon monoxide detector;   · Engage in regular physical activity and social activities       Follow-up: No follow-ups on file.

## 2019-09-26 ENCOUNTER — HOSPITAL ENCOUNTER (OUTPATIENT)
Dept: LAB | Facility: MEDICAL CENTER | Age: 79
End: 2019-09-26
Attending: INTERNAL MEDICINE
Payer: MEDICARE

## 2019-09-26 DIAGNOSIS — Z13.21 ENCOUNTER FOR VITAMIN DEFICIENCY SCREENING: ICD-10-CM

## 2019-09-26 DIAGNOSIS — E78.1 HYPERTRIGLYCERIDEMIA: Chronic | ICD-10-CM

## 2019-09-26 LAB
25(OH)D3 SERPL-MCNC: 34 NG/ML (ref 30–100)
ALBUMIN SERPL BCP-MCNC: 4.3 G/DL (ref 3.2–4.9)
ALBUMIN/GLOB SERPL: 1.4 G/DL
ALP SERPL-CCNC: 46 U/L (ref 30–99)
ALT SERPL-CCNC: 16 U/L (ref 2–50)
ANION GAP SERPL CALC-SCNC: 9 MMOL/L (ref 0–11.9)
AST SERPL-CCNC: 16 U/L (ref 12–45)
BASOPHILS # BLD AUTO: 0.9 % (ref 0–1.8)
BASOPHILS # BLD: 0.05 K/UL (ref 0–0.12)
BILIRUB SERPL-MCNC: 0.4 MG/DL (ref 0.1–1.5)
BUN SERPL-MCNC: 23 MG/DL (ref 8–22)
CALCIUM SERPL-MCNC: 9.7 MG/DL (ref 8.5–10.5)
CHLORIDE SERPL-SCNC: 108 MMOL/L (ref 96–112)
CHOLEST SERPL-MCNC: 129 MG/DL (ref 100–199)
CO2 SERPL-SCNC: 26 MMOL/L (ref 20–33)
CREAT SERPL-MCNC: 1.11 MG/DL (ref 0.5–1.4)
EOSINOPHIL # BLD AUTO: 0.12 K/UL (ref 0–0.51)
EOSINOPHIL NFR BLD: 2.2 % (ref 0–6.9)
ERYTHROCYTE [DISTWIDTH] IN BLOOD BY AUTOMATED COUNT: 45.3 FL (ref 35.9–50)
GLOBULIN SER CALC-MCNC: 3.1 G/DL (ref 1.9–3.5)
GLUCOSE SERPL-MCNC: 90 MG/DL (ref 65–99)
HCT VFR BLD AUTO: 48.1 % (ref 42–52)
HDLC SERPL-MCNC: 48 MG/DL
HGB BLD-MCNC: 15.2 G/DL (ref 14–18)
IMM GRANULOCYTES # BLD AUTO: 0.02 K/UL (ref 0–0.11)
IMM GRANULOCYTES NFR BLD AUTO: 0.4 % (ref 0–0.9)
LDLC SERPL CALC-MCNC: 55 MG/DL
LYMPHOCYTES # BLD AUTO: 1.59 K/UL (ref 1–4.8)
LYMPHOCYTES NFR BLD: 28.5 % (ref 22–41)
MCH RBC QN AUTO: 28.7 PG (ref 27–33)
MCHC RBC AUTO-ENTMCNC: 31.6 G/DL (ref 33.7–35.3)
MCV RBC AUTO: 90.8 FL (ref 81.4–97.8)
MONOCYTES # BLD AUTO: 0.45 K/UL (ref 0–0.85)
MONOCYTES NFR BLD AUTO: 8.1 % (ref 0–13.4)
NEUTROPHILS # BLD AUTO: 3.35 K/UL (ref 1.82–7.42)
NEUTROPHILS NFR BLD: 59.9 % (ref 44–72)
NRBC # BLD AUTO: 0 K/UL
NRBC BLD-RTO: 0 /100 WBC
PLATELET # BLD AUTO: 222 K/UL (ref 164–446)
PMV BLD AUTO: 10.7 FL (ref 9–12.9)
POTASSIUM SERPL-SCNC: 4.3 MMOL/L (ref 3.6–5.5)
PROT SERPL-MCNC: 7.4 G/DL (ref 6–8.2)
RBC # BLD AUTO: 5.3 M/UL (ref 4.7–6.1)
SODIUM SERPL-SCNC: 143 MMOL/L (ref 135–145)
TRIGL SERPL-MCNC: 130 MG/DL (ref 0–149)
WBC # BLD AUTO: 5.6 K/UL (ref 4.8–10.8)

## 2019-09-26 PROCEDURE — 36415 COLL VENOUS BLD VENIPUNCTURE: CPT

## 2019-09-26 PROCEDURE — 85025 COMPLETE CBC W/AUTO DIFF WBC: CPT

## 2019-09-26 PROCEDURE — 82306 VITAMIN D 25 HYDROXY: CPT

## 2019-09-26 PROCEDURE — 80061 LIPID PANEL: CPT

## 2019-09-26 PROCEDURE — 80053 COMPREHEN METABOLIC PANEL: CPT

## 2019-11-20 ENCOUNTER — HOSPITAL ENCOUNTER (OUTPATIENT)
Dept: LAB | Facility: MEDICAL CENTER | Age: 79
End: 2019-11-20
Attending: OPHTHALMOLOGY
Payer: MEDICARE

## 2019-11-20 LAB
ANION GAP SERPL CALC-SCNC: 8 MMOL/L (ref 0–11.9)
BASOPHILS # BLD AUTO: 0.7 % (ref 0–1.8)
BASOPHILS # BLD: 0.05 K/UL (ref 0–0.12)
BUN SERPL-MCNC: 18 MG/DL (ref 8–22)
CALCIUM SERPL-MCNC: 9.7 MG/DL (ref 8.5–10.5)
CHLORIDE SERPL-SCNC: 106 MMOL/L (ref 96–112)
CO2 SERPL-SCNC: 27 MMOL/L (ref 20–33)
CREAT SERPL-MCNC: 0.95 MG/DL (ref 0.5–1.4)
EOSINOPHIL # BLD AUTO: 0.07 K/UL (ref 0–0.51)
EOSINOPHIL NFR BLD: 1 % (ref 0–6.9)
ERYTHROCYTE [DISTWIDTH] IN BLOOD BY AUTOMATED COUNT: 45.9 FL (ref 35.9–50)
FASTING STATUS PATIENT QL REPORTED: NORMAL
GLUCOSE SERPL-MCNC: 102 MG/DL (ref 65–99)
HCT VFR BLD AUTO: 46 % (ref 42–52)
HGB BLD-MCNC: 15.1 G/DL (ref 14–18)
IMM GRANULOCYTES # BLD AUTO: 0.02 K/UL (ref 0–0.11)
IMM GRANULOCYTES NFR BLD AUTO: 0.3 % (ref 0–0.9)
LYMPHOCYTES # BLD AUTO: 1.64 K/UL (ref 1–4.8)
LYMPHOCYTES NFR BLD: 23.3 % (ref 22–41)
MCH RBC QN AUTO: 29.4 PG (ref 27–33)
MCHC RBC AUTO-ENTMCNC: 32.8 G/DL (ref 33.7–35.3)
MCV RBC AUTO: 89.5 FL (ref 81.4–97.8)
MONOCYTES # BLD AUTO: 0.61 K/UL (ref 0–0.85)
MONOCYTES NFR BLD AUTO: 8.7 % (ref 0–13.4)
NEUTROPHILS # BLD AUTO: 4.66 K/UL (ref 1.82–7.42)
NEUTROPHILS NFR BLD: 66 % (ref 44–72)
NRBC # BLD AUTO: 0 K/UL
NRBC BLD-RTO: 0 /100 WBC
PLATELET # BLD AUTO: 251 K/UL (ref 164–446)
PMV BLD AUTO: 11.1 FL (ref 9–12.9)
POTASSIUM SERPL-SCNC: 4.1 MMOL/L (ref 3.6–5.5)
RBC # BLD AUTO: 5.14 M/UL (ref 4.7–6.1)
SODIUM SERPL-SCNC: 141 MMOL/L (ref 135–145)
WBC # BLD AUTO: 7.1 K/UL (ref 4.8–10.8)

## 2019-11-20 PROCEDURE — 80048 BASIC METABOLIC PNL TOTAL CA: CPT

## 2019-11-20 PROCEDURE — 36415 COLL VENOUS BLD VENIPUNCTURE: CPT

## 2019-11-20 PROCEDURE — 85025 COMPLETE CBC W/AUTO DIFF WBC: CPT

## 2020-01-09 ENCOUNTER — OFFICE VISIT (OUTPATIENT)
Dept: MEDICAL GROUP | Facility: PHYSICIAN GROUP | Age: 80
End: 2020-01-09
Payer: MEDICARE

## 2020-01-09 VITALS
HEART RATE: 60 BPM | TEMPERATURE: 98.2 F | SYSTOLIC BLOOD PRESSURE: 120 MMHG | RESPIRATION RATE: 16 BRPM | DIASTOLIC BLOOD PRESSURE: 58 MMHG | BODY MASS INDEX: 30.23 KG/M2 | WEIGHT: 192.6 LBS | OXYGEN SATURATION: 92 % | HEIGHT: 67 IN

## 2020-01-09 DIAGNOSIS — Z12.5 ENCOUNTER FOR SCREENING FOR MALIGNANT NEOPLASM OF PROSTATE: ICD-10-CM

## 2020-01-09 DIAGNOSIS — Z51.81 MEDICATION MONITORING ENCOUNTER: ICD-10-CM

## 2020-01-09 DIAGNOSIS — Z78.9 ALCOHOL USE: ICD-10-CM

## 2020-01-09 DIAGNOSIS — E53.8 VITAMIN B12 DEFICIENCY: ICD-10-CM

## 2020-01-09 DIAGNOSIS — Z87.891 FORMER SMOKER: ICD-10-CM

## 2020-01-09 DIAGNOSIS — K63.5 INTESTINAL POLYP: ICD-10-CM

## 2020-01-09 DIAGNOSIS — E55.9 VITAMIN D DEFICIENCY: ICD-10-CM

## 2020-01-09 DIAGNOSIS — N40.0 BENIGN PROSTATIC HYPERPLASIA WITHOUT LOWER URINARY TRACT SYMPTOMS: Chronic | ICD-10-CM

## 2020-01-09 DIAGNOSIS — Z11.59 ENCOUNTER FOR HEPATITIS C SCREENING TEST FOR LOW RISK PATIENT: ICD-10-CM

## 2020-01-09 DIAGNOSIS — E66.9 OBESITY (BMI 30-39.9): ICD-10-CM

## 2020-01-09 DIAGNOSIS — I10 ESSENTIAL HYPERTENSION: Chronic | ICD-10-CM

## 2020-01-09 DIAGNOSIS — E61.1 IRON DEFICIENCY: ICD-10-CM

## 2020-01-09 DIAGNOSIS — E78.5 DYSLIPIDEMIA: ICD-10-CM

## 2020-01-09 DIAGNOSIS — R73.9 HYPERGLYCEMIA: ICD-10-CM

## 2020-01-09 DIAGNOSIS — H61.22 IMPACTED CERUMEN, LEFT EAR: ICD-10-CM

## 2020-01-09 DIAGNOSIS — E78.1 HYPERTRIGLYCERIDEMIA: Chronic | ICD-10-CM

## 2020-01-09 DIAGNOSIS — R53.83 OTHER FATIGUE: ICD-10-CM

## 2020-01-09 PROCEDURE — 99214 OFFICE O/P EST MOD 30 MIN: CPT | Performed by: FAMILY MEDICINE

## 2020-01-09 SDOH — HEALTH STABILITY: MENTAL HEALTH: HOW OFTEN DO YOU HAVE 6 OR MORE DRINKS ON ONE OCCASION?: NEVER

## 2020-01-09 SDOH — HEALTH STABILITY: MENTAL HEALTH: HOW OFTEN DO YOU HAVE A DRINK CONTAINING ALCOHOL?: 4 OR MORE TIMES A WEEK

## 2020-01-09 SDOH — HEALTH STABILITY: MENTAL HEALTH: HOW MANY STANDARD DRINKS CONTAINING ALCOHOL DO YOU HAVE ON A TYPICAL DAY?: 3 OR 4

## 2020-01-09 ASSESSMENT — PATIENT HEALTH QUESTIONNAIRE - PHQ9: CLINICAL INTERPRETATION OF PHQ2 SCORE: 0

## 2020-01-09 NOTE — PROGRESS NOTES
cc: [facility], BPH stable, hypertension, body mass index 30, intestinal polyp removal, medication encounter    Subjective:     Brian Lopez is a 79 y.o. male presenting Sees a gastroenterologist every 5 years for his colonoscopy for colon removal for precancerous lesions of polyps.  And he also sees ophthalmologist annually.  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  Lives with his wife.  No social or domestic concerns.  No hearing aids or hearing concerns no walking issues or balance or falls.  He has no issues driving.  No major memory concerns.  He did have eye surgery done for his cataracts and his colon partially removed for a polyp.  He also had operation on a trigger finger.  No hospitalizations in the last year though.  He does not recently check his blood pressure.  He is taking tamsulosin 0.4 mg daily for his BPH.  From his primary care he takes atenolol 50 mg daily for his blood pressure, and for his cholesterol he takes fenofibrate 160 mg daily and pravastatin 40 mg daily.  For his enlarged prostate he takes tamsulosin 0.4 mg daily and meloxicam 7.5 mg as needed for pain.  He only takes meloxicam as needed and has not needed it for a long time.  November 20 he had a CBC checked which was in normal limits, and his BMP with glucose mildly increased at 102 and kidney GFR greater than 60.  He reports his weight has been kind of stable right now and he is not doing much exercise at this time.  He does report he drinks about 3 beers a day.  No other hard liquor or wine.  He has quit smoking 10 years ago.  He did take baby aspirin in the past but came off of this as he was having issues with bleeding.  With nosebleeds.    Review of systems:     Constitutional: Negative for fever, chills and positive fatigue.   HENT: Negative for sinus pressure, negative for ear pain or hearing loss  Eyes: Negative for blurriness, negative for double vision  Respiratory: Negative for cough and shortness of  "breath, negative for exertional shortness of breath  Cardiovascular: Negative for leg swelling, negative for palpitations, negative for chest pain  Gastrointestinal: Negative for nausea, vomiting, abdominal pain, constipation and diarrhea.  Genitourinary: Negative for dysuria and hematuria.   Skin: Negative for rash.   Neurological: Negative for dizziness, focal weakness and headaches.   Endo/Heme/Allergies: Denies bleeding, bruising, and recurrent allergies.  Psychiatric/Behavioral: Negative for depression and anxiety.        Current Outpatient Medications:   •  atenolol (TENORMIN) 50 MG Tab, Take 1 Tab by mouth every day., Disp: 90 Tab, Rfl: 3  •  tamsulosin (FLOMAX) 0.4 MG capsule, Take 1 Cap by mouth every bedtime., Disp: 90 Cap, Rfl: 3  •  pravastatin (PRAVACHOL) 40 MG tablet, Take 1 Tab by mouth every day., Disp: 100 Tab, Rfl: 3  •  fenofibrate (TRIGLIDE) 160 MG tablet, TAKE  ONE TABLET BY MOUTH DAILY, Disp: 90 Tab, Rfl: 3  •  meloxicam (MOBIC) 7.5 MG Tab, Take 1 Tab by mouth 2 times a day as needed., Disp: 90 Tab, Rfl: 1  •  Cholecalciferol (VITAMIN D) 2000 UNITS Cap, Take  by mouth every day., Disp: , Rfl:   •  Multiple Vitamins-Minerals (MULTIVITAMIN ADULT PO), Take  by mouth every day., Disp: , Rfl:   •  Calcium Carb-Cholecalciferol (CALCIUM + D3 PO), Take  by mouth every day., Disp: , Rfl:     Allergies, past medical history, past surgical history, family history, social history reviewed and updated    Objective:     Vitals: /58 (BP Location: Left arm, Patient Position: Sitting, BP Cuff Size: Adult)   Pulse 60   Temp 36.8 °C (98.2 °F) (Temporal)   Resp 16   Ht 1.702 m (5' 7\")   Wt 87.4 kg (192 lb 9.6 oz)   SpO2 92%   BMI 30.17 kg/m²   General: Alert, pleasant, NAD  HEENT: Normocephalic.  Nontraumatic. EOMI, no icterus or pallor.  Conjunctivae and lids normal. External ears normal. Oropharynx non-erythematous, mucous membranes moist.  Neck supple.  No thyromegaly or masses palpated. No " cervical or supraclavicular lymphadenopathy.  Left ear impacted with cerumen.  Heart: Regular rate and rhythm.  S1 and S2 normal.  No murmurs appreciated.  Respiratory: Normal respiratory effort.  Clear to auscultation bilaterally.  Abdomen: Non-distended, soft, non tender in all 4 quadrants.  Skin: Warm, dry, no rashes.  Musculoskeletal: Gait is normal.  Moves all extremities well.  Extremities: No leg edema.  Pedal pulses 2+ symmetric.   Psych:  Affect/mood is normal, judgement is good, memory is intact, grooming is appropriate.    Assessment/Plan:     Diagnoses and all orders for this visit:    Benign prostatic hyperplasia without lower urinary tract symptoms    Essential hypertension  -     Comp Metabolic Panel; Future    Hypertriglyceridemia    Obesity (BMI 30-39.9)  -     Patient identified as having weight management issue.  Appropriate orders and counseling given.    Alcohol use    Intestinal polyp    Iron deficiency  -     FERRITIN; Future  -     IRON/TOTAL IRON BIND; Future    Vitamin D deficiency  -     VITAMIN D,25 HYDROXY; Future    Vitamin B12 deficiency  -     VITAMIN B12; Future    Encounter for hepatitis C screening test for low risk patient  -     HEPATITIS PANEL ACUTE(4 COMPONENTS); Future    Encounter for screening for malignant neoplasm of prostate  -     PROSTATE SPECIFIC AG SCREENING; Future    Dyslipidemia  -     Lipid Profile; Future    Other fatigue  -     TSH WITH REFLEX TO FT4; Future    Medication monitoring encounter  -     Comp Metabolic Panel; Future  -     TSH WITH REFLEX TO FT4; Future    Hyperglycemia  -     HEMOGLOBIN A1C; Future    Former smoker    Impacted cerumen, left ear    -At least 1 week before you see me please get fasting lab work 8 hours of no eating but drink plenty of water.  His lipids were low in the past and if they continue to stay low we may be able to reduce 1 of his cholesterol medications.  Also he will do his blood pressure logs at home and you could bring  your blood pressure machine to the office so we could compare it to the reading in the office so please check it about twice a week sometimes in the morning sometimes in the evening the top number systolic the bottom number diastolic and your heart rate and desired goal of blood pressures less than 130s over 90s and heart rate 60s to 100.  His BPH appears to be stable on Flomax.  He will continue to see the gastroenterologist every 5 years for his history of intestinal polyps and colon removal which is now stable.  He will see his ophthalmologist annually.  He was wondering whether we could recheck his kidney function so we will do that along with his liver function as he does have some alcohol but not more than 3 beers a day.  He was wondering if he needs to see a cardiologist for right now he is not having any risk factors but we will see what his labs show and what his blood pressure is like and if he is having any chest pain or problems breathing when he starts exercising then he could benefit from a evaluation but otherwise for now he is doing quite well.  ER precautions given if any chest pain, shortness of breath, passing out, head trauma please go to the ER call 911 otherwise we will see him back in about 4 weeks to go over his blood pressure logs and his lab work and to adjust any of his medications as needed.  He is aware not to take his pain medications such as meloxicam too often and he does not take it that often as this could cause kidney issues and stomach issues and bleeding issues such as ulcers.  We will do ear lavage in his left ear and in his right ear and left ear he could do eardrops sometimes over-the-counter Debrox or carbamide peroxide eardrops.    Return in about 4 weeks (around 2/6/2020), or lab FU/BP/med refills.      Annual Health Assessment Questions:    1.  Are you currently engaging in any exercise or physical activity? No    2.  How would you describe your mood or emotional  well-being today? good    3.  Have you had any falls in the last year? No    4.  Have you noticed any problems with your balance or had difficulty walking? No    5.  In the last six months have you experienced any leakage of urine? Yes    6. DPA/Advanced Directive: Patient has Advanced Directive, but it is not on file. Instructed to bring in a copy to scan into their chart.

## 2020-01-09 NOTE — PATIENT INSTRUCTIONS
Diagnoses and all orders for this visit:    Benign prostatic hyperplasia without lower urinary tract symptoms    Essential hypertension  -     Comp Metabolic Panel; Future    Hypertriglyceridemia    Obesity (BMI 30-39.9)  -     Patient identified as having weight management issue.  Appropriate orders and counseling given.    Alcohol use    Intestinal polyp    Iron deficiency  -     FERRITIN; Future  -     IRON/TOTAL IRON BIND; Future    Vitamin D deficiency  -     VITAMIN D,25 HYDROXY; Future    Vitamin B12 deficiency  -     VITAMIN B12; Future    Encounter for hepatitis C screening test for low risk patient  -     HEPATITIS PANEL ACUTE(4 COMPONENTS); Future    Encounter for screening for malignant neoplasm of prostate  -     PROSTATE SPECIFIC AG SCREENING; Future    Dyslipidemia  -     Lipid Profile; Future    Other fatigue  -     TSH WITH REFLEX TO FT4; Future    Medication monitoring encounter  -     Comp Metabolic Panel; Future  -     TSH WITH REFLEX TO FT4; Future    Hyperglycemia  -     HEMOGLOBIN A1C; Future    Former smoker    Impacted cerumen, left ear    -At least 1 week before you see me please get fasting lab work 8 hours of no eating but drink plenty of water.  His lipids were low in the past and if they continue to stay low we may be able to reduce 1 of his cholesterol medications.  Also he will do his blood pressure logs at home and you could bring your blood pressure machine to the office so we could compare it to the reading in the office so please check it about twice a week sometimes in the morning sometimes in the evening the top number systolic the bottom number diastolic and your heart rate and desired goal of blood pressures less than 130s over 90s and heart rate 60s to 100.  His BPH appears to be stable on Flomax.  He will continue to see the gastroenterologist every 5 years for his history of intestinal polyps and colon removal which is now stable.  He will see his ophthalmologist  annually.  He was wondering whether we could recheck his kidney function so we will do that along with his liver function as he does have some alcohol but not more than 3 beers a day.  He was wondering if he needs to see a cardiologist for right now he is not having any risk factors but we will see what his labs show and what his blood pressure is like and if he is having any chest pain or problems breathing when he starts exercising then he could benefit from a evaluation but otherwise for now he is doing quite well.  ER precautions given if any chest pain, shortness of breath, passing out, head trauma please go to the ER call 911 otherwise we will see him back in about 4 weeks to go over his blood pressure logs and his lab work and to adjust any of his medications as needed.  He is aware not to take his pain medications such as meloxicam too often and he does not take it that often as this could cause kidney issues and stomach issues and bleeding issues such as ulcers.  We will do ear lavage in his left ear and in his right ear and left ear he could do eardrops sometimes over-the-counter Debrox or carbamide peroxide eardrops.    Return in about 4 weeks (around 2/6/2020), or lab FU/BP/med refills.    How to Take Your Blood Pressure  Blood pressure is a measurement of how strongly your blood is pressing against the walls of your arteries. Arteries are blood vessels that carry blood from your heart throughout your body. Your health care provider takes your blood pressure at each office visit. You can also take your own blood pressure at home with a blood pressure machine. You may need to take your own blood pressure:  · To confirm a diagnosis of high blood pressure (hypertension).  · To monitor your blood pressure over time.  · To make sure your blood pressure medicine is working.  Supplies needed:  To take your blood pressure, you will need a blood pressure machine. You can buy a blood pressure machine, or blood  pressure monitor, at most [facility] or online. There are several types of home blood pressure monitors. When choosing one, consider the following:  · Choose a monitor that has an arm cuff.  · Choose a monitor that wraps snugly around your upper arm. You should be able to fit only one finger between your arm and the cuff.  · Do not choose a monitor that measures your blood pressure from your wrist or finger.  Your health care provider can suggest a reliable monitor that will meet your needs.  How to prepare  To get the most accurate reading, avoid the following for 30 minutes before you check your blood pressure:  · Drinking caffeine.  · Drinking alcohol.  · Eating.  · Smoking.  · Exercising.  Five minutes before you check your blood pressure:  · Empty your bladder.  · Sit quietly without talking in a dining chair, rather than in a soft couch or armchair.  How to take your blood pressure  To check your blood pressure, follow the instructions in the manual that came with your blood pressure monitor. If you have a digital blood pressure monitor, the instructions may be as follows:  1. Sit up straight.  2. Place your feet on the floor. Do not cross your ankles or legs.  3. Rest your left arm at the level of your heart on a table or desk or on the arm of a chair.  4. Pull up your shirt sleeve.  5. Wrap the blood pressure cuff around the upper part of your left arm, 1 inch (2.5 cm) above your elbow. It is best to wrap the cuff around bare skin.  6. Fit the cuff snugly around your arm. You should be able to place only one finger between the cuff and your arm.  7. Position the cord inside the groove of your elbow.  8. Press the power button.  9. Sit quietly while the cuff inflates and deflates.  10. Read the digital reading on the monitor screen and write it down (record it).  11. Wait 2-3 minutes, then repeat the steps starting at step 1.  What does my blood pressure reading mean?  A blood pressure reading is recorded as  "two numbers, such as \"120 over 80\" (or 120/80). The first (\"top\") number is called the systolic pressure. It is a measure of the pressure in your arteries as the heart beats. The second (\"bottom\") number is called the diastolic pressure. It is a measure of the pressure in your arteries as the heart relaxes between beats.  Blood pressure is classified into four stages. The following are the stages for adults who do not have a short-term serious illness or a chronic condition. Systolic pressure and diastolic pressure are measured in a unit called mm Hg.  Normal  · Systolic pressure: below 120.  · Diastolic pressure: below 80.  Prehypertension  · Systolic pressure: 120-139.  · Diastolic pressure: 80-89.  Hypertension stage 1  · Systolic pressure: 140-159.  · Diastolic pressure: 90-99.  Hypertension stage 2  · Systolic pressure: 160 or above.  · Diastolic pressure: 100 or above.  You can have prehypertension or hypertension even if only the systolic or only the diastolic number in your reading is higher than normal.  Follow these instructions at home:  · Check your blood pressure as often as recommended by your health care provider.  · Take your monitor to the next appointment with your health care provider to make sure:  ¨ That you are using it correctly.  ¨ That it provides accurate readings.  · Be sure you understand what your goal blood pressure numbers are.  · Tell your health care provider if you are having any side effects from blood pressure medicine.  Contact a health care provider if:  · Your blood pressure is consistently high.  Get help right away if:  · Your systolic blood pressure is higher than 180.  · Your diastolic blood pressure is higher than 110.  This information is not intended to replace advice given to you by your health care provider. Make sure you discuss any questions you have with your health care provider.  Document Released: 05/26/2017 Document Revised: 08/08/2017 Document Reviewed: " 05/26/2017  SEAT 4a Interactive Patient Education © 2017 [employer]  Introduction  Your blood pressure on this visit to the emergency department or clinic is elevated. This does not necessarily mean you have high blood pressure (hypertension), but it does mean that your blood pressure needs to be rechecked. Many times your blood pressure can increase due to illness, pain, anxiety, or other factors.  We recommend that you get a series of blood pressure readings done over a period of 5 days. It is best to get a reading in the morning and one in the evening. You should make sure to sit and relax for 1-5 minutes before the reading is taken. Write the readings down and make a follow-up appointment with your health care provider to discuss the results. If there is not a free clinic or a drug store with a blood-pressure-taking machine near you, you can purchase blood-pressure-taking equipment from a drug store. Having one in the home allows you the convenience of taking your blood pressure while you are home and relaxed.  BLOOD PRESSURE LOG   Date: _______________________  · a.m. _____________________  · p.m. _____________________  Date: _______________________  · a.m. _____________________  · p.m. _____________________  Date: _______________________  · a.m. _____________________  · p.m. _____________________  Date: _______________________  · a.m. _____________________  · p.m. _____________________  Date: _______________________  · a.m. _____________________  · p.m. _____________________  This information is not intended to replace advice given to you by your health care provider. Make sure you discuss any questions you have with your health care provider.  Document Released: 09/15/2004 Document Revised: 07/07/2017 Document Reviewed: 02/10/2015  © 2017 [employer]vier

## 2020-01-28 ENCOUNTER — HOSPITAL ENCOUNTER (OUTPATIENT)
Dept: LAB | Facility: MEDICAL CENTER | Age: 80
End: 2020-01-28
Attending: FAMILY MEDICINE
Payer: MEDICARE

## 2020-01-28 DIAGNOSIS — E78.5 DYSLIPIDEMIA: ICD-10-CM

## 2020-01-28 DIAGNOSIS — Z12.5 ENCOUNTER FOR SCREENING FOR MALIGNANT NEOPLASM OF PROSTATE: ICD-10-CM

## 2020-01-28 DIAGNOSIS — R53.83 OTHER FATIGUE: ICD-10-CM

## 2020-01-28 DIAGNOSIS — Z51.81 MEDICATION MONITORING ENCOUNTER: ICD-10-CM

## 2020-01-28 DIAGNOSIS — R73.9 HYPERGLYCEMIA: ICD-10-CM

## 2020-01-28 DIAGNOSIS — E61.1 IRON DEFICIENCY: ICD-10-CM

## 2020-01-28 DIAGNOSIS — I10 ESSENTIAL HYPERTENSION: Chronic | ICD-10-CM

## 2020-01-28 DIAGNOSIS — E55.9 VITAMIN D DEFICIENCY: ICD-10-CM

## 2020-01-28 DIAGNOSIS — E53.8 VITAMIN B12 DEFICIENCY: ICD-10-CM

## 2020-01-28 DIAGNOSIS — Z11.59 ENCOUNTER FOR HEPATITIS C SCREENING TEST FOR LOW RISK PATIENT: ICD-10-CM

## 2020-01-28 LAB
25(OH)D3 SERPL-MCNC: 29 NG/ML (ref 30–100)
ALBUMIN SERPL BCP-MCNC: 4.2 G/DL (ref 3.2–4.9)
ALBUMIN/GLOB SERPL: 1.2 G/DL
ALP SERPL-CCNC: 45 U/L (ref 30–99)
ALT SERPL-CCNC: 15 U/L (ref 2–50)
ANION GAP SERPL CALC-SCNC: 9 MMOL/L (ref 0–11.9)
AST SERPL-CCNC: 18 U/L (ref 12–45)
BILIRUB SERPL-MCNC: 0.5 MG/DL (ref 0.1–1.5)
BUN SERPL-MCNC: 16 MG/DL (ref 8–22)
CALCIUM SERPL-MCNC: 9.4 MG/DL (ref 8.5–10.5)
CHLORIDE SERPL-SCNC: 108 MMOL/L (ref 96–112)
CHOLEST SERPL-MCNC: 122 MG/DL (ref 100–199)
CO2 SERPL-SCNC: 25 MMOL/L (ref 20–33)
CREAT SERPL-MCNC: 1.14 MG/DL (ref 0.5–1.4)
FASTING STATUS PATIENT QL REPORTED: NORMAL
FERRITIN SERPL-MCNC: 107.5 NG/ML (ref 22–322)
GLOBULIN SER CALC-MCNC: 3.5 G/DL (ref 1.9–3.5)
GLUCOSE SERPL-MCNC: 92 MG/DL (ref 65–99)
HAV IGM SERPL QL IA: NEGATIVE
HBV CORE IGM SER QL: NEGATIVE
HBV SURFACE AG SER QL: NEGATIVE
HCV AB SER QL: NEGATIVE
HDLC SERPL-MCNC: 50 MG/DL
IRON SATN MFR SERPL: 14 % (ref 15–55)
IRON SERPL-MCNC: 66 UG/DL (ref 50–180)
LDLC SERPL CALC-MCNC: 53 MG/DL
POTASSIUM SERPL-SCNC: 3.8 MMOL/L (ref 3.6–5.5)
PROT SERPL-MCNC: 7.7 G/DL (ref 6–8.2)
PSA SERPL-MCNC: 4.4 NG/ML (ref 0–4)
SODIUM SERPL-SCNC: 142 MMOL/L (ref 135–145)
TIBC SERPL-MCNC: 461 UG/DL (ref 250–450)
TRIGL SERPL-MCNC: 94 MG/DL (ref 0–149)
TSH SERPL DL<=0.005 MIU/L-ACNC: 1.41 UIU/ML (ref 0.38–5.33)
VIT B12 SERPL-MCNC: 276 PG/ML (ref 211–911)

## 2020-01-28 PROCEDURE — 80061 LIPID PANEL: CPT

## 2020-01-28 PROCEDURE — 83540 ASSAY OF IRON: CPT

## 2020-01-28 PROCEDURE — 84443 ASSAY THYROID STIM HORMONE: CPT

## 2020-01-28 PROCEDURE — 83550 IRON BINDING TEST: CPT

## 2020-01-28 PROCEDURE — 80053 COMPREHEN METABOLIC PANEL: CPT

## 2020-01-28 PROCEDURE — 82306 VITAMIN D 25 HYDROXY: CPT

## 2020-01-28 PROCEDURE — 83036 HEMOGLOBIN GLYCOSYLATED A1C: CPT

## 2020-01-28 PROCEDURE — 82728 ASSAY OF FERRITIN: CPT

## 2020-01-28 PROCEDURE — 36415 COLL VENOUS BLD VENIPUNCTURE: CPT

## 2020-01-28 PROCEDURE — 82607 VITAMIN B-12: CPT

## 2020-01-28 PROCEDURE — 84153 ASSAY OF PSA TOTAL: CPT

## 2020-01-28 PROCEDURE — 80074 ACUTE HEPATITIS PANEL: CPT

## 2020-01-29 LAB
EST. AVERAGE GLUCOSE BLD GHB EST-MCNC: 123 MG/DL
HBA1C MFR BLD: 5.9 % (ref 0–5.6)

## 2020-02-03 ENCOUNTER — TELEPHONE (OUTPATIENT)
Dept: MEDICAL GROUP | Facility: PHYSICIAN GROUP | Age: 80
End: 2020-02-03

## 2020-02-03 NOTE — TELEPHONE ENCOUNTER
Future Appointments       Provider Department Center    2/4/2020 3:30 PM Wallace Mitchell M.D. [facility]        ESTABLISHED PATIENT PRE-VISIT PLANNING     Patient was NOT contacted to complete PVP.       1.  Reviewed notes from the last few office visits within the medical group: Yes    2.  If any orders were placed at last visit or intended to be done for this visit (i.e. 6 mos follow-up), do we have Results/Consult Notes?        •  Labs - Labs ordered, completed on 01/28/2020 and results are in chart.       •  Imaging - Imaging was not ordered at last office visit.       •  Referrals - No referrals were ordered at last office visit.    3. Is this appointment scheduled as a Hospital Follow-Up? No    4.  Immunizations were updated in Epic using WebIZ?: Epic matches WebIZ       •  Web Iz Recommendations: TD, VARICELLA (Chicken Pox)  and SHINGRIX (Shingles)    5.  Patient is due for the following Health Maintenance Topics:   There are no preventive care reminders to display for this patient.    6. Orders for overdue Health Maintenance topics pended in Pre-Charting? NO    7.  AHA (MDX) form printed for Provider? YES    8.  Patient was NOT informed to arrive 15 min prior to their scheduled appointment and bring in their medication bottles.

## 2020-02-04 ENCOUNTER — OFFICE VISIT (OUTPATIENT)
Dept: MEDICAL GROUP | Facility: PHYSICIAN GROUP | Age: 80
End: 2020-02-04
Payer: MEDICARE

## 2020-02-04 VITALS
HEART RATE: 72 BPM | TEMPERATURE: 98 F | WEIGHT: 195.2 LBS | DIASTOLIC BLOOD PRESSURE: 76 MMHG | BODY MASS INDEX: 30.64 KG/M2 | SYSTOLIC BLOOD PRESSURE: 132 MMHG | OXYGEN SATURATION: 92 % | RESPIRATION RATE: 14 BRPM | HEIGHT: 67 IN

## 2020-02-04 DIAGNOSIS — M54.50 CHRONIC LEFT-SIDED LOW BACK PAIN WITHOUT SCIATICA: ICD-10-CM

## 2020-02-04 DIAGNOSIS — I10 ESSENTIAL HYPERTENSION: Chronic | ICD-10-CM

## 2020-02-04 DIAGNOSIS — E07.89 THYROID MASS OF UNCLEAR ETIOLOGY: ICD-10-CM

## 2020-02-04 DIAGNOSIS — Z51.81 MEDICATION MONITORING ENCOUNTER: ICD-10-CM

## 2020-02-04 DIAGNOSIS — G89.29 CHRONIC LEFT-SIDED LOW BACK PAIN WITHOUT SCIATICA: ICD-10-CM

## 2020-02-04 DIAGNOSIS — R73.03 PREDIABETES: ICD-10-CM

## 2020-02-04 DIAGNOSIS — R97.20 PSA ELEVATION: ICD-10-CM

## 2020-02-04 DIAGNOSIS — N40.0 BENIGN PROSTATIC HYPERPLASIA WITHOUT LOWER URINARY TRACT SYMPTOMS: Chronic | ICD-10-CM

## 2020-02-04 DIAGNOSIS — E53.8 VITAMIN B12 DEFICIENCY: ICD-10-CM

## 2020-02-04 DIAGNOSIS — K63.5 INTESTINAL POLYP: ICD-10-CM

## 2020-02-04 DIAGNOSIS — E55.9 VITAMIN D DEFICIENCY: ICD-10-CM

## 2020-02-04 PROBLEM — E07.9 SWELLING OF THYROID GLAND: Status: ACTIVE | Noted: 2020-02-04

## 2020-02-04 PROCEDURE — 8041 PR SCP AHA: Performed by: FAMILY MEDICINE

## 2020-02-04 PROCEDURE — 99214 OFFICE O/P EST MOD 30 MIN: CPT | Performed by: FAMILY MEDICINE

## 2020-02-04 RX ORDER — ATENOLOL 50 MG/1
50 TABLET ORAL DAILY
Qty: 100 TAB | Refills: 0 | Status: SHIPPED | OUTPATIENT
Start: 2020-02-04 | End: 2020-10-14

## 2020-02-04 RX ORDER — CHOLECALCIFEROL (VITAMIN D3) 125 MCG
4000 TABLET ORAL DAILY
Status: ON HOLD | COMMUNITY
End: 2022-01-01

## 2020-02-04 RX ORDER — CHOLECALCIFEROL (VITAMIN D3) 125 MCG
500 CAPSULE ORAL EVERY MORNING
Status: ON HOLD | COMMUNITY
End: 2023-01-01

## 2020-02-04 RX ORDER — AMLODIPINE BESYLATE 5 MG/1
5 TABLET ORAL DAILY
Qty: 100 TAB | Refills: 0 | Status: SHIPPED | OUTPATIENT
Start: 2020-02-04 | End: 2020-05-04 | Stop reason: SDUPTHER

## 2020-02-04 NOTE — PROGRESS NOTES
cc: Prediabetes, hypertension, increased PSA, mild vitamin D deficiency, mild vitamin B12 deficient    Subjective:     Brian Lopez is a 79 y.o. male presenting he has been checking his blood pressure at home and his highest reading was 175/95 and his lowest was 144/76 with the average being 150s over 80s and heart rates in the 60s to 70s.  On recheck of his cuff in the office his blood pressure was 163/93.  He does not currently have a prostate doctor.  January 2018 he did his lab work and his A1c was 5.9, TSH thyroid within normal limits, lipid panel within normal limits total cholesterol 122 and LDL 53 and triglycerides 94 and HDL 50, PSA screening increased at 4.4, hepatitis screen negative, vitamin D low at 29, vitamin B12 low at 270, ferritin within normal limits and iron panel mildly off but not concerning, CMP within normal limit, kidney GFR greater than 60.  About a month ago he has been having chronic back pain on the left side of his back.  He has not had any injury and no numbness and tingling.  Is putting icing and heating but he still having pain.  Due to the pain he has been taking some additional Aleve on top of the meloxicam for his low back pain which is helping some.  On recheck his blood pressure was 158/70 on my recheck.  Does report some swelling mass in his right side of his neck that comes and goes.  Does not affect his breathing and he is had this on and off for many years.    Review of systems:     Constitutional: Negative for fever, chills and negative fatigue.   HENT: Negative for sinus pressure,  Eyes: Negative for blurriness  Respiratory: Negative for cough and shortness of breath, negative for exertional shortness of breath  Cardiovascular: Negative for leg swelling, negative for palpitations, negative for chest pain  Gastrointestinal: Negative for nausea, vomiting, abdominal pain, constipation and diarrhea.  Genitourinary: Negative for dysuria and hematuria.   Skin: Negative  "for rash.   Neurological: Negative for dizziness, focal weakness and headaches.   Endo/Heme/Allergies: Denies bleeding, bruising, and recurrent allergies.  Psychiatric/Behavioral: Negative for depression and anxiety.        Current Outpatient Medications:   •  Ergocalciferol (VITAMIN D2) 50 MCG (2000 UT) Tab, Take 4,000 Units by mouth every day., Disp: , Rfl:   •  cyanocobalamin (VITAMIN B-12) 500 MCG Tab, Take 500 mcg by mouth every day., Disp: , Rfl:   •  amLODIPine (NORVASC) 5 MG Tab, Take 1 Tab by mouth every day., Disp: 100 Tab, Rfl: 0  •  atenolol (TENORMIN) 50 MG Tab, Take 1 Tab by mouth every day., Disp: 100 Tab, Rfl: 0  •  tamsulosin (FLOMAX) 0.4 MG capsule, Take 1 Cap by mouth every bedtime., Disp: 90 Cap, Rfl: 3  •  pravastatin (PRAVACHOL) 40 MG tablet, Take 1 Tab by mouth every day., Disp: 100 Tab, Rfl: 3  •  fenofibrate (TRIGLIDE) 160 MG tablet, TAKE  ONE TABLET BY MOUTH DAILY, Disp: 90 Tab, Rfl: 3  •  meloxicam (MOBIC) 7.5 MG Tab, Take 1 Tab by mouth 2 times a day as needed., Disp: 90 Tab, Rfl: 1  •  Multiple Vitamins-Minerals (MULTIVITAMIN ADULT PO), Take  by mouth every day., Disp: , Rfl:   •  Calcium Carb-Cholecalciferol (CALCIUM + D3 PO), Take  by mouth every day., Disp: , Rfl:     Allergies, past medical history, past surgical history, family history, social history reviewed and updated    Objective:     Vitals: /76 (BP Location: Right arm, Patient Position: Sitting, BP Cuff Size: Adult)   Pulse 72   Temp 36.7 °C (98 °F) (Temporal)   Resp 14   Ht 1.702 m (5' 7\")   Wt 88.5 kg (195 lb 3.2 oz)   SpO2 92%   BMI 30.57 kg/m²   General: Alert, pleasant, NAD  HEENT: Normocephalic.  Nontraumatic. EOMI, no icterus or pallor.  Conjunctivae and lids normal. External ears normal. Oropharynx non-erythematous, mucous membranes moist.  Neck supple.  Positive thyromegaly or no masses palpated. No cervical or supraclavicular lymphadenopathy.  Heart: Regular rate and rhythm.  S1 and S2 normal.  No " murmurs appreciated.  Respiratory: Normal respiratory effort.  Clear to auscultation bilaterally.  Abdomen: Non-distended, soft, non tender in all 4 quadrants.  Skin: Warm, dry, no rashes.  Musculoskeletal: Gait is normal.  Moves all extremities well.  Extremities: No leg edema.  Pedal pulses 2+ symmetric.   Psych:  Affect/mood is normal, judgement is good, memory is intact, grooming is appropriate.    Assessment/Plan:     Diagnoses and all orders for this visit:    Essential hypertension  -     amLODIPine (NORVASC) 5 MG Tab; Take 1 Tab by mouth every day.  -     atenolol (TENORMIN) 50 MG Tab; Take 1 Tab by mouth every day.    Medication monitoring encounter    Benign prostatic hyperplasia without lower urinary tract symptoms  -     REFERRAL TO UROLOGY    PSA elevation  -     REFERRAL TO UROLOGY    Prediabetes    Vitamin D deficiency    Vitamin B12 deficiency    Intestinal polyp    Chronic left-sided low back pain without sciatica  -     DX-LUMBAR SPINE-4+ VIEWS; Future  -     REFERRAL TO PHYSIATRY (PMR)    Thyroid mass of unclear etiology  -     US-SOFT TISSUES OF HEAD - NECK; Future    -January 2018 he did his lab work and his A1c was 5.9, TSH thyroid within normal limits, lipid panel within normal limits total cholesterol 122 and LDL 53 and triglycerides 94 and HDL 50, PSA screening increased at 4.4, hepatitis screen negative, vitamin D low at 29, vitamin B12 low at 270, ferritin within normal limits and iron panel mildly off but not concerning, CMP within normal limit, kidney GFR greater than 60.   -For vitamin D please increase this by 2000 units and to 4000 units daily.  And vitamin B12 you could do 500 mcg daily over-the-counter as a like vitamin D to be above 50 and vitamin B12 above 500.  Also because your PSA screening is high and his history of BPH we will refer him to urology to get his prostate checked and also because he is having this low left-sided back pain to make sure it is not his prostate and  for the low back pain continue to do the back exercises and the patient instruction section we will get an x-ray to and you could do IcyHot roller rubs over-the-counter or icing and heating and only do the meloxicam as needed for the back pain not all the time to see if this helps as this could affect your kidney function so try not to take it too often and we will also refer him to physiatry physical medicine and rehab doctors if the back pain gets worse for further help and if it is not his prostate.  Also for the mass in his neck that comes and goes we will get a ultrasound as this is been coming and going for a while but not affecting his breathing at all.  His blood pressure still not well controlled so we will add amlodipine 5 mg to do daily and please bring your blood pressure logs and your cuts and all your current medication bottles for the new provider you will be seeing and please check your blood pressure and heart rate as you have done about twice a week and our goal for blood pressure is less than 130 over 90s and heart rate 60 and 100.  Also he does have some prediabetes so please read about this and try to reduce your carbohydrates and you could recheck your BMP with your new doctor in about 3 months to see how you do on this new medication otherwise his cholesterol is doing good and he will continue all other medications as above.  So please call radiology to schedule your x-ray and your ultrasound of your neck and if you do not hear back from your referrals and call with referrals in 1 week for your physical medicine and rehab physiatry referral for your back and urology for your prostate.  If you are having any other issues then please return earlier otherwise he will follow-up with your new primary care provider.    Return in about 6 weeks (around 3/17/2020), or FU blood pressure/US/Xray.        Annual Health Assessment Questions:    1.  Are you currently engaging in any exercise or physical  activity? No    2.  How would you describe your mood or emotional well-being today? good    3.  Have you had any falls in the last year? No    4.  Have you noticed any problems with your balance or had difficulty walking? No    5.  In the last six months have you experienced any leakage of urine? Yes    6. DPA/Advanced Directive: Patient has Advanced Directive, but it is not on file. Instructed to bring in a copy to scan into their chart.

## 2020-02-05 NOTE — PATIENT INSTRUCTIONS
Diagnoses and all orders for this visit:    Essential hypertension  -     amLODIPine (NORVASC) 5 MG Tab; Take 1 Tab by mouth every day.  -     atenolol (TENORMIN) 50 MG Tab; Take 1 Tab by mouth every day.    Medication monitoring encounter    Benign prostatic hyperplasia without lower urinary tract symptoms  -     REFERRAL TO UROLOGY    PSA elevation  -     REFERRAL TO UROLOGY    Prediabetes    Vitamin D deficiency    Vitamin B12 deficiency    Intestinal polyp    Chronic left-sided low back pain without sciatica  -     DX-LUMBAR SPINE-4+ VIEWS; Future  -     REFERRAL TO PHYSIATRY (PMR)    Thyroid mass of unclear etiology  -     US-SOFT TISSUES OF HEAD - NECK; Future    -January 2018 he did his lab work and his A1c was 5.9, TSH thyroid within normal limits, lipid panel within normal limits total cholesterol 122 and LDL 53 and triglycerides 94 and HDL 50, PSA screening increased at 4.4, hepatitis screen negative, vitamin D low at 29, vitamin B12 low at 270, ferritin within normal limits and iron panel mildly off but not concerning, CMP within normal limit, kidney GFR greater than 60.   -For vitamin D please increase this by 2000 units and to 4000 units daily.  And vitamin B12 you could do 500 mcg daily over-the-counter as a like vitamin D to be above 50 and vitamin B12 above 500.  Also because your PSA screening is high and his history of BPH we will refer him to urology to get his prostate checked and also because he is having this low left-sided back pain to make sure it is not his prostate and for the low back pain continue to do the back exercises and the patient instruction section we will get an x-ray to and you could do IcyHot roller rubs over-the-counter or icing and heating and only do the meloxicam as needed for the back pain not all the time to see if this helps as this could affect your kidney function so try not to take it too often and we will also refer him to physiatry physical medicine and rehab  doctors if the back pain gets worse for further help and if it is not his prostate.  Also for the mass in his neck that comes and goes we will get a ultrasound as this is been coming and going for a while but not affecting his breathing at all.  His blood pressure still not well controlled so we will add amlodipine 5 mg to do daily and please bring your blood pressure logs and your cuts and all your current medication bottles for the new provider you will be seeing and please check your blood pressure and heart rate as you have done about twice a week and our goal for blood pressure is less than 130 over 90s and heart rate 60 and 100.  Also he does have some prediabetes so please read about this and try to reduce your carbohydrates and you could recheck your BMP with your new doctor in about 3 months to see how you do on this new medication otherwise his cholesterol is doing good and he will continue all other medications as above.  So please call radiology to schedule your x-ray and your ultrasound of your neck and if you do not hear back from your referrals and call with referrals in 1 week for your physical medicine and rehab physiatry referral for your back and urology for your prostate.  If you are having any other issues then please return earlier otherwise he will follow-up with your new primary care provider.    Return in about 6 weeks (around 3/17/2020), or FU blood pressure/US/Xray.      Basic Carbohydrate Counting for Diabetes Mellitus  Carbohydrate counting is a method for keeping track of the amount of carbohydrates you eat. Eating carbohydrates naturally increases the level of sugar (glucose) in your blood, so it is important for you to know the amount that is okay for you to have in every meal. Carbohydrate counting helps keep the level of glucose in your blood within normal limits. The amount of carbohydrates allowed is different for every person. A dietitian can help you calculate the amount that is  "right for you. Once you know the amount of carbohydrates you can have, you can count the carbohydrates in the foods you want to eat.  Carbohydrates are found in the following foods:  · Grains, such as breads and cereals.  · Dried beans and soy products.  · Starchy vegetables, such as potatoes, peas, and corn.  · Fruit and fruit juices.  · Milk and yogurt.  · Sweets and snack foods, such as cake, cookies, candy, chips, soft drinks, and fruit drinks.  CARBOHYDRATE COUNTING  There are two ways to count the carbohydrates in your food. You can use either of the methods or a combination of both.  Reading the \"Nutrition Facts\" on Packaged Food  The \"Nutrition Facts\" is an area that is included on the labels of almost all packaged food and beverages in the [place]. It includes the serving size of that food or beverage and information about the nutrients in each serving of the food, including the grams (g) of carbohydrate per serving.   Decide the number of servings of this food or beverage that you will be able to eat or drink. Multiply that number of servings by the number of grams of carbohydrate that is listed on the label for that serving. The total will be the amount of carbohydrates you will be having when you eat or drink this food or beverage.  Learning Standard Serving Sizes of Food  When you eat food that is not packaged or does not include \"Nutrition Facts\" on the label, you need to measure the servings in order to count the amount of carbohydrates. A serving of most carbohydrate-rich foods contains about 15 g of carbohydrates. The following list includes serving sizes of carbohydrate-rich foods that provide 15 g of carbohydrate per serving:    · 1 slice of bread (1 oz) or 1 six-inch tortilla.    · ½ of a hamburger bun or English muffin.  · 4-6 crackers.  · ¾ cup unsweetened dry cereal.    · ½ cup hot cereal.  ·  cup rice or pasta.    · ½ cup mashed potatoes or ¼ of a large baked potato.  · 1 cup fresh " fruit or one small piece of fruit.    · ½ cup canned or frozen fruit or fruit juice.  · 1 cup milk.  ·  cup plain fat-free yogurt or yogurt sweetened with artificial sweeteners.  · ½ cup cooked dried beans or starchy vegetable, such as peas, corn, or potatoes.    Decide the number of standard-size servings that you will eat. Multiply that number of servings by 15 (the grams of carbohydrates in that serving). For example, if you eat 2 cups of strawberries, you will have eaten 2 servings and 30 g of carbohydrates (2 servings x 15 g = 30 g). For foods such as soups and casseroles, in which more than one food is mixed in, you will need to count the carbohydrates in each food that is included.  EXAMPLE OF CARBOHYDRATE COUNTING  Sample Dinner   · 3 oz chicken breast.  ·  cup of brown rice.  · ½ cup of corn.  · 1 cup milk.    · 1 cup strawberries with sugar-free whipped topping.    Carbohydrate Calculation   Step 1: Identify the foods that contain carbohydrates:   · Rice.    · Corn.    · Milk.    · Strawberries.  Step 2: Calculate the number of servings eaten of each:   · 2 servings of rice.    · 1 serving of corn.    · 1 serving of milk.    · 1 serving of strawberries.  Step 3:  Multiply each of those number of servings by 15 g:   · 2 servings of rice x 15 g = 30 g.    · 1 serving of corn x 15 g = 15 g.    · 1 serving of milk x 15 g = 15 g.    · 1 serving of strawberries x 15 g = 15 g.  Step 4: Add together all of the amounts to find the total grams of carbohydrates eaten: 30 g + 15 g + 15 g + 15 g = 75 g.     This information is not intended to replace advice given to you by your health care provider. Make sure you discuss any questions you have with your health care provider.     Document Released: 12/18/2006 Document Revised: 01/08/2016 Document Reviewed: 11/14/2014  [employer] Interactive Patient Education ©2016 [employer]  Prediabetes  Prediabetes is the condition of having a blood sugar (blood glucose) level that  is higher than it should be, but not high enough for you to be diagnosed with type 2 diabetes. Having prediabetes puts you at risk for developing type 2 diabetes (type 2 diabetes mellitus). Prediabetes may be called impaired glucose tolerance or impaired fasting glucose.  Prediabetes usually does not cause symptoms. Your health care provider can diagnose this condition with blood tests. You may be tested for prediabetes if you are overweight and if you have at least one other risk factor for prediabetes.  Risk factors for prediabetes include:  · Having a family member with type 2 diabetes.  · Being overweight or obese.  · Being older than age 45.  · Being of American-, -American, /, or / descent.  · Having an inactive (sedentary) lifestyle.  · Having a history of gestational diabetes or polycystic ovarian syndrome (PCOS).  · Having low levels of good cholesterol (HDL-C) or high levels of blood fats (triglycerides).  · Having high blood pressure.  What is blood glucose and how is blood glucose measured?     Blood glucose refers to the amount of glucose in your bloodstream. Glucose comes from eating foods that contain sugars and starches (carbohydrates) that the body breaks down into glucose. Your blood glucose level may be measured in mg/dL (milligrams per deciliter) or mmol/L (millimoles per liter). Your blood glucose may be checked with one or more of the following blood tests:  · A fasting blood glucose (FBG) test. You will not be allowed to eat (you will fast) for at least 8 hours before a blood sample is taken.  ¨ A normal range for FBG is  mg/dl (3.9-5.6 mmol/L).  · An A1c (hemoglobin A1c) blood test. This test provides information about blood glucose control over the previous 2?3 months.  · An oral glucose tolerance test (OGTT). This test measures your blood glucose twice:  ¨ After fasting. This is your baseline level.  ¨ Two hours after you drink a  beverage that contains glucose.  You may be diagnosed with prediabetes:  · If your FBG is 100?125 mg/dL (5.6-6.9 mmol/L).  · If your A1c level is 5.7?6.4%.  · If your OGGT result is 140?199 mg/dL (7.8-11 mmol/L).  These blood tests may be repeated to confirm your diagnosis.  What happens if blood glucose is too high?  The pancreas produces a hormone (insulin) that helps move glucose from the bloodstream into cells. When cells in the body do not respond properly to insulin that the body makes (insulin resistance), excess glucose builds up in the blood instead of going into cells. As a result, high blood glucose (hyperglycemia) can develop, which can cause many complications. This is a symptom of prediabetes.  What can happen if blood glucose stays higher than normal for a long time?  Having high blood glucose for a long time is dangerous. Too much glucose in your blood can damage your nerves and blood vessels. Long-term damage can lead to complications from diabetes, which may include:  · Heart disease.  · Stroke.  · Blindness.  · Kidney disease.  · Depression.  · Poor circulation in the feet and legs, which could lead to surgical removal (amputation) in severe cases.  How can prediabetes be prevented from turning into type 2 diabetes?     To help prevent type 2 diabetes, take the following actions:  · Be physically active.  ¨ Do moderate-intensity physical activity for at least 30 minutes on at least 5 days of the week, or as much as told by your health care provider. This could be brisk walking, biking, or water aerobics.  ¨ Ask your health care provider what activities are safe for you. A mix of physical activities may be best, such as walking, swimming, cycling, and strength training.  · Lose weight as told by your health care provider.  ¨ Losing 5-7% of your body weight can reverse insulin resistance.  ¨ Your health care provider can determine how much weight loss is best for you and can help you lose weight  safely.  · Follow a healthy meal plan. This includes eating lean proteins, complex carbohydrates, fresh fruits and vegetables, low-fat dairy products, and healthy fats.  ¨ Follow instructions from your health care provider about eating or drinking restrictions.  ¨ Make an appointment to see a diet and nutrition specialist (registered dietitian) to help you create a healthy eating plan that is right for you.  · Do not smoke or use any tobacco products, such as cigarettes, chewing tobacco, and e-cigarettes. If you need help quitting, ask your health care provider.  · Take over-the-counter and prescription medicines as told by your health care provider. You may be prescribed medicines that help lower the risk of type 2 diabetes.  This information is not intended to replace advice given to you by your health care provider. Make sure you discuss any questions you have with your health care provider.  Document Released: 04/10/2017 Document Revised: 05/25/2017 Document Reviewed: 02/07/2017  [employer] Interactive Patient Education © 2017 [employer].  Prediabetes Eating Plan  Prediabetes--also called impaired glucose tolerance or impaired fasting glucose--is a condition that causes blood sugar (blood glucose) levels to be higher than normal. Following a healthy diet can help to keep prediabetes under control. It can also help to lower the risk of type 2 diabetes and heart disease, which are increased in people who have prediabetes. Along with regular exercise, a healthy diet:  · Promotes weight loss.  · Helps to control blood sugar levels.  · Helps to improve the way that the body uses insulin.  What do I need to know about this eating plan?  · Use the glycemic index (GI) to plan your meals. The index tells you how quickly a food will raise your blood sugar. Choose low-GI foods. These foods take a longer time to raise blood sugar.  · Pay close attention to the amount of carbohydrates in the food that you eat. Carbohydrates  increase blood sugar levels.  · Keep track of how many calories you take in. Eating the right amount of calories will help you to achieve a healthy weight. Losing about 7 percent of your starting weight can help to prevent type 2 diabetes.  · You may want to follow a Mediterranean diet. This diet includes a lot of vegetables, lean meats or fish, whole grains, fruits, and healthy oils and fats.  What foods can I eat?  Grains   Whole grains, such as whole-wheat or whole-grain breads, crackers, cereals, and pasta. Unsweetened oatmeal. Bulgur. Barley. Quinoa. Brown rice. Corn or whole-wheat flour tortillas or taco shells.  Vegetables   Lettuce. Spinach. Peas. Beets. Cauliflower. Cabbage. Broccoli. Carrots. Tomatoes. Squash. Eggplant. Herbs. Peppers. Onions. Cucumbers. [place] sprouts.  Fruits   Berries. Bananas. Apples. Oranges. Grapes. Papaya. Tej. Pomegranate. Kiwi. Grapefruit. Cherries.  Meats and Other Protein Sources   Seafood. Lean meats, such as chicken and turkey or lean cuts of pork and beef. Tofu. Eggs. Nuts. Beans.  Dairy   Low-fat or fat-free dairy products, such as yogurt, cottage cheese, and cheese.  Beverages   Water. Tea. Coffee. Sugar-free or diet soda. Dawn water. Milk. Milk alternatives, such as soy or almond milk.  Condiments   Mustard. Relish. Low-fat, low-sugar ketchup. Low-fat, low-sugar barbecue sauce. Low-fat or fat-free mayonnaise.  Sweets and Desserts   Sugar-free or low-fat pudding. Sugar-free or low-fat ice cream and other frozen treats.  Fats and Oils   Avocado. Walnuts. Olive oil.  The items listed above may not be a complete list of recommended foods or beverages. Contact your dietitian for more options.   What foods are not recommended?  Grains   Refined white flour and flour products, such as bread, pasta, snack foods, and cereals.  Beverages   Sweetened drinks, such as sweet iced tea and soda.  Sweets and Desserts   Baked goods, such as cake, cupcakes, pastries, cookies, and  cheesecake.  The items listed above may not be a complete list of foods and beverages to avoid. Contact your dietitian for more information.   This information is not intended to replace advice given to you by your health care provider. Make sure you discuss any questions you have with your health care provider.  Document Released: 05/03/2016 Document Revised: 05/25/2017 Document Reviewed: 01/13/2016  Elsevier Interactive Patient Education © 2017 Elsevier Inc.

## 2020-02-11 ENCOUNTER — HOSPITAL ENCOUNTER (OUTPATIENT)
Dept: RADIOLOGY | Facility: MEDICAL CENTER | Age: 80
End: 2020-02-11
Attending: FAMILY MEDICINE
Payer: MEDICARE

## 2020-02-11 DIAGNOSIS — M54.50 CHRONIC LEFT-SIDED LOW BACK PAIN WITHOUT SCIATICA: ICD-10-CM

## 2020-02-11 DIAGNOSIS — G89.29 CHRONIC LEFT-SIDED LOW BACK PAIN WITHOUT SCIATICA: ICD-10-CM

## 2020-02-11 PROCEDURE — 72110 X-RAY EXAM L-2 SPINE 4/>VWS: CPT

## 2020-02-14 ENCOUNTER — HOSPITAL ENCOUNTER (OUTPATIENT)
Dept: RADIOLOGY | Facility: MEDICAL CENTER | Age: 80
End: 2020-02-14
Attending: FAMILY MEDICINE
Payer: MEDICARE

## 2020-02-14 DIAGNOSIS — E07.89 THYROID MASS OF UNCLEAR ETIOLOGY: ICD-10-CM

## 2020-02-14 PROCEDURE — 76536 US EXAM OF HEAD AND NECK: CPT

## 2020-03-25 ENCOUNTER — TELEPHONE (OUTPATIENT)
Dept: MEDICAL GROUP | Facility: PHYSICIAN GROUP | Age: 80
End: 2020-03-25

## 2020-03-25 NOTE — TELEPHONE ENCOUNTER
Future Appointments       Provider Department Center    3/30/2020 11:20 AM Eduard Lake M.D. [facility]    4/8/2020 11:15 AM Melo Villela M.D. [facility] PHYSIATRY         ESTABLISHED PATIENT PRE-VISIT PLANNING     Patient was NOT contacted to complete PVP.       1.  Reviewed notes from the last few office visits within the medical group: Yes    2.  If any orders were placed at last visit or intended to be done for this visit (i.e. 6 mos follow-up), do we have Results/Consult Notes?        •  Labs - Labs were not ordered at last office visit.       •  Imaging - Imaging ordered, completed and results are in chart.       •  Referrals - Referral ordered, patient was seen and consult notes were requested from [facility]/ Y[facility] Friend. Care Teams updated  YES.    3. Is this appointment scheduled as a Hospital Follow-Up? No    4.  Immunizations were updated in [facility] using WebIZ?: Yes       •  Web Iz Recommendations: TD, VARICELLA (Chicken Pox)  and SHINGRIX (Shingles)    5.  Patient is due for the following Health Maintenance Topics:   Health Maintenance Due   Topic Date Due   • IMM ZOSTER VACCINES (1 of 2) 03/17/1990       6. Orders for overdue Health Maintenance topics pended in Pre-Charting? NO    7.  AHA (MDX) form printed for Provider? No, already completed    8.  Patient was NOT informed to arrive 15 min prior to their scheduled appointment and bring in their medication bottles.

## 2020-03-25 NOTE — LETTER
[facility]  Eduard Lake M.D.  [address]  [place] [address]  Fax: [phone]   Authorization for Release/Disclosure of   Protected Health Information   Name: LEANDRO SUAREZ : 1940 SSN: [id number]   Address: [address] [place] [address]  [place] [address] Phone:    [phone] (home)    I authorize the entity listed below to release/disclose the PHI below to:   [facility]/Eduard Lake M.D. and Eduard Lake M.D.   Provider or Entity Name:  BRET Fuentes   Address   City, [facility]   Phone:      Fax:  [phone]   Reason for request: continuity of care   Information to be released:    [  ] LAST COLONOSCOPY,  including any PATH REPORT and follow-up  [  ] LAST FIT/COLOGUARD RESULT [  ] LAST DEXA  [  ] LAST MAMMOGRAM  [  ] LAST PAP  [  ] LAST LABS [  ] RETINA EXAM REPORT  [  ] IMMUNIZATION RECORDS  [XXX] Release all info      [  ] Check here and initial the line next to each item to release ALL health information INCLUDING  _____ Care and treatment for drug and / or alcohol abuse  _____ HIV testing, infection status, or AIDS  _____ Genetic Testing    DATES OF SERVICE OR TIME PERIOD TO BE DISCLOSED: _____________  I understand and acknowledge that:  * This Authorization may be revoked at any time by you in writing, except if your health information has already been used or disclosed.  * Your health information that will be used or disclosed as a result of you signing this authorization could be re-disclosed by the recipient. If this occurs, your re-disclosed health information may no longer be protected by State or Federal laws.  * You may refuse to sign this Authorization. Your refusal will not affect your ability to obtain treatment.  * This Authorization becomes effective upon signing and will  on (date) __________.      If no date is indicated, this Authorization will  one (1) year from the signature date.    Name: Leandro Suarez    Signature: CONTINUITY OF  CARE   Date:     3/25/2020       PLEASE FAX REQUESTED RECORDS BACK TO: [phone]

## 2020-03-26 PROBLEM — R97.20 PSA ELEVATION: Chronic | Status: ACTIVE | Noted: 2020-02-04

## 2020-03-27 ENCOUNTER — TELEPHONE (OUTPATIENT)
Dept: MEDICAL GROUP | Facility: PHYSICIAN GROUP | Age: 80
End: 2020-03-27

## 2020-03-27 NOTE — TELEPHONE ENCOUNTER
1. Caller Name: Brian                          Call Back Number: [phone] (home)           2.  Does patient have any active symptoms of respiratory illness (fever OR cough OR shortness of breath)? No.     Advised pt if he is having URI symptoms over the weekend to please call [phone] so we can transfer him to the Nurse's Hotline.

## 2020-03-30 ENCOUNTER — OFFICE VISIT (OUTPATIENT)
Dept: MEDICAL GROUP | Facility: PHYSICIAN GROUP | Age: 80
End: 2020-03-30
Payer: MEDICARE

## 2020-03-30 VITALS
BODY MASS INDEX: 30.17 KG/M2 | HEART RATE: 62 BPM | HEIGHT: 67 IN | SYSTOLIC BLOOD PRESSURE: 132 MMHG | RESPIRATION RATE: 14 BRPM | DIASTOLIC BLOOD PRESSURE: 66 MMHG | TEMPERATURE: 98.6 F | OXYGEN SATURATION: 96 % | WEIGHT: 192.2 LBS

## 2020-03-30 DIAGNOSIS — E66.9 OBESITY (BMI 30-39.9): Chronic | ICD-10-CM

## 2020-03-30 DIAGNOSIS — N40.0 BENIGN PROSTATIC HYPERPLASIA WITHOUT LOWER URINARY TRACT SYMPTOMS: Chronic | ICD-10-CM

## 2020-03-30 DIAGNOSIS — E78.5 DYSLIPIDEMIA: Chronic | ICD-10-CM

## 2020-03-30 DIAGNOSIS — I10 ESSENTIAL HYPERTENSION: Chronic | ICD-10-CM

## 2020-03-30 DIAGNOSIS — E55.9 VITAMIN D DEFICIENCY: Chronic | ICD-10-CM

## 2020-03-30 DIAGNOSIS — R73.03 PREDIABETES: Chronic | ICD-10-CM

## 2020-03-30 PROBLEM — H61.22 IMPACTED CERUMEN, LEFT EAR: Status: RESOLVED | Noted: 2020-01-09 | Resolved: 2020-03-30

## 2020-03-30 PROCEDURE — 99214 OFFICE O/P EST MOD 30 MIN: CPT | Performed by: INTERNAL MEDICINE

## 2020-03-30 PROCEDURE — 8041 PR SCP AHA: Performed by: INTERNAL MEDICINE

## 2020-03-30 ASSESSMENT — FIBROSIS 4 INDEX: FIB4 SCORE: 1.48

## 2020-03-30 NOTE — ASSESSMENT & PLAN NOTE
This is a chronic and stable condition.  His weight has remained fairly stable.  The patient try to stay active

## 2020-03-30 NOTE — ASSESSMENT & PLAN NOTE
This is a chronic and stable condition.  Presently the patient taking amlodipine and atenolol daily.  No side effect reported.  Blood pressure has been well controlled at home per patient report

## 2020-03-30 NOTE — ASSESSMENT & PLAN NOTE
This is a chronic and stable condition.  The patient is currently taking tamsulosin.  No obstructive symptoms noted.  Of note the patient was noted to have an elevated PSA.  He was seen by urologist  Patient reported that he has been monitor at this time no invasive procedure is planned.

## 2020-03-30 NOTE — PROGRESS NOTES
CC: Follow-up hypertension  Meet new provider  Dedication review         HPI: 80 y.o. Patient presents to discuss the following:     Hypertension  This is a chronic and stable condition.  Presently the patient taking amlodipine and atenolol daily.  No side effect reported.  Blood pressure has been well controlled at home per patient report    BPH (benign prostatic hyperplasia)  This is a chronic and stable condition.  The patient is currently taking tamsulosin.  No obstructive symptoms noted.  Of note the patient was noted to have an elevated PSA.  He was seen by urologist  Patient reported that he has been monitor at this time no invasive procedure is planned.    Obesity (BMI 30-39.9)  This is a chronic and stable condition.  His weight has remained fairly stable.  The patient try to stay active    Dyslipidemia  This is a chronic condition.  Currently he is taking pravastatin daily.  No side effect reported.    Prediabetes  This is a chronic and stable condition.  He is currently on diet therapy.  The patient was advised to try to lose some weight.    Vitamin D deficiency  This is a chronic and stable condition.  Currently he is taking vitamin D              REVIEW OF SYSTEMS:  Constitutional:  no fever / chills   Neurologic: no headaches  Eyes: no changes in vision  ENT: no sore throat, no hearing loss  CV:  no chest pain, no palpitations  Pulmonary: no SOB, no cough    GI: no nausea / vomiting, no diarrhea, no constipation, no rectal bleeding   :  no dysuria, no hematuria   Skin: no rash     All other systems reviewed and are negative.    Allergies: Patient has no known allergies.    Current Outpatient Medications Ordered in Epic   Medication Sig Dispense Refill   • Ergocalciferol (VITAMIN D2) 50 MCG (2000 UT) Tab Take 4,000 Units by mouth every day.     • cyanocobalamin (VITAMIN B-12) 500 MCG Tab Take 500 mcg by mouth every day.     • amLODIPine (NORVASC) 5 MG Tab Take 1 Tab by mouth every day. 100 Tab 0   •  atenolol (TENORMIN) 50 MG Tab Take 1 Tab by mouth every day. 100 Tab 0   • tamsulosin (FLOMAX) 0.4 MG capsule Take 1 Cap by mouth every bedtime. 90 Cap 3   • pravastatin (PRAVACHOL) 40 MG tablet Take 1 Tab by mouth every day. 100 Tab 3   • meloxicam (MOBIC) 7.5 MG Tab Take 1 Tab by mouth 2 times a day as needed. 90 Tab 1   • Multiple Vitamins-Minerals (MULTIVITAMIN ADULT PO) Take  by mouth every day.     • Calcium Carb-Cholecalciferol (CALCIUM + D3 PO) Take  by mouth every day.       No current [facility]-ordered facility-administered medications on file.        Past Medical History:   Diagnosis Date   • Arthritis     hands   • BPH (benign prostatic hyperplasia) 4/17/2014   • Cataract     removed bilat   • COPD (chronic obstructive pulmonary disease) (HCC)    • Dental disorder     upper/lower dentures   • High cholesterol    • Hypertension 4/17/2014   • Hypertriglyceridemia 4/17/2014   • Intestinal polyp    • Pain 05/29/2019    right hand, 5-6/10   • Snoring    • Swelling of thyroid gland 2/4/2020        Past Surgical History:   Procedure Laterality Date   • PB INCISE FINGER TENDON SHEATH Right 5/31/2019    Procedure: RELEASE, TRIGGER FINGER-  RING;  Surgeon: Simon Altamirano M.D.;  Location: SURGERY SAME DAY [facility];  Service: Orthopedics   • SYNOVECTOMY Right 5/31/2019    Procedure: fasciatomy of palm;  Surgeon: Simon Altamirano M.D.;  Location: SURGERY SAME DAY [facility];  Service: Orthopedics   • KNEE ARTHROPLASTY TOTAL Right 6/7/2017    Procedure: KNEE ARTHROPLASTY TOTAL;  Surgeon: Steffanie Del Angel M.D.;  Location: SURGERY [facility];  Service:    • COLON RESECTION ROBOTIC  6/16/2014    Performed by Ezra Burks M.D. at SURGERY [facility]   • CATARACT EXTRACTION WITH IOL Bilateral 2011   • KNEE ARTHROPLASTY TOTAL Left 2009   • APPENDECTOMY  1970's   • ARTHROSCOPY, KNEE     • CHOLECYSTECTOMY     • HEMORRHOIDECTOMY     • TRIGGER FINGER RELEASE Bilateral last 2000's    multiple          Family History   Problem Relation Age of Onset   • Stroke Mother    • Heart Attack Father 85   • Heart Disease Father    • Cancer Neg Hx         Social History     Tobacco Use   Smoking Status Former Smoker   • Packs/day: 1.00   • Years: 50.00   • Pack years: 50.00   • Types: Cigarettes   • Start date: 1955   • Last attempt to quit: 2010   • Years since quittin.9   Smokeless Tobacco Never Used          Social History     Substance and Sexual Activity   Alcohol Use Yes   • Alcohol/week: 8.4 oz   • Types: 14 Cans of beer per week   • Frequency: 4 or more times a week   • Drinks per session: 3 or 4   • Binge frequency: Never    Comment: 3 per day        ---------------------------------------------------------------------     PHYSICAL EXAM:  Vitals:    20 1008   BP: 132/66   Pulse: 62   Resp: 14   Temp: 37 °C (98.6 °F)   SpO2: 96%      Body mass index is 30.1 kg/m².        Psych: A&O x 3, mood and affect appropiate  Constitution: no acute distress  Eyes: EOMI, PERRL  Neck: supple, no LN or thyromegaly  Respiratory: normal effort, no wheezing  rales or rhonchi  CV: heart RRR  GI: abdomen is soft, nontender, no obvious mass  Skin: warm, no rash  Neuro: CN 2-12 grossly intact       ---------------------------------------------------------------------     ASSESSMENT and PLAN:     1. Essential hypertension  Chronic stable condition.  Continue with current therapy.  Advised the patient to continue to monitor blood pressures closely    2. Dyslipidemia  Chronic condition stable.  Continue with pravastatin.  - ALANINE AMINO-TRANS; Future  - Lipid Profile; Future    3. Prediabetes  Chronic stable condition.  Continue to monitor  Recommend low sweet low-carb diet.  The patient will try to lose some weight.  - HEMOGLOBIN A1C; Future  - MICROALBUMIN CREAT RATIO URINE; Future  - TSH; Future  - Basic Metabolic Panel; Future    4. Vitamin D deficiency  Chronic stable condition.  Lab test to be done before the  next appointment.  - VITAMIN D 25-HYDROXY    5.  prostatic hyperplasia without lower urinary tract symptoms  This is a chronic and stable condition.  His PSA was noted to be elevated recently for which she was seen by urologist.  Advised the patient to continue follow-up with urology service  To with tamsulosin    6. Obesity (BMI 30-39.9)  Chronic stable condition.  Patient will try to lose some weight.  Recommend healthy diet and regular exercise activities.              Return in about 6 months (around 9/30/2020) for high blood pressure followup.       PATIENT EDUCATION:  -If any problems should arise, patient was advised to contact our office or go to ER to be evaluated.  -The pertinent physical findings, assessments, and plans of care were discussed with the patient. Patient verbalized understanding.  -Advised pt to follow a healthy diet and regular aerobic exercise regimen. Advised pt to avoid alcohol and tobacco use.    Please note that this dictation was created using voice recognition software. I have made every reasonable attempt to correct obvious errors, but it is possible there are errors of grammar and possibly content that I did not discover before finalizing the note.

## 2020-03-30 NOTE — ASSESSMENT & PLAN NOTE
This is a chronic and stable condition.  He is currently on diet therapy.  The patient was advised to try to lose some weight.

## 2020-04-07 NOTE — PROGRESS NOTES
New patient note    Physiatry (physical medicine and  Rehabilitation), interventional spine and sports medicine    Date of service: See epic    Chief complaint:   Chief Complaint   Patient presents with   • New Patient     Neck/Back        Referring provider: Wallace Mitchell M.D.     HISTORY    HPI: Brian Lopez 80 y.o. male who presents today with Diagnoses of Diffuse idiopathic skeletal hyperostosis, Decreased range of motion of neck, Neck pain, and Claustrophobia were pertinent to this visit.    Neck Pain    Chronicity: 2 months ago. Episode onset: gradual onset. The problem occurs constantly. The problem has been gradually worsening. The pain is associated with an unknown factor. The pain is present in the left side, right side, midline and occipital region (nonradiating). The quality of the pain is described as aching and stabbing. The pain is at a severity of 7/10. The symptoms are aggravated by twisting. The pain is worse during the night. He has tried home exercises, neck support, heat, ice, acetaminophen and NSAIDs for the symptoms. The treatment provided no relief.     The patient reports that he has no back pain this is well controlled he wishes to focus today's visit for his neck pain.       Medical records review:  I reviewed the note from the referring provider Wallace Mitchell M.D. including the note dated 2/4/2020.  Chronic left-sided low back pain as well as x-rays ordered and referred to physiatry.  Hypertension reviewed.  Medications adjusted.  Elevated PSA with BPH referred to urology.      ROS:   Red Flags ROS:   Fever, Chills, Sweats: Denies  Involuntary Weight Loss: Denies  Bladder Incontinence: Denies  Bowel Incontinence: denies  Saddle Anesthesia: Denies    All other systems reviewed and negative.       PMHx:   Past Medical History:   Diagnosis Date   • Arthritis     hands   • BPH (benign prostatic hyperplasia) 4/17/2014   • Cataract     removed bilat   • COPD (chronic obstructive  pulmonary disease) (HCC)    • Dental disorder     upper/lower dentures   • High cholesterol    • Hypertension 4/17/2014   • Hypertriglyceridemia 4/17/2014   • Intestinal polyp    • Pain 05/29/2019    right hand, 5-6/10   • Snoring    • Swelling of thyroid gland 2/4/2020         Current Outpatient Medications on File Prior to Visit   Medication Sig Dispense Refill   • Ergocalciferol (VITAMIN D2) 50 MCG (2000 UT) Tab Take 4,000 Units by mouth every day.     • cyanocobalamin (VITAMIN B-12) 500 MCG Tab Take 500 mcg by mouth every day.     • amLODIPine (NORVASC) 5 MG Tab Take 1 Tab by mouth every day. 100 Tab 0   • atenolol (TENORMIN) 50 MG Tab Take 1 Tab by mouth every day. 100 Tab 0   • tamsulosin (FLOMAX) 0.4 MG capsule Take 1 Cap by mouth every bedtime. 90 Cap 3   • pravastatin (PRAVACHOL) 40 MG tablet Take 1 Tab by mouth every day. 100 Tab 3   • meloxicam (MOBIC) 7.5 MG Tab Take 1 Tab by mouth 2 times a day as needed. 90 Tab 1   • Multiple Vitamins-Minerals (MULTIVITAMIN ADULT PO) Take  by mouth every day.     • Calcium Carb-Cholecalciferol (CALCIUM + D3 PO) Take  by mouth every day.       No current facility-administered medications on file prior to visit.         PSHx:   Past Surgical History:   Procedure Laterality Date   • PB INCISE FINGER TENDON SHEATH Right 5/31/2019    Procedure: RELEASE, TRIGGER FINGER-  RING;  Surgeon: Simon lAtamirano M.D.;  Location: SURGERY SAME DAY [facility];  Service: Orthopedics   • SYNOVECTOMY Right 5/31/2019    Procedure: fasciatomy of palm;  Surgeon: Simon Altamirano M.D.;  Location: SURGERY SAME DAY [facility];  Service: Orthopedics   • KNEE ARTHROPLASTY TOTAL Right 6/7/2017    Procedure: KNEE ARTHROPLASTY TOTAL;  Surgeon: Steffanie Del Angel M.D.;  Location: SURGERY [facility];  Service:    • COLON RESECTION ROBOTIC  6/16/2014    Performed by Ezra Burks M.D. at SURGERY [facility]   • CATARACT EXTRACTION WITH IOL Bilateral 2011   • KNEE ARTHROPLASTY  TOTAL Left 2009   • APPENDECTOMY  1970's   • ARTHROSCOPY, KNEE     • CHOLECYSTECTOMY     • HEMORRHOIDECTOMY     • TRIGGER FINGER RELEASE Bilateral last 2000's    multiple        Family history   Family History   Problem Relation Age of Onset   • Stroke Mother    • Heart Attack Father 85   • Heart Disease Father    • Cancer Neg Hx          Medications: reviewed on epic.   Outpatient Medications Marked as Taking for the 4/8/20 encounter (Office Visit) with Melo Villela M.D.   Medication Sig Dispense Refill   • ALPRAZolam (XANAX) 0.5 MG Tab Take 1 Tab by mouth as needed for Anxiety (take one tab 1 hour prior to MRI. OK to repeat x 1. do not drive on this med) for up to 1 day. 2 Tab 0   • gabapentin (NEURONTIN) 100 MG Cap Take 1-3 Caps by mouth at bedtime as needed (pain). 90 Cap 3   • Ergocalciferol (VITAMIN D2) 50 MCG (2000 UT) Tab Take 4,000 Units by mouth every day.     • cyanocobalamin (VITAMIN B-12) 500 MCG Tab Take 500 mcg by mouth every day.     • amLODIPine (NORVASC) 5 MG Tab Take 1 Tab by mouth every day. 100 Tab 0   • atenolol (TENORMIN) 50 MG Tab Take 1 Tab by mouth every day. 100 Tab 0   • tamsulosin (FLOMAX) 0.4 MG capsule Take 1 Cap by mouth every bedtime. 90 Cap 3   • pravastatin (PRAVACHOL) 40 MG tablet Take 1 Tab by mouth every day. 100 Tab 3   • meloxicam (MOBIC) 7.5 MG Tab Take 1 Tab by mouth 2 times a day as needed. 90 Tab 1   • Multiple Vitamins-Minerals (MULTIVITAMIN ADULT PO) Take  by mouth every day.          Allergies:   No Known Allergies    Social Hx:   Social History     Socioeconomic History   • Marital status:      Spouse name: Not on file   • Number of children: Not on file   • Years of education: Not on file   • Highest education level: Not on file   Occupational History   • Not on file   Social Needs   • Financial resource strain: Not on file   • Food insecurity     Worry: Not on file     Inability: Not on file   • Transportation needs     Medical: Not on file      Non-medical: Not on file   Tobacco Use   • Smoking status: Former Smoker     Packs/day: 1.00     Years: 50.00     Pack years: 50.00     Types: Cigarettes     Start date: 1955     Last attempt to quit: 2010     Years since quittin.9   • Smokeless tobacco: Never Used   Substance and Sexual Activity   • Alcohol use: Yes     Alcohol/week: 8.4 oz     Types: 14 Cans of beer per week     Frequency: 4 or more times a week     Drinks per session: 3 or 4     Binge frequency: Never     Comment: 3 per day   • Drug use: No   • Sexual activity: Yes     Partners: Female     Comment:  / retired   Lifestyle   • Physical activity     Days per week: Not on file     Minutes per session: Not on file   • Stress: Not on file   Relationships   • Social connections     Talks on phone: Not on file     Gets together: Not on file     Attends Buddhism service: Not on file     Active member of club or organization: Not on file     Attends meetings of clubs or organizations: Not on file     Relationship status: Not on file   • Intimate partner violence     Fear of current or ex partner: Not on file     Emotionally abused: Not on file     Physically abused: Not on file     Forced sexual activity: Not on file   Other Topics Concern   •  Service No   • Blood Transfusions No   • Caffeine Concern No   • Occupational Exposure No   • Hobby Hazards No   • Sleep Concern Yes   • Stress Concern No   • Weight Concern Yes   • Special Diet No   • Back Care No   • Exercise Yes   • Bike Helmet No   • Seat Belt Yes   • Self-Exams No   Social History Narrative    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  Lives with his wife.  No social or domestic concerns.  No hearing aids or hearing concerns no walking issues or balance or falls.  He has no issues driving.  No major memory concerns.  He did have eye surgery done for his cataracts and his colon partially removed for a polyp.  He also had operation on a trigger finger.  No  "hospitalizations in the last year though.         EXAMINATION     Physical Exam:   Vitals: /80 (BP Location: Left arm, Patient Position: Sitting, BP Cuff Size: Adult)   Pulse 63   Temp 36.8 °C (98.3 °F) (Temporal)   Ht 1.676 m (5' 6\")   Wt 87.7 kg (193 lb 5.5 oz)   SpO2 94%     Constitutional:   Body Habitus: Body mass index is 31.21 kg/m².  Cooperation: Fully cooperates with exam  Appearance: Well-groomed, well-nourished, not disheveled     Eyes: No scleral icterus to suggest severe liver disease, no proptosis to suggest severe hyperthyroid    ENT -no obvious auditory deficits, no obvious tongue lesions, tongue midline, no facial droop     Skin -no rashes or lesions noted     Respiratory-  breathing comfortable on room air, no audible wheezing    Cardiovascular- capillary refills less than 2 seconds. No lower extremity edema is noted.     Gastrointestinal - no obvious abdominal masses, No tenderness to palpation in the abdomen    Psychiatric- alert and oriented ×3. Normal affect.     Gait - normal gait, no use of ambulatory device, nonantalgic.     Musculoskeletal and Neuro -       Cervical spine   Inspection: No deformities of the skin over the cervical spine. No rashes or lesions.    decreased  active range of motion in all directions, with  pain      Spurling’s sign: negative bilaterally    No signs of muscular atrophy in bilateral upper extremities     Positive for tenderness to palpation on the BILATERAL side in the cervical facets.       Key points for the international standards for neurological classification of spinal cord injury (ISNCSCI) to light touch.     Dermatome R L   C4 2 2   C5 2 2   C6 2 2   C7 2 2   C8 2 2   T1 2 2   T2 2 2                                     Motor Exam Upper Extremities   ? Myotome R L   Shoulder flexion C5 5 5   Elbow flexion C5 5 5   Wrist extension C6 5 5   Elbow extension C7 5 5   Finger flexion C8 5 5   Finger abduction T1 5 5     Reflexes  ?  R L   Biceps  2+ " 2+   Brachioradialis  2+ 2+     Monte’s sign negative bilaterally              Key points for the international standards for neurological classification of spinal cord injury (ISNCSCI) to light touch.     Dermatome R L                                      L2 2 2   L3 2 2   L4 2 2   L5 2 2   S1 2 2   S2 2 2       Motor Exam Lower Extremities    ? Myotome R L   Hip flexion L2 5 5   Knee extension L3 5 5   Ankle dorsiflexion L4 5 5   Toe extension L5 5 5   Ankle plantarflexion S1 5 5         Reflexes  ?  R L                Patella  2+ 2+   Achilles   2+ 2+       Babinski sign negative bilaterally   Clonus of the ankle negative bilaterally       MEDICAL DECISION MAKING    Medical records review: see under HPI section.     DATA    Labs:   Lab Results   Component Value Date/Time    SODIUM 142 01/28/2020 06:49 AM    POTASSIUM 3.8 01/28/2020 06:49 AM    CHLORIDE 108 01/28/2020 06:49 AM    CO2 25 01/28/2020 06:49 AM    ANION 9.0 01/28/2020 06:49 AM    GLUCOSE 92 01/28/2020 06:49 AM    BUN 16 01/28/2020 06:49 AM    CREATININE 1.14 01/28/2020 06:49 AM    CALCIUM 9.4 01/28/2020 06:49 AM    ASTSGOT 18 01/28/2020 06:49 AM    ALTSGPT 15 01/28/2020 06:49 AM    TBILIRUBIN 0.5 01/28/2020 06:49 AM    ALBUMIN 4.2 01/28/2020 06:49 AM    TOTPROTEIN 7.7 01/28/2020 06:49 AM    GLOBULIN 3.5 01/28/2020 06:49 AM    AGRATIO 1.2 01/28/2020 06:49 AM   ]    Lab Results   Component Value Date/Time    PROTHROMBTM 14.1 05/24/2017 10:49 AM    INR 1.06 05/24/2017 10:49 AM        Lab Results   Component Value Date/Time    WBC 7.1 11/20/2019 10:04 AM    RBC 5.14 11/20/2019 10:04 AM    HEMOGLOBIN 15.1 11/20/2019 10:04 AM    HEMATOCRIT 46.0 11/20/2019 10:04 AM    MCV 89.5 11/20/2019 10:04 AM    MCH 29.4 11/20/2019 10:04 AM    MCHC 32.8 (L) 11/20/2019 10:04 AM    MPV 11.1 11/20/2019 10:04 AM    NEUTSPOLYS 66.00 11/20/2019 10:04 AM    LYMPHOCYTES 23.30 11/20/2019 10:04 AM    MONOCYTES 8.70 11/20/2019 10:04 AM    EOSINOPHILS 1.00 11/20/2019 10:04 AM     BASOPHILS 0.70 11/20/2019 10:04 AM    HYPOCHROMIA 1+ 06/11/2014 09:54 AM        Lab Results   Component Value Date/Time    HBA1C 5.9 (H) 01/28/2020 06:49 AM        Imaging:   I personally reviewed following images, these are my reads  CTA pulmonary artery with recons 7/11/2017  I reviewed the study specifically to look at the patient's lower cervical spine.  There is ossifications along the anterior longitudinal ligament throughout the lower cervical spine and the thoracic spine.  There are multiple bridging anterior osteophytes seen on series 401 image 29.  And series 401 image 33.        IMAGING radiology reads. I reviewed the following radiology reads   CTA chest pulmonary artery 7/11/2017    IMPRESSION:     1.  No evidence of pulmonary embolus.     2.  Mild emphysema with interstitial prominence suggesting interstitial lung disease.     3.  Coronary artery calcifications.     4.  Hepatic cysts.                                                                                                                             Results for orders placed in visit on 06/11/14   DX-CHEST-2 VIEWS    Impression No acute cardiopulmonary process is identified.                          Results for orders placed in visit on 10/11/14   DX-HAND 3+    Impression No evidence of fracture or dislocation.                                                      Diagnosis   Visit Diagnoses     ICD-10-CM   1. Diffuse idiopathic skeletal hyperostosis M48.10   2. Decreased range of motion of neck R29.898   3. Neck pain M54.2   4. Claustrophobia F40.240           ASSESSMENT AND PLAN:  Brian Lopez 80 y.o. male      Brian was seen today for new patient.    Diagnoses and all orders for this visit:    Diffuse idiopathic skeletal hyperostosis  -     DX-CERVICAL SPINE-2 OR 3 VIEWS; Future  -     MR-CERVICAL SPINE-W/O; Future  -     gabapentin (NEURONTIN) 100 MG Cap; Take 1-3 Caps by mouth at bedtime as needed (pain).    Decreased range of  motion of neck  -     DX-CERVICAL SPINE-2 OR 3 VIEWS; Future  -     MR-CERVICAL SPINE-W/O; Future  -     gabapentin (NEURONTIN) 100 MG Cap; Take 1-3 Caps by mouth at bedtime as needed (pain).    Neck pain  -     DX-CERVICAL SPINE-2 OR 3 VIEWS; Future  -     MR-CERVICAL SPINE-W/O; Future  -     gabapentin (NEURONTIN) 100 MG Cap; Take 1-3 Caps by mouth at bedtime as needed (pain).    Claustrophobia  -     ALPRAZolam (XANAX) 0.5 MG Tab; Take 1 Tab by mouth as needed for Anxiety (take one tab 1 hour prior to MRI. OK to repeat x 1. do not drive on this med) for up to 1 day.        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  I have ordered gabapentin as above.  I have ordered an MRI and x-ray of the cervical spine for further evaluation.    The patient reports his back pain is stable and well-controlled.  No issues with that.    PLAN  Physical therapy: The patient failed his home exercise program.  I do not believe he is fit for physical therapy at this time as he may have worsening of his injury if he has DISH syndrome extending throughout the cervical spine.    home exercise program: I encouraged regular strengthening and stretching with a home exercise program     Diagnostic workup: As above    Medications: Gabapentin as above    Interventional program: To be considered after the above diagnostic tests    I recommend avoiding a chiropractor given his DISH syndrome.      Follow-up: After the above diagnostic studies           Please note that this dictation was created using voice recognition software. I have made every reasonable attempt to correct obvious errors but there may be errors of grammar and content that I may have overlooked prior to finalization of this note.      Melo Villela MD  Physical Medicine and Rehabilitation  Interventional Spine and Sports Physiatry  [facility]           CC Wallace Mitchell M.D.   CC Eduard Lake M.D.

## 2020-04-08 ENCOUNTER — OFFICE VISIT (OUTPATIENT)
Dept: PHYSICAL MEDICINE AND REHAB | Facility: MEDICAL CENTER | Age: 80
End: 2020-04-08
Payer: MEDICARE

## 2020-04-08 VITALS
HEIGHT: 66 IN | OXYGEN SATURATION: 94 % | TEMPERATURE: 98.3 F | DIASTOLIC BLOOD PRESSURE: 80 MMHG | BODY MASS INDEX: 31.07 KG/M2 | HEART RATE: 63 BPM | SYSTOLIC BLOOD PRESSURE: 144 MMHG | WEIGHT: 193.34 LBS

## 2020-04-08 DIAGNOSIS — F40.240 CLAUSTROPHOBIA: ICD-10-CM

## 2020-04-08 DIAGNOSIS — M48.10 DIFFUSE IDIOPATHIC SKELETAL HYPEROSTOSIS: ICD-10-CM

## 2020-04-08 DIAGNOSIS — R29.898 DECREASED RANGE OF MOTION OF NECK: ICD-10-CM

## 2020-04-08 DIAGNOSIS — M54.2 NECK PAIN: ICD-10-CM

## 2020-04-08 PROCEDURE — 99205 OFFICE O/P NEW HI 60 MIN: CPT | Performed by: PHYSICAL MEDICINE & REHABILITATION

## 2020-04-08 RX ORDER — GABAPENTIN 100 MG/1
100-300 CAPSULE ORAL NIGHTLY PRN
Qty: 90 CAP | Refills: 3 | Status: SHIPPED | OUTPATIENT
Start: 2020-04-08 | End: 2020-04-30

## 2020-04-08 RX ORDER — ALPRAZOLAM 0.5 MG/1
0.5 TABLET ORAL PRN
Qty: 2 TAB | Refills: 0 | Status: SHIPPED | OUTPATIENT
Start: 2020-04-08 | End: 2020-04-09

## 2020-04-08 ASSESSMENT — ENCOUNTER SYMPTOMS: NECK PAIN: 1

## 2020-04-08 ASSESSMENT — PATIENT HEALTH QUESTIONNAIRE - PHQ9
CLINICAL INTERPRETATION OF PHQ2 SCORE: 0
5. POOR APPETITE OR OVEREATING: 0 - NOT AT ALL

## 2020-04-08 ASSESSMENT — PAIN SCALES - GENERAL: PAINLEVEL: 4=SLIGHT-MODERATE PAIN

## 2020-04-08 ASSESSMENT — FIBROSIS 4 INDEX: FIB4 SCORE: 1.48

## 2020-04-08 NOTE — Clinical Note
Dear Dr Mitchell,   Thank you for the referral of Brian Lopez.  Please see my note for more details    Should you have any questions or concerns please do not hesitate to contact me.  Melo Villela M.D.

## 2020-04-08 NOTE — Clinical Note
Dear Eduard Lake M.D. ,   Thank you for the referral of Brian Lopez.  Please see my note for more details    Should you have any questions or concerns please do not hesitate to contact me.  Melo Villela M.D.

## 2020-04-13 RX ORDER — MELOXICAM 7.5 MG/1
TABLET ORAL
Qty: 90 TAB | Refills: 1 | Status: SHIPPED | OUTPATIENT
Start: 2020-04-13 | End: 2020-10-14

## 2020-04-13 NOTE — TELEPHONE ENCOUNTER
Was the patient seen in the last year in this department? Yes    Does patient have an active prescription for medications requested? No     Received Request Via: Pharmacy      Pt met protocol?: Yes   Pt last ov 3/2020

## 2020-04-16 ENCOUNTER — HOSPITAL ENCOUNTER (OUTPATIENT)
Dept: RADIOLOGY | Facility: MEDICAL CENTER | Age: 80
End: 2020-04-16
Attending: PHYSICAL MEDICINE & REHABILITATION
Payer: MEDICARE

## 2020-04-16 DIAGNOSIS — R29.898 DECREASED RANGE OF MOTION OF NECK: ICD-10-CM

## 2020-04-16 DIAGNOSIS — M54.2 NECK PAIN: ICD-10-CM

## 2020-04-16 DIAGNOSIS — M48.10 DIFFUSE IDIOPATHIC SKELETAL HYPEROSTOSIS: ICD-10-CM

## 2020-04-16 PROCEDURE — 72040 X-RAY EXAM NECK SPINE 2-3 VW: CPT

## 2020-04-16 PROCEDURE — 72141 MRI NECK SPINE W/O DYE: CPT

## 2020-04-30 ENCOUNTER — OFFICE VISIT (OUTPATIENT)
Dept: PHYSICAL MEDICINE AND REHAB | Facility: MEDICAL CENTER | Age: 80
End: 2020-04-30
Payer: MEDICARE

## 2020-04-30 VITALS
TEMPERATURE: 98.3 F | DIASTOLIC BLOOD PRESSURE: 62 MMHG | SYSTOLIC BLOOD PRESSURE: 100 MMHG | HEART RATE: 69 BPM | WEIGHT: 190.7 LBS | BODY MASS INDEX: 29.93 KG/M2 | HEIGHT: 67 IN | OXYGEN SATURATION: 93 %

## 2020-04-30 DIAGNOSIS — M47.812 CERVICAL SPONDYLOSIS: ICD-10-CM

## 2020-04-30 DIAGNOSIS — M48.10 DIFFUSE IDIOPATHIC SKELETAL HYPEROSTOSIS: ICD-10-CM

## 2020-04-30 DIAGNOSIS — R29.898 DECREASED RANGE OF MOTION OF NECK: ICD-10-CM

## 2020-04-30 DIAGNOSIS — M54.2 NECK PAIN: ICD-10-CM

## 2020-04-30 PROCEDURE — 99214 OFFICE O/P EST MOD 30 MIN: CPT | Performed by: PHYSICAL MEDICINE & REHABILITATION

## 2020-04-30 RX ORDER — GABAPENTIN 100 MG/1
100-300 CAPSULE ORAL NIGHTLY PRN
Qty: 90 CAP | Refills: 11 | Status: SHIPPED | OUTPATIENT
Start: 2020-04-30 | End: 2021-03-11

## 2020-04-30 ASSESSMENT — PATIENT HEALTH QUESTIONNAIRE - PHQ9
CLINICAL INTERPRETATION OF PHQ2 SCORE: 2
SUM OF ALL RESPONSES TO PHQ QUESTIONS 1-9: 4
5. POOR APPETITE OR OVEREATING: 0 - NOT AT ALL

## 2020-04-30 ASSESSMENT — PAIN SCALES - GENERAL: PAINLEVEL: 3=SLIGHT PAIN

## 2020-04-30 ASSESSMENT — FIBROSIS 4 INDEX: FIB4 SCORE: 1.48

## 2020-04-30 NOTE — PROGRESS NOTES
Follow up patient note  Interventional spine and sports physiatry, Physical medicine rehabilitation      Chief complaint:   Chief Complaint   Patient presents with   • Follow-Up     Neck Pain          HISTORY    Please see new patient note by Dr Villela,  for more details.     HPI  Patient identification: Brian Lopez 80 y.o. male  With Diagnoses of Diffuse idiopathic skeletal hyperostosis, Decreased range of motion of neck, Cervical spondylosis, and Neck pain were pertinent to this visit.       Chronic bilateral neck pain right worse than left, now significantly improved with gabapentin his sleep is improving, 1-4/10 in intensity, aching quality, nonradiating.  Denies numbness and tingling in the upper extremities.  Denies strength changes.  Denies recent injuries.       ROS Red Flags :   Fever, Chills, Sweats: Denies  Involuntary Weight Loss: Denies  Bowel/Bladder Incontinence: Denies  Saddle Anesthesia: Denies        PMHx:   Past Medical History:   Diagnosis Date   • Arthritis     hands   • BPH (benign prostatic hyperplasia) 4/17/2014   • Cataract     removed bilat   • COPD (chronic obstructive pulmonary disease) (HCC)    • Dental disorder     upper/lower dentures   • High cholesterol    • Hypertension 4/17/2014   • Hypertriglyceridemia 4/17/2014   • Intestinal polyp    • Pain 05/29/2019    right hand, 5-6/10   • Snoring    • Swelling of thyroid gland 2/4/2020       PSHx:   Past Surgical History:   Procedure Laterality Date   • PB INCISE FINGER TENDON SHEATH Right 5/31/2019    Procedure: RELEASE, TRIGGER FINGER-  RING;  Surgeon: Simon Altamirano M.D.;  Location: SURGERY SAME DAY [facility];  Service: Orthopedics   • SYNOVECTOMY Right 5/31/2019    Procedure: fasciatomy of palm;  Surgeon: Simon Altamirano M.D.;  Location: SURGERY SAME DAY [facility];  Service: Orthopedics   • KNEE ARTHROPLASTY TOTAL Right 6/7/2017    Procedure: KNEE ARTHROPLASTY TOTAL;  Surgeon: Steffanie Del Angel M.D.;  Location:  SURGERY [facility];  Service:    • COLON RESECTION ROBOTIC  6/16/2014    Performed by Ezra Burks M.D. at SURGERY [facility] ORS   • CATARACT EXTRACTION WITH IOL Bilateral 2011   • KNEE ARTHROPLASTY TOTAL Left 2009   • APPENDECTOMY  1970's   • ARTHROSCOPY, KNEE     • CHOLECYSTECTOMY     • HEMORRHOIDECTOMY     • TRIGGER FINGER RELEASE Bilateral last 2000's    multiple        Family history   Family History   Problem Relation Age of Onset   • Stroke Mother    • Heart Attack Father 85   • Heart Disease Father    • Cancer Neg Hx          Medications:   Outpatient Medications Marked as Taking for the 4/30/20 encounter (Office Visit) with Melo Villela M.D.   Medication Sig Dispense Refill   • gabapentin (NEURONTIN) 100 MG Cap Take 1-3 Caps by mouth at bedtime as needed (pain). 90 Cap 11   • meloxicam (MOBIC) 7.5 MG Tab TAKE ONE TABLET BY MOUTH TWICE DAILY AS NEEDED 90 Tab 1   • cyanocobalamin (VITAMIN B-12) 500 MCG Tab Take 500 mcg by mouth every day.     • amLODIPine (NORVASC) 5 MG Tab Take 1 Tab by mouth every day. 100 Tab 0   • atenolol (TENORMIN) 50 MG Tab Take 1 Tab by mouth every day. 100 Tab 0   • tamsulosin (FLOMAX) 0.4 MG capsule Take 1 Cap by mouth every bedtime. 90 Cap 3   • pravastatin (PRAVACHOL) 40 MG tablet Take 1 Tab by mouth every day. 100 Tab 3   • Calcium Carb-Cholecalciferol (CALCIUM + D3 PO) Take  by mouth every day.          Current Outpatient Medications on File Prior to Visit   Medication Sig Dispense Refill   • meloxicam (MOBIC) 7.5 MG Tab TAKE ONE TABLET BY MOUTH TWICE DAILY AS NEEDED 90 Tab 1   • cyanocobalamin (VITAMIN B-12) 500 MCG Tab Take 500 mcg by mouth every day.     • amLODIPine (NORVASC) 5 MG Tab Take 1 Tab by mouth every day. 100 Tab 0   • atenolol (TENORMIN) 50 MG Tab Take 1 Tab by mouth every day. 100 Tab 0   • tamsulosin (FLOMAX) 0.4 MG capsule Take 1 Cap by mouth every bedtime. 90 Cap 3   • pravastatin (PRAVACHOL) 40 MG tablet Take 1 Tab by mouth every day. 100  Tab 3   • Calcium Carb-Cholecalciferol (CALCIUM + D3 PO) Take  by mouth every day.     • Ergocalciferol (VITAMIN D2) 50 MCG (2000 UT) Tab Take 4,000 Units by mouth every day.     • Multiple Vitamins-Minerals (MULTIVITAMIN ADULT PO) Take  by mouth every day.       No current facility-administered medications on file prior to visit.          Allergies:   No Known Allergies    Social Hx:   Social History     Socioeconomic History   • Marital status:      Spouse name: Not on file   • Number of children: Not on file   • Years of education: Not on file   • Highest education level: Not on file   Occupational History   • Not on file   Social Needs   • Financial resource strain: Not on file   • Food insecurity     Worry: Not on file     Inability: Not on file   • Transportation needs     Medical: Not on file     Non-medical: Not on file   Tobacco Use   • Smoking status: Former Smoker     Packs/day: 1.00     Years: 50.00     Pack years: 50.00     Types: Cigarettes     Start date: 1/9/1955     Last attempt to quit: 4/17/2010     Years since quitting: 10.0   • Smokeless tobacco: Never Used   Substance and Sexual Activity   • Alcohol use: Yes     Alcohol/week: 8.4 oz     Types: 14 Cans of beer per week     Frequency: 4 or more times a week     Drinks per session: 3 or 4     Binge frequency: Never     Comment: 3 per day   • Drug use: No   • Sexual activity: Yes     Partners: Female     Comment:  / retired   Lifestyle   • Physical activity     Days per week: Not on file     Minutes per session: Not on file   • Stress: Not on file   Relationships   • Social connections     Talks on phone: Not on file     Gets together: Not on file     Attends Sikh service: Not on file     Active member of club or organization: Not on file     Attends meetings of clubs or organizations: Not on file     Relationship status: Not on file   • Intimate partner violence     Fear of current or ex partner: Not on file     Emotionally  "abused: Not on file     Physically abused: Not on file     Forced sexual activity: Not on file   Other Topics Concern   •  Service No   • Blood Transfusions No   • Caffeine Concern No   • Occupational Exposure No   • Hobby Hazards No   • Sleep Concern Yes   • Stress Concern No   • Weight Concern Yes   • Special Diet No   • Back Care No   • Exercise Yes   • Bike Helmet No   • Seat Belt Yes   • Self-Exams No   Social History Narrative    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  Lives with his wife.  No social or domestic concerns.  No hearing aids or hearing concerns no walking issues or balance or falls.  He has no issues driving.  No major memory concerns.  He did have eye surgery done for his cataracts and his colon partially removed for a polyp.  He also had operation on a trigger finger.  No hospitalizations in the last year though.         EXAMINATION     Physical Exam:   Vitals: /62 (BP Location: Left arm, Patient Position: Sitting, BP Cuff Size: Adult)   Pulse 69   Temp 36.8 °C (98.3 °F) (Temporal)   Ht 1.702 m (5' 7\")   Wt 86.5 kg (190 lb 11.2 oz)   SpO2 93%     Constitutional:   Body Habitus: Body mass index is 29.87 kg/m².  Cooperation: Fully cooperates with exam  Appearance: Well-groomed no disheveled    Respiratory-  breathing comfortable on room air, no audible wheezing  Cardiovascular- capillary refills less than 2 seconds. No lower extremity edema is noted.   Psychiatric- alert and oriented ×3. Normal affect.    MSK and Neuro: -    Decreased range of motion the cervical spine similar to previous visits.  Spurling's is negative bilaterally.  Key points for the international standards for neurological classification of spinal cord injury (ISNCSCI) to light touch.     Dermatome R L   C4 2 2   C5 2 2   C6 2 2   C7 2 2   C8 2 2   T1 2 2   T2 2 2                                         Motor Exam Upper Extremities   ? Myotome R L   Shoulder flexion C5 5 5   Elbow flexion C5 5 5 "   Wrist extension C6 5 5   Elbow extension C7 5 5   Finger flexion C8 5 5   Finger abduction T1 5 5               MEDICAL DECISION MAKING    DATA    Labs:   Lab Results   Component Value Date/Time    SODIUM 142 01/28/2020 06:49 AM    POTASSIUM 3.8 01/28/2020 06:49 AM    CHLORIDE 108 01/28/2020 06:49 AM    CO2 25 01/28/2020 06:49 AM    GLUCOSE 92 01/28/2020 06:49 AM    BUN 16 01/28/2020 06:49 AM    CREATININE 1.14 01/28/2020 06:49 AM        Lab Results   Component Value Date/Time    PROTHROMBTM 14.1 05/24/2017 10:49 AM    INR 1.06 05/24/2017 10:49 AM        Lab Results   Component Value Date/Time    WBC 7.1 11/20/2019 10:04 AM    RBC 5.14 11/20/2019 10:04 AM    HEMOGLOBIN 15.1 11/20/2019 10:04 AM    HEMATOCRIT 46.0 11/20/2019 10:04 AM    MCV 89.5 11/20/2019 10:04 AM    MCH 29.4 11/20/2019 10:04 AM    MCHC 32.8 (L) 11/20/2019 10:04 AM    MPV 11.1 11/20/2019 10:04 AM    NEUTSPOLYS 66.00 11/20/2019 10:04 AM    LYMPHOCYTES 23.30 11/20/2019 10:04 AM    MONOCYTES 8.70 11/20/2019 10:04 AM    EOSINOPHILS 1.00 11/20/2019 10:04 AM    BASOPHILS 0.70 11/20/2019 10:04 AM    HYPOCHROMIA 1+ 06/11/2014 09:54 AM        Lab Results   Component Value Date/Time    HBA1C 5.9 (H) 01/28/2020 06:49 AM          Imaging:   I personally reviewed following images    MRI cervical spine 4/16/2020.  Bilateral facet arthropathy C3-4, C4-5, C5-6.  Minimal/mild bilateral neuroforaminal stenosis C3-4 and C5-6.  No significant central canal stenosis at any level.      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    I reviewed the following radiology reports                                 Results for orders placed during the hospital encounter of 04/16/20   MR-CERVICAL SPINE-W/O    Impression 1.  Diffuse idiopathic skeletal hyperostosis    2.  No significant central canal narrowing. Mild foraminal narrowing at several levels.                                                                                                                                               Results for orders placed in visit on 05/30/14   CT-ABDOMEN-PELVIS WITH    Impression 1.  Filling defect in the proximal ascending colon, suspicious for known colon malignancy.    2.  Diverticulosis    3.  Multiple hypodense liver lesions, consistent with small cyst. Other smaller indeterminate lesions likely represent cysts as well. However, metastasis cannot be excluded. If clinically indicated, hepatic protocol MRI would be helpful for further characterization.    4.  Subcutaneous nodule in the right buttock, likely a sebaceous cyst. Please correlate clinically.                                 Results for orders placed during the hospital encounter of 04/16/20   DX-CERVICAL SPINE-2 OR 3 VIEWS    Impression Large anterior bridging osteophytes suggestive of DISH versus ankylosing spondylitis.    Multilevel uncovertebral joint arthropathy.          Results for orders placed in visit on 06/11/14   DX-CHEST-2 VIEWS    Impression No acute cardiopulmonary process is identified.                          Results for orders placed in visit on 10/11/14   DX-HAND 3+    Impression No evidence of fracture or dislocation.                Results for orders placed during the hospital encounter of 06/07/17   DX-KNEE 2- RIGHT    Impression Post tricompartment arthroplasty.            Results for orders placed in visit on 04/17/14   DX-LUMBAR SPINE-2 OR 3 VIEWS    Impression Severe L2/3 endplate spurring but there is relative disc height preservation throughout the examination. This likely indicates a combination of diffuse idiopathic skeletal hyperostosis and degenerative disc disease    Advanced atherosclerosis with some ectasia of the abdominal aorta, maximal anterior-posterior caliber estimated at 29 mm      Results for orders placed during the hospital encounter of 02/11/20   DX-LUMBAR SPINE-4+  VIEWS    Impression 1. Moderate multilevel lumbar spondylosis.  2. Minimal grade 1 degenerative anterolisthesis at L4-5, only seen in flexion.  3. No acute fracture.                                         DIAGNOSIS   Visit Diagnoses     ICD-10-CM   1. Diffuse idiopathic skeletal hyperostosis M48.10   2. Decreased range of motion of neck R29.898   3. Cervical spondylosis M47.812   4. Neck pain M54.2         ASSESSMENT and PLAN:     Brian Lopez 80 y.o. male      Brian was seen today for follow-up.    Diagnoses and all orders for this visit:    Diffuse idiopathic skeletal hyperostosis  -     REFERRAL TO PHYSICAL THERAPY Reason for Therapy: Eval/Treat/Report  -     gabapentin (NEURONTIN) 100 MG Cap; Take 1-3 Caps by mouth at bedtime as needed (pain).    Decreased range of motion of neck  -     REFERRAL TO PHYSICAL THERAPY Reason for Therapy: Eval/Treat/Report  -     gabapentin (NEURONTIN) 100 MG Cap; Take 1-3 Caps by mouth at bedtime as needed (pain).    Cervical spondylosis  -     REFERRAL TO PHYSICAL THERAPY Reason for Therapy: Eval/Treat/Report  -     gabapentin (NEURONTIN) 100 MG Cap; Take 1-3 Caps by mouth at bedtime as needed (pain).    Neck pain  -     REFERRAL TO PHYSICAL THERAPY Reason for Therapy: Eval/Treat/Report  -     gabapentin (NEURONTIN) 100 MG Cap; Take 1-3 Caps by mouth at bedtime as needed (pain).      The patient has had a significant improvement with stretching and with gabapentin.  His sleep is improved and his function is improved.    We discussed a home exercise program with gentle stretching and this should be under the supervision of physical therapy given his significant anterior bridging osteophytes.  We also discussed not having any chiropractic manipulations for fear of fracturing these osteophytes.    Also on the differential is ankylosing spondylitis.  Should the patient have a recurrence of his significant neck pain then I would consider him for testing for HLA-B27.  I  would also consider the patient for diagnostic medial branch blocks of the most affected levels likely C3-4, C4-5, C5-6 of the most affected side.    Follow up: 1 year    Thank you for allowing me to participate in the care of this patient. If you have any questions please not hesitate to contact me.        Please note that this dictation was created using voice recognition software. I have made every reasonable attempt to correct obvious errors but there may be errors of grammar and content that I may have overlooked prior to finalization of this note.      Melo Villela MD  Interventional Spine and Sports Physiatry  Physical Medicine and Rehabilitation  [facility]

## 2020-04-30 NOTE — Clinical Note
Dear Eduard Lake M.D. ,   Thank you for the referral of Brian Lopez.  Please see my note for more details    Should you have any questions or concerns please do not hesitate to contact me.  Melo Villela M.D. f

## 2020-05-04 DIAGNOSIS — I10 ESSENTIAL HYPERTENSION: Chronic | ICD-10-CM

## 2020-05-04 RX ORDER — AMLODIPINE BESYLATE 5 MG/1
5 TABLET ORAL DAILY
Qty: 100 TAB | Refills: 0 | Status: SHIPPED | OUTPATIENT
Start: 2020-05-04 | End: 2020-07-30 | Stop reason: SDUPTHER

## 2020-05-04 NOTE — TELEPHONE ENCOUNTER
Was the patient seen in the last year in this department? Yes    Does patient have an active prescription for medications requested? No     Received Request Via: Pharmacy      Pt met protocol?: Yes, OV 3/20   BP Readings from Last 1 Encounters:   04/30/20 100/62

## 2020-07-30 DIAGNOSIS — I10 ESSENTIAL HYPERTENSION: Chronic | ICD-10-CM

## 2020-07-30 RX ORDER — AMLODIPINE BESYLATE 5 MG/1
5 TABLET ORAL DAILY
Qty: 100 TAB | Refills: 0 | Status: SHIPPED | OUTPATIENT
Start: 2020-07-30 | End: 2020-10-27 | Stop reason: SDUPTHER

## 2020-08-20 ENCOUNTER — OFFICE VISIT (OUTPATIENT)
Dept: MEDICAL GROUP | Facility: PHYSICIAN GROUP | Age: 80
End: 2020-08-20
Payer: MEDICARE

## 2020-08-20 VITALS
OXYGEN SATURATION: 94 % | DIASTOLIC BLOOD PRESSURE: 78 MMHG | BODY MASS INDEX: 32.02 KG/M2 | SYSTOLIC BLOOD PRESSURE: 136 MMHG | HEART RATE: 71 BPM | HEIGHT: 65 IN | WEIGHT: 192.2 LBS | TEMPERATURE: 98.3 F | RESPIRATION RATE: 16 BRPM

## 2020-08-20 DIAGNOSIS — E78.5 DYSLIPIDEMIA: Chronic | ICD-10-CM

## 2020-08-20 DIAGNOSIS — R73.03 PREDIABETES: Chronic | ICD-10-CM

## 2020-08-20 DIAGNOSIS — R97.20 PSA ELEVATION: ICD-10-CM

## 2020-08-20 DIAGNOSIS — I10 ESSENTIAL HYPERTENSION: Chronic | ICD-10-CM

## 2020-08-20 DIAGNOSIS — R32 URINARY INCONTINENCE, UNSPECIFIED TYPE: ICD-10-CM

## 2020-08-20 PROBLEM — E66.9 OBESITY (BMI 30-39.9): Chronic | Status: RESOLVED | Noted: 2017-03-07 | Resolved: 2020-08-20

## 2020-08-20 PROCEDURE — 99214 OFFICE O/P EST MOD 30 MIN: CPT | Performed by: INTERNAL MEDICINE

## 2020-08-20 RX ORDER — OXYBUTYNIN CHLORIDE 10 MG/1
10 TABLET, EXTENDED RELEASE ORAL DAILY
Qty: 30 TAB | Refills: 3 | Status: SHIPPED
Start: 2020-08-20 | End: 2021-02-11

## 2020-08-20 ASSESSMENT — FIBROSIS 4 INDEX: FIB4 SCORE: 1.48

## 2020-08-20 NOTE — ASSESSMENT & PLAN NOTE
This is a new condition per patient report noted since the last several weeks.  Patient denies any fever or chills.  No history of hematuria.  No significant dysuria noted.  Patient reported symptoms suggestive of both stress and urge incontinence.  Chart review in January 2020 his PSA was mildly elevated.  He has not follow-up with a urologist

## 2020-08-20 NOTE — PROGRESS NOTES
CC: Urinary incontinence  Follow-up hypertension      HPI: 80 y.o. Patient presents to discuss the following:     Urinary incontinence  This is a new condition per patient report noted since the last several weeks.  Patient denies any fever or chills.  No history of hematuria.  No significant dysuria noted.  Patient reported symptoms suggestive of both stress and urge incontinence.  Chart review in January 2020 his PSA was mildly elevated.  He has not follow-up with a urologist    Hypertension  Chronic stable condition.  Patient is currently taking atenolol daily.  Blood pressure has been well controlled at home.    Dyslipidemia  Chronic condition.  The patient is taking pravastatin.  He is due for lab work.    Prediabetes  This is a chronic condition.  The patient is currently on diet therapy.  He is due for lab work.          REVIEW OF SYSTEMS:     Constitutional:  no fever / chills   Neurologic: no headaches, no numbness/tingling  Eyes: no changes in vision  ENT: no sore throat, no hearing loss  CV:  no chest pain, no palpitations  Pulmonary: no SOB, no cough    GI: no nausea / vomiting, no diarrhea, no constipation  Skin: no rash  Hematologic: no bleeding      Allergies: Patient has no known allergies.    Current Outpatient Medications Ordered in Epic   Medication Sig Dispense Refill   • oxybutynin SR (DITROPAN-XL) 10 MG CR tablet Take 1 Tab by mouth every day. 30 Tab 3   • amLODIPine (NORVASC) 5 MG Tab Take 1 Tab by mouth every day. 100 Tab 0   • gabapentin (NEURONTIN) 100 MG Cap Take 1-3 Caps by mouth at bedtime as needed (pain). 90 Cap 11   • meloxicam (MOBIC) 7.5 MG Tab TAKE ONE TABLET BY MOUTH TWICE DAILY AS NEEDED 90 Tab 1   • Ergocalciferol (VITAMIN D2) 50 MCG (2000 UT) Tab Take 4,000 Units by mouth every day.     • cyanocobalamin (VITAMIN B-12) 500 MCG Tab Take 500 mcg by mouth every day.     • atenolol (TENORMIN) 50 MG Tab Take 1 Tab by mouth every day. 100 Tab 0   • tamsulosin (FLOMAX) 0.4 MG capsule  Take 1 Cap by mouth every bedtime. 90 Cap 3   • pravastatin (PRAVACHOL) 40 MG tablet Take 1 Tab by mouth every day. 100 Tab 3   • Multiple Vitamins-Minerals (MULTIVITAMIN ADULT PO) Take  by mouth every day.     • Calcium Carb-Cholecalciferol (CALCIUM + D3 PO) Take  by mouth every day.       No current [facility]-ordered facility-administered medications on file.        Past Medical History:   Diagnosis Date   • Arthritis     hands   • BPH (benign prostatic hyperplasia) 4/17/2014   • Cataract     removed bilat   • COPD (chronic obstructive pulmonary disease) (HCC)    • Dental disorder     upper/lower dentures   • High cholesterol    • Hypertension 4/17/2014   • Hypertriglyceridemia 4/17/2014   • Intestinal polyp    • Pain 05/29/2019    right hand, 5-6/10   • Snoring    • Swelling of thyroid gland 2/4/2020        Past Surgical History:   Procedure Laterality Date   • PB INCISE FINGER TENDON SHEATH Right 5/31/2019    Procedure: RELEASE, TRIGGER FINGER-  RING;  Surgeon: Simon Altamirano M.D.;  Location: SURGERY SAME DAY [facility];  Service: Orthopedics   • SYNOVECTOMY Right 5/31/2019    Procedure: fasciatomy of palm;  Surgeon: Simon Altamirano M.D.;  Location: SURGERY SAME DAY [facility];  Service: Orthopedics   • KNEE ARTHROPLASTY TOTAL Right 6/7/2017    Procedure: KNEE ARTHROPLASTY TOTAL;  Surgeon: Steffanie Del Angel M.D.;  Location: SURGERY [facility];  Service:    • COLON RESECTION ROBOTIC  6/16/2014    Performed by Ezra Burks M.D. at SURGERY [facility]   • CATARACT EXTRACTION WITH IOL Bilateral 2011   • KNEE ARTHROPLASTY TOTAL Left 2009   • APPENDECTOMY  1970's   • ARTHROSCOPY, KNEE     • CHOLECYSTECTOMY     • HEMORRHOIDECTOMY     • TRIGGER FINGER RELEASE Bilateral last 2000's    multiple         Family History   Problem Relation Age of Onset   • Stroke Mother    • Heart Attack Father 85   • Heart Disease Father    • Cancer Neg Hx         Social History     Tobacco Use   Smoking Status Former  Smoker   • Packs/day: 1.00   • Years: 50.00   • Pack years: 50.00   • Types: Cigarettes   • Start date: 1/9/1955   • Quit date: 4/17/2010   • Years since quitting: 10.3   Smokeless Tobacco Never Used          Social History     Substance and Sexual Activity   Alcohol Use Yes   • Alcohol/week: 8.4 oz   • Types: 14 Cans of beer per week   • Frequency: 4 or more times a week   • Drinks per session: 3 or 4   • Binge frequency: Never    Comment: 3 per day        ---------------------------------------------------------------------     PHYSICAL EXAM:   Vitals:    08/20/20 1419   BP: 136/78   Pulse: 71   Resp: 16   Temp: 36.8 °C (98.3 °F)   SpO2: 94%      Body mass index is 31.69 kg/m².        Constitutional: no acute distress  Eyes: PERRL, EOMI  Ears/nose/mouth: OP no exudates  Neck: supple, no JVD  CV: heart RRR  Resp: normal effort, no wheezing or rales.  GI: abdomen soft, no obvious mass, no tenderness  Neuro: CN 2-12 grossly intact  Skin: no obvious rash noted   normal male genitalia no urethral discharge testy descended rectal with external hemorrhoid digital rectal examination attempted but aborted due to severe pain.  Unable to assess prostate      ---------------------------------------------------------------------     ASSESSMENT and PLAN:  1. Urinary incontinence, unspecified type  2. PSA elevation  - URINALYSIS; Future  - URINE CULTURE(NEW); Future  - REFERRAL TO UROLOGY  Trial of oxybutynin ordered for the patient.  Potential side effect medication discussed with the patient.      3. Essential hypertension  Chronic stable condition.  Continue with current management.    4. Dyslipidemia  Chronic condition.  Level is unclear.  Lab tests ordered.    5. Prediabetes  Chronic condition.  Patient is due for lab work.  Recommend low sweet and low-carb diet.            Return in about 4 months (around 12/20/2020) for routine followup.       PATIENT EDUCATION:  -If any problems should arise, patient was advised to  contact our office or go to ER to be evaluated.  -Advised pt to follow a healthy diet and regular aerobic exercise regimen. Advised pt to avoid alcohol and tobacco use.    Please note that this dictation was created using voice recognition software. I have made every reasonable attempt to correct obvious errors, but it is possible there are errors of grammar and possibly content that I did not discover before finalizing the note.

## 2020-08-20 NOTE — ASSESSMENT & PLAN NOTE
Chronic stable condition.  Patient is currently taking atenolol daily.  Blood pressure has been well controlled at home.

## 2020-08-31 ENCOUNTER — HOSPITAL ENCOUNTER (OUTPATIENT)
Dept: LAB | Facility: MEDICAL CENTER | Age: 80
End: 2020-08-31
Attending: UROLOGY
Payer: MEDICARE

## 2020-08-31 PROCEDURE — 84154 ASSAY OF PSA FREE: CPT

## 2020-08-31 PROCEDURE — 84153 ASSAY OF PSA TOTAL: CPT

## 2020-09-02 LAB
PSA FREE MFR SERPL: 18 %
PSA FREE SERPL-MCNC: 1 NG/ML
PSA SERPL-MCNC: 5.6 NG/ML (ref 0–4)

## 2020-09-24 ENCOUNTER — HOSPITAL ENCOUNTER (OUTPATIENT)
Dept: LAB | Facility: MEDICAL CENTER | Age: 80
End: 2020-09-24
Attending: INTERNAL MEDICINE
Payer: MEDICARE

## 2020-09-24 DIAGNOSIS — R73.03 PREDIABETES: Chronic | ICD-10-CM

## 2020-09-24 DIAGNOSIS — E78.5 DYSLIPIDEMIA: Chronic | ICD-10-CM

## 2020-09-24 DIAGNOSIS — R32 URINARY INCONTINENCE, UNSPECIFIED TYPE: ICD-10-CM

## 2020-09-24 LAB
25(OH)D3 SERPL-MCNC: 56 NG/ML (ref 30–100)
ALT SERPL-CCNC: 35 U/L (ref 2–50)
ANION GAP SERPL CALC-SCNC: 16 MMOL/L (ref 7–16)
APPEARANCE UR: CLEAR
BASOPHILS # BLD AUTO: 0.9 % (ref 0–1.8)
BASOPHILS # BLD: 0.05 K/UL (ref 0–0.12)
BILIRUB UR QL STRIP.AUTO: NEGATIVE
BUN SERPL-MCNC: 15 MG/DL (ref 8–22)
CALCIUM SERPL-MCNC: 9.2 MG/DL (ref 8.5–10.5)
CHLORIDE SERPL-SCNC: 105 MMOL/L (ref 96–112)
CHOLEST SERPL-MCNC: 156 MG/DL (ref 100–199)
CO2 SERPL-SCNC: 22 MMOL/L (ref 20–33)
COLOR UR: YELLOW
CREAT SERPL-MCNC: 0.72 MG/DL (ref 0.5–1.4)
CREAT UR-MCNC: 92.84 MG/DL
EOSINOPHIL # BLD AUTO: 0.11 K/UL (ref 0–0.51)
EOSINOPHIL NFR BLD: 1.9 % (ref 0–6.9)
ERYTHROCYTE [DISTWIDTH] IN BLOOD BY AUTOMATED COUNT: 48.2 FL (ref 35.9–50)
EST. AVERAGE GLUCOSE BLD GHB EST-MCNC: 123 MG/DL
FASTING STATUS PATIENT QL REPORTED: NORMAL
GLUCOSE SERPL-MCNC: 97 MG/DL (ref 65–99)
GLUCOSE UR STRIP.AUTO-MCNC: NEGATIVE MG/DL
HBA1C MFR BLD: 5.9 % (ref 0–5.6)
HCT VFR BLD AUTO: 50 % (ref 42–52)
HDLC SERPL-MCNC: 55 MG/DL
HGB BLD-MCNC: 16.4 G/DL (ref 14–18)
IMM GRANULOCYTES # BLD AUTO: 0.03 K/UL (ref 0–0.11)
IMM GRANULOCYTES NFR BLD AUTO: 0.5 % (ref 0–0.9)
KETONES UR STRIP.AUTO-MCNC: NEGATIVE MG/DL
LDLC SERPL CALC-MCNC: 68 MG/DL
LEUKOCYTE ESTERASE UR QL STRIP.AUTO: NEGATIVE
LYMPHOCYTES # BLD AUTO: 1.87 K/UL (ref 1–4.8)
LYMPHOCYTES NFR BLD: 31.9 % (ref 22–41)
MCH RBC QN AUTO: 29.8 PG (ref 27–33)
MCHC RBC AUTO-ENTMCNC: 32.8 G/DL (ref 33.7–35.3)
MCV RBC AUTO: 90.7 FL (ref 81.4–97.8)
MICRO URNS: NORMAL
MICROALBUMIN UR-MCNC: 6.1 MG/DL
MICROALBUMIN/CREAT UR: 66 MG/G (ref 0–30)
MONOCYTES # BLD AUTO: 0.45 K/UL (ref 0–0.85)
MONOCYTES NFR BLD AUTO: 7.7 % (ref 0–13.4)
NEUTROPHILS # BLD AUTO: 3.36 K/UL (ref 1.82–7.42)
NEUTROPHILS NFR BLD: 57.1 % (ref 44–72)
NITRITE UR QL STRIP.AUTO: NEGATIVE
NRBC # BLD AUTO: 0 K/UL
NRBC BLD-RTO: 0 /100 WBC
PH UR STRIP.AUTO: 6 [PH] (ref 5–8)
PLATELET # BLD AUTO: 181 K/UL (ref 164–446)
PMV BLD AUTO: 11 FL (ref 9–12.9)
POTASSIUM SERPL-SCNC: 4.6 MMOL/L (ref 3.6–5.5)
PROT UR QL STRIP: NEGATIVE MG/DL
RBC # BLD AUTO: 5.51 M/UL (ref 4.7–6.1)
RBC UR QL AUTO: NEGATIVE
SODIUM SERPL-SCNC: 143 MMOL/L (ref 135–145)
SP GR UR STRIP.AUTO: 1.02
TRIGL SERPL-MCNC: 167 MG/DL (ref 0–149)
TSH SERPL DL<=0.005 MIU/L-ACNC: 0.95 UIU/ML (ref 0.38–5.33)
UROBILINOGEN UR STRIP.AUTO-MCNC: 0.2 MG/DL
WBC # BLD AUTO: 5.9 K/UL (ref 4.8–10.8)

## 2020-09-24 PROCEDURE — 84460 ALANINE AMINO (ALT) (SGPT): CPT

## 2020-09-24 PROCEDURE — 80061 LIPID PANEL: CPT

## 2020-09-24 PROCEDURE — 80048 BASIC METABOLIC PNL TOTAL CA: CPT

## 2020-09-24 PROCEDURE — 82043 UR ALBUMIN QUANTITATIVE: CPT

## 2020-09-24 PROCEDURE — 82306 VITAMIN D 25 HYDROXY: CPT

## 2020-09-24 PROCEDURE — 36415 COLL VENOUS BLD VENIPUNCTURE: CPT

## 2020-09-24 PROCEDURE — 84443 ASSAY THYROID STIM HORMONE: CPT

## 2020-09-24 PROCEDURE — 81003 URINALYSIS AUTO W/O SCOPE: CPT

## 2020-09-24 PROCEDURE — 82570 ASSAY OF URINE CREATININE: CPT

## 2020-09-24 PROCEDURE — 83036 HEMOGLOBIN GLYCOSYLATED A1C: CPT

## 2020-09-24 PROCEDURE — 85025 COMPLETE CBC W/AUTO DIFF WBC: CPT

## 2020-09-24 PROCEDURE — 87086 URINE CULTURE/COLONY COUNT: CPT

## 2020-09-25 DIAGNOSIS — R80.9 PROTEINURIA, UNSPECIFIED TYPE: ICD-10-CM

## 2020-09-25 RX ORDER — FINASTERIDE 5 MG/1
5 TABLET, FILM COATED ORAL EVERY EVENING
Status: ON HOLD | COMMUNITY
End: 2023-01-01

## 2020-09-25 RX ORDER — FINASTERIDE 5 MG/1
TABLET, FILM COATED ORAL
COMMUNITY
Start: 2020-08-31 | End: 2020-10-01

## 2020-09-25 RX ORDER — TROSPIUM CHLORIDE 20 MG/1
TABLET, FILM COATED ORAL
COMMUNITY
End: 2021-02-11

## 2020-09-25 RX ORDER — MIRABEGRON 50 MG/1
50 TABLET, FILM COATED, EXTENDED RELEASE ORAL EVERY MORNING
Status: ON HOLD | COMMUNITY
End: 2023-01-01

## 2020-09-26 LAB
BACTERIA UR CULT: NORMAL
SIGNIFICANT IND 70042: NORMAL
SITE SITE: NORMAL
SOURCE SOURCE: NORMAL

## 2020-09-30 ENCOUNTER — HOSPITAL ENCOUNTER (OUTPATIENT)
Facility: MEDICAL CENTER | Age: 80
End: 2020-09-30
Attending: INTERNAL MEDICINE
Payer: MEDICARE

## 2020-09-30 DIAGNOSIS — R80.9 PROTEINURIA, UNSPECIFIED TYPE: ICD-10-CM

## 2020-09-30 LAB
PROT 24H UR-MCNC: 126 MG/24 HR (ref 30–150)
PROT 24H UR-MRATE: 6 MG/DL (ref 0–15)
SPECIMEN VOL UR: 2100 ML

## 2020-09-30 PROCEDURE — 84156 ASSAY OF PROTEIN URINE: CPT

## 2020-09-30 PROCEDURE — 81050 URINALYSIS VOLUME MEASURE: CPT

## 2020-10-01 ENCOUNTER — OFFICE VISIT (OUTPATIENT)
Dept: MEDICAL GROUP | Facility: PHYSICIAN GROUP | Age: 80
End: 2020-10-01
Payer: MEDICARE

## 2020-10-01 VITALS
BODY MASS INDEX: 30.7 KG/M2 | HEART RATE: 89 BPM | WEIGHT: 191 LBS | RESPIRATION RATE: 16 BRPM | TEMPERATURE: 97.2 F | SYSTOLIC BLOOD PRESSURE: 132 MMHG | OXYGEN SATURATION: 94 % | DIASTOLIC BLOOD PRESSURE: 78 MMHG | HEIGHT: 66 IN

## 2020-10-01 DIAGNOSIS — R73.03 PREDIABETES: Chronic | ICD-10-CM

## 2020-10-01 DIAGNOSIS — Z23 NEED FOR VACCINATION: ICD-10-CM

## 2020-10-01 DIAGNOSIS — E78.5 DYSLIPIDEMIA: Chronic | ICD-10-CM

## 2020-10-01 DIAGNOSIS — I10 ESSENTIAL HYPERTENSION: Chronic | ICD-10-CM

## 2020-10-01 DIAGNOSIS — G89.29 CHRONIC LEFT-SIDED LOW BACK PAIN WITHOUT SCIATICA: ICD-10-CM

## 2020-10-01 DIAGNOSIS — N40.0 BENIGN PROSTATIC HYPERPLASIA WITHOUT LOWER URINARY TRACT SYMPTOMS: Chronic | ICD-10-CM

## 2020-10-01 DIAGNOSIS — M54.50 CHRONIC LEFT-SIDED LOW BACK PAIN WITHOUT SCIATICA: ICD-10-CM

## 2020-10-01 PROBLEM — R97.20 PSA ELEVATION: Status: RESOLVED | Noted: 2020-02-04 | Resolved: 2020-10-01

## 2020-10-01 PROCEDURE — 90662 IIV NO PRSV INCREASED AG IM: CPT | Performed by: INTERNAL MEDICINE

## 2020-10-01 PROCEDURE — 99214 OFFICE O/P EST MOD 30 MIN: CPT | Mod: 25 | Performed by: INTERNAL MEDICINE

## 2020-10-01 PROCEDURE — G0008 ADMIN INFLUENZA VIRUS VAC: HCPCS | Performed by: INTERNAL MEDICINE

## 2020-10-01 RX ORDER — GABAPENTIN 100 MG/1
100 CAPSULE ORAL 3 TIMES DAILY
COMMUNITY
End: 2021-02-11

## 2020-10-01 ASSESSMENT — FIBROSIS 4 INDEX: FIB4 SCORE: 1.34

## 2020-10-01 NOTE — PROGRESS NOTES
CC: Follow-up hypertension  Discuss recent lab test results      HPI: This is a 80 y.o. pt.  Pt's medical history is notable for:     Hypertension  Chronic condition.  Patient presently taking atenolol and amlodipine.    BPH (benign prostatic hyperplasia)  Chronic stable condition patient is now taking tamsulosin.  No significant obstructive symptoms noted he also takes Proscar daily.    Dyslipidemia  Chronic condition stable patient is taking pravastatin.    Prediabetes  This is a chronic condition patient currently on diet therapy.          REVIEW OF SYSTEMS:     Constitutional:  no fever / chills   Neurologic: no headaches, no numbness/tingling  Eyes: no changes in vision  ENT: no sore throat, no hearing loss  CV:  no chest pain, no palpitations  Pulmonary: no SOB, no cough    GI: no nausea / vomiting, no diarrhea, no constipation        Allergies: Patient has no known allergies.    Current Outpatient Medications Ordered in Epic   Medication Sig Dispense Refill   • oxybutynin SR (DITROPAN-XL) 10 MG CR tablet Take 1 Tab by mouth every day. 30 Tab 3   • amLODIPine (NORVASC) 5 MG Tab Take 1 Tab by mouth every day. 100 Tab 0   • gabapentin (NEURONTIN) 100 MG Cap Take 1-3 Caps by mouth at bedtime as needed (pain). 90 Cap 11   • meloxicam (MOBIC) 7.5 MG Tab TAKE ONE TABLET BY MOUTH TWICE DAILY AS NEEDED 90 Tab 1   • atenolol (TENORMIN) 50 MG Tab Take 1 Tab by mouth every day. 100 Tab 0   • tamsulosin (FLOMAX) 0.4 MG capsule Take 1 Cap by mouth every bedtime. 90 Cap 3   • pravastatin (PRAVACHOL) 40 MG tablet Take 1 Tab by mouth every day. 100 Tab 3   • finasteride (PROSCAR) 5 MG Tab finasteride 5 mg tablet   Take 1 tablet every day by oral route.     • Mirabegron ER (MYRBETRIQ) 50 MG TABLET SR 24 HR Myrbetriq 50 mg tablet,extended release   Take 1 tablet every day by oral route as directed for 120 days.     • trospium (SANCTURA) 20 MG Tab trospium 20 mg tablet   Take 1 tablet twice a day by oral route.     •  Ergocalciferol (VITAMIN D2) 50 MCG (2000 UT) Tab Take 4,000 Units by mouth every day.     • cyanocobalamin (VITAMIN B-12) 500 MCG Tab Take 500 mcg by mouth every day.     • Multiple Vitamins-Minerals (MULTIVITAMIN ADULT PO) Take  by mouth every day.     • Calcium Carb-Cholecalciferol (CALCIUM + D3 PO) Take  by mouth every day.       No current [facility]-ordered facility-administered medications on file.        Past Medical History:   Diagnosis Date   • Arthritis     hands   • BPH (benign prostatic hyperplasia) 4/17/2014   • Cataract     removed bilat   • COPD (chronic obstructive pulmonary disease) (HCC)    • Dental disorder     upper/lower dentures   • High cholesterol    • Hypertension 4/17/2014   • Hypertriglyceridemia 4/17/2014   • Intestinal polyp    • Pain 05/29/2019    right hand, 5-6/10   • Snoring    • Swelling of thyroid gland 2/4/2020        Past Surgical History:   Procedure Laterality Date   • PB INCISE FINGER TENDON SHEATH Right 5/31/2019    Procedure: RELEASE, TRIGGER FINGER-  RING;  Surgeon: Simon Altamirano M.D.;  Location: SURGERY SAME DAY [facility] ORS;  Service: Orthopedics   • SYNOVECTOMY Right 5/31/2019    Procedure: fasciatomy of palm;  Surgeon: Simon Altamirano M.D.;  Location: SURGERY SAME DAY [facility] ORS;  Service: Orthopedics   • KNEE ARTHROPLASTY TOTAL Right 6/7/2017    Procedure: KNEE ARTHROPLASTY TOTAL;  Surgeon: Steffanie Del Angel M.D.;  Location: SURGERY [facility];  Service:    • COLON RESECTION ROBOTIC  6/16/2014    Performed by Ezra Burks M.D. at SURGERY [facility] ORS   • CATARACT EXTRACTION WITH IOL Bilateral 2011   • KNEE ARTHROPLASTY TOTAL Left 2009   • APPENDECTOMY  1970's   • ARTHROSCOPY, KNEE     • CHOLECYSTECTOMY     • HEMORRHOIDECTOMY     • TRIGGER FINGER RELEASE Bilateral last 2000's    multiple         Family History   Problem Relation Age of Onset   • Stroke Mother    • Heart Attack Father 85   • Heart Disease Father    • Cancer Neg Hx         Social  History     Tobacco Use   Smoking Status Former Smoker   • Packs/day: 1.00   • Years: 50.00   • Pack years: 50.00   • Types: Cigarettes   • Start date: 1/9/1955   • Quit date: 4/17/2010   • Years since quitting: 10.4   Smokeless Tobacco Never Used          Social History     Substance and Sexual Activity   Alcohol Use Yes   • Alcohol/week: 8.4 oz   • Types: 14 Cans of beer per week   • Frequency: 4 or more times a week   • Drinks per session: 3 or 4   • Binge frequency: Never    Comment: 3 per day        ---------------------------------------------------------------------     PHYSICAL EXAM:   Vitals:    10/01/20 1039   BP: 132/78   Pulse: 89   Resp: 16   Temp: 36.2 °C (97.2 °F)   SpO2: 94%      Body mass index is 30.83 kg/m².        Constitutional: no acute distress  Neck: supple, no JVD  CV: heart RRR  Resp: normal effort, no wheezing or rales.  GI: abdomen soft, no obvious mass, no tenderness  Neuro: CN 2-12 grossly intact    Results for LEANDRO SUAREZ (MRN [id number]) as of 10/1/2020 10:42   Ref. Range 9/24/2020 07:11   WBC Latest Ref Range: 4.8 - 10.8 K/uL 5.9   RBC Latest Ref Range: 4.70 - 6.10 M/uL 5.51   Hemoglobin Latest Ref Range: 14.0 - 18.0 g/dL 16.4   Hematocrit Latest Ref Range: 42.0 - 52.0 % 50.0   MCV Latest Ref Range: 81.4 - 97.8 fL 90.7   MCH Latest Ref Range: 27.0 - 33.0 pg 29.8   MCHC Latest Ref Range: 33.7 - 35.3 g/dL 32.8 (L)   RDW Latest Ref Range: 35.9 - 50.0 fL 48.2   Platelet Count Latest Ref Range: 164 - 446 K/uL 181   MPV Latest Ref Range: 9.0 - 12.9 fL 11.0   Neutrophils-Polys Latest Ref Range: 44.00 - 72.00 % 57.10   Neutrophils (Absolute) Latest Ref Range: 1.82 - 7.42 K/uL 3.36   Lymphocytes Latest Ref Range: 22.00 - 41.00 % 31.90   Lymphs (Absolute) Latest Ref Range: 1.00 - 4.80 K/uL 1.87   Monocytes Latest Ref Range: 0.00 - 13.40 % 7.70   Monos (Absolute) Latest Ref Range: 0.00 - 0.85 K/uL 0.45   Eosinophils Latest Ref Range: 0.00 - 6.90 % 1.90   Eos (Absolute) Latest Ref  Range: 0.00 - 0.51 K/uL 0.11   Basophils Latest Ref Range: 0.00 - 1.80 % 0.90   Baso (Absolute) Latest Ref Range: 0.00 - 0.12 K/uL 0.05   Immature Granulocytes Latest Ref Range: 0.00 - 0.90 % 0.50   Immature Granulocytes (abs) Latest Ref Range: 0.00 - 0.11 K/uL 0.03   Nucleated RBC Latest Units: /100 WBC 0.00   NRBC (Absolute) Latest Units: K/uL 0.00   Sodium Latest Ref Range: 135 - 145 mmol/L 143   Potassium Latest Ref Range: 3.6 - 5.5 mmol/L 4.6   Chloride Latest Ref Range: 96 - 112 mmol/L 105   Co2 Latest Ref Range: 20 - 33 mmol/L 22   Anion Gap Latest Ref Range: 7.0 - 16.0  16.0   Glucose Latest Ref Range: 65 - 99 mg/dL 97   Bun Latest Ref Range: 8 - 22 mg/dL 15   Creatinine Latest Ref Range: 0.50 - 1.40 mg/dL 0.72   GFR If  Latest Ref Range: >60 mL/min/1.73 m 2 >60   GFR If Non  Latest Ref Range: >60 mL/min/1.73 m 2 >60   Calcium Latest Ref Range: 8.5 - 10.5 mg/dL 9.2   ALT(SGPT) Latest Ref Range: 2 - 50 U/L 35   Glycohemoglobin Latest Ref Range: 0.0 - 5.6 % 5.9 (H)   Estim. Avg Glu Latest Units: mg/dL 123   Fasting Status Unknown Fasting   Cholesterol,Tot Latest Ref Range: 100 - 199 mg/dL 156   Triglycerides Latest Ref Range: 0 - 149 mg/dL 167 (H)   HDL Latest Ref Range: >=40 mg/dL 55   LDL Latest Ref Range: <100 mg/dL 68         ---------------------------------------------------------------------     ASSESSMENT and PLAN:  1. Need for vaccination    - Influenza Vaccine, High Dose (65+ Only)    2. Essential hypertension  Chronic condition.  Continue with current management.    3. Benign prostatic hyperplasia without lower urinary tract symptoms  Chronic stable condition continue with tamsulosin and Proscar.    4. Dyslipidemia  Chronic condition continue with pravastatin.            Return in about 6 months (around 4/1/2021).       PATIENT EDUCATION:  -If any problems should arise, patient was advised to contact our office or go to ER to be evaluated.  -Advised pt to follow a  healthy diet and regular aerobic exercise regimen. Advised pt to avoid alcohol and tobacco use.    Please note that this dictation was created using voice recognition software. I have made every reasonable attempt to correct obvious errors, but it is possible there are errors of grammar and possibly content that I did not discover before finalizing the note.

## 2020-10-01 NOTE — ASSESSMENT & PLAN NOTE
Chronic stable condition patient is now taking tamsulosin.  No significant obstructive symptoms noted he also takes Proscar daily.

## 2020-10-12 DIAGNOSIS — I10 ESSENTIAL HYPERTENSION: Chronic | ICD-10-CM

## 2020-10-14 RX ORDER — ATENOLOL 50 MG/1
TABLET ORAL
Qty: 90 TAB | Refills: 1 | Status: SHIPPED | OUTPATIENT
Start: 2020-10-14 | End: 2021-04-19

## 2020-10-14 RX ORDER — MELOXICAM 7.5 MG/1
TABLET ORAL
Qty: 90 TAB | Refills: 1 | Status: SHIPPED | OUTPATIENT
Start: 2020-10-14 | End: 2021-04-13

## 2020-10-14 NOTE — TELEPHONE ENCOUNTER
Last seen by PCP 10/1/2020. MD noted ton continue current regimen.  Last Blood Pressure reading was 132/78 on 10/1/2020

## 2020-10-27 DIAGNOSIS — I10 ESSENTIAL HYPERTENSION: Chronic | ICD-10-CM

## 2020-10-27 RX ORDER — AMLODIPINE BESYLATE 5 MG/1
5 TABLET ORAL DAILY
Qty: 100 TAB | Refills: 1 | Status: SHIPPED | OUTPATIENT
Start: 2020-10-27 | End: 2021-04-29

## 2020-10-27 NOTE — TELEPHONE ENCOUNTER
Refill X 6 months, sent to pharmacy.Pt. Seen in the last 6 months per protocol.   Lab Results   Component Value Date/Time    SODIUM 143 09/24/2020 07:11 AM    POTASSIUM 4.6 09/24/2020 07:11 AM    CHLORIDE 105 09/24/2020 07:11 AM    CO2 22 09/24/2020 07:11 AM    GLUCOSE 97 09/24/2020 07:11 AM    BUN 15 09/24/2020 07:11 AM    CREATININE 0.72 09/24/2020 07:11 AM

## 2020-10-28 RX ORDER — AMLODIPINE BESYLATE 5 MG/1
TABLET ORAL
Qty: 10 TAB | Refills: 0 | Status: CANCELLED | OUTPATIENT
Start: 2020-10-28

## 2020-12-10 RX ORDER — TAMSULOSIN HYDROCHLORIDE 0.4 MG/1
0.4 CAPSULE ORAL
Qty: 90 CAP | Refills: 3 | Status: SHIPPED | OUTPATIENT
Start: 2020-12-10 | End: 2021-12-20 | Stop reason: SDUPTHER

## 2020-12-10 RX ORDER — PRAVASTATIN SODIUM 40 MG
40 TABLET ORAL
Qty: 100 TAB | Refills: 2 | Status: SHIPPED | OUTPATIENT
Start: 2020-12-10 | End: 2021-10-05

## 2020-12-10 NOTE — TELEPHONE ENCOUNTER
Pt has had OV within the 12 month protocol and lipid panel is current. 9 month supply sent to pharmacy.   Lab Results   Component Value Date/Time    CHOLSTRLTOT 156 09/24/2020 07:11 AM    LDL 68 09/24/2020 07:11 AM    HDL 55 09/24/2020 07:11 AM    TRIGLYCERIDE 167 (H) 09/24/2020 07:11 AM       Lab Results   Component Value Date/Time    SODIUM 143 09/24/2020 07:11 AM    POTASSIUM 4.6 09/24/2020 07:11 AM    CHLORIDE 105 09/24/2020 07:11 AM    CO2 22 09/24/2020 07:11 AM    GLUCOSE 97 09/24/2020 07:11 AM    BUN 15 09/24/2020 07:11 AM    CREATININE 0.72 09/24/2020 07:11 AM     Lab Results   Component Value Date/Time    ALKPHOSPHAT 45 01/28/2020 06:49 AM    ASTSGOT 18 01/28/2020 06:49 AM    ALTSGPT 35 09/24/2020 07:11 AM    TBILIRUBIN 0.5 01/28/2020 06:49 AM

## 2021-01-11 DIAGNOSIS — Z23 NEED FOR VACCINATION: ICD-10-CM

## 2021-02-11 ENCOUNTER — HOSPITAL ENCOUNTER (OUTPATIENT)
Dept: RADIOLOGY | Facility: MEDICAL CENTER | Age: 81
End: 2021-02-11
Attending: INTERNAL MEDICINE
Payer: MEDICARE

## 2021-02-11 ENCOUNTER — TELEPHONE (OUTPATIENT)
Dept: MEDICAL GROUP | Facility: PHYSICIAN GROUP | Age: 81
End: 2021-02-11

## 2021-02-11 ENCOUNTER — OFFICE VISIT (OUTPATIENT)
Dept: MEDICAL GROUP | Facility: PHYSICIAN GROUP | Age: 81
End: 2021-02-11
Payer: MEDICARE

## 2021-02-11 VITALS
DIASTOLIC BLOOD PRESSURE: 62 MMHG | BODY MASS INDEX: 30.13 KG/M2 | TEMPERATURE: 98.1 F | HEIGHT: 67 IN | RESPIRATION RATE: 16 BRPM | SYSTOLIC BLOOD PRESSURE: 128 MMHG | WEIGHT: 192 LBS | OXYGEN SATURATION: 93 % | HEART RATE: 49 BPM

## 2021-02-11 DIAGNOSIS — Z00.00 MEDICARE ANNUAL WELLNESS VISIT, SUBSEQUENT: Primary | ICD-10-CM

## 2021-02-11 DIAGNOSIS — M54.2 NECK PAIN: ICD-10-CM

## 2021-02-11 DIAGNOSIS — J43.9 PULMONARY EMPHYSEMA, UNSPECIFIED EMPHYSEMA TYPE (HCC): ICD-10-CM

## 2021-02-11 DIAGNOSIS — E55.9 VITAMIN D DEFICIENCY: Chronic | ICD-10-CM

## 2021-02-11 DIAGNOSIS — R68.84 JAW PAIN: ICD-10-CM

## 2021-02-11 DIAGNOSIS — R32 URINARY INCONTINENCE, UNSPECIFIED TYPE: Chronic | ICD-10-CM

## 2021-02-11 DIAGNOSIS — I10 ESSENTIAL HYPERTENSION: Chronic | ICD-10-CM

## 2021-02-11 DIAGNOSIS — E78.5 DYSLIPIDEMIA: Chronic | ICD-10-CM

## 2021-02-11 PROCEDURE — 72050 X-RAY EXAM NECK SPINE 4/5VWS: CPT

## 2021-02-11 PROCEDURE — 8041 PR SCP AHA: Performed by: INTERNAL MEDICINE

## 2021-02-11 PROCEDURE — G0439 PPPS, SUBSEQ VISIT: HCPCS | Performed by: INTERNAL MEDICINE

## 2021-02-11 RX ORDER — CYCLOBENZAPRINE HCL 10 MG
TABLET ORAL
COMMUNITY
Start: 2021-02-09 | End: 2021-02-11

## 2021-02-11 RX ORDER — ALBUTEROL SULFATE 90 UG/1
2 AEROSOL, METERED RESPIRATORY (INHALATION) EVERY 6 HOURS PRN
COMMUNITY
End: 2021-09-01

## 2021-02-11 ASSESSMENT — ACTIVITIES OF DAILY LIVING (ADL): BATHING_REQUIRES_ASSISTANCE: 0

## 2021-02-11 ASSESSMENT — FIBROSIS 4 INDEX: FIB4 SCORE: 1.34

## 2021-02-11 ASSESSMENT — PATIENT HEALTH QUESTIONNAIRE - PHQ9: CLINICAL INTERPRETATION OF PHQ2 SCORE: 0

## 2021-02-11 ASSESSMENT — ENCOUNTER SYMPTOMS: GENERAL WELL-BEING: GOOD

## 2021-02-11 NOTE — ASSESSMENT & PLAN NOTE
Chronic cond  Sx noted > 1year  No recent trauma or injury  Denies fever, chills, or paresthesia  Pain noted w neck movement  Has been taking tylenol prn    Neck: no significant deformity, redness or swelling.   ROM of neck limited due to pain especially neck flexion/extension and lateral rotation.   Moderate tightness noted in the upper trapezius M region bilat.    Xrays ordered  Referred PT

## 2021-02-11 NOTE — PROGRESS NOTES
Chief Complaint   Patient presents with   • Annual Wellness Visit         HPI:  Brian is a 80 y.o. here for Medicare Annual Wellness Visit        Patient Active Problem List    Diagnosis Date Noted   • COPD 02/11/2021   • Jaw pain 02/11/2021   • Neck pain 02/11/2021   • Proteinuria 09/25/2020   • Urinary incontinence 08/20/2020   • Prediabetes 02/04/2020   • Vitamin D deficiency 02/04/2020   • Vitamin B12 deficiency 02/04/2020   • Chronic left-sided low back pain without sciatica 02/04/2020   • Intestinal polyp 01/09/2020   • Former smoker 01/09/2020   • Dyslipidemia 01/09/2020   • Joint pain 03/14/2019   • Status post right knee replacement 11/14/2018   • Hypertension 04/17/2014   • BPH (benign prostatic hyperplasia) 04/17/2014       Current Outpatient Medications   Medication Sig Dispense Refill   • albuterol 108 (90 Base) MCG/ACT Aero Soln inhalation aerosol Inhale 2 Puffs every 6 hours as needed.     • tamsulosin (FLOMAX) 0.4 MG capsule Take 1 Cap by mouth every bedtime. 90 Cap 3   • pravastatin (PRAVACHOL) 40 MG tablet Take 1 Tab by mouth every day. 100 Tab 2   • amLODIPine (NORVASC) 5 MG Tab Take 1 Tab by mouth every day. 100 Tab 1   • meloxicam (MOBIC) 7.5 MG Tab TAKE ONE TABLET BY MOUTH TWICE DAILY AS NEEDED 90 Tab 1   • atenolol (TENORMIN) 50 MG Tab TAKE ONE TABLET BY MOUTH EVERY DAY 90 Tab 1   • finasteride (PROSCAR) 5 MG Tab finasteride 5 mg tablet   Take 1 tablet every day by oral route.     • Mirabegron ER (MYRBETRIQ) 50 MG TABLET SR 24 HR Myrbetriq 50 mg tablet,extended release   Take 1 tablet every day by oral route as directed for 120 days.     • gabapentin (NEURONTIN) 100 MG Cap Take 1-3 Caps by mouth at bedtime as needed (pain). 90 Cap 11   • cyanocobalamin (VITAMIN B-12) 500 MCG Tab Take 500 mcg by mouth every day.     • Multiple Vitamins-Minerals (MULTIVITAMIN ADULT PO) Take  by mouth every day.     • Calcium Carb-Cholecalciferol (CALCIUM + D3 PO) Take  by mouth every day.     • Ergocalciferol  (VITAMIN D2) 50 MCG (2000 UT) Tab Take 4,000 Units by mouth every day.       No current facility-administered medications for this visit.        Patient is taking medications as noted in medication list.  Current supplements as per medication list.     Allergies: Patient has no known allergies.    Current social contact/activities: Pt states he likes to work on his cars and enjoys doing yardwork in the summer and likes going to car shows.     Is patient current with immunizations? No, due for SHINGRIX (Shingles) and COVID 19. Patient is interested in receiving NONE today.    He  reports that he quit smoking about 10 years ago. His smoking use included cigarettes. He started smoking about 66 years ago. He has a 50.00 pack-year smoking history. He has never used smokeless tobacco. He reports current alcohol use of about 8.4 oz of alcohol per week. He reports that he does not use drugs.  Counseling given: Not Answered        DPA/Advanced directive: Patient has Advanced Directive, but it is not on file. Instructed to bring in a copy to scan into their chart.    ROS:    Gait: Uses no assistive device   Ostomy: No   Other tubes: No   Amputations: No   Chronic oxygen use No   Last eye exam Pt states about a year ago   Wears hearing aids: No   : Reports urinary leakage during the last 6 months that has not interfered at all with their daily activities or sleep.      Screening:        Depression Screening    Little interest or pleasure in doing things?  0 - not at all  Feeling down, depressed, or hopeless? 0 - not at all  Patient Health Questionnaire Score: 0    If depressive symptoms identified deferred to follow up visit unless specifically addressed in assessment and plan.    Interpretation of PHQ-9 Total Score   Score Severity   1-4 No Depression   5-9 Mild Depression   10-14 Moderate Depression   15-19 Moderately Severe Depression   20-27 Severe Depression    Screening for Cognitive Impairment    Three Minute Recall  (river, nation, finger)  2/3 Crystal, Daughter & Cass  [place] clock face with all 12 numbers and set the hands to show 10 past 11.  No 20 past 12    4/5  If cognitive concerns identified, deferred for follow up unless specifically addressed in assessment and plan.    Fall Risk Assessment    Has the patient had two or more falls in the last year or any fall with injury in the last year?  No  If fall risk identified, deferred for follow up unless specifically addressed in assessment and plan.    Safety Assessment    Throw rugs on floor.  Yes  Handrails on all stairs.  No  Good lighting in all hallways.  Yes  Difficulty hearing.  No  Patient counseled about all safety risks that were identified.    Functional Assessment ADLs    Are there any barriers preventing you from cooking for yourself or meeting nutritional needs?  No.    Are there any barriers preventing you from driving safely or obtaining transportation?  No.    Are there any barriers preventing you from using a telephone or calling for help?  No.    Are there any barriers preventing you from shopping?  No.    Are there any barriers preventing you from taking care of your own finances?  No.    Are there any barriers preventing you from managing your medications?  No.    Are there any barriers preventing you from showering, bathing or dressing yourself?  No.    Are you currently engaging in any exercise or physical activity?  No.     What is your perception of your health?  Good.    Health Maintenance Summary                COVID-19 Vaccine Overdue 3/17/1956     IMM ZOSTER VACCINES Postponed 7/11/2021 Originally 3/17/1990. Patient Refused    Annual Wellness Visit Next Due 2/12/2022      Done 2/11/2021      Patient has more history with this topic...    COLONOSCOPY Next Due 8/22/2023      Done 8/22/2018 REFERRAL TO GI FOR COLONOSCOPY     Patient has more history with this topic...    IMM DTaP/Tdap/Td Vaccine Next Due 10/11/2024      Done 10/11/2014 Imm Admin:  Tdap Vaccine          Patient Care Team:  Eduard Lake M.D. as PCP - General (Internal Medicine)  Carlos Jules M.D. as Consulting Physician (Gastroenterology)  BRET Fuentes as Consulting Physician (Urology)  Ron Quintanilla Ass't (Inactive) as [facility]     Social History     Tobacco Use   • Smoking status: Former Smoker     Packs/day: 1.00     Years: 50.00     Pack years: 50.00     Types: Cigarettes     Start date: 1/9/1955     Quit date: 4/17/2010     Years since quitting: 10.8   • Smokeless tobacco: Never Used   Substance Use Topics   • Alcohol use: Yes     Alcohol/week: 8.4 oz     Types: 14 Cans of beer per week     Comment: 3 per day   • Drug use: No     Family History   Problem Relation Age of Onset   • Stroke Mother    • Heart Attack Father 85   • Heart Disease Father    • Cancer Neg Hx      He  has a past medical history of Arthritis, BPH (benign prostatic hyperplasia) (4/17/2014), Cataract, COPD (chronic obstructive pulmonary disease) ([facility]), Dental disorder, High cholesterol, Hypertension (4/17/2014), Hypertriglyceridemia (4/17/2014), Intestinal polyp, Pain (05/29/2019), Pulmonary emphysema (HCC) (2/11/2021), Snoring, and Swelling of thyroid gland (2/4/2020). He also has no past medical history of Anxiety, Arrhythmia, Cancer (HCC), CHF (congestive heart failure) (HCC), Clotting disorder (HCC), Depression, Diabetes (HCC), Heart attack (HCC), IBD (inflammatory bowel disease), Kidney disease, Migraine, Muscle disorder, Osteoporosis, Seizure (HCC), Stroke (HCC), or Substance abuse ([facility]).   Past Surgical History:   Procedure Laterality Date   • PB INCISE FINGER TENDON SHEATH Right 5/31/2019    Procedure: RELEASE, TRIGGER FINGER-  RING;  Surgeon: Simon Altamirano M.D.;  Location: SURGERY SAME DAY [facility];  Service: Orthopedics   • SYNOVECTOMY Right 5/31/2019    Procedure: fasciatomy of palm;  Surgeon: Simon Altamirano M.D.;  Location: SURGERY SAME DAY  [facility] ORS;  Service: Orthopedics   • KNEE ARTHROPLASTY TOTAL Right 6/7/2017    Procedure: KNEE ARTHROPLASTY TOTAL;  Surgeon: Steffanie Del Angel M.D.;  Location: SURGERY [facility] ORS;  Service:    • COLON RESECTION ROBOTIC  6/16/2014    Performed by Ezra Burks M.D. at SURGERY [facility]   • CATARACT EXTRACTION WITH IOL Bilateral 2011   • KNEE ARTHROPLASTY TOTAL Left 2009   • APPENDECTOMY  1970's   • ARTHROSCOPY, KNEE     • CHOLECYSTECTOMY     • HEMORRHOIDECTOMY     • TRIGGER FINGER RELEASE Bilateral last 2000's    multiple            Exam:       Hearing fair.    Dentition fair  Alert, oriented in no acute distress.  Eye contact is good, speech goal directed, affect calm      Assessment and Plan.     Dyslipidemia  This is a chronic condition.  The patient is now taking pravastatin.  Lab tests ordered for follow-up.  No significant side effects reported by the patient.    Hypertension  Chronic condition.  Patient is taking atenolol and amlodipine.    Urinary incontinence  This is a chronic condition for the patient currently followed by urology service  Currently on treatment    Vitamin D deficiency  Chronic condition patient is taking vitamin D supplementation.  Lab tests ordered for follow-up    Jaw pain  New cond   noted since 2mo  Affecting L jaw  Difficulty chewing due to pain  No h.o trauma or injury  Exam : mod pain noted w palpation L jaw noted w opening/closing  Pt was seen by dentist and was given flexeril w.o improvement  Referred pt to ENT for further eval. Likely TMJ    Neck pain  Chronic cond  Sx noted > 1year  No recent trauma or injury  Denies fever, chills, or paresthesia  Pain noted w neck movement  Has been taking tylenol prn    Neck: no significant deformity, redness or swelling.   ROM of neck limited due to pain especially neck flexion/extension and lateral rotation.   Moderate tightness noted in the upper trapezius M region bilat.    Xrays ordered  Referred PT    COPD  Patient was  seen by pulmonology service previously and was diagnosed with this condition.  Patient reported 50-pack-year smoking history previously.  Currently denies fever chills shortness of breath wheezing or significant cough.  Patient is using albuterol as needed.          Health Care Screening recommendations as per orders if indicated.  Referrals offered: PT/OT/Nutrition counseling/Behavioral Health/Smoking cessation as per orders if indicated.    Discussion today about general wellness and lifestyle habits:    · Prevent falls and reduce trip hazards; Cautioned about securing or removing rugs.  · Have a working fire alarm and carbon monoxide detector;   · Engage in regular physical activity and social activities       Follow-up: Return in about 6 months (around 8/11/2021).

## 2021-02-11 NOTE — ASSESSMENT & PLAN NOTE
Patient was seen by pulmonology service previously and was diagnosed with this condition.  Patient reported 50-pack-year smoking history previously.  Currently denies fever chills shortness of breath wheezing or significant cough.  Patient is using albuterol as needed.

## 2021-02-11 NOTE — TELEPHONE ENCOUNTER
Message  Received: Today  Message Contents   Eduard Lake M.D.  [facility]     Please call patient :  xrays showed   There is overall stable appearance of the cervical spine with findings suggestive of either DISH degenerative type change versus ankylosing spondylitis.     A referral has been submitted to PHYSIATRY   for further evaluation.   Please call [phone] and ask for Referral Department.     Informed pt of Dr Lake's message above.

## 2021-02-11 NOTE — ASSESSMENT & PLAN NOTE
This is a chronic condition.  The patient is now taking pravastatin.  Lab tests ordered for follow-up.  No significant side effects reported by the patient.

## 2021-02-11 NOTE — ASSESSMENT & PLAN NOTE
This is a chronic condition for the patient currently followed by urology service  Currently on treatment

## 2021-02-13 ENCOUNTER — HOSPITAL ENCOUNTER (OUTPATIENT)
Dept: LAB | Facility: MEDICAL CENTER | Age: 81
End: 2021-02-13
Attending: INTERNAL MEDICINE
Payer: MEDICARE

## 2021-02-13 DIAGNOSIS — E78.5 DYSLIPIDEMIA: Chronic | ICD-10-CM

## 2021-02-13 DIAGNOSIS — I10 ESSENTIAL HYPERTENSION: Chronic | ICD-10-CM

## 2021-02-13 DIAGNOSIS — E55.9 VITAMIN D DEFICIENCY: Chronic | ICD-10-CM

## 2021-02-13 LAB
25(OH)D3 SERPL-MCNC: 54 NG/ML (ref 30–100)
ALT SERPL-CCNC: 29 U/L (ref 2–50)
ANION GAP SERPL CALC-SCNC: 10 MMOL/L (ref 7–16)
BASOPHILS # BLD AUTO: 0.7 % (ref 0–1.8)
BASOPHILS # BLD: 0.04 K/UL (ref 0–0.12)
BUN SERPL-MCNC: 9 MG/DL (ref 8–22)
CALCIUM SERPL-MCNC: 9.3 MG/DL (ref 8.5–10.5)
CHLORIDE SERPL-SCNC: 101 MMOL/L (ref 96–112)
CHOLEST SERPL-MCNC: 157 MG/DL (ref 100–199)
CO2 SERPL-SCNC: 27 MMOL/L (ref 20–33)
CREAT SERPL-MCNC: 0.8 MG/DL (ref 0.5–1.4)
CREAT UR-MCNC: 86.41 MG/DL
EOSINOPHIL # BLD AUTO: 0.1 K/UL (ref 0–0.51)
EOSINOPHIL NFR BLD: 1.7 % (ref 0–6.9)
ERYTHROCYTE [DISTWIDTH] IN BLOOD BY AUTOMATED COUNT: 44 FL (ref 35.9–50)
GLUCOSE SERPL-MCNC: 105 MG/DL (ref 65–99)
HCT VFR BLD AUTO: 51.5 % (ref 42–52)
HDLC SERPL-MCNC: 52 MG/DL
HGB BLD-MCNC: 17.5 G/DL (ref 14–18)
IMM GRANULOCYTES # BLD AUTO: 0.01 K/UL (ref 0–0.11)
IMM GRANULOCYTES NFR BLD AUTO: 0.2 % (ref 0–0.9)
LDLC SERPL CALC-MCNC: 63 MG/DL
LYMPHOCYTES # BLD AUTO: 1.48 K/UL (ref 1–4.8)
LYMPHOCYTES NFR BLD: 24.5 % (ref 22–41)
MCH RBC QN AUTO: 30.4 PG (ref 27–33)
MCHC RBC AUTO-ENTMCNC: 34 G/DL (ref 33.7–35.3)
MCV RBC AUTO: 89.4 FL (ref 81.4–97.8)
MICROALBUMIN UR-MCNC: 1.7 MG/DL
MICROALBUMIN/CREAT UR: 20 MG/G (ref 0–30)
MONOCYTES # BLD AUTO: 0.41 K/UL (ref 0–0.85)
MONOCYTES NFR BLD AUTO: 6.8 % (ref 0–13.4)
NEUTROPHILS # BLD AUTO: 3.99 K/UL (ref 1.82–7.42)
NEUTROPHILS NFR BLD: 66.1 % (ref 44–72)
NRBC # BLD AUTO: 0 K/UL
NRBC BLD-RTO: 0 /100 WBC
PLATELET # BLD AUTO: 175 K/UL (ref 164–446)
PMV BLD AUTO: 11.3 FL (ref 9–12.9)
POTASSIUM SERPL-SCNC: 4.4 MMOL/L (ref 3.6–5.5)
RBC # BLD AUTO: 5.76 M/UL (ref 4.7–6.1)
SODIUM SERPL-SCNC: 138 MMOL/L (ref 135–145)
TRIGL SERPL-MCNC: 208 MG/DL (ref 0–149)
TSH SERPL DL<=0.005 MIU/L-ACNC: 1.21 UIU/ML (ref 0.38–5.33)
WBC # BLD AUTO: 6 K/UL (ref 4.8–10.8)

## 2021-02-13 PROCEDURE — 84443 ASSAY THYROID STIM HORMONE: CPT

## 2021-02-13 PROCEDURE — 82570 ASSAY OF URINE CREATININE: CPT

## 2021-02-13 PROCEDURE — 82043 UR ALBUMIN QUANTITATIVE: CPT

## 2021-02-13 PROCEDURE — 82306 VITAMIN D 25 HYDROXY: CPT

## 2021-02-13 PROCEDURE — 85025 COMPLETE CBC W/AUTO DIFF WBC: CPT

## 2021-02-13 PROCEDURE — 80061 LIPID PANEL: CPT

## 2021-02-13 PROCEDURE — 36415 COLL VENOUS BLD VENIPUNCTURE: CPT

## 2021-02-13 PROCEDURE — 80048 BASIC METABOLIC PNL TOTAL CA: CPT

## 2021-02-13 PROCEDURE — 84460 ALANINE AMINO (ALT) (SGPT): CPT

## 2021-02-23 ENCOUNTER — IMMUNIZATION (OUTPATIENT)
Dept: FAMILY PLANNING/WOMEN'S HEALTH CLINIC | Facility: IMMUNIZATION CENTER | Age: 81
End: 2021-02-23
Attending: INTERNAL MEDICINE
Payer: MEDICARE

## 2021-02-23 DIAGNOSIS — Z23 ENCOUNTER FOR VACCINATION: Primary | ICD-10-CM

## 2021-02-23 DIAGNOSIS — Z23 NEED FOR VACCINATION: ICD-10-CM

## 2021-02-23 PROCEDURE — 91300 PFIZER SARS-COV-2 VACCINE: CPT

## 2021-02-23 PROCEDURE — 0001A PFIZER SARS-COV-2 VACCINE: CPT

## 2021-03-11 ENCOUNTER — OFFICE VISIT (OUTPATIENT)
Dept: PHYSICAL MEDICINE AND REHAB | Facility: MEDICAL CENTER | Age: 81
End: 2021-03-11
Payer: MEDICARE

## 2021-03-11 VITALS
TEMPERATURE: 98.4 F | HEIGHT: 67 IN | BODY MASS INDEX: 30.17 KG/M2 | HEART RATE: 82 BPM | WEIGHT: 192.24 LBS | DIASTOLIC BLOOD PRESSURE: 72 MMHG | SYSTOLIC BLOOD PRESSURE: 138 MMHG | OXYGEN SATURATION: 91 %

## 2021-03-11 DIAGNOSIS — M79.10 MYALGIA: ICD-10-CM

## 2021-03-11 DIAGNOSIS — M48.10 DIFFUSE IDIOPATHIC SKELETAL HYPEROSTOSIS: ICD-10-CM

## 2021-03-11 DIAGNOSIS — R29.898 DECREASED RANGE OF MOTION OF NECK: ICD-10-CM

## 2021-03-11 DIAGNOSIS — M47.812 CERVICAL SPONDYLOSIS: ICD-10-CM

## 2021-03-11 DIAGNOSIS — M54.2 NECK PAIN: ICD-10-CM

## 2021-03-11 DIAGNOSIS — M79.10 TRIGGER POINT: ICD-10-CM

## 2021-03-11 PROCEDURE — 99214 OFFICE O/P EST MOD 30 MIN: CPT | Performed by: PHYSICAL MEDICINE & REHABILITATION

## 2021-03-11 RX ORDER — GABAPENTIN 100 MG/1
100-300 CAPSULE ORAL NIGHTLY PRN
Qty: 90 CAPSULE | Refills: 1 | Status: SHIPPED | OUTPATIENT
Start: 2021-03-11 | End: 2021-05-05

## 2021-03-11 ASSESSMENT — PAIN SCALES - GENERAL: PAINLEVEL: 5=MODERATE PAIN

## 2021-03-11 ASSESSMENT — FIBROSIS 4 INDEX: FIB4 SCORE: 1.53

## 2021-03-11 NOTE — PROGRESS NOTES
"Follow up patient note  Interventional spine and sports physiatry, Physical medicine rehabilitation      Chief complaint:   Chief Complaint   Patient presents with   • Follow-Up     Neck pain          HISTORY    Please see new patient note by Dr Villela,  for more details.     HPI  Patient identification: Brian Lopez  1940,  male  With Diagnoses of Diffuse idiopathic skeletal hyperostosis, Decreased range of motion of neck, Cervical spondylosis, Neck pain, Myalgia, and Trigger point were pertinent to this visit.     Chronic bilateral neck pain 5 out of 10 in intensity aching quality nonradiating with decreased range of motion of the cervical spine denies numbness tingling.  Denies radiating pains down the arms.  Denies weakness.       ROS Red Flags :   Fever, Chills, Sweats: Denies  Involuntary Weight Loss: Denies  Bowel/Bladder Incontinence: Denies  Saddle Anesthesia: Denies        PMHx:   Past Medical History:   Diagnosis Date   • Arthritis     hands   • BPH (benign prostatic hyperplasia) 2014   • Cataract     removed bilat   • COPD (chronic obstructive pulmonary disease) (HCC)    • Dental disorder     upper/lower dentures   • High cholesterol    • Hypertension 2014   • Hypertriglyceridemia 2014   • Intestinal polyp    • Pain 2019    right hand, 5-6/10   • Pulmonary emphysema (HCC) 2021    Saw [facility] previously dr Merary Wang  \"...However he was found to have some changes consistent with emphysema. A 50-pack-year smoking    • Snoring    • Swelling of thyroid gland 2020       PSHx:   Past Surgical History:   Procedure Laterality Date   • PB INCISE FINGER TENDON SHEATH Right 2019    Procedure: RELEASE, TRIGGER FINGER-  RING;  Surgeon: Simon Altamirano M.D.;  Location: SURGERY SAME DAY [facility];  Service: Orthopedics   • SYNOVECTOMY Right 2019    Procedure: fasciatomy of palm;  Surgeon: Simon Altamirano M.D.;  Location: SURGERY SAME DAY [facility]" ORS;  Service: Orthopedics   • KNEE ARTHROPLASTY TOTAL Right 6/7/2017    Procedure: KNEE ARTHROPLASTY TOTAL;  Surgeon: Steffanie Del Angel M.D.;  Location: SURGERY [facility] ORS;  Service:    • COLON RESECTION ROBOTIC  6/16/2014    Performed by Ezra Burks M.D. at SURGERY [facility] ORS   • CATARACT EXTRACTION WITH IOL Bilateral 2011   • KNEE ARTHROPLASTY TOTAL Left 2009   • APPENDECTOMY  1970's   • ARTHROSCOPY, KNEE     • CHOLECYSTECTOMY     • HEMORRHOIDECTOMY     • TRIGGER FINGER RELEASE Bilateral last 2000's    multiple        Family history   Family History   Problem Relation Age of Onset   • Stroke Mother    • Heart Attack Father 85   • Heart Disease Father    • Cancer Neg Hx          Medications:   Outpatient Medications Marked as Taking for the 3/11/21 encounter (Office Visit) with Melo Villela M.D.   Medication Sig Dispense Refill   • gabapentin (NEURONTIN) 100 MG Cap Take 1-3 Capsules by mouth at bedtime as needed (pain). 90 capsule 1   • albuterol 108 (90 Base) MCG/ACT Aero Soln inhalation aerosol Inhale 2 Puffs every 6 hours as needed.     • tamsulosin (FLOMAX) 0.4 MG capsule Take 1 Cap by mouth every bedtime. 90 Cap 3   • pravastatin (PRAVACHOL) 40 MG tablet Take 1 Tab by mouth every day. 100 Tab 2   • amLODIPine (NORVASC) 5 MG Tab Take 1 Tab by mouth every day. 100 Tab 1   • meloxicam (MOBIC) 7.5 MG Tab TAKE ONE TABLET BY MOUTH TWICE DAILY AS NEEDED 90 Tab 1   • atenolol (TENORMIN) 50 MG Tab TAKE ONE TABLET BY MOUTH EVERY DAY 90 Tab 1   • finasteride (PROSCAR) 5 MG Tab finasteride 5 mg tablet   Take 1 tablet every day by oral route.     • Mirabegron ER (MYRBETRIQ) 50 MG TABLET SR 24 HR Myrbetriq 50 mg tablet,extended release   Take 1 tablet every day by oral route as directed for 120 days.     • Ergocalciferol (VITAMIN D2) 50 MCG (2000 UT) Tab Take 4,000 Units by mouth every day.     • cyanocobalamin (VITAMIN B-12) 500 MCG Tab Take 500 mcg by mouth every day.     • Multiple Vitamins-Minerals  (MULTIVITAMIN ADULT PO) Take  by mouth every day.     • Calcium Carb-Cholecalciferol (CALCIUM + D3 PO) Take  by mouth every day.          Current Outpatient Medications on File Prior to Visit   Medication Sig Dispense Refill   • albuterol 108 (90 Base) MCG/ACT Aero Soln inhalation aerosol Inhale 2 Puffs every 6 hours as needed.     • tamsulosin (FLOMAX) 0.4 MG capsule Take 1 Cap by mouth every bedtime. 90 Cap 3   • pravastatin (PRAVACHOL) 40 MG tablet Take 1 Tab by mouth every day. 100 Tab 2   • amLODIPine (NORVASC) 5 MG Tab Take 1 Tab by mouth every day. 100 Tab 1   • meloxicam (MOBIC) 7.5 MG Tab TAKE ONE TABLET BY MOUTH TWICE DAILY AS NEEDED 90 Tab 1   • atenolol (TENORMIN) 50 MG Tab TAKE ONE TABLET BY MOUTH EVERY DAY 90 Tab 1   • finasteride (PROSCAR) 5 MG Tab finasteride 5 mg tablet   Take 1 tablet every day by oral route.     • Mirabegron ER (MYRBETRIQ) 50 MG TABLET SR 24 HR Myrbetriq 50 mg tablet,extended release   Take 1 tablet every day by oral route as directed for 120 days.     • Ergocalciferol (VITAMIN D2) 50 MCG (2000 UT) Tab Take 4,000 Units by mouth every day.     • cyanocobalamin (VITAMIN B-12) 500 MCG Tab Take 500 mcg by mouth every day.     • Multiple Vitamins-Minerals (MULTIVITAMIN ADULT PO) Take  by mouth every day.     • Calcium Carb-Cholecalciferol (CALCIUM + D3 PO) Take  by mouth every day.       No current facility-administered medications on file prior to visit.         Allergies:   No Known Allergies    Social Hx:   Social History     Socioeconomic History   • Marital status:      Spouse name: Not on file   • Number of children: Not on file   • Years of education: Not on file   • Highest education level: Not on file   Occupational History   • Not on file   Tobacco Use   • Smoking status: Former Smoker     Packs/day: 1.00     Years: 50.00     Pack years: 50.00     Types: Cigarettes     Start date: 1/9/1955     Quit date: 4/17/2010     Years since quitting: 10.9   • Smokeless tobacco:  Never Used   Substance and Sexual Activity   • Alcohol use: Yes     Alcohol/week: 8.4 oz     Types: 14 Cans of beer per week     Comment: 3 per day   • Drug use: No   • Sexual activity: Yes     Partners: Female     Comment:  / retired   Other Topics Concern   •  Service No   • Blood Transfusions No   • Caffeine Concern No   • Occupational Exposure No   • Hobby Hazards No   • Sleep Concern Yes   • Stress Concern No   • Weight Concern Yes   • Special Diet No   • Back Care No   • Exercise Yes   • Bike Helmet No   • Seat Belt Yes   • Self-Exams No   Social History Narrative    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  Lives with his wife.  No social or domestic concerns.  No hearing aids or hearing concerns no walking issues or balance or falls.  He has no issues driving.  No major memory concerns.  He did have eye surgery done for his cataracts and his colon partially removed for a polyp.  He also had operation on a trigger finger.  No hospitalizations in the last year though.     Social Determinants of Health     Financial Resource Strain:    • Difficulty of Paying Living Expenses:    Food Insecurity:    • Worried About Running Out of Food in the Last Year:    • Ran Out of Food in the Last Year:    Transportation Needs:    • Lack of Transportation (Medical):    • Lack of Transportation (Non-Medical):    Physical Activity:    • Days of Exercise per Week:    • Minutes of Exercise per Session:    Stress:    • Feeling of Stress :    Social Connections:    • Frequency of Communication with Friends and Family:    • Frequency of Social Gatherings with Friends and Family:    • Attends Christianity Services:    • Active Member of Clubs or Organizations:    • Attends Club or Organization Meetings:    • Marital Status:    Intimate Partner Violence:    • Fear of Current or Ex-Partner:    • Emotionally Abused:    • Physically Abused:    • Sexually Abused:          EXAMINATION     Physical Exam:   Vitals: BP  "138/72 (BP Location: Left arm, Patient Position: Sitting, BP Cuff Size: Adult)   Pulse 82   Temp 36.9 °C (98.4 °F) (Temporal)   Ht 1.702 m (5' 7\")   Wt 87.2 kg (192 lb 3.9 oz)   SpO2 91%     Constitutional:   Body Habitus: Body mass index is 30.11 kg/m².  Cooperation: Fully cooperates with exam  Appearance: Well-groomed no disheveled    Respiratory-  breathing comfortable on room air, no audible wheezing  Cardiovascular- capillary refills less than 2 seconds. No lower extremity edema is noted.   Psychiatric- alert and oriented ×3. Normal affect.    MSK and Neuro: -      Cervical spine  There are no signs of infection around the injection sites.     decreased active range of motion with flexion, lateral flexion, and rotation bilaterally.   There is decreased active range of motion with cervical extension.      Palpation:   Tenderness to palpation throughout the cervical spine: negative bilaterally        Cervical spine Special tests  Neuro tension  Spurling's maneuver negative bilaterally           Key points for the international standards for neurological classification of spinal cord injury (ISNCSCI) to light touch.     Dermatome R L   C4 2 2   C5 2 2   C6 2 2   C7 2 2   C8 2 2   T1 2 2   T2 2 2                                         Motor Exam Upper Extremities   ? Myotome R L   Shoulder flexion C5 5 5   Elbow flexion C5 5 5   Wrist extension C6 5 5   Elbow extension C7 5 5   Finger flexion C8 5 5   Finger abduction T1 5 5                 MEDICAL DECISION MAKING    DATA    Labs:   Lab Results   Component Value Date/Time    SODIUM 138 02/13/2021 07:41 AM    POTASSIUM 4.4 02/13/2021 07:41 AM    CHLORIDE 101 02/13/2021 07:41 AM    CO2 27 02/13/2021 07:41 AM    GLUCOSE 105 (H) 02/13/2021 07:41 AM    BUN 9 02/13/2021 07:41 AM    CREATININE 0.80 02/13/2021 07:41 AM        Lab Results   Component Value Date/Time    PROTHROMBTM 14.1 05/24/2017 10:49 AM    INR 1.06 05/24/2017 10:49 AM        Lab Results   Component " Value Date/Time    WBC 6.0 02/13/2021 07:41 AM    RBC 5.76 02/13/2021 07:41 AM    HEMOGLOBIN 17.5 02/13/2021 07:41 AM    HEMATOCRIT 51.5 02/13/2021 07:41 AM    MCV 89.4 02/13/2021 07:41 AM    MCH 30.4 02/13/2021 07:41 AM    MCHC 34.0 02/13/2021 07:41 AM    MPV 11.3 02/13/2021 07:41 AM    NEUTSPOLYS 66.10 02/13/2021 07:41 AM    LYMPHOCYTES 24.50 02/13/2021 07:41 AM    MONOCYTES 6.80 02/13/2021 07:41 AM    EOSINOPHILS 1.70 02/13/2021 07:41 AM    BASOPHILS 0.70 02/13/2021 07:41 AM    HYPOCHROMIA 1+ 06/11/2014 09:54 AM        Lab Results   Component Value Date/Time    HBA1C 5.9 (H) 09/24/2020 07:11 AM          Imaging:   I personally reviewed following images    MRI cervical spine 4/16/2020.  Bilateral facet arthropathy C3-4, C4-5, C5-6.  Minimal/mild bilateral neuroforaminal stenosis C3-4 and C5-6.  No significant central canal stenosis at any level.      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  No acute changes        I reviewed the following radiology reports    X-ray cervical spine 2/11/2021  IMPRESSION:     1.  There is overall stable appearance of the cervical spine with findings suggestive of either DISH degenerative type change versus ankylosing spondylitis.  2.  The alignment is within normal limits and there is no abnormal motion.                                 Results for orders placed during the hospital encounter of 04/16/20   MR-CERVICAL SPINE-W/O    Impression 1.  Diffuse idiopathic skeletal hyperostosis    2.  No significant central canal narrowing. Mild foraminal narrowing at several levels.                                                                                                                                               Results for orders placed in visit on 05/30/14   CT-ABDOMEN-PELVIS WITH    Impression 1.  Filling defect in the proximal ascending colon, suspicious for known colon malignancy.    2.  Diverticulosis    3.  Multiple hypodense liver lesions, consistent with small cyst. Other smaller indeterminate lesions likely represent cysts as well. However, metastasis cannot be excluded. If clinically indicated, hepatic protocol MRI would be helpful for further characterization.    4.  Subcutaneous nodule in the right buttock, likely a sebaceous cyst. Please correlate clinically.                                 Results for orders placed during the hospital encounter of 04/16/20   DX-CERVICAL SPINE-2 OR 3 VIEWS    Impression Large anterior bridging osteophytes suggestive of DISH versus ankylosing spondylitis.    Multilevel uncovertebral joint arthropathy.          Results for orders placed in visit on 06/11/14   DX-CHEST-2 VIEWS    Impression No acute cardiopulmonary process is identified.                          Results for orders placed in visit on 10/11/14   DX-HAND 3+    Impression No evidence of fracture or dislocation.                Results for orders placed during the hospital encounter of 06/07/17   DX-KNEE 2- RIGHT    Impression Post tricompartment arthroplasty.            Results for orders placed in visit on 04/17/14   DX-LUMBAR SPINE-2 OR 3 VIEWS    Impression Severe L2/3 endplate spurring but there is relative disc height preservation throughout the examination. This likely indicates a combination of diffuse idiopathic skeletal hyperostosis and degenerative disc disease    Advanced atherosclerosis with some ectasia of the abdominal aorta, maximal anterior-posterior caliber estimated at 29 mm      Results for orders placed during the hospital encounter of 02/11/20   DX-LUMBAR SPINE-4+ VIEWS    Impression 1. Moderate multilevel lumbar spondylosis.  2. Minimal grade 1 degenerative  anterolisthesis at L4-5, only seen in flexion.  3. No acute fracture.                                         DIAGNOSIS   Visit Diagnoses     ICD-10-CM   1. Diffuse idiopathic skeletal hyperostosis  M48.10   2. Decreased range of motion of neck  R29.898   3. Cervical spondylosis  M47.812   4. Neck pain  M54.2   5. Myalgia  M79.10   6. Trigger point  M79.10         ASSESSMENT and PLAN:     Brian Lopez  1940,   male      Brian was seen today for follow-up.    Diagnoses and all orders for this visit:    Diffuse idiopathic skeletal hyperostosis  -     REFERRAL TO PHYSICAL THERAPY  -     REFERRAL TO SPINE SURGERY  -     gabapentin (NEURONTIN) 100 MG Cap; Take 1-3 Capsules by mouth at bedtime as needed (pain).    Decreased range of motion of neck  -     REFERRAL TO PHYSICAL THERAPY  -     REFERRAL TO SPINE SURGERY  -     gabapentin (NEURONTIN) 100 MG Cap; Take 1-3 Capsules by mouth at bedtime as needed (pain).    Cervical spondylosis  -     REFERRAL TO PHYSICAL THERAPY  -     REFERRAL TO SPINE SURGERY  -     gabapentin (NEURONTIN) 100 MG Cap; Take 1-3 Capsules by mouth at bedtime as needed (pain).    Neck pain  -     REFERRAL TO PHYSICAL THERAPY  -     REFERRAL TO SPINE SURGERY  -     gabapentin (NEURONTIN) 100 MG Cap; Take 1-3 Capsules by mouth at bedtime as needed (pain).    Myalgia  -     REFERRAL TO PHYSICAL THERAPY  -     REFERRAL TO SPINE SURGERY  -     gabapentin (NEURONTIN) 100 MG Cap; Take 1-3 Capsules by mouth at bedtime as needed (pain).    Trigger point  -     REFERRAL TO PHYSICAL THERAPY  -     REFERRAL TO SPINE SURGERY  -     gabapentin (NEURONTIN) 100 MG Cap; Take 1-3 Capsules by mouth at bedtime as needed (pain).         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  He has essentially autofused throughout his cervical spine.  He has no radiating pains to suggest a cervical radiculopathy.  He does have trigger points and myalgia which  reproduce the patient's pain.  We discussed trigger point injections but the patient declined.  We discussed a cervical epidural and the patient declined.  He did request a referral to spine surgery for evaluation which is reasonable given the extent of his DISH syndrome.  I advised him to avoid chiropractic care    Follow up: As needed with me after the above consults    Thank you for allowing me to participate in the care of this patient. If you have any questions please not hesitate to contact me.        Please note that this dictation was created using voice recognition software. I have made every reasonable attempt to correct obvious errors but there may be errors of grammar and content that I may have overlooked prior to finalization of this note.      Melo Villela MD  Interventional Spine and Sports Physiatry  Physical Medicine and Rehabilitation  [facility]

## 2021-03-13 ENCOUNTER — IMMUNIZATION (OUTPATIENT)
Dept: FAMILY PLANNING/WOMEN'S HEALTH CLINIC | Facility: IMMUNIZATION CENTER | Age: 81
End: 2021-03-13
Attending: INTERNAL MEDICINE
Payer: MEDICARE

## 2021-03-13 DIAGNOSIS — Z23 ENCOUNTER FOR VACCINATION: Primary | ICD-10-CM

## 2021-03-13 PROCEDURE — 0002A PFIZER SARS-COV-2 VACCINE: CPT

## 2021-03-13 PROCEDURE — 91300 PFIZER SARS-COV-2 VACCINE: CPT

## 2021-03-17 NOTE — PROGRESS NOTES
Outcome: Completed birthday call.    Please transfer to Patient Outreach Team at [phone] when patient returns call.    Attempt # 1

## 2021-03-25 ENCOUNTER — PHYSICAL THERAPY (OUTPATIENT)
Dept: PHYSICAL THERAPY | Facility: REHABILITATION | Age: 81
End: 2021-03-25
Attending: PHYSICAL MEDICINE & REHABILITATION
Payer: MEDICARE

## 2021-03-25 DIAGNOSIS — M48.10 DIFFUSE IDIOPATHIC SKELETAL HYPEROSTOSIS: ICD-10-CM

## 2021-03-25 DIAGNOSIS — M79.10 MYALGIA: ICD-10-CM

## 2021-03-25 DIAGNOSIS — M47.812 CERVICAL SPONDYLOSIS: ICD-10-CM

## 2021-03-25 DIAGNOSIS — M54.2 NECK PAIN: ICD-10-CM

## 2021-03-25 DIAGNOSIS — M79.10 TRIGGER POINT: ICD-10-CM

## 2021-03-25 DIAGNOSIS — R29.898 DECREASED RANGE OF MOTION OF NECK: ICD-10-CM

## 2021-03-25 PROCEDURE — 97161 PT EVAL LOW COMPLEX 20 MIN: CPT

## 2021-03-25 PROCEDURE — 97110 THERAPEUTIC EXERCISES: CPT

## 2021-03-25 SDOH — ECONOMIC STABILITY: GENERAL: QUALITY OF LIFE: FAIR

## 2021-03-25 ASSESSMENT — ENCOUNTER SYMPTOMS
PAIN SCALE: 0
PAIN SCALE AT HIGHEST: 7
PAIN SCALE AT LOWEST: 0

## 2021-03-26 ENCOUNTER — PHYSICAL THERAPY (OUTPATIENT)
Dept: PHYSICAL THERAPY | Facility: REHABILITATION | Age: 81
End: 2021-03-26
Attending: PHYSICAL MEDICINE & REHABILITATION
Payer: MEDICARE

## 2021-03-26 DIAGNOSIS — M79.10 TRIGGER POINT: ICD-10-CM

## 2021-03-26 DIAGNOSIS — R29.898 DECREASED RANGE OF MOTION OF NECK: ICD-10-CM

## 2021-03-26 DIAGNOSIS — M79.10 MYALGIA: ICD-10-CM

## 2021-03-26 DIAGNOSIS — M48.10 DIFFUSE IDIOPATHIC SKELETAL HYPEROSTOSIS: ICD-10-CM

## 2021-03-26 DIAGNOSIS — M54.2 NECK PAIN: ICD-10-CM

## 2021-03-26 DIAGNOSIS — M47.812 CERVICAL SPONDYLOSIS: ICD-10-CM

## 2021-03-26 PROCEDURE — 97110 THERAPEUTIC EXERCISES: CPT

## 2021-03-26 NOTE — OP THERAPY DAILY TREATMENT
Outpatient Physical Therapy  DAILY TREATMENT     [facility] Outpatient Physical Therapy [place]  [address], [address]  [place] [address]  Phone:  [phone]  Fax:  [phone]    Date: 03/26/2021    Patient: Brian Lopez  YOB: 1940  MRN: [id number]     Time Calculation    Start time: 1030  Stop time: 1100 Time Calculation (min): 30 minutes         Chief Complaint: Neck Problem    Visit #: 2    SUBJECTIVE:  Patient reports no change in neck pain. States he is about the same.     OBJECTIVE:  Current objective measures:           Therapeutic Exercises (CPT 58106):       Therapeutic Exercise Summary: Access Code: [email or web address]      Exercises  Seated Shoulder Flexion AAROM with Pulley Behind - 1 x daily - 7 x weekly - 10 reps - 2 sets  Seated Shoulder Row with Anchored Resistance - 1 x daily - 7 x weekly - 10 reps - 2 sets  Shoulder Extension with Resistance - 1 x daily - 7 x weekly - 10 reps - 2 sets  Supine Cervical Rotation AROM on Pillow - 1 x daily - 7 x weekly - 2 sets - 10 reps  Seated Gentle Upper Trapezius Stretch - 1 x daily - 7 x weekly - 3 reps - 1 sets - 15sec hold        Time-based treatments/modalities:    Physical Therapy Timed Treatment Charges  Therapeutic exercise minutes (CPT 44220): 30 minutes      ASSESSMENT:   Response to treatment: Patient demonstrated a good understanding of assigned HEP. Patient will continue with this program and follow up with PT upon return from his trip.     PLAN/RECOMMENDATIONS:   Plan for treatment: therapy treatment to continue next visit.  Planned interventions for next visit: continue with current treatment.

## 2021-04-13 RX ORDER — MELOXICAM 7.5 MG/1
TABLET ORAL
Qty: 90 TABLET | Refills: 1 | Status: SHIPPED
Start: 2021-04-13 | End: 2021-09-01

## 2021-04-14 ENCOUNTER — PHYSICAL THERAPY (OUTPATIENT)
Dept: PHYSICAL THERAPY | Facility: REHABILITATION | Age: 81
End: 2021-04-14
Attending: PHYSICAL MEDICINE & REHABILITATION
Payer: MEDICARE

## 2021-04-14 DIAGNOSIS — M47.812 CERVICAL SPONDYLOSIS: ICD-10-CM

## 2021-04-14 DIAGNOSIS — M79.10 TRIGGER POINT: ICD-10-CM

## 2021-04-14 DIAGNOSIS — M48.10 DIFFUSE IDIOPATHIC SKELETAL HYPEROSTOSIS: ICD-10-CM

## 2021-04-14 DIAGNOSIS — R29.898 DECREASED RANGE OF MOTION OF NECK: ICD-10-CM

## 2021-04-14 DIAGNOSIS — M54.2 NECK PAIN: ICD-10-CM

## 2021-04-14 DIAGNOSIS — M79.10 MYALGIA: ICD-10-CM

## 2021-04-14 PROCEDURE — 97110 THERAPEUTIC EXERCISES: CPT

## 2021-04-14 NOTE — OP THERAPY DAILY TREATMENT
Outpatient Physical Therapy  DAILY TREATMENT     [facility] Outpatient Physical Therapy [place]  [address], [address]  [place] [address]  Phone:  [phone]  Fax:  [phone]    Date: 04/14/2021    Patient: Brian Lopez  YOB: 1940  MRN: [id number]     Time Calculation    Start time: 0930  Stop time: 1000 Time Calculation (min): 30 minutes         Chief Complaint: Post-Op Pain and Shoulder Problem    Visit #: 3    SUBJECTIVE:  Patient reports that his neck is a little better. Notes that he has not been too compliant with his HEP.     OBJECTIVE:  Current objective measures:  Extension: 22deg             Therapeutic Exercises (CPT 18634):       Therapeutic Exercise Summary: Access Code: [email or web address]      Exercises  Seated Shoulder Flexion AAROM with Pulley Behind - 1 x daily - 7 x weekly - 10 reps - 2 sets  Seated Shoulder Row with Anchored Resistance - 1 x daily - 7 x weekly - 10 reps - 2 sets  Shoulder Extension with Resistance - 1 x daily - 7 x weekly - 10 reps - 2 sets  Supine Cervical Rotation AROM on Pillow - 1 x daily - 7 x weekly - 2 sets - 10 reps  Seated Gentle Upper Trapezius Stretch - 1 x daily - 7 x weekly - 3 reps - 1 sets - 15sec hold        Time-based treatments/modalities:    Physical Therapy Timed Treatment Charges  Therapeutic exercise minutes (CPT 57975): 30 minutes      Pain rating (1-10) before treatment:  2  Pain rating (1-10) after treatment:  2    ASSESSMENT:   Response to treatment: Patient responded well to therapy with a slight increase in ROM and decrease in pain. Patient will attempt to be more compliant with HEP.     PLAN/RECOMMENDATIONS:   Plan for treatment: therapy treatment to continue next visit.  Planned interventions for next visit: continue with current treatment.

## 2021-04-15 DIAGNOSIS — I10 ESSENTIAL HYPERTENSION: Chronic | ICD-10-CM

## 2021-04-19 RX ORDER — ATENOLOL 50 MG/1
TABLET ORAL
Qty: 90 TABLET | Refills: 1 | Status: SHIPPED | OUTPATIENT
Start: 2021-04-19 | End: 2021-10-18 | Stop reason: SDUPTHER

## 2021-04-19 NOTE — TELEPHONE ENCOUNTER
Refill X 6 months, sent to pharmacy.Pt. Seen in the last 6 months per protocol.   Lab Results   Component Value Date/Time    SODIUM 138 02/13/2021 07:41 AM    POTASSIUM 4.4 02/13/2021 07:41 AM    CHLORIDE 101 02/13/2021 07:41 AM    CO2 27 02/13/2021 07:41 AM    GLUCOSE 105 (H) 02/13/2021 07:41 AM    BUN 9 02/13/2021 07:41 AM    CREATININE 0.80 02/13/2021 07:41 AM

## 2021-04-21 ENCOUNTER — PHYSICAL THERAPY (OUTPATIENT)
Dept: PHYSICAL THERAPY | Facility: REHABILITATION | Age: 81
End: 2021-04-21
Attending: PHYSICAL MEDICINE & REHABILITATION
Payer: MEDICARE

## 2021-04-21 DIAGNOSIS — R29.898 DECREASED RANGE OF MOTION OF NECK: ICD-10-CM

## 2021-04-21 DIAGNOSIS — M54.2 NECK PAIN: ICD-10-CM

## 2021-04-21 DIAGNOSIS — M79.10 MYALGIA: ICD-10-CM

## 2021-04-21 DIAGNOSIS — M47.812 CERVICAL SPONDYLOSIS: ICD-10-CM

## 2021-04-21 DIAGNOSIS — M79.10 TRIGGER POINT: ICD-10-CM

## 2021-04-21 DIAGNOSIS — M48.10 DIFFUSE IDIOPATHIC SKELETAL HYPEROSTOSIS: ICD-10-CM

## 2021-04-21 PROCEDURE — 97110 THERAPEUTIC EXERCISES: CPT

## 2021-04-21 PROCEDURE — 97014 ELECTRIC STIMULATION THERAPY: CPT

## 2021-04-21 NOTE — OP THERAPY DAILY TREATMENT
Outpatient Physical Therapy  DAILY TREATMENT     [facility] Outpatient Physical Therapy [place]  [address], [address]  [place] [address]  Phone:  [phone]  Fax:  [phone]    Date: 04/21/2021    Patient: Brian Lopez  YOB: 1940  MRN: [id number]     Time Calculation    Start time: 0830  Stop time: 0915 Time Calculation (min): 45 minutes         Chief Complaint: Neck Problem    Visit #: 4    SUBJECTIVE:  Patient reports that pain has increased. Notes that he continues to have stiffness and pain with most all activities.     OBJECTIVE:  Current objective measures:           Therapeutic Exercises (CPT 63193):     1. UBE, x6min total       Therapeutic Exercise Summary: Access Code: [email or web address]      Exercises  Seated Shoulder Flexion AAROM with Pulley Behind - 1 x daily - 7 x weekly - 10 reps - 2 sets  Seated Shoulder Row with Anchored Resistance - 1 x daily - 7 x weekly - 10 reps - 2 sets  Shoulder Extension with Resistance - 1 x daily - 7 x weekly - 10 reps - 2 sets  Supine Cervical Rotation AROM on Pillow - HOLD  Seated Gentle Upper Trapezius Stretch - 1 x daily - 7 x weekly - 3 reps - 1 sets - 15sec hold      Therapeutic Treatments and Modalities:     1. E Stim Unattended (CPT 73617), IFC with HP x15min 80-150hz    Time-based treatments/modalities:    Physical Therapy Timed Treatment Charges  Therapeutic exercise minutes (CPT 88636): 25 minutes      Pain rating (1-10) before treatment:  4  Pain rating (1-10) after treatment:  2    ASSESSMENT:   Response to treatment: Patient responded well to therapy with an overall decrease in symptoms post IFC.     PLAN/RECOMMENDATIONS:   Plan for treatment: therapy treatment to continue next visit.  Planned interventions for next visit: continue with current treatment.

## 2021-04-26 DIAGNOSIS — I10 ESSENTIAL HYPERTENSION: Chronic | ICD-10-CM

## 2021-04-27 ENCOUNTER — PATIENT OUTREACH (OUTPATIENT)
Dept: HEALTH INFORMATION MANAGEMENT | Facility: OTHER | Age: 81
End: 2021-04-27

## 2021-04-28 ENCOUNTER — TELEPHONE (OUTPATIENT)
Dept: PHYSICAL THERAPY | Facility: REHABILITATION | Age: 81
End: 2021-04-28

## 2021-04-28 ENCOUNTER — PHYSICAL THERAPY (OUTPATIENT)
Dept: PHYSICAL THERAPY | Facility: REHABILITATION | Age: 81
End: 2021-04-28
Attending: PHYSICAL MEDICINE & REHABILITATION
Payer: MEDICARE

## 2021-04-28 DIAGNOSIS — M79.10 MYALGIA: ICD-10-CM

## 2021-04-28 DIAGNOSIS — M54.2 NECK PAIN: ICD-10-CM

## 2021-04-28 DIAGNOSIS — M48.10 DIFFUSE IDIOPATHIC SKELETAL HYPEROSTOSIS: ICD-10-CM

## 2021-04-28 DIAGNOSIS — M79.10 TRIGGER POINT: ICD-10-CM

## 2021-04-28 DIAGNOSIS — M47.812 CERVICAL SPONDYLOSIS: ICD-10-CM

## 2021-04-28 DIAGNOSIS — R29.898 DECREASED RANGE OF MOTION OF NECK: ICD-10-CM

## 2021-04-28 PROCEDURE — 97110 THERAPEUTIC EXERCISES: CPT

## 2021-04-28 NOTE — OP THERAPY DAILY TREATMENT
Outpatient Physical Therapy  DAILY TREATMENT     [facility] Outpatient Physical Therapy [place]  [address], [address]  [place] [address]  Phone:  [phone]  Fax:  [phone]    Date: 04/28/2021    Patient: Brian Lopez  YOB: 1940  MRN: [id number]     Time Calculation    Start time: 0900  Stop time: 0925 Time Calculation (min): 25 minutes         Chief Complaint: Neck Problem    Visit #: 5    SUBJECTIVE:  Patient reports that his neck is still bad. Notes no improvement.    OBJECTIVE:  Current objective measures:   PT Functional Assessment Tool Used: NDI  PT Functional Assessment Score: 30  Cervical Spine   Flexion: Active cervical flexion: 30deg.  Extension: Active cervical extension: 18deg.  Left lateral flexion: Active left cervical lateral flexion: 6deg.  Right lateral flexion: Active right cervical lateral flexion: 12deg.  Left rotation: Active left cervical rotation: 22deg.  Right rotation: Active right cervical rotation: 16deg      Therapeutic Exercises (CPT 90336):     1. UBE, x6min total       Therapeutic Exercise Summary: Access Code: [email or web address]      Exercises  Seated Shoulder Flexion AAROM with Pulley Behind - 1 x daily - 7 x weekly - 10 reps - 2 sets  Seated Shoulder Row with Anchored Resistance - 1 x daily - 7 x weekly - 10 reps - 2 sets  Shoulder Extension with Resistance - 1 x daily - 7 x weekly - 10 reps - 2 sets  Supine Cervical Rotation AROM on Pillow - HOLD  Seated Gentle Upper Trapezius Stretch - 1 x daily - 7 x weekly - 3 reps - 1 sets - 15sec hold        Time-based treatments/modalities:    Physical Therapy Timed Treatment Charges  Therapeutic exercise minutes (CPT 35605): 23 minutes      Pain rating (1-10) before treatment:  4  Pain rating (1-10) after treatment:  4    ASSESSMENT:   Response to treatment: Patient continues to not respond to therapy. Plan to send back to MD and or wait for surgical consult.     PLAN/RECOMMENDATIONS:   Plan for treatment: refer patient  back to MD.  Planned interventions for next visit: Hold PT.

## 2021-04-29 RX ORDER — AMLODIPINE BESYLATE 5 MG/1
TABLET ORAL
Qty: 100 TABLET | Refills: 1 | Status: SHIPPED | OUTPATIENT
Start: 2021-04-29 | End: 2021-10-27

## 2021-04-29 RX ORDER — OXYBUTYNIN CHLORIDE 10 MG/1
TABLET, EXTENDED RELEASE ORAL
Qty: 30 TABLET | Refills: 6 | Status: CANCELLED | OUTPATIENT
Start: 2021-04-29

## 2021-04-29 NOTE — TELEPHONE ENCOUNTER
"Dr Lake- Pharmacy is requesting oxybutynin but it looks like it was d/c'd 2/21 for \"pt's decision\". Please refill as you see fit.    "

## 2021-05-04 ENCOUNTER — APPOINTMENT (OUTPATIENT)
Dept: PHYSICAL THERAPY | Facility: REHABILITATION | Age: 81
End: 2021-05-04
Attending: PHYSICAL MEDICINE & REHABILITATION
Payer: MEDICARE

## 2021-05-05 ENCOUNTER — OFFICE VISIT (OUTPATIENT)
Dept: PHYSICAL MEDICINE AND REHAB | Facility: MEDICAL CENTER | Age: 81
End: 2021-05-05
Payer: MEDICARE

## 2021-05-05 VITALS
BODY MASS INDEX: 29.93 KG/M2 | HEART RATE: 75 BPM | WEIGHT: 190.7 LBS | OXYGEN SATURATION: 96 % | HEIGHT: 67 IN | TEMPERATURE: 98 F | SYSTOLIC BLOOD PRESSURE: 132 MMHG | DIASTOLIC BLOOD PRESSURE: 70 MMHG

## 2021-05-05 DIAGNOSIS — R29.898 DECREASED RANGE OF MOTION OF NECK: ICD-10-CM

## 2021-05-05 DIAGNOSIS — M47.812 CERVICAL SPONDYLOSIS: ICD-10-CM

## 2021-05-05 DIAGNOSIS — M79.10 TRIGGER POINT: ICD-10-CM

## 2021-05-05 DIAGNOSIS — M79.10 MYALGIA: ICD-10-CM

## 2021-05-05 DIAGNOSIS — M48.10 DIFFUSE IDIOPATHIC SKELETAL HYPEROSTOSIS: ICD-10-CM

## 2021-05-05 DIAGNOSIS — M54.2 NECK PAIN: ICD-10-CM

## 2021-05-05 PROCEDURE — 99214 OFFICE O/P EST MOD 30 MIN: CPT | Performed by: PHYSICAL MEDICINE & REHABILITATION

## 2021-05-05 RX ORDER — GABAPENTIN 100 MG/1
100-300 CAPSULE ORAL NIGHTLY PRN
Qty: 90 CAPSULE | Refills: 1 | Status: SHIPPED
Start: 2021-05-05 | End: 2021-09-01

## 2021-05-05 ASSESSMENT — FIBROSIS 4 INDEX: FIB4 SCORE: 1.55

## 2021-05-05 ASSESSMENT — PAIN SCALES - GENERAL: PAINLEVEL: 8=MODERATE-SEVERE PAIN

## 2021-05-05 NOTE — PROGRESS NOTES
"Follow up patient note  Interventional spine and sports physiatry, Physical medicine rehabilitation      Chief complaint:   Chief Complaint   Patient presents with   • Follow-Up     Neck pain          HISTORY    Please see new patient note by Dr Villela,  for more details.     HPI  Patient identification: Brian Lopez  1940,  male  With Diagnoses of Cervical spondylosis, Decreased range of motion of neck, Myalgia, Diffuse idiopathic skeletal hyperostosis, Neck pain, and Trigger point were pertinent to this visit.     The patient had no improvement with physical therapy and continues to have some neck and bilateral neck pain nonradiating with neck stiffness.  He has been seen by physical therapy and spine surgery.  An MRI of the cervical spine is been ordered but this is not been done yet.  He also has an open referral to rheumatology but he has not had this appointment yet    Patient's pain is bilateral moderate to severe in intensity aching in quality nonradiating with significant decreased range of motion cervical spine which is not responded to conservative treatments.       ROS Red Flags :   Fever, Chills, Sweats: Denies  Involuntary Weight Loss: Denies  Bowel/Bladder Incontinence: Denies  Saddle Anesthesia: Denies        PMHx:   Past Medical History:   Diagnosis Date   • Arthritis     hands   • BPH (benign prostatic hyperplasia) 2014   • Cataract     removed bilat   • COPD (chronic obstructive pulmonary disease) (HCC)    • Dental disorder     upper/lower dentures   • High cholesterol    • Hypertension 2014   • Hypertriglyceridemia 2014   • Intestinal polyp    • Pain 2019    right hand, 5-6/10   • Pulmonary emphysema (HCC) 2021    Saw PUL previously dr Merary Wang  \"...However he was found to have some changes consistent with emphysema. A 50-pack-year smoking    • Snoring    • Swelling of thyroid gland 2020       PSHx:   Past Surgical History:   Procedure " Laterality Date   • PB INCISE FINGER TENDON SHEATH Right 5/31/2019    Procedure: RELEASE, TRIGGER FINGER-  RING;  Surgeon: Simon Altamirano M.D.;  Location: SURGERY SAME DAY [facility];  Service: Orthopedics   • SYNOVECTOMY Right 5/31/2019    Procedure: fasciatomy of palm;  Surgeon: Simon Altamirano M.D.;  Location: SURGERY SAME DAY [facility];  Service: Orthopedics   • KNEE ARTHROPLASTY TOTAL Right 6/7/2017    Procedure: KNEE ARTHROPLASTY TOTAL;  Surgeon: Steffanie Del Angel M.D.;  Location: SURGERY [facility];  Service:    • COLON RESECTION ROBOTIC  6/16/2014    Performed by Ezra Burks M.D. at SURGERY [facility]   • CATARACT EXTRACTION WITH IOL Bilateral 2011   • KNEE ARTHROPLASTY TOTAL Left 2009   • APPENDECTOMY  1970's   • ARTHROSCOPY, KNEE     • CHOLECYSTECTOMY     • HEMORRHOIDECTOMY     • TRIGGER FINGER RELEASE Bilateral last 2000's    multiple        Family history   Family History   Problem Relation Age of Onset   • Stroke Mother    • Heart Attack Father 85   • Heart Disease Father    • Cancer Neg Hx          Medications:   Outpatient Medications Marked as Taking for the 5/5/21 encounter (Office Visit) with Melo Villela M.D.   Medication Sig Dispense Refill   • gabapentin (NEURONTIN) 100 MG Cap Take 1-3 Capsules by mouth at bedtime as needed (pain). 90 capsule 1   • amLODIPine (NORVASC) 5 MG Tab TAKE ONE TABLET BY MOUTH EVERY DAY  100 tablet 1   • atenolol (TENORMIN) 50 MG Tab TAKE ONE TABLET BY MOUTH EVERY DAY  90 tablet 1   • meloxicam (MOBIC) 7.5 MG Tab TAKE ONE TABLET BY MOUTH TWICE DAILY AS NEEDED  90 tablet 1   • albuterol 108 (90 Base) MCG/ACT Aero Soln inhalation aerosol Inhale 2 Puffs every 6 hours as needed.     • tamsulosin (FLOMAX) 0.4 MG capsule Take 1 Cap by mouth every bedtime. 90 Cap 3   • pravastatin (PRAVACHOL) 40 MG tablet Take 1 Tab by mouth every day. 100 Tab 2   • finasteride (PROSCAR) 5 MG Tab finasteride 5 mg tablet   Take 1 tablet every day by oral  route.     • Mirabegron ER (MYRBETRIQ) 50 MG TABLET SR 24 HR Myrbetriq 50 mg tablet,extended release   Take 1 tablet every day by oral route as directed for 120 days.     • Ergocalciferol (VITAMIN D2) 50 MCG (2000 UT) Tab Take 4,000 Units by mouth every day.     • cyanocobalamin (VITAMIN B-12) 500 MCG Tab Take 500 mcg by mouth every day.     • Multiple Vitamins-Minerals (MULTIVITAMIN ADULT PO) Take  by mouth every day.     • Calcium Carb-Cholecalciferol (CALCIUM + D3 PO) Take  by mouth every day.          Current Outpatient Medications on File Prior to Visit   Medication Sig Dispense Refill   • amLODIPine (NORVASC) 5 MG Tab TAKE ONE TABLET BY MOUTH EVERY DAY  100 tablet 1   • atenolol (TENORMIN) 50 MG Tab TAKE ONE TABLET BY MOUTH EVERY DAY  90 tablet 1   • meloxicam (MOBIC) 7.5 MG Tab TAKE ONE TABLET BY MOUTH TWICE DAILY AS NEEDED  90 tablet 1   • albuterol 108 (90 Base) MCG/ACT Aero Soln inhalation aerosol Inhale 2 Puffs every 6 hours as needed.     • tamsulosin (FLOMAX) 0.4 MG capsule Take 1 Cap by mouth every bedtime. 90 Cap 3   • pravastatin (PRAVACHOL) 40 MG tablet Take 1 Tab by mouth every day. 100 Tab 2   • finasteride (PROSCAR) 5 MG Tab finasteride 5 mg tablet   Take 1 tablet every day by oral route.     • Mirabegron ER (MYRBETRIQ) 50 MG TABLET SR 24 HR Myrbetriq 50 mg tablet,extended release   Take 1 tablet every day by oral route as directed for 120 days.     • Ergocalciferol (VITAMIN D2) 50 MCG (2000 UT) Tab Take 4,000 Units by mouth every day.     • cyanocobalamin (VITAMIN B-12) 500 MCG Tab Take 500 mcg by mouth every day.     • Multiple Vitamins-Minerals (MULTIVITAMIN ADULT PO) Take  by mouth every day.     • Calcium Carb-Cholecalciferol (CALCIUM + D3 PO) Take  by mouth every day.       No current facility-administered medications on file prior to visit.         Allergies:   No Known Allergies    Social Hx:   Social History     Socioeconomic History   • Marital status:      Spouse name: Not on  file   • Number of children: Not on file   • Years of education: Not on file   • Highest education level: Not on file   Occupational History   • Not on file   Tobacco Use   • Smoking status: Former Smoker     Packs/day: 1.00     Years: 50.00     Pack years: 50.00     Types: Cigarettes     Start date: 1955     Quit date: 2010     Years since quittin.0   • Smokeless tobacco: Never Used   Substance and Sexual Activity   • Alcohol use: Yes     Alcohol/week: 8.4 oz     Types: 14 Cans of beer per week     Comment: 3 per day   • Drug use: No   • Sexual activity: Yes     Partners: Female     Comment:  / retired   Other Topics Concern   •  Service No   • Blood Transfusions No   • Caffeine Concern No   • Occupational Exposure No   • Hobby Hazards No   • Sleep Concern Yes   • Stress Concern No   • Weight Concern Yes   • Special Diet No   • Back Care No   • Exercise Yes   • Bike Helmet No   • Seat Belt Yes   • Self-Exams No   Social History Narrative    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  Lives with his wife.  No social or domestic concerns.  No hearing aids or hearing concerns no walking issues or balance or falls.  He has no issues driving.  No major memory concerns.  He did have eye surgery done for his cataracts and his colon partially removed for a polyp.  He also had operation on a trigger finger.  No hospitalizations in the last year though.     Social Determinants of Health     Financial Resource Strain:    • Difficulty of Paying Living Expenses:    Food Insecurity:    • Worried About Running Out of Food in the Last Year:    • Ran Out of Food in the Last Year:    Transportation Needs:    • Lack of Transportation (Medical):    • Lack of Transportation (Non-Medical):    Physical Activity:    • Days of Exercise per Week:    • Minutes of Exercise per Session:    Stress:    • Feeling of Stress :    Social Connections:    • Frequency of Communication with Friends and Family:    •  "Frequency of Social Gatherings with Friends and Family:    • Attends Latter day Services:    • Active Member of Clubs or Organizations:    • Attends Club or Organization Meetings:    • Marital Status:    Intimate Partner Violence:    • Fear of Current or Ex-Partner:    • Emotionally Abused:    • Physically Abused:    • Sexually Abused:          EXAMINATION     Physical Exam:   Vitals: /70 (BP Location: Right arm, Patient Position: Sitting, BP Cuff Size: Adult)   Pulse 75   Temp 36.7 °C (98 °F) (Temporal)   Ht 1.702 m (5' 7\")   Wt 86.5 kg (190 lb 11.2 oz)   SpO2 96%     Constitutional:   Body Habitus: Body mass index is 29.87 kg/m².  Cooperation: Fully cooperates with exam  Appearance: Well-groomed no disheveled    Respiratory-  breathing comfortable on room air, no audible wheezing  Cardiovascular- capillary refills less than 2 seconds. No lower extremity edema is noted.   Psychiatric- alert and oriented ×3. Normal affect.    MSK and Neuro: -    Cervical spine  decreased active range of motion with flexion, lateral flexion, and rotation bilaterally.   There is decreased active range of motion with cervical extension.      Palpation:   Tenderness to palpation throughout the cervical spine: negative bilaterally        Cervical spine Special tests  Neuro tension  Spurling's maneuver negative bilaterally           Key points for the international standards for neurological classification of spinal cord injury (ISNCSCI) to light touch.     Dermatome R L   C4 2 2   C5 2 2   C6 2 2   C7 2 2   C8 2 2   T1 2 2   T2 2 2                                         Motor Exam Upper Extremities   ? Myotome R L   Shoulder flexion C5 5 5   Elbow flexion C5 5 5   Wrist extension C6 5 5   Elbow extension C7 5 5   Finger flexion C8 5 5   Finger abduction T1 5 5               MEDICAL DECISION MAKING    DATA    Labs:   Lab Results   Component Value Date/Time    SODIUM 138 02/13/2021 07:41 AM    POTASSIUM 4.4 02/13/2021 07:41 AM "    CHLORIDE 101 02/13/2021 07:41 AM    CO2 27 02/13/2021 07:41 AM    GLUCOSE 105 (H) 02/13/2021 07:41 AM    BUN 9 02/13/2021 07:41 AM    CREATININE 0.80 02/13/2021 07:41 AM        Lab Results   Component Value Date/Time    PROTHROMBTM 14.1 05/24/2017 10:49 AM    INR 1.06 05/24/2017 10:49 AM        Lab Results   Component Value Date/Time    WBC 6.0 02/13/2021 07:41 AM    RBC 5.76 02/13/2021 07:41 AM    HEMOGLOBIN 17.5 02/13/2021 07:41 AM    HEMATOCRIT 51.5 02/13/2021 07:41 AM    MCV 89.4 02/13/2021 07:41 AM    MCH 30.4 02/13/2021 07:41 AM    MCHC 34.0 02/13/2021 07:41 AM    MPV 11.3 02/13/2021 07:41 AM    NEUTSPOLYS 66.10 02/13/2021 07:41 AM    LYMPHOCYTES 24.50 02/13/2021 07:41 AM    MONOCYTES 6.80 02/13/2021 07:41 AM    EOSINOPHILS 1.70 02/13/2021 07:41 AM    BASOPHILS 0.70 02/13/2021 07:41 AM    HYPOCHROMIA 1+ 06/11/2014 09:54 AM        Lab Results   Component Value Date/Time    HBA1C 5.9 (H) 09/24/2020 07:11 AM          Imaging:   I personally reviewed following images    MRI cervical spine 4/16/2020.  Bilateral facet arthropathy C3-4, C4-5, C5-6.  Minimal/mild bilateral neuroforaminal stenosis C3-4 and C5-6.  No significant central canal stenosis at any level.      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  No acute changes        I reviewed the following radiology reports    X-ray cervical spine 2/11/2021  IMPRESSION:     1.  There is overall stable appearance of the cervical spine with findings suggestive of either DISH degenerative type change versus ankylosing spondylitis.  2.  The alignment is within normal limits and there is no abnormal motion.                                 Results for orders placed during  the hospital encounter of 04/16/20   MR-CERVICAL SPINE-W/O    Impression 1.  Diffuse idiopathic skeletal hyperostosis    2.  No significant central canal narrowing. Mild foraminal narrowing at several levels.                                                                                                                                              Results for orders placed in visit on 05/30/14   CT-ABDOMEN-PELVIS WITH    Impression 1.  Filling defect in the proximal ascending colon, suspicious for known colon malignancy.    2.  Diverticulosis    3.  Multiple hypodense liver lesions, consistent with small cyst. Other smaller indeterminate lesions likely represent cysts as well. However, metastasis cannot be excluded. If clinically indicated, hepatic protocol MRI would be helpful for further characterization.    4.  Subcutaneous nodule in the right buttock, likely a sebaceous cyst. Please correlate clinically.                                 Results for orders placed during the hospital encounter of 04/16/20   DX-CERVICAL SPINE-2 OR 3 VIEWS    Impression Large anterior bridging osteophytes suggestive of DISH versus ankylosing spondylitis.    Multilevel uncovertebral joint arthropathy.          Results for orders placed in visit on 06/11/14   DX-CHEST-2 VIEWS    Impression No acute cardiopulmonary process is identified.                          Results for orders placed in visit on 10/11/14   DX-HAND 3+    Impression No evidence of fracture or dislocation.                Results for orders placed during the hospital encounter of 06/07/17   DX-KNEE 2- RIGHT    Impression Post tricompartment arthroplasty.            Results for orders placed in visit on 04/17/14   DX-LUMBAR SPINE-2 OR 3 VIEWS    Impression Severe L2/3 endplate spurring but there is relative disc height preservation throughout the examination. This likely indicates a combination of diffuse idiopathic skeletal hyperostosis and degenerative disc  disease    Advanced atherosclerosis with some ectasia of the abdominal aorta, maximal anterior-posterior caliber estimated at 29 mm      Results for orders placed during the hospital encounter of 20   DX-LUMBAR SPINE-4+ VIEWS    Impression 1. Moderate multilevel lumbar spondylosis.  2. Minimal grade 1 degenerative anterolisthesis at L4-5, only seen in flexion.  3. No acute fracture.                                         DIAGNOSIS   Visit Diagnoses     ICD-10-CM   1. Cervical spondylosis  M47.812   2. Decreased range of motion of neck  R29.898   3. Myalgia  M79.10   4. Diffuse idiopathic skeletal hyperostosis  M48.10   5. Neck pain  M54.2   6. Trigger point  M79.10         ASSESSMENT and PLAN:     Brian Lopez  1940,   male      Brian was seen today for follow-up.    Diagnoses and all orders for this visit:    Cervical spondylosis  -     gabapentin (NEURONTIN) 100 MG Cap; Take 1-3 Capsules by mouth at bedtime as needed (pain).    Decreased range of motion of neck  -     gabapentin (NEURONTIN) 100 MG Cap; Take 1-3 Capsules by mouth at bedtime as needed (pain).    Myalgia  -     gabapentin (NEURONTIN) 100 MG Cap; Take 1-3 Capsules by mouth at bedtime as needed (pain).    Diffuse idiopathic skeletal hyperostosis  -     gabapentin (NEURONTIN) 100 MG Cap; Take 1-3 Capsules by mouth at bedtime as needed (pain).    Neck pain  -     gabapentin (NEURONTIN) 100 MG Cap; Take 1-3 Capsules by mouth at bedtime as needed (pain).    Trigger point  -     gabapentin (NEURONTIN) 100 MG Cap; Take 1-3 Capsules by mouth at bedtime as needed (pain).         The patient has severely decreased range of motion of cervical spine with differential including DISH syndrome versus ankylosing spondylitis.  He had no improvement with physical therapy.  I discussed the case with Vikas Calderon.  At this point I do not believe that injections will be helpful for the patient.  Neurologically the patient is intact.  We  discussed emergency precautions.  I also discussed the case with the patient's wife who was at his visit and assisted with the HPI.     5/5/2021, I reviewed the notes from Tejinder Steiner PA-C from 4/29/2021 from Dr. Enedina Berry's clinic.  The patient was prescribed meloxicam and Robaxin.  Sent to rheumatology for concern of DISH syndrome versus ankylosing spondylitis.  Continued with physical therapy.  An MRI of the cervical spine was ordered.        Follow up: as needed with me    Thank you for allowing me to participate in the care of this patient. If you have any questions please not hesitate to contact me.        Please note that this dictation was created using voice recognition software. I have made every reasonable attempt to correct obvious errors but there may be errors of grammar and content that I may have overlooked prior to finalization of this note.      Melo Villela MD  Interventional Spine and Sports Physiatry  Physical Medicine and Rehabilitation  [facility]

## 2021-05-06 ENCOUNTER — APPOINTMENT (OUTPATIENT)
Dept: PHYSICAL THERAPY | Facility: REHABILITATION | Age: 81
End: 2021-05-06
Attending: PHYSICAL MEDICINE & REHABILITATION
Payer: MEDICARE

## 2021-05-11 ENCOUNTER — APPOINTMENT (OUTPATIENT)
Dept: PHYSICAL THERAPY | Facility: REHABILITATION | Age: 81
End: 2021-05-11
Attending: PHYSICAL MEDICINE & REHABILITATION
Payer: MEDICARE

## 2021-05-13 ENCOUNTER — APPOINTMENT (OUTPATIENT)
Dept: PHYSICAL THERAPY | Facility: REHABILITATION | Age: 81
End: 2021-05-13
Attending: PHYSICAL MEDICINE & REHABILITATION
Payer: MEDICARE

## 2021-05-14 ENCOUNTER — HOSPITAL ENCOUNTER (OUTPATIENT)
Dept: RADIOLOGY | Facility: MEDICAL CENTER | Age: 81
End: 2021-05-14
Attending: PHYSICIAN ASSISTANT
Payer: MEDICARE

## 2021-05-14 DIAGNOSIS — M54.12 BRACHIAL NEURITIS: ICD-10-CM

## 2021-05-14 PROCEDURE — 72141 MRI NECK SPINE W/O DYE: CPT | Mod: MH

## 2021-05-14 NOTE — PROGRESS NOTES
Wife called in to cancel Comprehensive Health Assessment. She stated that they are not feeling to well. She will call us back when and if interested. Verified HIPAA, no questions or concerns. Appointment was scheduled for 5/17/21 with Dr. Kendrick.

## 2021-06-15 NOTE — ASSESSMENT & PLAN NOTE
New cond   noted since 2mo  Affecting L jaw  Difficulty chewing due to pain  No h.o trauma or injury  Exam : mod pain noted w palpation L jaw noted w opening/closing  Pt was seen by dentist and was given flexeril w.o improvement  Referred pt to ENT for further eval. Likely TMJ   Showing: fungal hyphal elements: negative

## 2021-09-01 ENCOUNTER — OFFICE VISIT (OUTPATIENT)
Dept: MEDICAL GROUP | Facility: PHYSICIAN GROUP | Age: 81
End: 2021-09-01
Payer: MEDICARE

## 2021-09-01 VITALS
RESPIRATION RATE: 16 BRPM | WEIGHT: 192 LBS | BODY MASS INDEX: 30.13 KG/M2 | TEMPERATURE: 98.3 F | OXYGEN SATURATION: 95 % | HEIGHT: 67 IN | SYSTOLIC BLOOD PRESSURE: 128 MMHG | HEART RATE: 69 BPM | DIASTOLIC BLOOD PRESSURE: 72 MMHG

## 2021-09-01 DIAGNOSIS — Z13.228 SCREENING FOR ENDOCRINE, METABOLIC AND IMMUNITY DISORDER: ICD-10-CM

## 2021-09-01 DIAGNOSIS — Z13.29 SCREENING FOR ENDOCRINE, METABOLIC AND IMMUNITY DISORDER: ICD-10-CM

## 2021-09-01 DIAGNOSIS — R32 URINARY INCONTINENCE, UNSPECIFIED TYPE: Chronic | ICD-10-CM

## 2021-09-01 DIAGNOSIS — E55.9 VITAMIN D DEFICIENCY: Chronic | ICD-10-CM

## 2021-09-01 DIAGNOSIS — Z13.0 SCREENING FOR ENDOCRINE, METABOLIC AND IMMUNITY DISORDER: ICD-10-CM

## 2021-09-01 DIAGNOSIS — I10 ESSENTIAL HYPERTENSION: Chronic | ICD-10-CM

## 2021-09-01 DIAGNOSIS — J43.9 PULMONARY EMPHYSEMA, UNSPECIFIED EMPHYSEMA TYPE (HCC): Chronic | ICD-10-CM

## 2021-09-01 DIAGNOSIS — E78.5 DYSLIPIDEMIA: Chronic | ICD-10-CM

## 2021-09-01 PROCEDURE — 99214 OFFICE O/P EST MOD 30 MIN: CPT | Performed by: INTERNAL MEDICINE

## 2021-09-01 RX ORDER — ALBUTEROL SULFATE 90 UG/1
2 AEROSOL, METERED RESPIRATORY (INHALATION) EVERY 6 HOURS PRN
COMMUNITY
End: 2021-12-30

## 2021-09-01 RX ORDER — GABAPENTIN 100 MG/1
200 CAPSULE ORAL
COMMUNITY
End: 2021-12-16 | Stop reason: SDUPTHER

## 2021-09-01 ASSESSMENT — FIBROSIS 4 INDEX: FIB4 SCORE: 1.55

## 2021-09-01 NOTE — ASSESSMENT & PLAN NOTE
Chronic stable condition.  Patient being treated with amlodipine and atenolol.  Her blood pressure has been well controlled.

## 2021-09-01 NOTE — PROGRESS NOTES
CC: Follow-up hypertension      HPI: This is a 81 y.o. pt.  Pt's medical history is notable for:     COPD  Chronic condition.  The patient was seen by pulmonologist previously.  The patient denies shortness of breath or wheezing.  Patient uses albuterol as needed only.    Dyslipidemia  This is a chronic condition.  The patient is due for lab test.  He is currently taking pravastatin.  No significant side effects reported.    Hypertension  Chronic stable condition.  Patient being treated with amlodipine and atenolol.  Her blood pressure has been well controlled.    Urinary incontinence  Patient followed by urology service.  He is currently taking Myrbetriq.    Vitamin D deficiency  Patient is taking vitamin D supplementation.  Lab tests ordered for follow-up.          REVIEW OF SYSTEMS:     Constitutional:  no fever / chills   Neurologic: no headaches  Eyes: no changes in vision  ENT: no sore throat, no hearing loss  CV:  no chest pain, no palpitations  Pulmonary: no SOB, no cough          Allergies: Patient has no known allergies.    Current Outpatient Medications Ordered in Epic   Medication Sig Dispense Refill   • albuterol 108 (90 Base) MCG/ACT Aero Soln inhalation aerosol Inhale 2 Puffs every 6 hours as needed.     • gabapentin (NEURONTIN) 100 MG Cap Take 200 mg by mouth at bedtime.     • amLODIPine (NORVASC) 5 MG Tab TAKE ONE TABLET BY MOUTH EVERY DAY  100 tablet 1   • atenolol (TENORMIN) 50 MG Tab TAKE ONE TABLET BY MOUTH EVERY DAY  90 tablet 1   • tamsulosin (FLOMAX) 0.4 MG capsule Take 1 Cap by mouth every bedtime. 90 Cap 3   • pravastatin (PRAVACHOL) 40 MG tablet Take 1 Tab by mouth every day. 100 Tab 2   • finasteride (PROSCAR) 5 MG Tab finasteride 5 mg tablet   Take 1 tablet every day by oral route.     • Mirabegron ER (MYRBETRIQ) 50 MG TABLET SR 24 HR Myrbetriq 50 mg tablet,extended release   Take 1 tablet every day by oral route as directed for 120 days.     • Ergocalciferol (VITAMIN D2) 50 MCG (2000  "UT) Tab Take 4,000 Units by mouth every day.     • cyanocobalamin (VITAMIN B-12) 500 MCG Tab Take 500 mcg by mouth every day.     • Multiple Vitamins-Minerals (MULTIVITAMIN ADULT PO) Take  by mouth every day.     • Calcium Carb-Cholecalciferol (CALCIUM + D3 PO) Take  by mouth every day.       No current [facility]-ordered facility-administered medications on file.       Past Medical, Social, and Family history reviewed and updated in EPIC     ------------------------------------------------------------------------------     PHYSICAL EXAM:   Vitals:    09/01/21 1029   BP: 128/72   Pulse: 69   Resp: 16   Temp: 36.8 °C (98.3 °F)   SpO2: 95%        Vitals:    09/01/21 1029   BP: 128/72   Weight: 87.1 kg (192 lb)   Height: 1.702 m (5' 7\")         Body mass index is 30.07 kg/m².    Constitutional: no acute distress  CV: heart RRR  Resp: normal effort, no wheezing or rales.  GI: abdomen soft, no obvious mass, no tenderness  Neuro: CN 2-12 grossly intact        -----------------------------------------------------------------------------    ASSESSMENT:   1. Urinary incontinence, unspecified type     2. Pulmonary emphysema, unspecified emphysema type (HCC)     3. Dyslipidemia  ALANINE AMINO-TRANS    Lipid Profile   4. Essential hypertension  Basic Metabolic Panel   5. Vitamin D deficiency  VITAMIN D,25 HYDROXY   6. Screening for endocrine, metabolic and immunity disorder  TSH           MEDICAL DECISION MAKING: DISCUSSION / STATUS / PLAN:    Overall the patient is clinically stable.  Med list reviewed and updated.  Advised the patient continue with current medications as listed.  Lab tests ordered as noted above.  Advised the patient to continue follow-up with urologist and physiatrist as directed.    Health maintenance.  Recommend shingle vaccine but the patient refused.     Return in about 6 months (around 3/1/2022).     -If any problems should arise, patient was advised to contact our office or go to ER to be " evaluated.    Please note that this dictation was created using voice recognition software. I have made every reasonable attempt to correct obvious errors, but it is possible there are errors of grammar and possibly content that I did not discover before finalizing the note.

## 2021-09-01 NOTE — ASSESSMENT & PLAN NOTE
Chronic condition.  The patient was seen by pulmonologist previously.  The patient denies shortness of breath or wheezing.  Patient uses albuterol as needed only.

## 2021-09-01 NOTE — ASSESSMENT & PLAN NOTE
This is a chronic condition.  The patient is due for lab test.  He is currently taking pravastatin.  No significant side effects reported.

## 2021-09-15 ENCOUNTER — HOSPITAL ENCOUNTER (OUTPATIENT)
Dept: LAB | Facility: MEDICAL CENTER | Age: 81
End: 2021-09-15
Attending: INTERNAL MEDICINE
Payer: MEDICARE

## 2021-09-15 DIAGNOSIS — E78.5 DYSLIPIDEMIA: Chronic | ICD-10-CM

## 2021-09-15 DIAGNOSIS — E55.9 VITAMIN D DEFICIENCY: Chronic | ICD-10-CM

## 2021-09-15 DIAGNOSIS — Z13.29 SCREENING FOR ENDOCRINE, METABOLIC AND IMMUNITY DISORDER: ICD-10-CM

## 2021-09-15 DIAGNOSIS — I10 ESSENTIAL HYPERTENSION: Chronic | ICD-10-CM

## 2021-09-15 DIAGNOSIS — Z13.0 SCREENING FOR ENDOCRINE, METABOLIC AND IMMUNITY DISORDER: ICD-10-CM

## 2021-09-15 DIAGNOSIS — Z13.228 SCREENING FOR ENDOCRINE, METABOLIC AND IMMUNITY DISORDER: ICD-10-CM

## 2021-09-15 LAB
25(OH)D3 SERPL-MCNC: 62 NG/ML (ref 30–100)
ALT SERPL-CCNC: 25 U/L (ref 2–50)
ANION GAP SERPL CALC-SCNC: 12 MMOL/L (ref 7–16)
BUN SERPL-MCNC: 14 MG/DL (ref 8–22)
CALCIUM SERPL-MCNC: 9.3 MG/DL (ref 8.5–10.5)
CHLORIDE SERPL-SCNC: 100 MMOL/L (ref 96–112)
CHOLEST SERPL-MCNC: 166 MG/DL (ref 100–199)
CO2 SERPL-SCNC: 25 MMOL/L (ref 20–33)
CREAT SERPL-MCNC: 0.83 MG/DL (ref 0.5–1.4)
FASTING STATUS PATIENT QL REPORTED: NORMAL
GLUCOSE SERPL-MCNC: 102 MG/DL (ref 65–99)
HDLC SERPL-MCNC: 56 MG/DL
LDLC SERPL CALC-MCNC: 66 MG/DL
POTASSIUM SERPL-SCNC: 4.2 MMOL/L (ref 3.6–5.5)
SODIUM SERPL-SCNC: 137 MMOL/L (ref 135–145)
TRIGL SERPL-MCNC: 219 MG/DL (ref 0–149)
TSH SERPL DL<=0.005 MIU/L-ACNC: 1.06 UIU/ML (ref 0.38–5.33)

## 2021-09-15 PROCEDURE — 84460 ALANINE AMINO (ALT) (SGPT): CPT

## 2021-09-15 PROCEDURE — 80048 BASIC METABOLIC PNL TOTAL CA: CPT

## 2021-09-15 PROCEDURE — 82306 VITAMIN D 25 HYDROXY: CPT

## 2021-09-15 PROCEDURE — 36415 COLL VENOUS BLD VENIPUNCTURE: CPT

## 2021-09-15 PROCEDURE — 80061 LIPID PANEL: CPT

## 2021-09-15 PROCEDURE — 84443 ASSAY THYROID STIM HORMONE: CPT

## 2021-10-05 RX ORDER — PRAVASTATIN SODIUM 40 MG
TABLET ORAL
Qty: 100 TABLET | Refills: 0 | Status: SHIPPED | OUTPATIENT
Start: 2021-10-05 | End: 2022-01-17

## 2021-10-11 ENCOUNTER — HOSPITAL ENCOUNTER (OUTPATIENT)
Dept: LAB | Facility: MEDICAL CENTER | Age: 81
End: 2021-10-11
Attending: UROLOGY
Payer: MEDICARE

## 2021-10-11 LAB — PSA SERPL-MCNC: 2.41 NG/ML (ref 0–4)

## 2021-10-11 PROCEDURE — 36415 COLL VENOUS BLD VENIPUNCTURE: CPT

## 2021-10-11 PROCEDURE — 84153 ASSAY OF PSA TOTAL: CPT

## 2021-10-12 ENCOUNTER — PATIENT MESSAGE (OUTPATIENT)
Dept: HEALTH INFORMATION MANAGEMENT | Facility: OTHER | Age: 81
End: 2021-10-12

## 2021-10-18 DIAGNOSIS — I10 ESSENTIAL HYPERTENSION: Chronic | ICD-10-CM

## 2021-10-18 RX ORDER — ATENOLOL 50 MG/1
50 TABLET ORAL
Qty: 90 TABLET | Refills: 1 | Status: CANCELLED | OUTPATIENT
Start: 2021-10-18

## 2021-10-18 RX ORDER — ATENOLOL 50 MG/1
50 TABLET ORAL
Qty: 90 TABLET | Refills: 1 | Status: SHIPPED | OUTPATIENT
Start: 2021-10-18 | End: 2021-10-20

## 2021-10-20 DIAGNOSIS — I10 ESSENTIAL HYPERTENSION: Chronic | ICD-10-CM

## 2021-10-20 RX ORDER — ATENOLOL 50 MG/1
TABLET ORAL
Qty: 90 TABLET | Refills: 1 | Status: SHIPPED | OUTPATIENT
Start: 2021-10-20 | End: 2022-04-18

## 2021-10-27 DIAGNOSIS — I10 ESSENTIAL HYPERTENSION: Chronic | ICD-10-CM

## 2021-10-27 RX ORDER — AMLODIPINE BESYLATE 5 MG/1
TABLET ORAL
Qty: 90 TABLET | Refills: 3 | Status: SHIPPED | OUTPATIENT
Start: 2021-10-27 | End: 2022-01-01 | Stop reason: SDUPTHER

## 2021-11-12 ENCOUNTER — NON-PROVIDER VISIT (OUTPATIENT)
Dept: MEDICAL GROUP | Facility: PHYSICIAN GROUP | Age: 81
End: 2021-11-12
Payer: MEDICARE

## 2021-11-12 DIAGNOSIS — Z23 NEED FOR VACCINATION: ICD-10-CM

## 2021-11-12 PROCEDURE — 90662 IIV NO PRSV INCREASED AG IM: CPT | Performed by: INTERNAL MEDICINE

## 2021-11-12 PROCEDURE — 99999 PR NO CHARGE: CPT | Performed by: FAMILY MEDICINE

## 2021-11-12 PROCEDURE — G0008 ADMIN INFLUENZA VIRUS VAC: HCPCS | Performed by: INTERNAL MEDICINE

## 2021-11-12 NOTE — PROGRESS NOTES
"Brian Lopez is a 81 y.o. male here for a non-provider visit for:   FLU    Reason for immunization: Annual Flu Vaccine  Immunization records indicate need for vaccine: Yes, confirmed with Epic  Minimum interval has been met for this vaccine: Yes  ABN completed: No    VIS Dated  08/06/2021 was given to patient: Yes  All IAC Questionnaire questions were answered \"No.\"    Patient tolerated injection and no adverse effects were observed or reported: Yes    Pt scheduled for next dose in series: No  "

## 2021-12-16 RX ORDER — GABAPENTIN 100 MG/1
CAPSULE ORAL
Qty: 90 CAPSULE | Refills: 3 | Status: SHIPPED
Start: 2021-12-16 | End: 2022-07-26

## 2021-12-16 NOTE — TELEPHONE ENCOUNTER
Received request via: Pharmacy    Was the patient seen in the last year in this department? Yes    Does the patient have an active prescription (recently filled or refills available) for medication(s) requested? No     Last seen 5/5/21.

## 2021-12-20 RX ORDER — TAMSULOSIN HYDROCHLORIDE 0.4 MG/1
0.4 CAPSULE ORAL
Qty: 90 CAPSULE | Refills: 3 | Status: SHIPPED | OUTPATIENT
Start: 2021-12-20 | End: 2022-01-01

## 2021-12-30 ENCOUNTER — PRE-ADMISSION TESTING (OUTPATIENT)
Dept: ADMISSIONS | Facility: MEDICAL CENTER | Age: 81
End: 2021-12-30
Attending: UROLOGY
Payer: MEDICARE

## 2021-12-30 DIAGNOSIS — Z01.812 PRE-OPERATIVE LABORATORY EXAMINATION: ICD-10-CM

## 2021-12-30 DIAGNOSIS — Z01.810 PRE-OPERATIVE CARDIOVASCULAR EXAMINATION: ICD-10-CM

## 2021-12-30 LAB
ANION GAP SERPL CALC-SCNC: 10 MMOL/L (ref 7–16)
BASOPHILS # BLD AUTO: 0.8 % (ref 0–1.8)
BASOPHILS # BLD: 0.05 K/UL (ref 0–0.12)
BUN SERPL-MCNC: 15 MG/DL (ref 8–22)
CALCIUM SERPL-MCNC: 9.7 MG/DL (ref 8.5–10.5)
CHLORIDE SERPL-SCNC: 104 MMOL/L (ref 96–112)
CO2 SERPL-SCNC: 27 MMOL/L (ref 20–33)
CREAT SERPL-MCNC: 0.79 MG/DL (ref 0.5–1.4)
EKG IMPRESSION: NORMAL
EOSINOPHIL # BLD AUTO: 0.12 K/UL (ref 0–0.51)
EOSINOPHIL NFR BLD: 1.9 % (ref 0–6.9)
ERYTHROCYTE [DISTWIDTH] IN BLOOD BY AUTOMATED COUNT: 44 FL (ref 35.9–50)
GLUCOSE SERPL-MCNC: 114 MG/DL (ref 65–99)
HCT VFR BLD AUTO: 47.1 % (ref 42–52)
HGB BLD-MCNC: 15.9 G/DL (ref 14–18)
IMM GRANULOCYTES # BLD AUTO: 0.02 K/UL (ref 0–0.11)
IMM GRANULOCYTES NFR BLD AUTO: 0.3 % (ref 0–0.9)
LYMPHOCYTES # BLD AUTO: 1.87 K/UL (ref 1–4.8)
LYMPHOCYTES NFR BLD: 29.1 % (ref 22–41)
MCH RBC QN AUTO: 29.7 PG (ref 27–33)
MCHC RBC AUTO-ENTMCNC: 33.8 G/DL (ref 33.7–35.3)
MCV RBC AUTO: 88 FL (ref 81.4–97.8)
MONOCYTES # BLD AUTO: 0.5 K/UL (ref 0–0.85)
MONOCYTES NFR BLD AUTO: 7.8 % (ref 0–13.4)
NEUTROPHILS # BLD AUTO: 3.87 K/UL (ref 1.82–7.42)
NEUTROPHILS NFR BLD: 60.1 % (ref 44–72)
NRBC # BLD AUTO: 0 K/UL
NRBC BLD-RTO: 0 /100 WBC
PLATELET # BLD AUTO: 207 K/UL (ref 164–446)
PMV BLD AUTO: 10.4 FL (ref 9–12.9)
POTASSIUM SERPL-SCNC: 4.1 MMOL/L (ref 3.6–5.5)
RBC # BLD AUTO: 5.35 M/UL (ref 4.7–6.1)
SARS-COV-2 RNA RESP QL NAA+PROBE: NOTDETECTED
SODIUM SERPL-SCNC: 141 MMOL/L (ref 135–145)
SPECIMEN SOURCE: NORMAL
WBC # BLD AUTO: 6.4 K/UL (ref 4.8–10.8)

## 2021-12-30 PROCEDURE — C9803 HOPD COVID-19 SPEC COLLECT: HCPCS

## 2021-12-30 PROCEDURE — U0005 INFEC AGEN DETEC AMPLI PROBE: HCPCS

## 2021-12-30 PROCEDURE — 93005 ELECTROCARDIOGRAM TRACING: CPT

## 2021-12-30 PROCEDURE — 80048 BASIC METABOLIC PNL TOTAL CA: CPT

## 2021-12-30 PROCEDURE — 85025 COMPLETE CBC W/AUTO DIFF WBC: CPT

## 2021-12-30 PROCEDURE — 36415 COLL VENOUS BLD VENIPUNCTURE: CPT

## 2021-12-30 PROCEDURE — 93010 ELECTROCARDIOGRAM REPORT: CPT | Performed by: INTERNAL MEDICINE

## 2021-12-30 PROCEDURE — U0003 INFECTIOUS AGENT DETECTION BY NUCLEIC ACID (DNA OR RNA); SEVERE ACUTE RESPIRATORY SYNDROME CORONAVIRUS 2 (SARS-COV-2) (CORONAVIRUS DISEASE [COVID-19]), AMPLIFIED PROBE TECHNIQUE, MAKING USE OF HIGH THROUGHPUT TECHNOLOGIES AS DESCRIBED BY CMS-2020-01-R: HCPCS

## 2021-12-30 RX ORDER — OXYBUTYNIN CHLORIDE 10 MG/1
10 TABLET, EXTENDED RELEASE ORAL DAILY
Status: ON HOLD | COMMUNITY
End: 2022-01-01

## 2021-12-30 ASSESSMENT — FIBROSIS 4 INDEX: FIB4 SCORE: 1.67

## 2021-12-30 NOTE — PREPROCEDURE INSTRUCTIONS
Patient here with his wife Liliana for his preadmit appointment.  Patient instructed to stop vitamins/supplements/herbal medications/diet pills now in preparation for surgery on 1/4/2022 per anesthesia guidelines.  Patient will check with Dr. Kee's office to see if he can continue Meloxicam before surgery and then will follow Dr. Kee's instructions.  Patient given fasting guidelines per anesthesia protocol.  Patient understands all instructions and denies questions at this time.

## 2021-12-31 ENCOUNTER — HOSPITAL ENCOUNTER (OUTPATIENT)
Facility: MEDICAL CENTER | Age: 81
End: 2021-12-31
Attending: UROLOGY
Payer: MEDICARE

## 2021-12-31 PROCEDURE — 81003 URINALYSIS AUTO W/O SCOPE: CPT

## 2021-12-31 PROCEDURE — 87086 URINE CULTURE/COLONY COUNT: CPT

## 2022-01-01 ENCOUNTER — PATIENT OUTREACH (OUTPATIENT)
Dept: ONCOLOGY | Facility: MEDICAL CENTER | Age: 82
End: 2022-01-01

## 2022-01-01 ENCOUNTER — HOSPITAL ENCOUNTER (OUTPATIENT)
Dept: LAB | Facility: MEDICAL CENTER | Age: 82
End: 2022-12-22
Attending: INTERNAL MEDICINE
Payer: MEDICARE

## 2022-01-01 ENCOUNTER — OUTPATIENT INFUSION SERVICES (OUTPATIENT)
Dept: ONCOLOGY | Facility: MEDICAL CENTER | Age: 82
End: 2022-01-01
Attending: INTERNAL MEDICINE
Payer: MEDICARE

## 2022-01-01 ENCOUNTER — TELEPHONE (OUTPATIENT)
Dept: MEDICAL GROUP | Facility: PHYSICIAN GROUP | Age: 82
End: 2022-01-01
Payer: MEDICARE

## 2022-01-01 ENCOUNTER — APPOINTMENT (OUTPATIENT)
Dept: PHYSICAL THERAPY | Facility: REHABILITATION | Age: 82
End: 2022-01-01
Attending: INTERNAL MEDICINE
Payer: MEDICARE

## 2022-01-01 ENCOUNTER — ANESTHESIA EVENT (OUTPATIENT)
Dept: SURGERY | Facility: MEDICAL CENTER | Age: 82
End: 2022-01-01
Payer: MEDICARE

## 2022-01-01 ENCOUNTER — APPOINTMENT (OUTPATIENT)
Dept: RADIOLOGY | Facility: MEDICAL CENTER | Age: 82
End: 2022-01-01
Attending: NURSE PRACTITIONER
Payer: MEDICARE

## 2022-01-01 ENCOUNTER — HOSPITAL ENCOUNTER (OUTPATIENT)
Dept: RADIOLOGY | Facility: MEDICAL CENTER | Age: 82
End: 2022-09-20
Attending: INTERNAL MEDICINE
Payer: MEDICARE

## 2022-01-01 ENCOUNTER — PATIENT OUTREACH (OUTPATIENT)
Dept: ONCOLOGY | Facility: MEDICAL CENTER | Age: 82
End: 2022-01-01
Payer: MEDICARE

## 2022-01-01 ENCOUNTER — HOSPITAL ENCOUNTER (OUTPATIENT)
Dept: LAB | Facility: MEDICAL CENTER | Age: 82
End: 2022-12-05
Attending: INTERNAL MEDICINE
Payer: MEDICARE

## 2022-01-01 ENCOUNTER — OFFICE VISIT (OUTPATIENT)
Dept: URGENT CARE | Facility: PHYSICIAN GROUP | Age: 82
End: 2022-01-01
Payer: MEDICARE

## 2022-01-01 ENCOUNTER — HOSPITAL ENCOUNTER (OUTPATIENT)
Dept: LAB | Facility: MEDICAL CENTER | Age: 82
End: 2022-11-11
Attending: INTERNAL MEDICINE
Payer: MEDICARE

## 2022-01-01 ENCOUNTER — OFFICE VISIT (OUTPATIENT)
Dept: MEDICAL GROUP | Facility: PHYSICIAN GROUP | Age: 82
End: 2022-01-01
Payer: MEDICARE

## 2022-01-01 ENCOUNTER — TELEPHONE (OUTPATIENT)
Dept: CARDIOLOGY | Facility: MEDICAL CENTER | Age: 82
End: 2022-01-01
Payer: MEDICARE

## 2022-01-01 ENCOUNTER — HOSPITAL ENCOUNTER (OUTPATIENT)
Facility: MEDICAL CENTER | Age: 82
End: 2022-08-24
Attending: UROLOGY
Payer: MEDICARE

## 2022-01-01 ENCOUNTER — HOSPITAL ENCOUNTER (OUTPATIENT)
Facility: MEDICAL CENTER | Age: 82
End: 2022-10-19
Attending: NURSE PRACTITIONER
Payer: MEDICARE

## 2022-01-01 ENCOUNTER — HOSPITAL ENCOUNTER (OUTPATIENT)
Facility: MEDICAL CENTER | Age: 82
End: 2022-01-01
Attending: INTERNAL MEDICINE | Admitting: INTERNAL MEDICINE
Payer: MEDICARE

## 2022-01-01 ENCOUNTER — HOSPITAL ENCOUNTER (OUTPATIENT)
Facility: MEDICAL CENTER | Age: 82
End: 2022-08-29
Attending: UROLOGY | Admitting: UROLOGY
Payer: MEDICARE

## 2022-01-01 ENCOUNTER — PRE-ADMISSION TESTING (OUTPATIENT)
Dept: ADMISSIONS | Facility: MEDICAL CENTER | Age: 82
End: 2022-01-01
Attending: UROLOGY
Payer: MEDICARE

## 2022-01-01 ENCOUNTER — HOSPITAL ENCOUNTER (OUTPATIENT)
Dept: LAB | Facility: MEDICAL CENTER | Age: 82
End: 2022-12-12
Attending: INTERNAL MEDICINE
Payer: MEDICARE

## 2022-01-01 ENCOUNTER — TELEPHONE (OUTPATIENT)
Dept: HEALTH INFORMATION MANAGEMENT | Facility: OTHER | Age: 82
End: 2022-01-01

## 2022-01-01 ENCOUNTER — ANESTHESIA (OUTPATIENT)
Dept: SURGERY | Facility: MEDICAL CENTER | Age: 82
End: 2022-01-01
Payer: MEDICARE

## 2022-01-01 VITALS
HEIGHT: 65 IN | SYSTOLIC BLOOD PRESSURE: 153 MMHG | BODY MASS INDEX: 30.71 KG/M2 | DIASTOLIC BLOOD PRESSURE: 75 MMHG | OXYGEN SATURATION: 94 % | WEIGHT: 184.3 LBS | TEMPERATURE: 98 F | HEART RATE: 60 BPM | RESPIRATION RATE: 18 BRPM

## 2022-01-01 VITALS
TEMPERATURE: 96.8 F | HEIGHT: 64 IN | DIASTOLIC BLOOD PRESSURE: 67 MMHG | HEART RATE: 67 BPM | WEIGHT: 181.88 LBS | RESPIRATION RATE: 18 BRPM | OXYGEN SATURATION: 94 % | SYSTOLIC BLOOD PRESSURE: 149 MMHG | BODY MASS INDEX: 31.05 KG/M2

## 2022-01-01 VITALS
HEIGHT: 66 IN | OXYGEN SATURATION: 94 % | TEMPERATURE: 98.2 F | HEART RATE: 71 BPM | WEIGHT: 178 LBS | DIASTOLIC BLOOD PRESSURE: 74 MMHG | RESPIRATION RATE: 16 BRPM | SYSTOLIC BLOOD PRESSURE: 110 MMHG | BODY MASS INDEX: 28.61 KG/M2

## 2022-01-01 VITALS
DIASTOLIC BLOOD PRESSURE: 64 MMHG | OXYGEN SATURATION: 98 % | WEIGHT: 184 LBS | HEIGHT: 65 IN | BODY MASS INDEX: 30.66 KG/M2 | SYSTOLIC BLOOD PRESSURE: 122 MMHG | HEART RATE: 73 BPM | RESPIRATION RATE: 16 BRPM | TEMPERATURE: 98.6 F

## 2022-01-01 VITALS
RESPIRATION RATE: 16 BRPM | OXYGEN SATURATION: 93 % | DIASTOLIC BLOOD PRESSURE: 78 MMHG | TEMPERATURE: 97.5 F | HEIGHT: 67 IN | BODY MASS INDEX: 28.13 KG/M2 | WEIGHT: 179.23 LBS | SYSTOLIC BLOOD PRESSURE: 168 MMHG | HEART RATE: 71 BPM

## 2022-01-01 VITALS
DIASTOLIC BLOOD PRESSURE: 62 MMHG | HEART RATE: 60 BPM | BODY MASS INDEX: 30.16 KG/M2 | OXYGEN SATURATION: 95 % | SYSTOLIC BLOOD PRESSURE: 146 MMHG | HEIGHT: 65 IN | TEMPERATURE: 96.8 F | WEIGHT: 181 LBS | RESPIRATION RATE: 18 BRPM

## 2022-01-01 DIAGNOSIS — C67.9 MALIGNANT NEOPLASM OF URINARY BLADDER, UNSPECIFIED SITE (HCC): ICD-10-CM

## 2022-01-01 DIAGNOSIS — J43.9 PULMONARY EMPHYSEMA, UNSPECIFIED EMPHYSEMA TYPE (HCC): Chronic | ICD-10-CM

## 2022-01-01 DIAGNOSIS — I10 PRIMARY HYPERTENSION: Chronic | ICD-10-CM

## 2022-01-01 DIAGNOSIS — R39.11 URINARY HESITANCY: ICD-10-CM

## 2022-01-01 DIAGNOSIS — D09.0 CARCINOMA IN SITU OF BLADDER: ICD-10-CM

## 2022-01-01 DIAGNOSIS — N32.89 MASS OF BLADDER: ICD-10-CM

## 2022-01-01 DIAGNOSIS — E78.5 DYSLIPIDEMIA: Chronic | ICD-10-CM

## 2022-01-01 DIAGNOSIS — Z01.810 PRE-OPERATIVE CARDIOVASCULAR EXAMINATION: ICD-10-CM

## 2022-01-01 DIAGNOSIS — I10 ESSENTIAL HYPERTENSION: Chronic | ICD-10-CM

## 2022-01-01 LAB
ALBUMIN SERPL BCP-MCNC: 4 G/DL (ref 3.2–4.9)
ALBUMIN SERPL BCP-MCNC: 4.2 G/DL (ref 3.2–4.9)
ALBUMIN SERPL BCP-MCNC: 4.3 G/DL (ref 3.2–4.9)
ALBUMIN SERPL BCP-MCNC: 4.3 G/DL (ref 3.2–4.9)
ALBUMIN SERPL BCP-MCNC: 4.8 G/DL (ref 3.2–4.9)
ALBUMIN/GLOB SERPL: 1.2 G/DL
ALBUMIN/GLOB SERPL: 1.2 G/DL
ALBUMIN/GLOB SERPL: 1.3 G/DL
ALP SERPL-CCNC: 100 U/L (ref 30–99)
ALP SERPL-CCNC: 102 U/L (ref 30–99)
ALP SERPL-CCNC: 143 U/L (ref 30–99)
ALP SERPL-CCNC: 94 U/L (ref 30–99)
ALP SERPL-CCNC: 96 U/L (ref 30–99)
ALT SERPL-CCNC: 181 U/L (ref 2–50)
ALT SERPL-CCNC: 21 U/L (ref 2–50)
ALT SERPL-CCNC: 25 U/L (ref 2–50)
ALT SERPL-CCNC: 28 U/L (ref 2–50)
ALT SERPL-CCNC: 48 U/L (ref 2–50)
AMBIGUOUS DTTM AMBI4: NORMAL
AMBIGUOUS DTTM AMBI4: NORMAL
ANION GAP SERPL CALC-SCNC: 10 MMOL/L (ref 7–16)
ANION GAP SERPL CALC-SCNC: 11 MMOL/L (ref 7–16)
ANION GAP SERPL CALC-SCNC: 12 MMOL/L (ref 7–16)
ANION GAP SERPL CALC-SCNC: 13 MMOL/L (ref 7–16)
ANION GAP SERPL CALC-SCNC: 9 MMOL/L (ref 7–16)
ANION GAP SERPL CALC-SCNC: 9 MMOL/L (ref 7–16)
APPEARANCE UR: CLEAR
APTT PPP: 28.6 SEC (ref 24.7–36)
AST SERPL-CCNC: 15 U/L (ref 12–45)
AST SERPL-CCNC: 20 U/L (ref 12–45)
AST SERPL-CCNC: 21 U/L (ref 12–45)
AST SERPL-CCNC: 24 U/L (ref 12–45)
AST SERPL-CCNC: 58 U/L (ref 12–45)
BACTERIA UR CULT: NORMAL
BASOPHILS # BLD AUTO: 0.8 % (ref 0–1.8)
BASOPHILS # BLD AUTO: 0.8 % (ref 0–1.8)
BASOPHILS # BLD AUTO: 1 % (ref 0–1.8)
BASOPHILS # BLD AUTO: 1 % (ref 0–1.8)
BASOPHILS # BLD AUTO: 1.1 % (ref 0–1.8)
BASOPHILS # BLD: 0.06 K/UL (ref 0–0.12)
BASOPHILS # BLD: 0.07 K/UL (ref 0–0.12)
BASOPHILS # BLD: 0.07 K/UL (ref 0–0.12)
BILIRUB SERPL-MCNC: 0.3 MG/DL (ref 0.1–1.5)
BILIRUB SERPL-MCNC: 0.4 MG/DL (ref 0.1–1.5)
BILIRUB SERPL-MCNC: 0.4 MG/DL (ref 0.1–1.5)
BILIRUB SERPL-MCNC: 0.6 MG/DL (ref 0.1–1.5)
BILIRUB SERPL-MCNC: 0.6 MG/DL (ref 0.1–1.5)
BILIRUB UR QL STRIP.AUTO: NEGATIVE
BILIRUB UR QL STRIP.AUTO: NEGATIVE
BILIRUB UR STRIP-MCNC: NORMAL MG/DL
BUN SERPL-MCNC: 13 MG/DL (ref 8–22)
BUN SERPL-MCNC: 14 MG/DL (ref 8–22)
BUN SERPL-MCNC: 15 MG/DL (ref 8–22)
BUN SERPL-MCNC: 16 MG/DL (ref 8–22)
BUN SERPL-MCNC: 16 MG/DL (ref 8–22)
BUN SERPL-MCNC: 17 MG/DL (ref 8–22)
CALCIUM ALBUM COR SERPL-MCNC: 9.7 MG/DL (ref 8.5–10.5)
CALCIUM SERPL-MCNC: 10.1 MG/DL (ref 8.5–10.5)
CALCIUM SERPL-MCNC: 10.2 MG/DL (ref 8.5–10.5)
CALCIUM SERPL-MCNC: 9.3 MG/DL (ref 8.5–10.5)
CALCIUM SERPL-MCNC: 9.3 MG/DL (ref 8.5–10.5)
CALCIUM SERPL-MCNC: 9.7 MG/DL (ref 8.5–10.5)
CALCIUM SERPL-MCNC: 9.9 MG/DL (ref 8.5–10.5)
CHLORIDE SERPL-SCNC: 100 MMOL/L (ref 96–112)
CHLORIDE SERPL-SCNC: 102 MMOL/L (ref 96–112)
CHLORIDE SERPL-SCNC: 103 MMOL/L (ref 96–112)
CHLORIDE SERPL-SCNC: 106 MMOL/L (ref 96–112)
CO2 SERPL-SCNC: 24 MMOL/L (ref 20–33)
CO2 SERPL-SCNC: 25 MMOL/L (ref 20–33)
CO2 SERPL-SCNC: 26 MMOL/L (ref 20–33)
CO2 SERPL-SCNC: 26 MMOL/L (ref 20–33)
CO2 SERPL-SCNC: 28 MMOL/L (ref 20–33)
CO2 SERPL-SCNC: 29 MMOL/L (ref 20–33)
COLOR UR AUTO: YELLOW
COLOR UR: YELLOW
COLOR UR: YELLOW
CREAT SERPL-MCNC: 0.64 MG/DL (ref 0.5–1.4)
CREAT SERPL-MCNC: 0.74 MG/DL (ref 0.5–1.4)
CREAT SERPL-MCNC: 0.8 MG/DL (ref 0.5–1.4)
CREAT SERPL-MCNC: 0.82 MG/DL (ref 0.5–1.4)
CREAT SERPL-MCNC: 0.83 MG/DL (ref 0.5–1.4)
CREAT SERPL-MCNC: 0.87 MG/DL (ref 0.5–1.4)
EKG IMPRESSION: NORMAL
EOSINOPHIL # BLD AUTO: 0.06 K/UL (ref 0–0.51)
EOSINOPHIL # BLD AUTO: 0.11 K/UL (ref 0–0.51)
EOSINOPHIL # BLD AUTO: 0.14 K/UL (ref 0–0.51)
EOSINOPHIL # BLD AUTO: 0.16 K/UL (ref 0–0.51)
EOSINOPHIL # BLD AUTO: 0.23 K/UL (ref 0–0.51)
EOSINOPHIL NFR BLD: 0.7 % (ref 0–6.9)
EOSINOPHIL NFR BLD: 1.8 % (ref 0–6.9)
EOSINOPHIL NFR BLD: 1.8 % (ref 0–6.9)
EOSINOPHIL NFR BLD: 2.9 % (ref 0–6.9)
EOSINOPHIL NFR BLD: 3.2 % (ref 0–6.9)
ERYTHROCYTE [DISTWIDTH] IN BLOOD BY AUTOMATED COUNT: 43.8 FL (ref 35.9–50)
ERYTHROCYTE [DISTWIDTH] IN BLOOD BY AUTOMATED COUNT: 44.1 FL (ref 35.9–50)
ERYTHROCYTE [DISTWIDTH] IN BLOOD BY AUTOMATED COUNT: 44.7 FL (ref 35.9–50)
ERYTHROCYTE [DISTWIDTH] IN BLOOD BY AUTOMATED COUNT: 45.4 FL (ref 35.9–50)
ERYTHROCYTE [DISTWIDTH] IN BLOOD BY AUTOMATED COUNT: 47.8 FL (ref 35.9–50)
FASTING STATUS PATIENT QL REPORTED: NORMAL
GFR SERPLBLD CREATININE-BSD FMLA CKD-EPI: 86 ML/MIN/1.73 M 2
GFR SERPLBLD CREATININE-BSD FMLA CKD-EPI: 87 ML/MIN/1.73 M 2
GFR SERPLBLD CREATININE-BSD FMLA CKD-EPI: 87 ML/MIN/1.73 M 2
GFR SERPLBLD CREATININE-BSD FMLA CKD-EPI: 88 ML/MIN/1.73 M 2
GFR SERPLBLD CREATININE-BSD FMLA CKD-EPI: 90 ML/MIN/1.73 M 2
GFR SERPLBLD CREATININE-BSD FMLA CKD-EPI: 94 ML/MIN/1.73 M 2
GLOBULIN SER CALC-MCNC: 3.2 G/DL (ref 1.9–3.5)
GLOBULIN SER CALC-MCNC: 3.3 G/DL (ref 1.9–3.5)
GLOBULIN SER CALC-MCNC: 3.5 G/DL (ref 1.9–3.5)
GLOBULIN SER CALC-MCNC: 3.5 G/DL (ref 1.9–3.5)
GLOBULIN SER CALC-MCNC: 3.6 G/DL (ref 1.9–3.5)
GLUCOSE SERPL-MCNC: 104 MG/DL (ref 65–99)
GLUCOSE SERPL-MCNC: 108 MG/DL (ref 65–99)
GLUCOSE SERPL-MCNC: 116 MG/DL (ref 65–99)
GLUCOSE SERPL-MCNC: 87 MG/DL (ref 65–99)
GLUCOSE SERPL-MCNC: 95 MG/DL (ref 65–99)
GLUCOSE SERPL-MCNC: 98 MG/DL (ref 65–99)
GLUCOSE UR STRIP.AUTO-MCNC: NEGATIVE MG/DL
GLUCOSE UR STRIP.AUTO-MCNC: NEGATIVE MG/DL
GLUCOSE UR STRIP.AUTO-MCNC: NORMAL MG/DL
HBV CORE AB SERPL QL IA: NONREACTIVE
HBV SURFACE AB SERPL IA-ACNC: <3.5 MIU/ML (ref 0–10)
HBV SURFACE AG SER QL: NORMAL
HCT VFR BLD AUTO: 46.7 % (ref 42–52)
HCT VFR BLD AUTO: 47.9 % (ref 42–52)
HCT VFR BLD AUTO: 48.9 % (ref 42–52)
HCT VFR BLD AUTO: 50.2 % (ref 42–52)
HCT VFR BLD AUTO: 50.4 % (ref 42–52)
HCV AB SER QL: NORMAL
HGB BLD-MCNC: 15.5 G/DL (ref 14–18)
HGB BLD-MCNC: 15.8 G/DL (ref 14–18)
HGB BLD-MCNC: 16.3 G/DL (ref 14–18)
HGB BLD-MCNC: 16.5 G/DL (ref 14–18)
HGB BLD-MCNC: 16.6 G/DL (ref 14–18)
IMM GRANULOCYTES # BLD AUTO: 0.01 K/UL (ref 0–0.11)
IMM GRANULOCYTES # BLD AUTO: 0.03 K/UL (ref 0–0.11)
IMM GRANULOCYTES # BLD AUTO: 0.04 K/UL (ref 0–0.11)
IMM GRANULOCYTES NFR BLD AUTO: 0.2 % (ref 0–0.9)
IMM GRANULOCYTES NFR BLD AUTO: 0.4 % (ref 0–0.9)
IMM GRANULOCYTES NFR BLD AUTO: 0.5 % (ref 0–0.9)
INR PPP: 1.02 (ref 0.87–1.13)
KETONES UR STRIP.AUTO-MCNC: NEGATIVE MG/DL
KETONES UR STRIP.AUTO-MCNC: NEGATIVE MG/DL
KETONES UR STRIP.AUTO-MCNC: NORMAL MG/DL
LEUKOCYTE ESTERASE UR QL STRIP.AUTO: NEGATIVE
LEUKOCYTE ESTERASE UR QL STRIP.AUTO: NEGATIVE
LEUKOCYTE ESTERASE UR QL STRIP.AUTO: NORMAL
LYMPHOCYTES # BLD AUTO: 1.29 K/UL (ref 1–4.8)
LYMPHOCYTES # BLD AUTO: 1.52 K/UL (ref 1–4.8)
LYMPHOCYTES # BLD AUTO: 1.64 K/UL (ref 1–4.8)
LYMPHOCYTES # BLD AUTO: 1.71 K/UL (ref 1–4.8)
LYMPHOCYTES # BLD AUTO: 1.88 K/UL (ref 1–4.8)
LYMPHOCYTES NFR BLD: 14.1 % (ref 22–41)
LYMPHOCYTES NFR BLD: 21.6 % (ref 22–41)
LYMPHOCYTES NFR BLD: 26 % (ref 22–41)
LYMPHOCYTES NFR BLD: 26.5 % (ref 22–41)
LYMPHOCYTES NFR BLD: 27.5 % (ref 22–41)
MCH RBC QN AUTO: 29 PG (ref 27–33)
MCH RBC QN AUTO: 29.3 PG (ref 27–33)
MCH RBC QN AUTO: 29.3 PG (ref 27–33)
MCH RBC QN AUTO: 29.4 PG (ref 27–33)
MCH RBC QN AUTO: 29.6 PG (ref 27–33)
MCHC RBC AUTO-ENTMCNC: 32.9 G/DL (ref 33.7–35.3)
MCHC RBC AUTO-ENTMCNC: 32.9 G/DL (ref 33.7–35.3)
MCHC RBC AUTO-ENTMCNC: 33 G/DL (ref 33.7–35.3)
MCHC RBC AUTO-ENTMCNC: 33.2 G/DL (ref 33.7–35.3)
MCHC RBC AUTO-ENTMCNC: 33.3 G/DL (ref 33.7–35.3)
MCV RBC AUTO: 87.8 FL (ref 81.4–97.8)
MCV RBC AUTO: 88.1 FL (ref 81.4–97.8)
MCV RBC AUTO: 88.4 FL (ref 81.4–97.8)
MCV RBC AUTO: 89 FL (ref 81.4–97.8)
MCV RBC AUTO: 90 FL (ref 81.4–97.8)
MICRO URNS: NORMAL
MICRO URNS: NORMAL
MONOCYTES # BLD AUTO: 0.4 K/UL (ref 0–0.85)
MONOCYTES # BLD AUTO: 0.5 K/UL (ref 0–0.85)
MONOCYTES # BLD AUTO: 0.51 K/UL (ref 0–0.85)
MONOCYTES # BLD AUTO: 0.53 K/UL (ref 0–0.85)
MONOCYTES # BLD AUTO: 0.61 K/UL (ref 0–0.85)
MONOCYTES NFR BLD AUTO: 5.4 % (ref 0–13.4)
MONOCYTES NFR BLD AUTO: 7 % (ref 0–13.4)
MONOCYTES NFR BLD AUTO: 7.2 % (ref 0–13.4)
MONOCYTES NFR BLD AUTO: 7.7 % (ref 0–13.4)
MONOCYTES NFR BLD AUTO: 8.6 % (ref 0–13.4)
NEUTROPHILS # BLD AUTO: 3.36 K/UL (ref 1.82–7.42)
NEUTROPHILS # BLD AUTO: 3.83 K/UL (ref 1.82–7.42)
NEUTROPHILS # BLD AUTO: 4.52 K/UL (ref 1.82–7.42)
NEUTROPHILS # BLD AUTO: 5.36 K/UL (ref 1.82–7.42)
NEUTROPHILS # BLD AUTO: 7.22 K/UL (ref 1.82–7.42)
NEUTROPHILS NFR BLD: 60.8 % (ref 44–72)
NEUTROPHILS NFR BLD: 61.9 % (ref 44–72)
NEUTROPHILS NFR BLD: 62.4 % (ref 44–72)
NEUTROPHILS NFR BLD: 67.7 % (ref 44–72)
NEUTROPHILS NFR BLD: 78.6 % (ref 44–72)
NITRITE UR QL STRIP.AUTO: NEGATIVE
NITRITE UR QL STRIP.AUTO: NEGATIVE
NITRITE UR QL STRIP.AUTO: NORMAL
NRBC # BLD AUTO: 0 K/UL
NRBC BLD-RTO: 0 /100 WBC
OUTPT INFUS CBC COMMENT OICOM: ABNORMAL
PATHOLOGY CONSULT NOTE: NORMAL
PH UR STRIP.AUTO: 5.5 [PH] (ref 5–8)
PH UR STRIP.AUTO: 6.5 [PH] (ref 5–8)
PH UR STRIP.AUTO: 6.5 [PH] (ref 5–8)
PLATELET # BLD AUTO: 201 K/UL (ref 164–446)
PLATELET # BLD AUTO: 211 K/UL (ref 164–446)
PLATELET # BLD AUTO: 212 K/UL (ref 164–446)
PLATELET # BLD AUTO: 232 K/UL (ref 164–446)
PLATELET # BLD AUTO: 232 K/UL (ref 164–446)
PMV BLD AUTO: 10.8 FL (ref 9–12.9)
PMV BLD AUTO: 10.9 FL (ref 9–12.9)
PMV BLD AUTO: 11.1 FL (ref 9–12.9)
POTASSIUM SERPL-SCNC: 4.3 MMOL/L (ref 3.6–5.5)
POTASSIUM SERPL-SCNC: 4.4 MMOL/L (ref 3.6–5.5)
POTASSIUM SERPL-SCNC: 4.5 MMOL/L (ref 3.6–5.5)
POTASSIUM SERPL-SCNC: 5 MMOL/L (ref 3.6–5.5)
PROT SERPL-MCNC: 7.2 G/DL (ref 6–8.2)
PROT SERPL-MCNC: 7.6 G/DL (ref 6–8.2)
PROT SERPL-MCNC: 7.7 G/DL (ref 6–8.2)
PROT SERPL-MCNC: 7.8 G/DL (ref 6–8.2)
PROT SERPL-MCNC: 8.4 G/DL (ref 6–8.2)
PROT UR QL STRIP: NEGATIVE MG/DL
PROT UR QL STRIP: NEGATIVE MG/DL
PROT UR QL STRIP: NORMAL MG/DL
PROTHROMBIN TIME: 13.3 SEC (ref 12–14.6)
RBC # BLD AUTO: 5.28 M/UL (ref 4.7–6.1)
RBC # BLD AUTO: 5.44 M/UL (ref 4.7–6.1)
RBC # BLD AUTO: 5.57 M/UL (ref 4.7–6.1)
RBC # BLD AUTO: 5.58 M/UL (ref 4.7–6.1)
RBC # BLD AUTO: 5.66 M/UL (ref 4.7–6.1)
RBC UR QL AUTO: NEGATIVE
RBC UR QL AUTO: NEGATIVE
RBC UR QL AUTO: NORMAL
SIGNIFICANT IND 70042: NORMAL
SITE SITE: NORMAL
SODIUM SERPL-SCNC: 137 MMOL/L (ref 135–145)
SODIUM SERPL-SCNC: 138 MMOL/L (ref 135–145)
SODIUM SERPL-SCNC: 140 MMOL/L (ref 135–145)
SODIUM SERPL-SCNC: 140 MMOL/L (ref 135–145)
SODIUM SERPL-SCNC: 141 MMOL/L (ref 135–145)
SODIUM SERPL-SCNC: 143 MMOL/L (ref 135–145)
SOURCE SOURCE: NORMAL
SP GR UR STRIP.AUTO: 1.02
T4 FREE SERPL-MCNC: 1.24 NG/DL (ref 0.93–1.7)
TSH SERPL DL<=0.005 MIU/L-ACNC: 0.64 UIU/ML (ref 0.38–5.33)
TSH SERPL DL<=0.005 MIU/L-ACNC: 0.74 UIU/ML (ref 0.38–5.33)
TSH SERPL DL<=0.005 MIU/L-ACNC: 0.79 UIU/ML (ref 0.38–5.33)
TSH SERPL DL<=0.005 MIU/L-ACNC: 0.91 UIU/ML (ref 0.38–5.33)
UROBILINOGEN UR STRIP-MCNC: 0.2 MG/DL
UROBILINOGEN UR STRIP.AUTO-MCNC: 0.2 MG/DL
UROBILINOGEN UR STRIP.AUTO-MCNC: 0.2 MG/DL
WBC # BLD AUTO: 5.5 K/UL (ref 4.8–10.8)
WBC # BLD AUTO: 6.2 K/UL (ref 4.8–10.8)
WBC # BLD AUTO: 7.2 K/UL (ref 4.8–10.8)
WBC # BLD AUTO: 7.9 K/UL (ref 4.8–10.8)
WBC # BLD AUTO: 9.2 K/UL (ref 4.8–10.8)

## 2022-01-01 PROCEDURE — 700111 HCHG RX REV CODE 636 W/ 250 OVERRIDE (IP): Performed by: INTERNAL MEDICINE

## 2022-01-01 PROCEDURE — 81003 URINALYSIS AUTO W/O SCOPE: CPT

## 2022-01-01 PROCEDURE — 85610 PROTHROMBIN TIME: CPT

## 2022-01-01 PROCEDURE — 86704 HEP B CORE ANTIBODY TOTAL: CPT

## 2022-01-01 PROCEDURE — 36415 COLL VENOUS BLD VENIPUNCTURE: CPT

## 2022-01-01 PROCEDURE — 700101 HCHG RX REV CODE 250: Performed by: ANESTHESIOLOGY

## 2022-01-01 PROCEDURE — 84443 ASSAY THYROID STIM HORMONE: CPT

## 2022-01-01 PROCEDURE — 81002 URINALYSIS NONAUTO W/O SCOPE: CPT | Performed by: FAMILY MEDICINE

## 2022-01-01 PROCEDURE — 700105 HCHG RX REV CODE 258: Performed by: UROLOGY

## 2022-01-01 PROCEDURE — 160048 HCHG OR STATISTICAL LEVEL 1-5: Performed by: UROLOGY

## 2022-01-01 PROCEDURE — 700111 HCHG RX REV CODE 636 W/ 250 OVERRIDE (IP): Performed by: ANESTHESIOLOGY

## 2022-01-01 PROCEDURE — 93010 ELECTROCARDIOGRAM REPORT: CPT | Performed by: INTERNAL MEDICINE

## 2022-01-01 PROCEDURE — A9270 NON-COVERED ITEM OR SERVICE: HCPCS | Performed by: UROLOGY

## 2022-01-01 PROCEDURE — 00912 ANES TRURL PX RESCJ BLDR TUM: CPT | Performed by: ANESTHESIOLOGY

## 2022-01-01 PROCEDURE — 80053 COMPREHEN METABOLIC PANEL: CPT

## 2022-01-01 PROCEDURE — 96413 CHEMO IV INFUSION 1 HR: CPT

## 2022-01-01 PROCEDURE — 700117 HCHG RX CONTRAST REV CODE 255: Performed by: INTERNAL MEDICINE

## 2022-01-01 PROCEDURE — 700111 HCHG RX REV CODE 636 W/ 250 OVERRIDE (IP)

## 2022-01-01 PROCEDURE — 99100 ANES PT EXTEME AGE<1 YR&>70: CPT | Performed by: ANESTHESIOLOGY

## 2022-01-01 PROCEDURE — 88307 TISSUE EXAM BY PATHOLOGIST: CPT | Mod: 59

## 2022-01-01 PROCEDURE — 85025 COMPLETE CBC W/AUTO DIFF WBC: CPT

## 2022-01-01 PROCEDURE — 86706 HEP B SURFACE ANTIBODY: CPT

## 2022-01-01 PROCEDURE — 86803 HEPATITIS C AB TEST: CPT

## 2022-01-01 PROCEDURE — 160046 HCHG PACU - 1ST 60 MINS PHASE II: Performed by: UROLOGY

## 2022-01-01 PROCEDURE — 93005 ELECTROCARDIOGRAM TRACING: CPT

## 2022-01-01 PROCEDURE — 74177 CT ABD & PELVIS W/CONTRAST: CPT

## 2022-01-01 PROCEDURE — 85730 THROMBOPLASTIN TIME PARTIAL: CPT

## 2022-01-01 PROCEDURE — 700102 HCHG RX REV CODE 250 W/ 637 OVERRIDE(OP): Performed by: ANESTHESIOLOGY

## 2022-01-01 PROCEDURE — 700105 HCHG RX REV CODE 258: Performed by: ANESTHESIOLOGY

## 2022-01-01 PROCEDURE — 160047 HCHG PACU  - EA ADDL 30 MINS PHASE II: Performed by: UROLOGY

## 2022-01-01 PROCEDURE — 88342 IMHCHEM/IMCYTCHM 1ST ANTB: CPT

## 2022-01-01 PROCEDURE — 160039 HCHG SURGERY MINUTES - EA ADDL 1 MIN LEVEL 3: Performed by: UROLOGY

## 2022-01-01 PROCEDURE — 160035 HCHG PACU - 1ST 60 MINS PHASE I: Performed by: UROLOGY

## 2022-01-01 PROCEDURE — 700105 HCHG RX REV CODE 258: Performed by: INTERNAL MEDICINE

## 2022-01-01 PROCEDURE — 160009 HCHG ANES TIME/MIN: Performed by: UROLOGY

## 2022-01-01 PROCEDURE — 160025 RECOVERY II MINUTES (STATS): Performed by: UROLOGY

## 2022-01-01 PROCEDURE — A9270 NON-COVERED ITEM OR SERVICE: HCPCS | Performed by: ANESTHESIOLOGY

## 2022-01-01 PROCEDURE — 80048 BASIC METABOLIC PNL TOTAL CA: CPT

## 2022-01-01 PROCEDURE — 160002 HCHG RECOVERY MINUTES (STAT): Performed by: UROLOGY

## 2022-01-01 PROCEDURE — 160036 HCHG PACU - EA ADDL 30 MINS PHASE I: Performed by: UROLOGY

## 2022-01-01 PROCEDURE — G0439 PPPS, SUBSEQ VISIT: HCPCS | Performed by: INTERNAL MEDICINE

## 2022-01-01 PROCEDURE — 87086 URINE CULTURE/COLONY COUNT: CPT

## 2022-01-01 PROCEDURE — 84439 ASSAY OF FREE THYROXINE: CPT

## 2022-01-01 PROCEDURE — 700111 HCHG RX REV CODE 636 W/ 250 OVERRIDE (IP): Performed by: UROLOGY

## 2022-01-01 PROCEDURE — 87340 HEPATITIS B SURFACE AG IA: CPT

## 2022-01-01 PROCEDURE — A4212 NON CORING NEEDLE OR STYLET: HCPCS

## 2022-01-01 PROCEDURE — 99213 OFFICE O/P EST LOW 20 MIN: CPT | Performed by: FAMILY MEDICINE

## 2022-01-01 PROCEDURE — 700102 HCHG RX REV CODE 250 W/ 637 OVERRIDE(OP): Performed by: UROLOGY

## 2022-01-01 PROCEDURE — 160028 HCHG SURGERY MINUTES - 1ST 30 MINS LEVEL 3: Performed by: UROLOGY

## 2022-01-01 RX ORDER — EPINEPHRINE 1 MG/ML(1)
0.5 AMPUL (ML) INJECTION PRN
Status: CANCELLED | OUTPATIENT
Start: 2022-01-01

## 2022-01-01 RX ORDER — ONDANSETRON 8 MG/1
8 TABLET, ORALLY DISINTEGRATING ORAL PRN
Status: CANCELLED | OUTPATIENT
Start: 2023-01-01

## 2022-01-01 RX ORDER — OXYCODONE HCL 5 MG/5 ML
10 SOLUTION, ORAL ORAL
Status: COMPLETED | OUTPATIENT
Start: 2022-01-01 | End: 2022-01-01

## 2022-01-01 RX ORDER — DIPHENHYDRAMINE HYDROCHLORIDE 50 MG/ML
12.5 INJECTION INTRAMUSCULAR; INTRAVENOUS
Status: DISCONTINUED | OUTPATIENT
Start: 2022-01-01 | End: 2022-01-01 | Stop reason: HOSPADM

## 2022-01-01 RX ORDER — METHYLPREDNISOLONE SODIUM SUCCINATE 125 MG/2ML
125 INJECTION, POWDER, LYOPHILIZED, FOR SOLUTION INTRAMUSCULAR; INTRAVENOUS PRN
Status: CANCELLED | OUTPATIENT
Start: 2022-01-01

## 2022-01-01 RX ORDER — MELOXICAM 15 MG/1
TABLET ORAL
Qty: 30 TABLET | Refills: 1 | Status: SHIPPED | OUTPATIENT
Start: 2022-01-01 | End: 2022-01-01

## 2022-01-01 RX ORDER — 0.9 % SODIUM CHLORIDE 0.9 %
3 VIAL (ML) INJECTION PRN
Status: CANCELLED | OUTPATIENT
Start: 2022-01-01

## 2022-01-01 RX ORDER — DIPHENHYDRAMINE HYDROCHLORIDE 50 MG/ML
50 INJECTION INTRAMUSCULAR; INTRAVENOUS PRN
Status: CANCELLED | OUTPATIENT
Start: 2023-01-01

## 2022-01-01 RX ORDER — EPINEPHRINE 1 MG/ML(1)
0.5 AMPUL (ML) INJECTION PRN
Status: CANCELLED | OUTPATIENT
Start: 2023-01-01

## 2022-01-01 RX ORDER — 0.9 % SODIUM CHLORIDE 0.9 %
10 VIAL (ML) INJECTION PRN
Status: CANCELLED | OUTPATIENT
Start: 2022-01-01

## 2022-01-01 RX ORDER — 0.9 % SODIUM CHLORIDE 0.9 %
3 VIAL (ML) INJECTION PRN
Status: CANCELLED | OUTPATIENT
Start: 2023-01-01

## 2022-01-01 RX ORDER — LABETALOL HYDROCHLORIDE 5 MG/ML
INJECTION, SOLUTION INTRAVENOUS PRN
Status: DISCONTINUED | OUTPATIENT
Start: 2022-01-01 | End: 2022-01-01 | Stop reason: SURG

## 2022-01-01 RX ORDER — HEPARIN SODIUM (PORCINE) LOCK FLUSH IV SOLN 100 UNIT/ML 100 UNIT/ML
500 SOLUTION INTRAVENOUS PRN
Status: CANCELLED | OUTPATIENT
Start: 2022-01-01

## 2022-01-01 RX ORDER — 0.9 % SODIUM CHLORIDE 0.9 %
VIAL (ML) INJECTION PRN
Status: CANCELLED | OUTPATIENT
Start: 2022-01-01

## 2022-01-01 RX ORDER — PROCHLORPERAZINE MALEATE 10 MG
10 TABLET ORAL EVERY 6 HOURS PRN
Status: CANCELLED | OUTPATIENT
Start: 2022-01-01

## 2022-01-01 RX ORDER — ONDANSETRON 2 MG/ML
4 INJECTION INTRAMUSCULAR; INTRAVENOUS PRN
Status: CANCELLED | OUTPATIENT
Start: 2022-01-01

## 2022-01-01 RX ORDER — ONDANSETRON 2 MG/ML
4 INJECTION INTRAMUSCULAR; INTRAVENOUS
Status: DISCONTINUED | OUTPATIENT
Start: 2022-01-01 | End: 2022-01-01 | Stop reason: HOSPADM

## 2022-01-01 RX ORDER — AMLODIPINE BESYLATE 5 MG/1
5 TABLET ORAL DAILY
Qty: 90 TABLET | Refills: 3 | Status: ON HOLD | OUTPATIENT
Start: 2022-01-01 | End: 2023-01-01

## 2022-01-01 RX ORDER — ONDANSETRON 8 MG/1
8 TABLET, ORALLY DISINTEGRATING ORAL PRN
Status: CANCELLED | OUTPATIENT
Start: 2022-01-01

## 2022-01-01 RX ORDER — HYDROMORPHONE HYDROCHLORIDE 1 MG/ML
0.5 INJECTION, SOLUTION INTRAMUSCULAR; INTRAVENOUS; SUBCUTANEOUS
Status: DISCONTINUED | OUTPATIENT
Start: 2022-01-01 | End: 2022-01-01 | Stop reason: HOSPADM

## 2022-01-01 RX ORDER — LABETALOL HYDROCHLORIDE 5 MG/ML
5 INJECTION, SOLUTION INTRAVENOUS
Status: DISCONTINUED | OUTPATIENT
Start: 2022-01-01 | End: 2022-01-01 | Stop reason: HOSPADM

## 2022-01-01 RX ORDER — ATROPA BELLADONNA AND OPIUM 16.2; 3 MG/1; MG/1
60 SUPPOSITORY RECTAL
Status: DISCONTINUED | OUTPATIENT
Start: 2022-01-01 | End: 2022-01-01 | Stop reason: HOSPADM

## 2022-01-01 RX ORDER — 0.9 % SODIUM CHLORIDE 0.9 %
VIAL (ML) INJECTION PRN
Status: CANCELLED | OUTPATIENT
Start: 2023-01-01

## 2022-01-01 RX ORDER — PROCHLORPERAZINE MALEATE 10 MG
10 TABLET ORAL EVERY 6 HOURS PRN
Status: CANCELLED | OUTPATIENT
Start: 2023-01-01

## 2022-01-01 RX ORDER — HYDROMORPHONE HYDROCHLORIDE 1 MG/ML
0.4 INJECTION, SOLUTION INTRAMUSCULAR; INTRAVENOUS; SUBCUTANEOUS
Status: DISCONTINUED | OUTPATIENT
Start: 2022-01-01 | End: 2022-01-01 | Stop reason: HOSPADM

## 2022-01-01 RX ORDER — SODIUM CHLORIDE 9 MG/ML
INJECTION, SOLUTION INTRAVENOUS CONTINUOUS
Status: CANCELLED | OUTPATIENT
Start: 2022-01-01

## 2022-01-01 RX ORDER — LIDOCAINE HYDROCHLORIDE 20 MG/ML
INJECTION, SOLUTION EPIDURAL; INFILTRATION; INTRACAUDAL; PERINEURAL PRN
Status: DISCONTINUED | OUTPATIENT
Start: 2022-01-01 | End: 2022-01-01 | Stop reason: SURG

## 2022-01-01 RX ORDER — TAMSULOSIN HYDROCHLORIDE 0.4 MG/1
CAPSULE ORAL
Qty: 90 CAPSULE | Refills: 3 | Status: SHIPPED | OUTPATIENT
Start: 2022-01-01 | End: 2023-08-01 | Stop reason: CLARIF

## 2022-01-01 RX ORDER — 0.9 % SODIUM CHLORIDE 0.9 %
10 VIAL (ML) INJECTION PRN
Status: CANCELLED | OUTPATIENT
Start: 2023-01-01

## 2022-01-01 RX ORDER — DIPHENHYDRAMINE HYDROCHLORIDE 50 MG/ML
50 INJECTION INTRAMUSCULAR; INTRAVENOUS PRN
Status: CANCELLED | OUTPATIENT
Start: 2022-01-01

## 2022-01-01 RX ORDER — MELOXICAM 15 MG/1
TABLET ORAL
Qty: 100 TABLET | Refills: 0 | Status: SHIPPED | OUTPATIENT
Start: 2022-01-01 | End: 2023-01-01

## 2022-01-01 RX ORDER — ONDANSETRON 2 MG/ML
4 INJECTION INTRAMUSCULAR; INTRAVENOUS PRN
Status: CANCELLED | OUTPATIENT
Start: 2023-01-01

## 2022-01-01 RX ORDER — GABAPENTIN 100 MG/1
100 CAPSULE ORAL NIGHTLY PRN
Qty: 90 CAPSULE | Refills: 3 | Status: SHIPPED | OUTPATIENT
Start: 2022-01-01 | End: 2023-01-01 | Stop reason: SDUPTHER

## 2022-01-01 RX ORDER — SODIUM CHLORIDE 9 MG/ML
INJECTION, SOLUTION INTRAVENOUS CONTINUOUS
Status: CANCELLED | OUTPATIENT
Start: 2023-01-01

## 2022-01-01 RX ORDER — HEPARIN SODIUM (PORCINE) LOCK FLUSH IV SOLN 100 UNIT/ML 100 UNIT/ML
500 SOLUTION INTRAVENOUS PRN
Status: CANCELLED | OUTPATIENT
Start: 2023-01-01

## 2022-01-01 RX ORDER — BUTYROSPERMUM PARKII(SHEA BUTTER), SIMMONDSIA CHINENSIS (JOJOBA) SEED OIL, ALOE BARBADENSIS LEAF EXTRACT .01; 1; 3.5 G/100G; G/100G; G/100G
4000 LIQUID TOPICAL EVERY MORNING
Status: ON HOLD | COMMUNITY
End: 2023-01-01

## 2022-01-01 RX ORDER — ALBUTEROL SULFATE 2.5 MG/3ML
2.5 SOLUTION RESPIRATORY (INHALATION)
Status: DISCONTINUED | OUTPATIENT
Start: 2022-01-01 | End: 2022-01-01 | Stop reason: HOSPADM

## 2022-01-01 RX ORDER — DEXAMETHASONE SODIUM PHOSPHATE 4 MG/ML
INJECTION, SOLUTION INTRA-ARTICULAR; INTRALESIONAL; INTRAMUSCULAR; INTRAVENOUS; SOFT TISSUE PRN
Status: DISCONTINUED | OUTPATIENT
Start: 2022-01-01 | End: 2022-01-01 | Stop reason: SURG

## 2022-01-01 RX ORDER — PRAVASTATIN SODIUM 40 MG
40 TABLET ORAL DAILY
Qty: 100 TABLET | Refills: 0 | Status: SHIPPED | OUTPATIENT
Start: 2022-01-01 | End: 2023-01-01

## 2022-01-01 RX ORDER — OXYCODONE HCL 5 MG/5 ML
5 SOLUTION, ORAL ORAL
Status: COMPLETED | OUTPATIENT
Start: 2022-01-01 | End: 2022-01-01

## 2022-01-01 RX ORDER — CEFAZOLIN SODIUM 1 G/3ML
INJECTION, POWDER, FOR SOLUTION INTRAMUSCULAR; INTRAVENOUS PRN
Status: DISCONTINUED | OUTPATIENT
Start: 2022-01-01 | End: 2022-01-01 | Stop reason: SURG

## 2022-01-01 RX ORDER — HYDROMORPHONE HYDROCHLORIDE 1 MG/ML
0.2 INJECTION, SOLUTION INTRAMUSCULAR; INTRAVENOUS; SUBCUTANEOUS
Status: DISCONTINUED | OUTPATIENT
Start: 2022-01-01 | End: 2022-01-01 | Stop reason: HOSPADM

## 2022-01-01 RX ORDER — PRAVASTATIN SODIUM 40 MG
TABLET ORAL
Qty: 100 TABLET | Refills: 0 | Status: SHIPPED | OUTPATIENT
Start: 2022-01-01 | End: 2022-01-01

## 2022-01-01 RX ORDER — MELOXICAM 15 MG/1
7.5 TABLET ORAL EVERY EVENING
COMMUNITY
End: 2022-01-01

## 2022-01-01 RX ORDER — HEPARIN SODIUM (PORCINE) LOCK FLUSH IV SOLN 100 UNIT/ML 100 UNIT/ML
500 SOLUTION INTRAVENOUS PRN
Status: DISCONTINUED | OUTPATIENT
Start: 2022-01-01 | End: 2022-01-01 | Stop reason: HOSPADM

## 2022-01-01 RX ORDER — HEPARIN SODIUM (PORCINE) LOCK FLUSH IV SOLN 100 UNIT/ML 100 UNIT/ML
SOLUTION INTRAVENOUS
Status: COMPLETED
Start: 2022-01-01 | End: 2022-01-01

## 2022-01-01 RX ORDER — METHYLPREDNISOLONE SODIUM SUCCINATE 125 MG/2ML
125 INJECTION, POWDER, LYOPHILIZED, FOR SOLUTION INTRAMUSCULAR; INTRAVENOUS PRN
Status: CANCELLED | OUTPATIENT
Start: 2023-01-01

## 2022-01-01 RX ORDER — SODIUM CHLORIDE, SODIUM LACTATE, POTASSIUM CHLORIDE, CALCIUM CHLORIDE 600; 310; 30; 20 MG/100ML; MG/100ML; MG/100ML; MG/100ML
INJECTION, SOLUTION INTRAVENOUS
Status: DISCONTINUED | OUTPATIENT
Start: 2022-01-01 | End: 2022-01-01 | Stop reason: SURG

## 2022-01-01 RX ORDER — LIDOCAINE AND PRILOCAINE 25; 25 MG/G; MG/G
CREAM TOPICAL PRN
Status: ON HOLD | COMMUNITY
Start: 2022-01-01 | End: 2023-01-01

## 2022-01-01 RX ORDER — HALOPERIDOL 5 MG/ML
1 INJECTION INTRAMUSCULAR
Status: DISCONTINUED | OUTPATIENT
Start: 2022-01-01 | End: 2022-01-01 | Stop reason: HOSPADM

## 2022-01-01 RX ORDER — GABAPENTIN 100 MG/1
100 CAPSULE ORAL NIGHTLY PRN
COMMUNITY
End: 2022-01-01 | Stop reason: SDUPTHER

## 2022-01-01 RX ORDER — ONDANSETRON 2 MG/ML
INJECTION INTRAMUSCULAR; INTRAVENOUS PRN
Status: DISCONTINUED | OUTPATIENT
Start: 2022-01-01 | End: 2022-01-01 | Stop reason: SURG

## 2022-01-01 RX ORDER — PHENAZOPYRIDINE HYDROCHLORIDE 100 MG/1
200 TABLET, FILM COATED ORAL
Status: COMPLETED | OUTPATIENT
Start: 2022-01-01 | End: 2022-01-01

## 2022-01-01 RX ORDER — ATROPA BELLADONNA AND OPIUM 16.2; 6 MG/1; MG/1
SUPPOSITORY RECTAL
Status: DISCONTINUED | OUTPATIENT
Start: 2022-01-01 | End: 2022-01-01 | Stop reason: HOSPADM

## 2022-01-01 RX ORDER — SODIUM CHLORIDE, SODIUM LACTATE, POTASSIUM CHLORIDE, CALCIUM CHLORIDE 600; 310; 30; 20 MG/100ML; MG/100ML; MG/100ML; MG/100ML
INJECTION, SOLUTION INTRAVENOUS CONTINUOUS
Status: DISCONTINUED | OUTPATIENT
Start: 2022-01-01 | End: 2022-01-01 | Stop reason: HOSPADM

## 2022-01-01 RX ORDER — FINASTERIDE 5 MG/1
TABLET, FILM COATED ORAL
COMMUNITY
End: 2022-01-01

## 2022-01-01 RX ORDER — MELOXICAM 15 MG/1
7.5 TABLET ORAL EVERY EVENING
Qty: 30 TABLET | Refills: 0 | Status: CANCELLED | OUTPATIENT
Start: 2022-01-01

## 2022-01-01 RX ADMIN — PROPOFOL 30 MG: 10 INJECTION, EMULSION INTRAVENOUS at 15:04

## 2022-01-01 RX ADMIN — ONDANSETRON 4 MG: 2 INJECTION INTRAMUSCULAR; INTRAVENOUS at 14:59

## 2022-01-01 RX ADMIN — LABETALOL HYDROCHLORIDE 5 MG: 5 INJECTION, SOLUTION INTRAVENOUS at 15:04

## 2022-01-01 RX ADMIN — OXYCODONE HYDROCHLORIDE 10 MG: 5 SOLUTION ORAL at 15:39

## 2022-01-01 RX ADMIN — GEMCITABINE 1000 MG: 38 INJECTION, POWDER, LYOPHILIZED, FOR SOLUTION INTRAVENOUS at 15:06

## 2022-01-01 RX ADMIN — IOHEXOL 100 ML: 350 INJECTION, SOLUTION INTRAVENOUS at 12:07

## 2022-01-01 RX ADMIN — IOHEXOL 25 ML: 240 INJECTION, SOLUTION INTRATHECAL; INTRAVASCULAR; INTRAVENOUS; ORAL at 12:07

## 2022-01-01 RX ADMIN — PROPOFOL 20 MG: 10 INJECTION, EMULSION INTRAVENOUS at 15:08

## 2022-01-01 RX ADMIN — CEFAZOLIN 2 G: 330 INJECTION, POWDER, FOR SOLUTION INTRAMUSCULAR; INTRAVENOUS at 14:32

## 2022-01-01 RX ADMIN — LABETALOL HYDROCHLORIDE 5 MG: 5 INJECTION, SOLUTION INTRAVENOUS at 16:27

## 2022-01-01 RX ADMIN — FENTANYL CITRATE 50 MCG: 50 INJECTION, SOLUTION INTRAMUSCULAR; INTRAVENOUS at 15:45

## 2022-01-01 RX ADMIN — SODIUM CHLORIDE 200 MG: 9 INJECTION, SOLUTION INTRAVENOUS at 14:18

## 2022-01-01 RX ADMIN — SODIUM CHLORIDE, POTASSIUM CHLORIDE, SODIUM LACTATE AND CALCIUM CHLORIDE: 600; 310; 30; 20 INJECTION, SOLUTION INTRAVENOUS at 14:25

## 2022-01-01 RX ADMIN — SUGAMMADEX 200 MG: 100 INJECTION, SOLUTION INTRAVENOUS at 15:08

## 2022-01-01 RX ADMIN — FENTANYL CITRATE 25 MCG: 50 INJECTION, SOLUTION INTRAMUSCULAR; INTRAVENOUS at 15:09

## 2022-01-01 RX ADMIN — HEPARIN 500 UNITS: 100 SYRINGE at 15:09

## 2022-01-01 RX ADMIN — HEPARIN 500 UNITS: 100 SYRINGE at 10:48

## 2022-01-01 RX ADMIN — SODIUM CHLORIDE 200 MG: 9 INJECTION, SOLUTION INTRAVENOUS at 15:46

## 2022-01-01 RX ADMIN — ROCURONIUM BROMIDE 50 MG: 10 INJECTION, SOLUTION INTRAVENOUS at 14:32

## 2022-01-01 RX ADMIN — PHENAZOPYRIDINE HYDROCHLORIDE 200 MG: 100 TABLET ORAL at 16:40

## 2022-01-01 RX ADMIN — DEXAMETHASONE SODIUM PHOSPHATE 6 MG: 4 INJECTION, SOLUTION INTRA-ARTICULAR; INTRALESIONAL; INTRAMUSCULAR; INTRAVENOUS; SOFT TISSUE at 14:35

## 2022-01-01 RX ADMIN — PROPOFOL 100 MG: 10 INJECTION, EMULSION INTRAVENOUS at 14:32

## 2022-01-01 RX ADMIN — FENTANYL CITRATE 100 MCG: 50 INJECTION, SOLUTION INTRAMUSCULAR; INTRAVENOUS at 14:32

## 2022-01-01 RX ADMIN — LIDOCAINE HYDROCHLORIDE 80 MG: 20 INJECTION, SOLUTION EPIDURAL; INFILTRATION; INTRACAUDAL at 14:32

## 2022-01-01 RX ADMIN — SODIUM CHLORIDE 200 MG: 9 INJECTION, SOLUTION INTRAVENOUS at 10:08

## 2022-01-01 ASSESSMENT — PAIN DESCRIPTION - PAIN TYPE
TYPE: SURGICAL PAIN
TYPE: SURGICAL PAIN
TYPE: CHRONIC PAIN
TYPE: SURGICAL PAIN
TYPE: SURGICAL PAIN
TYPE: CHRONIC PAIN
TYPE: SURGICAL PAIN

## 2022-01-01 ASSESSMENT — FIBROSIS 4 INDEX
FIB4 SCORE: 1.54
FIB4 SCORE: 1.07
FIB4 SCORE: 1.07
FIB4 SCORE: 1.76

## 2022-01-01 ASSESSMENT — PATIENT HEALTH QUESTIONNAIRE - PHQ9: CLINICAL INTERPRETATION OF PHQ2 SCORE: 0

## 2022-01-01 ASSESSMENT — ACTIVITIES OF DAILY LIVING (ADL): BATHING_REQUIRES_ASSISTANCE: 0

## 2022-01-01 ASSESSMENT — ENCOUNTER SYMPTOMS: GENERAL WELL-BEING: GOOD

## 2022-01-03 LAB
BACTERIA UR CULT: NORMAL
SIGNIFICANT IND 70042: NORMAL
SITE SITE: NORMAL
SOURCE SOURCE: NORMAL

## 2022-01-03 NOTE — PROGRESS NOTES
"Pharmacy Chemotherapy calculation:    Pt Name: Brian Lopez  DX: Bladder Cancer    Cycle 1    Regimen: Intravesical Gemcitabine  Gemcitabine 2000 mg in  ml instilled intravesically within 3 hours after TURBT  Donna DELGADO, et al. Effect of Intravesical Instillation of Gemcitabine vs Saline Immediately Following Resection of Suspected Low-Grade Non-Muscle-Invasive Bladder Cancer on Tumor Recurrence SWOG  Randomized Clinical Trial. BASIL. 2018;319(18):2870-5790       Ht 1.702 m (5' 7\")   Wt 85.2 kg (187 lb 13.3 oz)   BMI 29.42 kg/m²   Body surface area is 2.01 meters squared.  LABS: not required       Gemcitabine 1000 mg in NS 50ml intravesically via cath tipped syringe x 2 syringes   Fixed dose, no calculation required. To be administered by MD 1/4/21.    Luis Armando Ndiaye, PharmD    "

## 2022-01-03 NOTE — PROGRESS NOTES
"Pharmacy Chemotherapy verification:       Dx: Bladder Cancer        Protocol: Intravesical Gemcitabine     Dosing Reference:  Gemcitabine 2000 mg in  ml instilled intravesically within 3 hours after TURBT  Donna DELGADO, et al. Effect of Intravesical Instillation of Gemcitabine vs Saline Immediately Following Resection of Suspected Low-Grade Non-Muscle-Invasive Bladder Cancer on Tumor Recurrence SWOG  Randomized Clinical Trial. BASIL. 2018;319(18):5277-5070     Ht 1.702 m (5' 7\")   Wt 85.2 kg (187 lb 13.3 oz)   BMI 29.42 kg/m²     Body surface area is 2.01 meters squared.       Allergies: Patient has no known allergies.  No labs required     Drug Order   (Drug name, dose, route, IV Fluid & volume, frequency, number of doses) Cycle: C1      Previous treatment: N/a      Medication = Gemcitabine  Base Dose= 2000 mg total  Calc Dose: No calc required  Final Dose = 2000 mg total split into 1000 mg x 2 in cath tip syringe  Route = Intravesical  Fluid & Volume = NS 50 mL in cath tip syringe each  Administered by physician    To be administered by MD during procedure 1/4/22        okay to treat with final dose      By my signature below, I confirm this process was performed independently with the BSA and all final chemotherapy dosing calculations congruent. I have reviewed the above chemotherapy order and that my calculation of the final dose and BSA (when applicable) corroborate those calculations of the  pharmacist. Discrepancies of 10% or greater in the written dose have been addressed and documented within the [facility] Progress notes.    Kateryna Addison, PharmD, BCOP    "

## 2022-01-04 ENCOUNTER — HOSPITAL ENCOUNTER (OUTPATIENT)
Facility: MEDICAL CENTER | Age: 82
End: 2022-01-04
Attending: UROLOGY | Admitting: UROLOGY
Payer: MEDICARE

## 2022-01-04 ENCOUNTER — ANESTHESIA (OUTPATIENT)
Dept: SURGERY | Facility: MEDICAL CENTER | Age: 82
End: 2022-01-04
Payer: MEDICARE

## 2022-01-04 ENCOUNTER — ANESTHESIA EVENT (OUTPATIENT)
Dept: SURGERY | Facility: MEDICAL CENTER | Age: 82
End: 2022-01-04
Payer: MEDICARE

## 2022-01-04 VITALS
HEIGHT: 67 IN | HEART RATE: 70 BPM | SYSTOLIC BLOOD PRESSURE: 155 MMHG | TEMPERATURE: 97.2 F | DIASTOLIC BLOOD PRESSURE: 72 MMHG | RESPIRATION RATE: 16 BRPM | OXYGEN SATURATION: 90 % | BODY MASS INDEX: 28.79 KG/M2 | WEIGHT: 183.42 LBS

## 2022-01-04 PROBLEM — N32.89 MASS OF BLADDER: Status: ACTIVE | Noted: 2022-01-04

## 2022-01-04 LAB — PATHOLOGY CONSULT NOTE: NORMAL

## 2022-01-04 PROCEDURE — A9270 NON-COVERED ITEM OR SERVICE: HCPCS | Performed by: UROLOGY

## 2022-01-04 PROCEDURE — 700111 HCHG RX REV CODE 636 W/ 250 OVERRIDE (IP): Performed by: ANESTHESIOLOGY

## 2022-01-04 PROCEDURE — 160035 HCHG PACU - 1ST 60 MINS PHASE I: Performed by: UROLOGY

## 2022-01-04 PROCEDURE — 160025 RECOVERY II MINUTES (STATS): Performed by: UROLOGY

## 2022-01-04 PROCEDURE — 700105 HCHG RX REV CODE 258: Performed by: UROLOGY

## 2022-01-04 PROCEDURE — 700111 HCHG RX REV CODE 636 W/ 250 OVERRIDE (IP): Performed by: UROLOGY

## 2022-01-04 PROCEDURE — 500042 HCHG BAG, URINARY DRAINAGE (CLOSED): Performed by: UROLOGY

## 2022-01-04 PROCEDURE — 160046 HCHG PACU - 1ST 60 MINS PHASE II: Performed by: UROLOGY

## 2022-01-04 PROCEDURE — 700101 HCHG RX REV CODE 250: Performed by: ANESTHESIOLOGY

## 2022-01-04 PROCEDURE — 160036 HCHG PACU - EA ADDL 30 MINS PHASE I: Performed by: UROLOGY

## 2022-01-04 PROCEDURE — 700101 HCHG RX REV CODE 250: Performed by: UROLOGY

## 2022-01-04 PROCEDURE — A4338 INDWELLING CATHETER LATEX: HCPCS | Performed by: UROLOGY

## 2022-01-04 PROCEDURE — 160039 HCHG SURGERY MINUTES - EA ADDL 1 MIN LEVEL 3: Performed by: UROLOGY

## 2022-01-04 PROCEDURE — 700102 HCHG RX REV CODE 250 W/ 637 OVERRIDE(OP): Performed by: ANESTHESIOLOGY

## 2022-01-04 PROCEDURE — 160009 HCHG ANES TIME/MIN: Performed by: UROLOGY

## 2022-01-04 PROCEDURE — A9270 NON-COVERED ITEM OR SERVICE: HCPCS | Performed by: ANESTHESIOLOGY

## 2022-01-04 PROCEDURE — 88307 TISSUE EXAM BY PATHOLOGIST: CPT

## 2022-01-04 PROCEDURE — 160028 HCHG SURGERY MINUTES - 1ST 30 MINS LEVEL 3: Performed by: UROLOGY

## 2022-01-04 PROCEDURE — 160048 HCHG OR STATISTICAL LEVEL 1-5: Performed by: UROLOGY

## 2022-01-04 PROCEDURE — 160002 HCHG RECOVERY MINUTES (STAT): Performed by: UROLOGY

## 2022-01-04 PROCEDURE — 700102 HCHG RX REV CODE 250 W/ 637 OVERRIDE(OP): Performed by: UROLOGY

## 2022-01-04 RX ORDER — MIDAZOLAM HYDROCHLORIDE 1 MG/ML
INJECTION INTRAMUSCULAR; INTRAVENOUS PRN
Status: DISCONTINUED | OUTPATIENT
Start: 2022-01-04 | End: 2022-01-04 | Stop reason: SURG

## 2022-01-04 RX ORDER — HYDROMORPHONE HYDROCHLORIDE 1 MG/ML
0.1 INJECTION, SOLUTION INTRAMUSCULAR; INTRAVENOUS; SUBCUTANEOUS
Status: DISCONTINUED | OUTPATIENT
Start: 2022-01-04 | End: 2022-01-04 | Stop reason: HOSPADM

## 2022-01-04 RX ORDER — SODIUM CHLORIDE, SODIUM LACTATE, POTASSIUM CHLORIDE, CALCIUM CHLORIDE 600; 310; 30; 20 MG/100ML; MG/100ML; MG/100ML; MG/100ML
INJECTION, SOLUTION INTRAVENOUS CONTINUOUS
Status: DISCONTINUED | OUTPATIENT
Start: 2022-01-04 | End: 2022-01-04 | Stop reason: HOSPADM

## 2022-01-04 RX ORDER — METOPROLOL TARTRATE 1 MG/ML
1 INJECTION, SOLUTION INTRAVENOUS
Status: DISCONTINUED | OUTPATIENT
Start: 2022-01-04 | End: 2022-01-04 | Stop reason: HOSPADM

## 2022-01-04 RX ORDER — CEFAZOLIN SODIUM 1 G/3ML
INJECTION, POWDER, FOR SOLUTION INTRAMUSCULAR; INTRAVENOUS PRN
Status: DISCONTINUED | OUTPATIENT
Start: 2022-01-04 | End: 2022-01-04 | Stop reason: SURG

## 2022-01-04 RX ORDER — OXYCODONE HCL 5 MG/5 ML
5 SOLUTION, ORAL ORAL
Status: COMPLETED | OUTPATIENT
Start: 2022-01-04 | End: 2022-01-04

## 2022-01-04 RX ORDER — MAGNESIUM HYDROXIDE 1200 MG/15ML
LIQUID ORAL
Status: DISCONTINUED | OUTPATIENT
Start: 2022-01-04 | End: 2022-01-04 | Stop reason: HOSPADM

## 2022-01-04 RX ORDER — ONDANSETRON 2 MG/ML
4 INJECTION INTRAMUSCULAR; INTRAVENOUS
Status: DISCONTINUED | OUTPATIENT
Start: 2022-01-04 | End: 2022-01-04 | Stop reason: HOSPADM

## 2022-01-04 RX ORDER — PHENAZOPYRIDINE HYDROCHLORIDE 100 MG/1
200 TABLET, FILM COATED ORAL
Status: COMPLETED | OUTPATIENT
Start: 2022-01-04 | End: 2022-01-04

## 2022-01-04 RX ORDER — DEXAMETHASONE SODIUM PHOSPHATE 4 MG/ML
INJECTION, SOLUTION INTRA-ARTICULAR; INTRALESIONAL; INTRAMUSCULAR; INTRAVENOUS; SOFT TISSUE PRN
Status: DISCONTINUED | OUTPATIENT
Start: 2022-01-04 | End: 2022-01-04 | Stop reason: SURG

## 2022-01-04 RX ORDER — HYDRALAZINE HYDROCHLORIDE 20 MG/ML
5 INJECTION INTRAMUSCULAR; INTRAVENOUS
Status: DISCONTINUED | OUTPATIENT
Start: 2022-01-04 | End: 2022-01-04 | Stop reason: HOSPADM

## 2022-01-04 RX ORDER — ACETAMINOPHEN 500 MG
1000 TABLET ORAL ONCE
Status: COMPLETED | OUTPATIENT
Start: 2022-01-04 | End: 2022-01-04

## 2022-01-04 RX ORDER — GABAPENTIN 300 MG/1
300 CAPSULE ORAL ONCE
Status: COMPLETED | OUTPATIENT
Start: 2022-01-04 | End: 2022-01-04

## 2022-01-04 RX ORDER — DIPHENHYDRAMINE HYDROCHLORIDE 50 MG/ML
12.5 INJECTION INTRAMUSCULAR; INTRAVENOUS
Status: DISCONTINUED | OUTPATIENT
Start: 2022-01-04 | End: 2022-01-04 | Stop reason: HOSPADM

## 2022-01-04 RX ORDER — HYDROMORPHONE HYDROCHLORIDE 1 MG/ML
0.4 INJECTION, SOLUTION INTRAMUSCULAR; INTRAVENOUS; SUBCUTANEOUS
Status: DISCONTINUED | OUTPATIENT
Start: 2022-01-04 | End: 2022-01-04 | Stop reason: HOSPADM

## 2022-01-04 RX ORDER — ATROPA BELLADONNA AND OPIUM 16.2; 6 MG/1; MG/1
SUPPOSITORY RECTAL
Status: DISCONTINUED | OUTPATIENT
Start: 2022-01-04 | End: 2022-01-04 | Stop reason: HOSPADM

## 2022-01-04 RX ORDER — MAGNESIUM HYDROXIDE 1200 MG/15ML
LIQUID ORAL
Status: COMPLETED | OUTPATIENT
Start: 2022-01-04 | End: 2022-01-04

## 2022-01-04 RX ORDER — SODIUM CHLORIDE, SODIUM LACTATE, POTASSIUM CHLORIDE, CALCIUM CHLORIDE 600; 310; 30; 20 MG/100ML; MG/100ML; MG/100ML; MG/100ML
INJECTION, SOLUTION INTRAVENOUS CONTINUOUS
Status: ACTIVE | OUTPATIENT
Start: 2022-01-04 | End: 2022-01-04

## 2022-01-04 RX ORDER — HALOPERIDOL 5 MG/ML
1 INJECTION INTRAMUSCULAR
Status: DISCONTINUED | OUTPATIENT
Start: 2022-01-04 | End: 2022-01-04 | Stop reason: HOSPADM

## 2022-01-04 RX ORDER — OXYCODONE HCL 5 MG/5 ML
10 SOLUTION, ORAL ORAL
Status: COMPLETED | OUTPATIENT
Start: 2022-01-04 | End: 2022-01-04

## 2022-01-04 RX ORDER — LIDOCAINE HYDROCHLORIDE 20 MG/ML
INJECTION, SOLUTION EPIDURAL; INFILTRATION; INTRACAUDAL; PERINEURAL PRN
Status: DISCONTINUED | OUTPATIENT
Start: 2022-01-04 | End: 2022-01-04 | Stop reason: SURG

## 2022-01-04 RX ORDER — HYDROMORPHONE HYDROCHLORIDE 1 MG/ML
0.2 INJECTION, SOLUTION INTRAMUSCULAR; INTRAVENOUS; SUBCUTANEOUS
Status: DISCONTINUED | OUTPATIENT
Start: 2022-01-04 | End: 2022-01-04 | Stop reason: HOSPADM

## 2022-01-04 RX ADMIN — LIDOCAINE HYDROCHLORIDE 50 MG: 20 INJECTION, SOLUTION EPIDURAL; INFILTRATION; INTRACAUDAL at 07:35

## 2022-01-04 RX ADMIN — FENTANYL CITRATE 25 MCG: 50 INJECTION, SOLUTION INTRAMUSCULAR; INTRAVENOUS at 07:35

## 2022-01-04 RX ADMIN — FENTANYL CITRATE 25 MCG: 50 INJECTION, SOLUTION INTRAMUSCULAR; INTRAVENOUS at 07:59

## 2022-01-04 RX ADMIN — GEMCITABINE HYDROCHLORIDE 1000 MG: 1 INJECTION, POWDER, LYOPHILIZED, FOR SOLUTION INTRAVENOUS at 08:17

## 2022-01-04 RX ADMIN — DEXAMETHASONE SODIUM PHOSPHATE 4 MG: 4 INJECTION, SOLUTION INTRA-ARTICULAR; INTRALESIONAL; INTRAMUSCULAR; INTRAVENOUS; SOFT TISSUE at 08:03

## 2022-01-04 RX ADMIN — PROPOFOL 15 MG: 10 INJECTION, EMULSION INTRAVENOUS at 07:56

## 2022-01-04 RX ADMIN — FENTANYL CITRATE 50 MCG: 50 INJECTION INTRAMUSCULAR; INTRAVENOUS at 09:15

## 2022-01-04 RX ADMIN — FENTANYL CITRATE 25 MCG: 50 INJECTION, SOLUTION INTRAMUSCULAR; INTRAVENOUS at 08:12

## 2022-01-04 RX ADMIN — ACETAMINOPHEN 1000 MG: 500 TABLET ORAL at 06:27

## 2022-01-04 RX ADMIN — OXYCODONE HYDROCHLORIDE 10 MG: 5 SOLUTION ORAL at 08:43

## 2022-01-04 RX ADMIN — MIDAZOLAM HYDROCHLORIDE 2 MG: 1 INJECTION, SOLUTION INTRAMUSCULAR; INTRAVENOUS at 07:32

## 2022-01-04 RX ADMIN — PHENAZOPYRIDINE HYDROCHLORIDE 200 MG: 100 TABLET ORAL at 08:43

## 2022-01-04 RX ADMIN — LIDOCAINE HYDROCHLORIDE 0.5 ML: 10 INJECTION, SOLUTION EPIDURAL; INFILTRATION; INTRACAUDAL; PERINEURAL at 06:26

## 2022-01-04 RX ADMIN — FENTANYL CITRATE 25 MCG: 50 INJECTION, SOLUTION INTRAMUSCULAR; INTRAVENOUS at 07:51

## 2022-01-04 RX ADMIN — HYDRALAZINE HYDROCHLORIDE 5 MG: 20 INJECTION INTRAMUSCULAR; INTRAVENOUS at 09:33

## 2022-01-04 RX ADMIN — PROPOFOL 15 MG: 10 INJECTION, EMULSION INTRAVENOUS at 08:12

## 2022-01-04 RX ADMIN — FENTANYL CITRATE 50 MCG: 50 INJECTION INTRAMUSCULAR; INTRAVENOUS at 08:43

## 2022-01-04 RX ADMIN — CEFAZOLIN 2 G: 330 INJECTION, POWDER, FOR SOLUTION INTRAMUSCULAR; INTRAVENOUS at 07:47

## 2022-01-04 RX ADMIN — PROPOFOL 10 MG: 10 INJECTION, EMULSION INTRAVENOUS at 08:10

## 2022-01-04 RX ADMIN — PROPOFOL 100 MG: 10 INJECTION, EMULSION INTRAVENOUS at 07:35

## 2022-01-04 RX ADMIN — SODIUM CHLORIDE, POTASSIUM CHLORIDE, SODIUM LACTATE AND CALCIUM CHLORIDE: 600; 310; 30; 20 INJECTION, SOLUTION INTRAVENOUS at 06:26

## 2022-01-04 RX ADMIN — GABAPENTIN 300 MG: 300 CAPSULE ORAL at 06:27

## 2022-01-04 RX ADMIN — PROPOFOL 15 MG: 10 INJECTION, EMULSION INTRAVENOUS at 07:59

## 2022-01-04 ASSESSMENT — PAIN DESCRIPTION - PAIN TYPE
TYPE: SURGICAL PAIN

## 2022-01-04 ASSESSMENT — FIBROSIS 4 INDEX: FIB4 SCORE: 1.41

## 2022-01-04 NOTE — OR NURSING
Patient recovering well post op. AAOx4, calm with c/o 10/10 bladder burning and pressure discomfort. Oral and IV pain medication administered. Bladder drained at 0920, patient verbalizes immediate pain relief. Pain currently 4/10 and tolerable. Hypertensive 177/81, NSR 60. Hydralazine PRN administered, current /75. Patient states he did not take is blood pressure medication today. Luna draining pink/red urine. Tolerating sips of water, no nausea. Wife updated on status and plan of care.

## 2022-01-04 NOTE — DISCHARGE INSTRUCTIONS
"ACTIVITY: Rest and take it easy for the first 24 hours.  A responsible adult is recommended to remain with you during that time.  It is normal to feel sleepy.  We encourage you to not do anything that requires balance, judgment or coordination.    MILD FLU-LIKE SYMPTOMS ARE NORMAL. YOU MAY EXPERIENCE GENERALIZED MUSCLE ACHES, THROAT IRRITATION, HEADACHE AND/OR SOME NAUSEA.    FOR 24 HOURS DO NOT:  Drive, operate machinery or run household appliances.  Drink beer or alcoholic beverages.   Make important decisions or sign legal documents.    SPECIAL INSTRUCTIONS:   DR. Kee DISCHARGE INSTRUCTIONS FOLLOWING    TRANSURETHRAL RESECTION OF BLADDER TUMOR (TURBT)     DIET:  You can resume your regular diet. We encourage you to eat well-balanced and nutritious meals.      ACTIVITY:  Please restrain from strenuous activity or heavy lifting (more than 10 pounds) for two weeks following your TUR procedure.  Please walk daily as much as tolerated, making exercise a part of your daily life. Do not drive while using narcotics for pain control if you are using them.  Please avoid excessive straining with defecation and use stool softeners as directed to prevent constipation!     WOUND CARE:  You have no dressing or wound to manage.     CATHETER CARE:  You may go home with a urethral catheter (“paz). The purpose of this catheter is to make sure your bladder is continuously drained and does not become too distended as it heals over the next couple of days. Take care of the paz as you were shown in the recovery area.    This will be removed tomorrow in clinic.      MEDICATIONS:  1. Please use plain Tylenol or Advil (Motrin, Ibuprofen, etc) for pain and stool softeners (Miralax and Colace) as directed.     2. Pyridium (over the counter \"AZO\" - as needed three times a day, bladder pain medication (turns your urine orange)      FOLLOW-UP:  We will call you to schedule your follow up appointment in 1-3 days for your void trial and " catheter removal. If you have not heard from us in 1-2 business days, please call [phone] to schedule your follow-up appointment. You may also contact this number if you have questions or concerns that can be answered by Dr. Kee staff.     We will call you to schedule your follow up appointment in 2 weeks for follow up. If you have not heard from us in 1-2 business days, please call [phone] to schedule your follow-up appointment. You may also contact this number if you have questions or concerns that can be answered by Dr. Kee staff.     WARNING SIGNS:  Fever greater than 101 degrees Fahrenheit, chills, nausea or vomiting, Large amount of clots in urine that make it difficult to urinate or for urine to drain from paz, increasing pain, or abdominal swelling. If you are experiencing these symptoms, call the Urology Clinic or go to your local PCP or emergency room.    It is normal to see blood in your urine for up to 2 weeks even from surgery. The urine may clear up entirely, and then turn bloody again a few days later depending on your activity level; do not be alarmed. However, if you experience severe pain or tenderness, have a lot of increased bleeding, or find that you are unable to urinate because of large clots, please notify your doctor immediately          DIET: To avoid nausea, slowly advance diet as tolerated, avoiding spicy or greasy foods for the first day.  Add more substantial food to your diet according to your physician's instructions.  Babies can be fed formula or breast milk as soon as they are hungry.  INCREASE FLUIDS AND FIBER TO AVOID CONSTIPATION.    SURGICAL DRESSING/BATHING: You may shower as you normally do, avoid submerging in water until cleared by your MD.    FOLLOW-UP APPOINTMENT:  A follow-up appointment should be arranged with your doctor in 1-3 Days; call to schedule.    You should CALL YOUR PHYSICIAN if you develop:  Fever greater than 101 degrees F.  Pain not  relieved by medication, or persistent nausea or vomiting.  Excessive bleeding (blood soaking through dressing) or unexpected drainage from the wound.  Extreme redness or swelling around the incision site, drainage of pus or foul smelling drainage.  Inability to urinate or empty your bladder within 8 hours.  Problems with breathing or chest pain.    You should call 911 if you develop problems with breathing or chest pain.  If you are unable to contact your doctor or surgical center, you should go to the nearest emergency room or urgent care center.      Physician's telephone #: [phone] Dr. Kee    If any questions arise, call your doctor.  If your doctor is not available, please feel free to call the Surgical Center at [phone].     A registered nurse may call you a few days after your surgery to see how you are doing after your procedure.    MEDICATIONS: Resume taking daily medication.  Take prescribed pain medication with food.  If no medication is prescribed, you may take non-aspirin pain medication if needed.  PAIN MEDICATION CAN BE VERY CONSTIPATING.  Take a stool softener or laxative such as senokot, pericolace, or milk of magnesia if needed.    Last pain medication given at 08:43 AM.    If your physician has prescribed pain medication that includes Acetaminophen (Tylenol), do not take additional Acetaminophen (Tylenol) while taking the prescribed medication.    Depression / Suicide Risk    As you are discharged from this [facility] facility, it is important to learn how to keep safe from harming yourself.    Recognize the warning signs:  · Abrupt changes in personality, positive or negative- including increase in energy   · Giving away possessions  · Change in eating patterns- significant weight changes-  positive or negative  · Change in sleeping patterns- unable to sleep or sleeping all the time   · Unwillingness or inability to communicate  · Depression  · Unusual sadness, discouragement  and loneliness  · Talk of wanting to die  · Neglect of personal appearance   · Rebelliousness- reckless behavior  · Withdrawal from people/activities they love  · Confusion- inability to concentrate     If you or a loved one observes any of these behaviors or has concerns about self-harm, here's what you can do:  · Talk about it- your feelings and reasons for harming yourself  · Remove any means that you might use to hurt yourself (examples: pills, rope, extension cords, firearm)  · Get professional help from the community (Mental Health, Substance Abuse, psychological counseling)  · Do not be alone:Call your Safe Contact- someone whom you trust who will be there for you.  · Call your local CRISIS HOTLINE 684-2585 or 250-444-9222  · Call your local Children's Mobile Crisis Response Team [place] (002) 504-3512 or www.Qualvu  · Call the toll free National Suicide Prevention Hotlines   · National Suicide Prevention Lifeline 434-562-QPYB (5053)  · National Hope Line Network 800-SUICIDE (707-3813)

## 2022-01-04 NOTE — OR NURSING
@1001 PT arrived to Phase 2. Pt has no complaints of pain or nausea. Pt mobilized from gurney to chair. Luna draining strawberry colored urine.     @1008 Pts wife brought back to discharge area. Discharge instructions discussed with patient and patients wife at bedside. PT verbalizes understanding.    @1030 Pt able to dress self without assistance, PIV removed.     @1045 Pt discharged to home with all belongings.

## 2022-01-04 NOTE — ANESTHESIA POSTPROCEDURE EVALUATION
Patient: Brian Lopez    Procedure Summary     Date: 01/04/22 Room / Location: [place] [address] / SURGERY [facility]    Anesthesia Start: 0729 Anesthesia Stop: 0833    Procedure: TURBT (TRANSURETHRAL RESECTION OF BLADDER TUMOR) - BIPOLAR WITH INSTILLATION OF GEMCITABINE (N/A Bladder) Diagnosis: (MASS OF URINARY BLADDER)    Surgeons: Gil Kee M.D. Responsible Provider: David Mayorga M.D.    Anesthesia Type: general ASA Status: 3          Final Anesthesia Type: general  Last vitals  BP   Blood Pressure : 155/72    Temp   36.2 °C (97.2 °F)    Pulse   70   Resp   16    SpO2   90 %      Anesthesia Post Evaluation    Patient location during evaluation: PACU  Patient participation: complete - patient participated  Level of consciousness: awake and alert    Airway patency: patent  Anesthetic complications: no  Cardiovascular status: hemodynamically stable  Respiratory status: acceptable  Hydration status: euvolemic    PONV: none          No complications documented.     Nurse Pain Score: 0 (NPRS)

## 2022-01-04 NOTE — ANESTHESIA TIME REPORT
Anesthesia Start and Stop Event Times     Date Time Event    1/4/2022 0728 Ready for Procedure     0729 Anesthesia Start     0833 Anesthesia Stop        Responsible Staff  01/04/22    Name Role Begin End    David Mayorga M.D. Anesth 0729 0833        Preop Diagnosis (Free Text):  Pre-op Diagnosis     MASS OF URINARY BLADDER        Preop Diagnosis (Codes):    Premium Reason  Non-Premium    Comments:

## 2022-01-04 NOTE — OP REPORT
Urologic Surgery Operative Report     Pre-operative Diagnosis: 1. Bladder mass     Post-operative Diagnosis: Same as above   Procedure: 1. Transurethral Resection of Bladder tumor (3cm in size)  2. Intravesical instillation of gemcitabine   Surgeon Gil Kee M.D.    Assistant: None   Anesthesia: General, without paralysis  Anesthesiologist: David Mayorga M.D.   LMA   Estimated Blood Loss: 10ml       Specimens: 1. Posterior dome bladder tumor chips sent for surgical pathology   Drains: 1. 20 F paz catheter to bag   Complications: None   Condition: Stable, procedure well tolerated    Disposition:  1. PACU, discharge home after recovery, paz out tomorrow in clinic TOV, f/u 2-3 wks to discuss pathology,   2. Gemcitabine to be removed one hour post op instillation   Findings: 1.  Cystourethroscopy demonstrated normal wide caliber urethra, mild trilobar hyperplasia of the prostate with slightly irregular median lobe nodule, possible regrowth.  Bilateral ureters are very close to the median lobe and seen clearly effluxing urine.  Not involved in the resection.  Severe trabeculation of the bladder.  Multiple diverticuli that are relatively small, largest posterior right bladder.  The main bladder mass was approximately 2 to 3 cm in size located posterior just right of midline near the dome of the bladder.  This was a papillary mass with some papillary features surrounding the main mass.  Overall did not appear invasive.  Some mild erythema on the posterior bladder that was fulgurated without any distinct masses.  Otherwise no other significant bladder masses seen on surveillance cystoscopy prior to the resection.    Of note: Patient has a long urethra and penis and would benefit from extra long resectoscope in the event that he needs additional transurethral resection of bladder tumor or repeat resection in the future.  I would also recommend general endotracheal anesthesia with paralysis given his large  abdomen and excursion of the bladder during resection.  2. Bimanual exam revealed 40 g prostate without nodule freely mobile bladder.     INDICATIONS FOR PROCEDURE:  Brian Lopez is a 81-year-old male who with bladder lesion concerning for bladder cancer. Risks discussed, but not limited to, were infection, sepsis, bleeding, bladder injury, need for secondary procedure, inability to resect all of tumor, injury to ureters, need for paz post op, possible admission post operatively, and the cardiovascular and pulmonary complications of anesthesia/surgery including DVT, Mi, PE, and death    PROCEDURE IN DETAIL:   After informed consent was obtained, the patient was taken to the operating room and given Ancef for preoperative antibiotics.  After satisfactory induction of general anesthesia, he was then placed in the lithotomy position after a time out was called and prepped and draped in the normal sterile fashion.  Cystoscopy was initially performed using 30 degree lens which revealed the above operative findings.    I used the 26F resectoscope to proceed with excision of his tumor(s) using by polar cautery. The lesion was resected down to muscular layer focusing on complete resection of the visualized tumor.  After resection, the bladder fragments were irrigated out with bladder tumor specimens brought out with the [facility] evacuator and sent for surgical pathology.      Hemostasis was assured using coagulation/fulguration over the course of the base of tumor, we also fulgarated a rim of tissue circumferentially to help eliminate any potentially residual tumor.  We filled and emptied the bladder multiple times to ensure that there was adequate hemostasis.  I also fulgurated a small area of erythema in the posterior bladder estimated at 2 cm.  We therefore terminated the case at this time and placed a 20 paz catheter was placed.  Please see above findings for notes on needing extra long resectoscope in the  future.    Gemcitabine:  At this point 2g of gemcitabine in 100ml  Was instilled into bladder via use of cath tip syringe at 8:17 AM.  A clamp was placed, and this will be removed one hour post procedure in PACU.  The patient was then awakened from anesthesia and brought to recovery in stable condition.        Gil Kee MD  [address] [place] [address]  [phone]

## 2022-01-04 NOTE — ANESTHESIA PREPROCEDURE EVALUATION
Case: [id number] Date/Time: 01/04/22 0715    Procedure: TURBT (TRANSURETHRAL RESECTION OF BLADDER TUMOR) - BIPOLAR WITH INSTILLATION OF GEMCITABINE (Bladder)    Pre-op diagnosis: MASS OF URINARY BLADDER    Location: [facility] OR 18 / SURGERY [facility]    Surgeons: Gil Kee M.D.          Relevant Problems   PULMONARY   (positive) COPD      CARDIAC   (positive) Hypertension       Physical Exam    Airway   Mallampati: II  TM distance: >3 FB  Neck ROM: full       Cardiovascular - normal exam  Rhythm: regular  Rate: normal  (-) murmur     Dental - normal exam           Pulmonary - normal exam  Breath sounds clear to auscultation     Abdominal    Neurological - normal exam                 Anesthesia Plan    ASA 3   ASA physical status 3 criteria: COPD    Plan - general       Airway plan will be LMA          Induction: intravenous    Postoperative Plan: Postoperative administration of opioids is intended.    Pertinent diagnostic labs and testing reviewed    Informed Consent:    Anesthetic plan and risks discussed with patient.    Use of blood products discussed with: patient whom consented to blood products.

## 2022-01-04 NOTE — ANESTHESIA PROCEDURE NOTES
Airway    Date/Time: 1/4/2022 7:37 AM  Performed by: David Mayorga M.D.  Authorized by: David Mayorga M.D.     Location:  OR  Urgency:  Elective  Indications for Airway Management:  Anesthesia      Spontaneous Ventilation: absent    Sedation Level:  Deep  Preoxygenated: Yes    Final Airway Type:  Supraglottic airway  Final Supraglottic Airway:  Standard LMA    SGA Size:  4  Number of Attempts at Approach:  1

## 2022-01-10 PROBLEM — C67.9 BLADDER CANCER (HCC): Status: ACTIVE | Noted: 2022-01-10

## 2022-01-17 RX ORDER — PRAVASTATIN SODIUM 40 MG
TABLET ORAL
Qty: 100 TABLET | Refills: 0 | Status: SHIPPED | OUTPATIENT
Start: 2022-01-17 | End: 2022-04-25

## 2022-01-19 ENCOUNTER — HOSPITAL ENCOUNTER (OUTPATIENT)
Facility: MEDICAL CENTER | Age: 82
End: 2022-01-19
Attending: UROLOGY
Payer: MEDICARE

## 2022-01-19 PROCEDURE — 87086 URINE CULTURE/COLONY COUNT: CPT

## 2022-01-22 LAB
BACTERIA UR CULT: NORMAL
SIGNIFICANT IND 70042: NORMAL
SITE SITE: NORMAL
SOURCE SOURCE: NORMAL

## 2022-03-18 ENCOUNTER — PATIENT MESSAGE (OUTPATIENT)
Dept: HEALTH INFORMATION MANAGEMENT | Facility: OTHER | Age: 82
End: 2022-03-18

## 2022-03-25 ENCOUNTER — TELEPHONE (OUTPATIENT)
Dept: PHYSICAL THERAPY | Facility: REHABILITATION | Age: 82
End: 2022-03-25
Payer: MEDICARE

## 2022-03-25 NOTE — OP THERAPY DISCHARGE SUMMARY
Outpatient Physical Therapy  DISCHARGE SUMMARY NOTE      [facility]  [address], [address]  [place] [address]  Phone:  [phone]  Fax:  [phone]    Date of Visit: 03/25/2022    Patient: Brian Lopez  YOB: 1940  MRN: [id number]     Referring Provider: Melo Villela M.D.   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

## 2022-04-18 DIAGNOSIS — I10 ESSENTIAL HYPERTENSION: Chronic | ICD-10-CM

## 2022-04-18 RX ORDER — ATENOLOL 50 MG/1
TABLET ORAL
Qty: 90 TABLET | Refills: 1 | Status: SHIPPED | OUTPATIENT
Start: 2022-04-18 | End: 2023-01-01

## 2022-04-25 RX ORDER — PRAVASTATIN SODIUM 40 MG
TABLET ORAL
Qty: 100 TABLET | Refills: 0 | Status: SHIPPED | OUTPATIENT
Start: 2022-04-25 | End: 2022-01-01

## 2022-06-22 ENCOUNTER — HOSPITAL ENCOUNTER (EMERGENCY)
Facility: MEDICAL CENTER | Age: 82
End: 2022-06-22
Attending: EMERGENCY MEDICINE
Payer: MEDICARE

## 2022-06-22 ENCOUNTER — APPOINTMENT (OUTPATIENT)
Dept: RADIOLOGY | Facility: MEDICAL CENTER | Age: 82
End: 2022-06-22
Attending: EMERGENCY MEDICINE
Payer: MEDICARE

## 2022-06-22 VITALS
SYSTOLIC BLOOD PRESSURE: 158 MMHG | OXYGEN SATURATION: 89 % | BODY MASS INDEX: 29.82 KG/M2 | HEIGHT: 67 IN | DIASTOLIC BLOOD PRESSURE: 76 MMHG | TEMPERATURE: 98 F | HEART RATE: 63 BPM | WEIGHT: 190 LBS | RESPIRATION RATE: 16 BRPM

## 2022-06-22 DIAGNOSIS — M54.17 LUMBOSACRAL RADICULOPATHY: ICD-10-CM

## 2022-06-22 PROCEDURE — 700111 HCHG RX REV CODE 636 W/ 250 OVERRIDE (IP): Performed by: EMERGENCY MEDICINE

## 2022-06-22 PROCEDURE — 72100 X-RAY EXAM L-S SPINE 2/3 VWS: CPT

## 2022-06-22 PROCEDURE — 99284 EMERGENCY DEPT VISIT MOD MDM: CPT

## 2022-06-22 PROCEDURE — 96372 THER/PROPH/DIAG INJ SC/IM: CPT

## 2022-06-22 RX ORDER — KETOROLAC TROMETHAMINE 30 MG/ML
30 INJECTION, SOLUTION INTRAMUSCULAR; INTRAVENOUS ONCE
Status: COMPLETED | OUTPATIENT
Start: 2022-06-22 | End: 2022-06-22

## 2022-06-22 RX ORDER — METHYLPREDNISOLONE 4 MG/1
TABLET ORAL
Qty: 21 EACH | Refills: 0 | Status: SHIPPED | OUTPATIENT
Start: 2022-06-22 | End: 2022-06-29

## 2022-06-22 RX ORDER — LIDOCAINE 50 MG/G
1 PATCH TOPICAL EVERY 24 HOURS
Qty: 15 PATCH | Refills: 0 | Status: ON HOLD | OUTPATIENT
Start: 2022-06-22 | End: 2022-01-01

## 2022-06-22 RX ADMIN — KETOROLAC TROMETHAMINE 30 MG: 30 INJECTION, SOLUTION INTRAMUSCULAR; INTRAVENOUS at 13:26

## 2022-06-22 NOTE — ED PROVIDER NOTES
"ED Provider Note      CHIEF COMPLAINT  Chief Complaint   Patient presents with   • Low Back Pain     Low back pain radiating to right hip since Sunday. Denies fall or trauma pt states \"I just woke up with pain.\" Pt reports taking alleve but denies relief.        HPI  Brian Lopez is a 82 y.o. male who presents with right low back pain.  Started 3 days ago.  Simply woke up with the pain.  No trauma.  Worse with movement.  Sharp character.  Radiates down the back of the right buttock into behind the right knee at times.  Denies any weakness or numbness in the extremity.  He has had an episode of incontinence but this is not completely atypical for him.  He has not had a fever.  No abdominal pain.        REVIEW OF SYSTEMS  Denies any weakness in the lower extremities. No saddle anesthesia. No fevers or chills. No injection drug use or IV drug abuse. No abdominal pain, nausea or vomiting. No dysuria, hematuria or flank pain.    PAST MEDICAL HISTORY  Past Medical History:   Diagnosis Date   • Arthritis     hands   • Bladder cancer (HCC) 1/10/2022   • BPH (benign prostatic hyperplasia) 4/17/2014   • Cataract     removed bilat   • COPD (chronic obstructive pulmonary disease) ([facility])    • Dental disorder     upper/lower dentures   • High cholesterol    • Hypertension 4/17/2014   • Hypertriglyceridemia 4/17/2014   • Intestinal polyp    • Pain 05/29/2019    right hand, 5-6/10   • Pulmonary emphysema (HCC) 2/11/2021    Saw [facility] previously dr Merary Wang  \"...However he was found to have some changes consistent with emphysema. A 50-pack-year smoking    • Snoring     sleep study done, pt was not diagnosed with OSMAN   • Swelling of thyroid gland 2/4/2020   • Urinary incontinence 12/30/2021       FAMILY HISTORY  Family History   Problem Relation Age of Onset   • Stroke Mother    • Heart Attack Father 85   • Heart Disease Father    • Cancer Neg Hx        SOCIAL HISTORY  Social History     Tobacco Use   • Smoking status: " Former Smoker     Packs/day: 1.00     Years: 50.00     Pack years: 50.00     Types: Cigarettes     Start date: 1955     Quit date: 2010     Years since quittin.1   • Smokeless tobacco: Never Used   Vaping Use   • Vaping Use: Never used   Substance Use Topics   • Alcohol use: Yes     Alcohol/week: 8.4 oz     Types: 14 Cans of beer per week     Comment: 3 per day   • Drug use: No       SURGICAL HISTORY  Past Surgical History:   Procedure Laterality Date   • TURBT (TRANSURETHRAL RESECTION OF BLADDER TUMOR) N/A 2022    Procedure: TURBT (TRANSURETHRAL RESECTION OF BLADDER TUMOR) - BIPOLAR WITH INSTILLATION OF GEMCITABINE;  Surgeon: Gil Kee M.D.;  Location: SURGERY [facility];  Service: Urology   • PB INCISE FINGER TENDON SHEATH Right 2019    Procedure: RELEASE, TRIGGER FINGER-  RING;  Surgeon: Simon Altamirano M.D.;  Location: SURGERY SAME DAY [facility];  Service: Orthopedics   • SYNOVECTOMY Right 2019    Procedure: fasciatomy of palm;  Surgeon: Simon Altamirano M.D.;  Location: SURGERY SAME DAY [facility];  Service: Orthopedics   • KNEE ARTHROPLASTY TOTAL Right 2017    Procedure: KNEE ARTHROPLASTY TOTAL;  Surgeon: Steffanie Del Angel M.D.;  Location: SURGERY [facility];  Service:    • COLON RESECTION ROBOTIC  2014    Performed by Ezra Burks M.D. at SURGERY [facility] ORS   • CATARACT EXTRACTION WITH IOL Bilateral    • KNEE ARTHROPLASTY TOTAL Left    • APPENDECTOMY  's   • ARTHROSCOPY, KNEE     • CHOLECYSTECTOMY     • HEMORRHOIDECTOMY     • TRIGGER FINGER RELEASE Bilateral last 's    multiple        CURRENT MEDICATIONS  Home Medications     Reviewed by Sav Mehta R.N. (Registered Nurse) on 22 at 1106  Med List Status: Not Addressed   Medication Last Dose Status   amLODIPine (NORVASC) 5 MG Tab  Active   atenolol (TENORMIN) 50 MG Tab  Active   Calcium Carb-Cholecalciferol (CALCIUM + D3 PO)  Active   cyanocobalamin (VITAMIN  "B-12) 500 MCG Tab  Active   Ergocalciferol (VITAMIN D2) 50 MCG (2000 UT) Tab  Active   finasteride (PROSCAR) 5 MG Tab  Active   gabapentin (NEURONTIN) 100 MG Cap  Active   meloxicam (MOBIC) 15 MG tablet  Active   methocarbamol (ROBAXIN) 750 MG Tab  Active   Mirabegron ER (MYRBETRIQ) 50 MG TABLET SR 24 HR  Active   Multiple Vitamins-Minerals (MULTIVITAMIN ADULT PO)  Active   oxybutynin SR (DITROPAN-XL) 10 MG CR tablet  Active   pravastatin (PRAVACHOL) 40 MG tablet  Active   tamsulosin (FLOMAX) 0.4 MG capsule  Active                ALLERGIES  No Known Allergies      PHYSICAL EXAM  VITAL SIGNS: BP (!) 156/70   Pulse 62   Temp 36.8 °C (98.2 °F) (Temporal)   Resp 15   Ht 1.702 m (5' 7\")   Wt 86.2 kg (190 lb)   SpO2 91%   BMI 29.76 kg/m²   Constitutional: Well developed, Well nourished, No acute distress, Non-toxic appearance. Complaining of pain  Neck: Grossly normal range of motion  Cardiovascular: Normal heart rate   Thorax & Lungs: No respiratory distress  Abdomen: Bowel sounds normal, soft, non-distended, nontender, no masses.  Skin: Warm, Dry, No rash.   Back: Diffuse right lower lumbar tenderness, no step-off or deformity  Extremities: No clubbing, cyanosis, edema, no Homans or cords   Neurologic: Grossly normal cranial nerves, 5 out of 5 EHL, FHL, gastrocnemius, tibialis anterior. 2+ DTRs at the patellas bilaterally. Normal gait . Normal sensory throughout.  Normal rectal tone.  No normal perianal and saddle anesthesia.      RADIOLOGY/PROCEDURES  DX-LUMBAR SPINE-2 OR 3 VIEWS   Final Result      Multilevel multifactorial degenerative changes, similar to prior          COURSE & MEDICAL DECISION MAKING    Patient presents with acute low back pain and radicular symptoms.  Given his age I obtained an x-ray.  This was negative.  Patient was treated with Toradol IM.  He has neurologically intact without other red flags.  Appropriate for symptomatic management.  Given radicular symptoms will be treated with Medrol " Dosepak.  Advised Tylenol in addition of this.  Has also given a prescription for lidocaine patches.  He should follow-up with his primary provider within 1 week.  He was precautioned to return for weakness in the extremity, numbness around his genitals or anus, uncontrolled symptoms or concern.      FINAL IMPRESSION  1.  Acute lumbosacral radiculopathy, right-sided        This dictation was created using voice recognition software. The accuracy of the dictation is limited to the abilities of the software.  The nursing notes were reviewed and certain aspects of this information were incorporated into this note.    Electronically signed by: Jr Adams M.D., 6/22/2022 1:05 PM

## 2022-06-22 NOTE — ED NOTES
Pt provided with discharge instructions. Pt verbalized understanding. Pt medicated per mar and assisted out of ed in WC.

## 2022-06-22 NOTE — ED TRIAGE NOTES
"Chief Complaint   Patient presents with   • Low Back Pain     Low back pain radiating to right hip since Sunday. Denies fall or trauma pt states \"I just woke up with pain.\" Pt reports taking alleve but denies relief.      Pt to triage via wheelchair for above complaint. Pt states it is painful to walk.   "

## 2022-06-27 ENCOUNTER — TELEPHONE (OUTPATIENT)
Dept: HEALTH INFORMATION MANAGEMENT | Facility: OTHER | Age: 82
End: 2022-06-27

## 2022-06-29 ENCOUNTER — OFFICE VISIT (OUTPATIENT)
Dept: MEDICAL GROUP | Facility: PHYSICIAN GROUP | Age: 82
End: 2022-06-29
Payer: MEDICARE

## 2022-06-29 VITALS
SYSTOLIC BLOOD PRESSURE: 122 MMHG | OXYGEN SATURATION: 95 % | DIASTOLIC BLOOD PRESSURE: 64 MMHG | WEIGHT: 182 LBS | HEIGHT: 67 IN | BODY MASS INDEX: 28.56 KG/M2 | RESPIRATION RATE: 17 BRPM | TEMPERATURE: 98.3 F | HEART RATE: 73 BPM

## 2022-06-29 DIAGNOSIS — R73.03 PREDIABETES: ICD-10-CM

## 2022-06-29 DIAGNOSIS — G89.29 CHRONIC LEFT-SIDED LOW BACK PAIN WITHOUT SCIATICA: ICD-10-CM

## 2022-06-29 DIAGNOSIS — M54.50 CHRONIC LEFT-SIDED LOW BACK PAIN WITHOUT SCIATICA: ICD-10-CM

## 2022-06-29 DIAGNOSIS — I10 PRIMARY HYPERTENSION: Chronic | ICD-10-CM

## 2022-06-29 DIAGNOSIS — E78.5 DYSLIPIDEMIA: ICD-10-CM

## 2022-06-29 PROCEDURE — 99214 OFFICE O/P EST MOD 30 MIN: CPT | Performed by: INTERNAL MEDICINE

## 2022-06-29 ASSESSMENT — PATIENT HEALTH QUESTIONNAIRE - PHQ9: CLINICAL INTERPRETATION OF PHQ2 SCORE: 0

## 2022-06-29 NOTE — ASSESSMENT & PLAN NOTE
This is a chronic and recurrent condition.  Patient stated that he was seen in the emergency room recently with severe low back pain.  X-ray completed in the ER.  The result discussed with the patient    The lowest formed intervertebral disc will be designated L5-S1 for the purposes of this report and vertebral levels numbered accordingly.     No acute fracture is evident.  No gross malalignment is seen.  There is mild to moderate loss of intervertebral disc height throughout the lumbar spine.  There are prominent marginal osteophytes at L2-L3.  There is no evidence of spondylolisthesis or osseous lesion.  There are degenerative changes of the mid to lower lumbar facet joints.  There are changes of BILATERAL sacroiliac arthropathy.    Today I offered to refer the patient to spine specialist patient declined as he does not wish to undergo any invasive procedure.  The patient however is open to physical therapy.    Patient stated that the pain has improved currently 1/10 in intensity.  It was 10/10 when the patient went to the hospital.  Patient denies fever or chills.  He denies bowel or bladder dysfunction.  Denies perineal paresthesia.

## 2022-06-29 NOTE — ASSESSMENT & PLAN NOTE
Chronic condition.  The patient is now taking amlodipine and atenolol.  Blood pressure has been well controlled.  No significant side effects reported.

## 2022-06-29 NOTE — PROGRESS NOTES
PRIMARY CARE CLINIC VISIT  Chief Complaint   Patient presents with   • Follow-Up     Follow-up ER low back pain    History of Present Illness     Chronic left-sided low back pain without sciatica  This is a chronic and recurrent condition.  Patient stated that he was seen in the emergency room recently with severe low back pain.  X-ray completed in the ER.  The result discussed with the patient    The lowest formed intervertebral disc will be designated L5-S1 for the purposes of this report and vertebral levels numbered accordingly.     No acute fracture is evident.  No gross malalignment is seen.  There is mild to moderate loss of intervertebral disc height throughout the lumbar spine.  There are prominent marginal osteophytes at L2-L3.  There is no evidence of spondylolisthesis or osseous lesion.  There are degenerative changes of the mid to lower lumbar facet joints.  There are changes of BILATERAL sacroiliac arthropathy.    Today I offered to refer the patient to spine specialist patient declined as he does not wish to undergo any invasive procedure.  The patient however is open to physical therapy.    Patient stated that the pain has improved currently 1/10 in intensity.  It was 10/10 when the patient went to the hospital.  Patient denies fever or chills.  He denies bowel or bladder dysfunction.  Denies perineal paresthesia.    Hypertension  Chronic condition.  The patient is now taking amlodipine and atenolol.  Blood pressure has been well controlled.  No significant side effects reported.    Dyslipidemia  Chronic condition.  Patient is being treated with pravastatin.  Lab tests ordered for follow-up.      Current Outpatient Medications on File Prior to Visit   Medication Sig Dispense Refill   • lidocaine (LIDODERM) 5 % Patch Place 1 Patch on the skin every 24 hours. 15 Patch 0   • methocarbamol (ROBAXIN) 750 MG Tab TAKE ONE TABLET BY MOUTH EVERY EIGHT HOURS AS NEEDED FOR MUSCLE SPASM 180 Tablet 2   •  "pravastatin (PRAVACHOL) 40 MG tablet TAKE ONE TABLET BY MOUTH ONCE DAILY 100 Tablet 0   • atenolol (TENORMIN) 50 MG Tab TAKE ONE TABLET BY MOUTH ONCE DAILY 90 Tablet 1   • oxybutynin SR (DITROPAN-XL) 10 MG CR tablet Take 10 mg by mouth every day.     • tamsulosin (FLOMAX) 0.4 MG capsule Take 1 Capsule by mouth at bedtime. (Patient taking differently: Take 0.8 mg by mouth at bedtime as needed.) 90 Capsule 3   • gabapentin (NEURONTIN) 100 MG Cap Take 1-3 caps by mouth at bedtime as needed for pain 90 Capsule 3   • meloxicam (MOBIC) 15 MG tablet TAKE ONE TABLET BY MOUTH EVERY MORNING WITH FOOD (Patient taking differently: Take 15 mg by mouth every evening.) 90 Tablet 1   • amLODIPine (NORVASC) 5 MG Tab TAKE ONE TABLET BY MOUTH EVERY DAY (Patient taking differently: Take 5 mg by mouth every day.) 90 Tablet 3   • finasteride (PROSCAR) 5 MG Tab Take 5 mg by mouth every evening.     • Mirabegron ER (MYRBETRIQ) 50 MG TABLET SR 24 HR Myrbetriq 50 mg tablet,extended release   Take 1 tablet every day by oral route as directed for 120 days.     • Ergocalciferol (VITAMIN D2) 50 MCG (2000 UT) Tab Take 4,000 Units by mouth every day.     • cyanocobalamin (VITAMIN B-12) 500 MCG Tab Take 500 mcg by mouth every day.     • Multiple Vitamins-Minerals (MULTIVITAMIN ADULT PO) Take 1 Tablet by mouth every day.     • Calcium Carb-Cholecalciferol (CALCIUM + D3 PO) Take 1 Tablet by mouth every day.       No current facility-administered medications on file prior to visit.        Allergies: Patient has no known allergies.    ROS  As per HPI above. All other systems reviewed and negative.      Past Medical, Social, and Family history reviewed and updated in EPIC     Objective     /64 (BP Location: Left arm, Patient Position: Sitting, BP Cuff Size: Adult)   Pulse 73   Temp 36.8 °C (98.3 °F) (Temporal)   Resp 17   Ht 1.702 m (5' 7\")   Wt 82.6 kg (182 lb)   SpO2 95%    Body mass index is 28.51 kg/m².    General: alert and " oriented  Cardiovascular: regular rate and rhythm  Pulmonary: lungs : no wheezing   Gastrointestinal: BS present. No obvious mass noted    Low back: No significant spinal deformity noted.   Slight Pain noted w palpation of the lumbar region.  ROM : flexion, extension, rotation, lateral bend of back limited due to pain          Assessment and Plan     1. Chronic left-sided low back pain without sciatica  As above the patient declined to be referred to a spine specialist.  He has agreed to see physical therapist.  Referral submitted today.  At this time the pain has improved and is rated 1/10 in intensity  - Referral to Physical Therapy      - Advised pt re: neck/ back care, posture and regular stretching exercises.   Rec to maintain ideal weight.   Ice/heat application as directed.  Avoid heavy lifting or activities which may aggravate pt's condition.   Consider the use of over the counter topical treatment such as Icy/hot or Biofreeze   2. Dyslipidemia  Continue with pravastatin.  Lab tests ordered  - Lipid Profile; Future    3. Prediabetes  - Basic Metabolic Panel; Future  - HEMOGLOBIN A1C; Future  Blood test requested.  Recommended healthy diet and exercise  4. Primary hypertension  Chronic stable condition continue current management atenolol and amlodipine.                    Healthcare Maintenance     Health Maintenance Due   Topic Date Due   • IMM ZOSTER VACCINES (1 of 2) Never done   • Annual Wellness Visit  02/12/2022                Please note that this dictation was created using voice recognition software. I have made every reasonable attempt to correct obvious errors but there may be errors of grammar and content that I may have overlooked prior to finalization of this note.      Eduard Lake MD  Internal Medicine  [facility]

## 2022-07-26 ENCOUNTER — OFFICE VISIT (OUTPATIENT)
Dept: PHYSICAL MEDICINE AND REHAB | Facility: MEDICAL CENTER | Age: 82
End: 2022-07-26
Payer: MEDICARE

## 2022-07-26 VITALS
DIASTOLIC BLOOD PRESSURE: 78 MMHG | HEART RATE: 78 BPM | WEIGHT: 185.19 LBS | HEIGHT: 66 IN | TEMPERATURE: 97.3 F | SYSTOLIC BLOOD PRESSURE: 120 MMHG | BODY MASS INDEX: 29.76 KG/M2 | OXYGEN SATURATION: 94 %

## 2022-07-26 DIAGNOSIS — M54.41 ACUTE RIGHT-SIDED LOW BACK PAIN WITH RIGHT-SIDED SCIATICA: ICD-10-CM

## 2022-07-26 DIAGNOSIS — Z96.653 HISTORY OF BILATERAL KNEE REPLACEMENT: ICD-10-CM

## 2022-07-26 DIAGNOSIS — Z13.31 POSITIVE DEPRESSION SCREENING: ICD-10-CM

## 2022-07-26 DIAGNOSIS — M54.16 LUMBAR RADICULOPATHY: ICD-10-CM

## 2022-07-26 DIAGNOSIS — M54.41 CHRONIC RIGHT-SIDED LOW BACK PAIN WITH RIGHT-SIDED SCIATICA: ICD-10-CM

## 2022-07-26 DIAGNOSIS — M47.816 LUMBAR SPONDYLOSIS: ICD-10-CM

## 2022-07-26 DIAGNOSIS — M48.10 DIFFUSE IDIOPATHIC SKELETAL HYPEROSTOSIS: ICD-10-CM

## 2022-07-26 DIAGNOSIS — G89.29 CHRONIC RIGHT-SIDED LOW BACK PAIN WITH RIGHT-SIDED SCIATICA: ICD-10-CM

## 2022-07-26 PROCEDURE — 99214 OFFICE O/P EST MOD 30 MIN: CPT | Performed by: PHYSICAL MEDICINE & REHABILITATION

## 2022-07-26 RX ORDER — GABAPENTIN 100 MG/1
CAPSULE ORAL
Qty: 90 CAPSULE | Refills: 3 | Status: ON HOLD | OUTPATIENT
Start: 2022-07-26 | End: 2022-01-01

## 2022-07-26 ASSESSMENT — PATIENT HEALTH QUESTIONNAIRE - PHQ9
5. POOR APPETITE OR OVEREATING: 2 - MORE THAN HALF THE DAYS
SUM OF ALL RESPONSES TO PHQ QUESTIONS 1-9: 10
CLINICAL INTERPRETATION OF PHQ2 SCORE: 1

## 2022-07-26 ASSESSMENT — PAIN SCALES - GENERAL: PAINLEVEL: 2=MINIMAL-SLIGHT

## 2022-07-26 NOTE — PROGRESS NOTES
Follow up patient note  Interventional spine and sports physiatry, Physical medicine rehabilitation      Chief complaint:   Chief Complaint   Patient presents with   • Follow-Up     Back pain          HISTORY    Please see new patient note by Dr Villela,  for more details.     HPI  Patient identification: Brian Lopez  1940,  male  With Diagnoses of Acute right-sided low back pain with right-sided sciatica, Chronic right-sided low back pain with right-sided sciatica, Lumbar radiculopathy, Lumbar spondylosis, Diffuse idiopathic skeletal hyperostosis, History of bilateral knee replacement, and Positive depression screening were pertinent to this visit.     The patient previously been doing well and starting about 1 month ago the patient noted he was pulling weeds in the backyard and the next day he woke up with severe pain in the right low back radiating down the right leg with associated numbness tingling.  He tried medications as well as stretching and exercises and this pain has nearly resolved.  Pain is now 1 out of 10 in intensity intermittently radiating down the right leg but not causing functional deficits.  He denies current numbness tingling and weakness.  He denies trauma or injuries.    Medications tried include gabapentin, lidocaine patches, Robaxin, meloxicam, acetaminophen    Records review  I reviewed notes from the emergency department for  where the patient was seen for right-sided acute lumbosacral radiculopathy.  I also reviewed notes from patient's PCP Dr. Lake from  for post ER discharge.  The patient was referred to physical therapy.  Chronic management was done for dyslipidemia, prediabetic and hypertension.     ROS Red Flags :   Fever, Chills, Sweats: Denies  Involuntary Weight Loss: Denies  Bowel/Bladder Incontinence: Denies  Saddle Anesthesia: Denies        PMHx:   Past Medical History:   Diagnosis Date   • Arthritis     hands   • Bladder cancer (HCC)  "1/10/2022   • BPH (benign prostatic hyperplasia) 4/17/2014   • Cataract     removed bilat   • COPD (chronic obstructive pulmonary disease) (HCC)    • Dental disorder     upper/lower dentures   • High cholesterol    • Hypertension 4/17/2014   • Hypertriglyceridemia 4/17/2014   • Intestinal polyp    • Pain 05/29/2019    right hand, 5-6/10   • Pulmonary emphysema (HCC) 2/11/2021    Saw [facility] previously dr Merary Wang  \"...However he was found to have some changes consistent with emphysema. A 50-pack-year smoking    • Snoring     sleep study done, pt was not diagnosed with OSMAN   • Swelling of thyroid gland 2/4/2020   • Urinary incontinence 12/30/2021       PSHx:   Past Surgical History:   Procedure Laterality Date   • TURBT (TRANSURETHRAL RESECTION OF BLADDER TUMOR) N/A 1/4/2022    Procedure: TURBT (TRANSURETHRAL RESECTION OF BLADDER TUMOR) - BIPOLAR WITH INSTILLATION OF GEMCITABINE;  Surgeon: Gil Kee M.D.;  Location: SURGERY [facility];  Service: Urology   • PB INCISE FINGER TENDON SHEATH Right 5/31/2019    Procedure: RELEASE, TRIGGER FINGER-  RING;  Surgeon: Simon Altamirano M.D.;  Location: SURGERY SAME DAY [facility];  Service: Orthopedics   • SYNOVECTOMY Right 5/31/2019    Procedure: fasciatomy of palm;  Surgeon: Simon Altamirano M.D.;  Location: SURGERY SAME DAY [facility];  Service: Orthopedics   • KNEE ARTHROPLASTY TOTAL Right 6/7/2017    Procedure: KNEE ARTHROPLASTY TOTAL;  Surgeon: Steffanie Del Angel M.D.;  Location: SURGERY [facility];  Service:    • COLON RESECTION ROBOTIC  6/16/2014    Performed by Ezra Burks M.D. at [facility] ORS   • CATARACT EXTRACTION WITH IOL Bilateral 2011   • KNEE ARTHROPLASTY TOTAL Left 2009   • APPENDECTOMY  1970's   • ARTHROSCOPY, KNEE     • CHOLECYSTECTOMY     • HEMORRHOIDECTOMY     • TRIGGER FINGER RELEASE Bilateral last 2000's    multiple        Family history   Family History   Problem Relation Age of Onset   • Stroke Mother    • Heart " Attack Father 85   • Heart Disease Father    • Cancer Neg Hx          Medications:   Outpatient Medications Marked as Taking for the 7/26/22 encounter (Office Visit) with Melo Villela M.D.   Medication Sig Dispense Refill   • gabapentin (NEURONTIN) 100 MG Cap Take 1-3 caps by mouth at bedtime as needed for pain 90 Capsule 3   • lidocaine (LIDODERM) 5 % Patch Place 1 Patch on the skin every 24 hours. 15 Patch 0   • methocarbamol (ROBAXIN) 750 MG Tab TAKE ONE TABLET BY MOUTH EVERY EIGHT HOURS AS NEEDED FOR MUSCLE SPASM 180 Tablet 2   • pravastatin (PRAVACHOL) 40 MG tablet TAKE ONE TABLET BY MOUTH ONCE DAILY 100 Tablet 0   • atenolol (TENORMIN) 50 MG Tab TAKE ONE TABLET BY MOUTH ONCE DAILY 90 Tablet 1   • oxybutynin SR (DITROPAN-XL) 10 MG CR tablet Take 10 mg by mouth every day.     • tamsulosin (FLOMAX) 0.4 MG capsule Take 1 Capsule by mouth at bedtime. (Patient taking differently: Take 0.8 mg by mouth at bedtime as needed.) 90 Capsule 3   • meloxicam (MOBIC) 15 MG tablet TAKE ONE TABLET BY MOUTH EVERY MORNING WITH FOOD (Patient taking differently: Take 15 mg by mouth every evening.) 90 Tablet 1   • amLODIPine (NORVASC) 5 MG Tab TAKE ONE TABLET BY MOUTH EVERY DAY (Patient taking differently: Take 5 mg by mouth every day.) 90 Tablet 3   • finasteride (PROSCAR) 5 MG Tab Take 5 mg by mouth every evening.     • Mirabegron ER (MYRBETRIQ) 50 MG TABLET SR 24 HR Myrbetriq 50 mg tablet,extended release   Take 1 tablet every day by oral route as directed for 120 days.     • Ergocalciferol (VITAMIN D2) 50 MCG (2000 UT) Tab Take 4,000 Units by mouth every day.     • cyanocobalamin (VITAMIN B-12) 500 MCG Tab Take 500 mcg by mouth every day.     • Multiple Vitamins-Minerals (MULTIVITAMIN ADULT PO) Take 1 Tablet by mouth every day.     • Calcium Carb-Cholecalciferol (CALCIUM + D3 PO) Take 1 Tablet by mouth every day.          Current Outpatient Medications on File Prior to Visit   Medication Sig Dispense Refill   • lidocaine  (LIDODERM) 5 % Patch Place 1 Patch on the skin every 24 hours. 15 Patch 0   • methocarbamol (ROBAXIN) 750 MG Tab TAKE ONE TABLET BY MOUTH EVERY EIGHT HOURS AS NEEDED FOR MUSCLE SPASM 180 Tablet 2   • pravastatin (PRAVACHOL) 40 MG tablet TAKE ONE TABLET BY MOUTH ONCE DAILY 100 Tablet 0   • atenolol (TENORMIN) 50 MG Tab TAKE ONE TABLET BY MOUTH ONCE DAILY 90 Tablet 1   • oxybutynin SR (DITROPAN-XL) 10 MG CR tablet Take 10 mg by mouth every day.     • tamsulosin (FLOMAX) 0.4 MG capsule Take 1 Capsule by mouth at bedtime. (Patient taking differently: Take 0.8 mg by mouth at bedtime as needed.) 90 Capsule 3   • meloxicam (MOBIC) 15 MG tablet TAKE ONE TABLET BY MOUTH EVERY MORNING WITH FOOD (Patient taking differently: Take 15 mg by mouth every evening.) 90 Tablet 1   • amLODIPine (NORVASC) 5 MG Tab TAKE ONE TABLET BY MOUTH EVERY DAY (Patient taking differently: Take 5 mg by mouth every day.) 90 Tablet 3   • finasteride (PROSCAR) 5 MG Tab Take 5 mg by mouth every evening.     • Mirabegron ER (MYRBETRIQ) 50 MG TABLET SR 24 HR Myrbetriq 50 mg tablet,extended release   Take 1 tablet every day by oral route as directed for 120 days.     • Ergocalciferol (VITAMIN D2) 50 MCG (2000 UT) Tab Take 4,000 Units by mouth every day.     • cyanocobalamin (VITAMIN B-12) 500 MCG Tab Take 500 mcg by mouth every day.     • Multiple Vitamins-Minerals (MULTIVITAMIN ADULT PO) Take 1 Tablet by mouth every day.     • Calcium Carb-Cholecalciferol (CALCIUM + D3 PO) Take 1 Tablet by mouth every day.       No current facility-administered medications on file prior to visit.         Allergies:   No Known Allergies    Social Hx:   Social History     Socioeconomic History   • Marital status:      Spouse name: Not on file   • Number of children: Not on file   • Years of education: Not on file   • Highest education level: Not on file   Occupational History   • Not on file   Tobacco Use   • Smoking status: Former Smoker     Packs/day: 1.00      "Years: 50.00     Pack years: 50.00     Types: Cigarettes     Start date: 1955     Quit date: 2010     Years since quittin.2   • Smokeless tobacco: Never Used   Vaping Use   • Vaping Use: Never used   Substance and Sexual Activity   • Alcohol use: Yes     Alcohol/week: 8.4 oz     Types: 14 Cans of beer per week     Comment: 3 per day   • Drug use: No   • Sexual activity: Yes     Partners: Female     Comment:  / retired   Other Topics Concern   •  Service No   • Blood Transfusions No   • Caffeine Concern No   • Occupational Exposure No   • Hobby Hazards No   • Sleep Concern Yes   • Stress Concern No   • Weight Concern Yes   • Special Diet No   • Back Care No   • Exercise Yes   • Bike Helmet No   • Seat Belt Yes   • Self-Exams No   Social History Narrative    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  Lives with his wife.  No social or domestic concerns.  No hearing aids or hearing concerns no walking issues or balance or falls.  He has no issues driving.  No major memory concerns.  He did have eye surgery done for his cataracts and his colon partially removed for a polyp.  He also had operation on a trigger finger.  No hospitalizations in the last year though.     Social Determinants of Health     Financial Resource Strain: Not on file   Food Insecurity: Not on file   Transportation Needs: Not on file   Physical Activity: Not on file   Stress: Not on file   Social Connections: Not on file   Intimate Partner Violence: Not on file   Housing Stability: Not on file         EXAMINATION     Physical Exam:   Vitals: /78 (BP Location: Right arm, Patient Position: Sitting, BP Cuff Size: Adult)   Pulse 78   Temp 36.3 °C (97.3 °F) (Temporal)   Ht 1.676 m (5' 6\")   Wt 84 kg (185 lb 3 oz)   SpO2 94%     Constitutional:   Body Habitus: Body mass index is 29.89 kg/m².  Cooperation: Fully cooperates with exam  Appearance: Well-groomed no disheveled    Respiratory-  breathing " comfortable on room air, no audible wheezing  Cardiovascular- capillary refills less than 2 seconds. No lower extremity edema is noted.   Psychiatric- alert and oriented ×3. Normal affect.    MSK and Neuro: -      Thoracic/Lumbar Spine/Sacral Spine/Hips     decreased active range of motion with flexion, lateral flexion, and rotation bilaterally.   There is decreased active range of motion with lumbar extension.      Palpation:   No tenderness to palpation in midline at T1-T12 levels. No tenderness to palpation in the left and right of the midline T1-L5, NEGATIVE for tenderness to palpation to the para-midline region in the lower lumbar levels.  palpation over SI joint: negative bilaterally    palpation in hip or over the gluteus medius tendon insertion: negative bilaterally      Lumbar spine Special tests  Neuro tension  Straight leg test negative bilaterally    Slump test negative bilaterally      Key points for the international standards for neurological classification of spinal cord injury (ISNCSCI) to light touch.     Dermatome R L                                      L2 2 2   L3 2 2   L4 2 2   L5 2 2   S1 2 2   S2 2 2         Motor Exam Lower Extremities    ? Myotome R L   Hip flexion L2 5 5   Knee extension L3 5 5   Ankle dorsiflexion L4 5 5   Toe extension L5 5 5   Ankle plantarflexion S1 5 5               MEDICAL DECISION MAKING    DATA    Labs:   Lab Results   Component Value Date/Time    SODIUM 141 12/30/2021 03:19 PM    POTASSIUM 4.1 12/30/2021 03:19 PM    CHLORIDE 104 12/30/2021 03:19 PM    CO2 27 12/30/2021 03:19 PM    GLUCOSE 114 (H) 12/30/2021 03:19 PM    BUN 15 12/30/2021 03:19 PM    CREATININE 0.79 12/30/2021 03:19 PM        Lab Results   Component Value Date/Time    PROTHROMBTM 14.1 05/24/2017 10:49 AM    INR 1.06 05/24/2017 10:49 AM        Lab Results   Component Value Date/Time    WBC 6.4 12/30/2021 03:19 PM    RBC 5.35 12/30/2021 03:19 PM    HEMOGLOBIN 15.9 12/30/2021 03:19 PM    HEMATOCRIT  47.1 12/30/2021 03:19 PM    MCV 88.0 12/30/2021 03:19 PM    MCH 29.7 12/30/2021 03:19 PM    MCHC 33.8 12/30/2021 03:19 PM    MPV 10.4 12/30/2021 03:19 PM    NEUTSPOLYS 60.10 12/30/2021 03:19 PM    LYMPHOCYTES 29.10 12/30/2021 03:19 PM    MONOCYTES 7.80 12/30/2021 03:19 PM    EOSINOPHILS 1.90 12/30/2021 03:19 PM    BASOPHILS 0.80 12/30/2021 03:19 PM    HYPOCHROMIA 1+ 06/11/2014 09:54 AM        Lab Results   Component Value Date/Time    HBA1C 5.9 (H) 09/24/2020 07:11 AM          Imaging:   I personally reviewed following images    MRI cervical spine 4/16/2020.  Bilateral facet arthropathy C3-4, C4-5, C5-6.  Minimal/mild bilateral neuroforaminal stenosis C3-4 and C5-6.  No significant central canal stenosis at any level.      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  No acute changes        I reviewed the following radiology reports    X-ray cervical spine 2/11/2021  IMPRESSION:     1.  There is overall stable appearance of the cervical spine with findings suggestive of either DISH degenerative type change versus ankylosing spondylitis.  2.  The alignment is within normal limits and there is no abnormal motion.                                 Results for orders placed during the hospital encounter of 04/16/20   MR-CERVICAL SPINE-W/O    Impression 1.  Diffuse idiopathic skeletal hyperostosis    2.  No significant central canal narrowing. Mild foraminal narrowing at several levels.                                                                                                                                              Results for orders placed in visit on 05/30/14   CT-ABDOMEN-PELVIS WITH    Impression 1.  Filling defect  in the proximal ascending colon, suspicious for known colon malignancy.    2.  Diverticulosis    3.  Multiple hypodense liver lesions, consistent with small cyst. Other smaller indeterminate lesions likely represent cysts as well. However, metastasis cannot be excluded. If clinically indicated, hepatic protocol MRI would be helpful for further characterization.    4.  Subcutaneous nodule in the right buttock, likely a sebaceous cyst. Please correlate clinically.                                 Results for orders placed during the hospital encounter of 04/16/20   DX-CERVICAL SPINE-2 OR 3 VIEWS    Impression Large anterior bridging osteophytes suggestive of DISH versus ankylosing spondylitis.    Multilevel uncovertebral joint arthropathy.          Results for orders placed in visit on 06/11/14   DX-CHEST-2 VIEWS    Impression No acute cardiopulmonary process is identified.                          Results for orders placed in visit on 10/11/14   DX-HAND 3+    Impression No evidence of fracture or dislocation.                Results for orders placed during the hospital encounter of 06/07/17   DX-KNEE 2- RIGHT    Impression Post tricompartment arthroplasty.            Results for orders placed in visit on 04/17/14   DX-LUMBAR SPINE-2 OR 3 VIEWS    Impression Severe L2/3 endplate spurring but there is relative disc height preservation throughout the examination. This likely indicates a combination of diffuse idiopathic skeletal hyperostosis and degenerative disc disease    Advanced atherosclerosis with some ectasia of the abdominal aorta, maximal anterior-posterior caliber estimated at 29 mm      Results for orders placed during the hospital encounter of 02/11/20   DX-LUMBAR SPINE-4+ VIEWS    Impression 1. Moderate multilevel lumbar spondylosis.  2. Minimal grade 1 degenerative anterolisthesis at L4-5, only seen in flexion.  3. No acute fracture.                                         DIAGNOSIS   Visit Diagnoses      ICD-10-CM   1. Acute right-sided low back pain with right-sided sciatica  M54.41   2. Chronic right-sided low back pain with right-sided sciatica  M54.41    G89.29   3. Lumbar radiculopathy  M54.16   4. Lumbar spondylosis  M47.816   5. Diffuse idiopathic skeletal hyperostosis  M48.10   6. History of bilateral knee replacement  Z96.653   7. Positive depression screening  Z13.31         ASSESSMENT and PLAN:     Brian Lopez  1940,   male      Brian was seen today for follow-up.    Diagnoses and all orders for this visit:    Acute right-sided low back pain with right-sided sciatica  -     gabapentin (NEURONTIN) 100 MG Cap; Take 1-3 caps by mouth at bedtime as needed for pain    Chronic right-sided low back pain with right-sided sciatica  -     gabapentin (NEURONTIN) 100 MG Cap; Take 1-3 caps by mouth at bedtime as needed for pain    Lumbar radiculopathy  -     gabapentin (NEURONTIN) 100 MG Cap; Take 1-3 caps by mouth at bedtime as needed for pain    Lumbar spondylosis  -     gabapentin (NEURONTIN) 100 MG Cap; Take 1-3 caps by mouth at bedtime as needed for pain    Diffuse idiopathic skeletal hyperostosis  -     gabapentin (NEURONTIN) 100 MG Cap; Take 1-3 caps by mouth at bedtime as needed for pain    History of bilateral knee replacement  -     gabapentin (NEURONTIN) 100 MG Cap; Take 1-3 caps by mouth at bedtime as needed for pain    Positive depression screening  -     Patient has been identified as having a positive depression screening. Appropriate orders and counseling have been given.       The above diagnoses are stable.  He can use gabapentin nightly as needed.  We discussed additions to the patient's home exercise program and I provided the patient with exercises.  We discussed restrictions especially because of the patient's diffuse idiopathic skeletal hyperostosis which is present in multiple levels of his spine he does have decreased range of motion which are unlikely to improve his  multiple of these levels have fused.  However it would be beneficial for the patient to improve core strengthening and with stretching exercises.  I also discussed the case with the patient's wife Liliana who assisted with the HPI.      Follow up: As needed with me    Thank you for allowing me to participate in the care of this patient. If you have any questions please not hesitate to contact me.        Please note that this dictation was created using voice recognition software. I have made every reasonable attempt to correct obvious errors but there may be errors of grammar and content that I may have overlooked prior to finalization of this note.      Melo Villela MD  Interventional Spine and Sports Physiatry  Physical Medicine and Rehabilitation  [facility]

## 2022-07-28 ENCOUNTER — HOSPITAL ENCOUNTER (OUTPATIENT)
Dept: LAB | Facility: MEDICAL CENTER | Age: 82
End: 2022-07-28
Attending: INTERNAL MEDICINE
Payer: MEDICARE

## 2022-07-28 ENCOUNTER — TELEPHONE (OUTPATIENT)
Dept: MEDICAL GROUP | Facility: PHYSICIAN GROUP | Age: 82
End: 2022-07-28

## 2022-07-28 DIAGNOSIS — R73.03 PREDIABETES: ICD-10-CM

## 2022-07-28 DIAGNOSIS — E78.5 DYSLIPIDEMIA: ICD-10-CM

## 2022-07-28 LAB
ANION GAP SERPL CALC-SCNC: 11 MMOL/L (ref 7–16)
BUN SERPL-MCNC: 12 MG/DL (ref 8–22)
CALCIUM SERPL-MCNC: 9.2 MG/DL (ref 8.5–10.5)
CHLORIDE SERPL-SCNC: 104 MMOL/L (ref 96–112)
CHOLEST SERPL-MCNC: 147 MG/DL (ref 100–199)
CO2 SERPL-SCNC: 24 MMOL/L (ref 20–33)
CREAT SERPL-MCNC: 0.74 MG/DL (ref 0.5–1.4)
EST. AVERAGE GLUCOSE BLD GHB EST-MCNC: 120 MG/DL
FASTING STATUS PATIENT QL REPORTED: NORMAL
GFR SERPLBLD CREATININE-BSD FMLA CKD-EPI: 90 ML/MIN/1.73 M 2
GLUCOSE SERPL-MCNC: 91 MG/DL (ref 65–99)
HBA1C MFR BLD: 5.8 % (ref 4–5.6)
HDLC SERPL-MCNC: 46 MG/DL
LDLC SERPL CALC-MCNC: 66 MG/DL
POTASSIUM SERPL-SCNC: 4 MMOL/L (ref 3.6–5.5)
SODIUM SERPL-SCNC: 139 MMOL/L (ref 135–145)
TRIGL SERPL-MCNC: 175 MG/DL (ref 0–149)

## 2022-07-28 PROCEDURE — 36415 COLL VENOUS BLD VENIPUNCTURE: CPT

## 2022-07-28 PROCEDURE — 83036 HEMOGLOBIN GLYCOSYLATED A1C: CPT

## 2022-07-28 PROCEDURE — 80061 LIPID PANEL: CPT

## 2022-07-28 PROCEDURE — 80048 BASIC METABOLIC PNL TOTAL CA: CPT

## 2022-07-28 RX ORDER — MELOXICAM 15 MG/1
15 TABLET ORAL
Qty: 30 TABLET | Refills: 1 | Status: ON HOLD | OUTPATIENT
Start: 2022-07-28 | End: 2022-01-01

## 2022-07-28 NOTE — TELEPHONE ENCOUNTER
Caller Name: Liliana Lopez  Call Back Number: [phone] (home)       How would the patient prefer to be contacted with a response: Phone call do NOT leave a detailed message    Pt's wife would like to know if you are able to send in a refill for Meloxicam. They have attempted to call Dr. Steiner' office but have been unsuccessful. Please advise.

## 2022-08-29 NOTE — DISCHARGE INSTRUCTIONS
"DR. Kee DISCHARGE INSTRUCTIONS FOLLOWING    TRANSURETHRAL RESECTION OF BLADDER TUMOR (TURBT)     DIET:  You can resume your regular diet. We encourage you to eat well-balanced and nutritious meals.      ACTIVITY:  Please restrain from strenuous activity or heavy lifting (more than 10 pounds) for two weeks following your TUR procedure.  Please walk daily as much as tolerated, making exercise a part of your daily life. Do not drive while using narcotics for pain control if you are using them.  Please avoid excessive straining with defecation and use stool softeners as directed to prevent constipation!     WOUND CARE:  You have no dressing or wound to manage.     CATHETER CARE:  You may go home with a urethral catheter (“paz\"). The purpose of this catheter is to make sure your bladder is continuously drained and does not become too distended as it heals over the next couple of days. Take care of the paz as you were shown in the recovery area.     MEDICATIONS:  1. Please use plain Tylenol or Advil (Motrin, Ibuprofen, etc) for pain and stool softeners (Miralax and Colace) as directed.     2. Pyridium - as needed three times a day, bladder pain medication (turns your urine orange) - this is over the counter AZO.     No narcotic pain medication as we discussed in pre-op.        FOLLOW-UP:    We will call you to schedule your follow up appointment in 1-3 days for your void trial and catheter removal. If you have not heard from us in 1-2 business days, please call [phone] to schedule your follow-up appointment. You may also contact this number if you have questions or concerns that can be answered by Dr. Kee staff.     We will call you to schedule your follow up appointment in 2 weeks for follow up. If you have not heard from us in 1-2 business days, please call [phone] to schedule your follow-up appointment. You may also contact this number if you have questions or concerns that can be answered by " Dr. Kee staff.     WARNING SIGNS:  Fever greater than 101 degrees Fahrenheit, chills, nausea or vomiting, Large amount of clots in urine that make it difficult to urinate or for urine to drain from paz, increasing pain, or abdominal swelling. If you are experiencing these symptoms, call the Urology Clinic or go to your local PCP or emergency room.    It is normal to see blood in your urine for up to 2 weeks even from surgery. The urine may clear up entirely, and then turn bloody again a few days later depending on your activity level; do not be alarmed. However, if you experience severe pain or tenderness, have a lot of increased bleeding, or find that you are unable to urinate because of large clots, please notify your doctor immediately              What to Expect Post Anesthesia    Rest and take it easy for the first 24 hours.  A responsible adult is recommended to remain with you during that time.  It is normal to feel sleepy.  We encourage you to not do anything that requires balance, judgment or coordination.    FOR 24 HOURS DO NOT:  Drive, operate machinery or run household appliances.  Drink beer or alcoholic beverages.  Make important decisions or sign legal documents.    To avoid nausea, slowly advance diet as tolerated, avoiding spicy or greasy foods for the first day.  Add more substantial food to your diet according to your provider's instructions.  INCREASE FLUIDS AND FIBER TO AVOID CONSTIPATION.    MILD FLU-LIKE SYMPTOMS ARE NORMAL.  YOU MAY EXPERIENCE GENERALIZED MUSCLE ACHES, THROAT IRRITATION, HEADACHE AND/OR SOME NAUSEA.    If any questions arise, call your provider.  If your provider is not available, please feel free to call the Surgical Center at [phone].    MEDICATIONS: Resume taking daily medication.  Take prescribed pain medication with food.  If no medication is prescribed, you may take non-aspirin pain medication if needed.  PAIN MEDICATION CAN BE VERY CONSTIPATING.  Take a  stool softener or laxative such as senokot, pericolace, or milk of magnesia if needed.    Last pain medication given at 3:45pm (10mg oral oxycodone given). Ok to take tylenol and/or ibuprofen.

## 2022-08-29 NOTE — PROGRESS NOTES
"Pharmacy Chemotherapy calculation:    Pt Name: Brian Lopez  DX: Bladder Cancer    Cycle 2  Previous treatment = 1/4/22    Regimen: Intravesical Gemcitabine  Gemcitabine 2000 mg in  ml instilled intravesically within 3 hours after TURBT  Donna DELGADO, et al. Effect of Intravesical Instillation of Gemcitabine vs Saline Immediately Following Resection of Suspected Low-Grade Non-Muscle-Invasive Bladder Cancer on Tumor Recurrence SWOG  Randomized Clinical Trial. BASIL. 2018;319(18):3909-9352       Ht 1.702 m (5' 7\")   Wt 83.5 kg (184 lb 1.4 oz)   BMI 28.83 kg/m²   Body surface area is 1.99 meters squared.  LABS: not required       Gemcitabine 1000 mg in NS 50ml intravesically via cath tipped syringe x 2 syringes   Fixed dose, no calculation required. To be administered by MD. Luis Armando Ndiaye, PharmD    "

## 2022-08-29 NOTE — ANESTHESIA PROCEDURE NOTES
Airway    Date/Time: 8/29/2022 2:35 PM  Performed by: German Kruse M.D.  Authorized by: German Kruse M.D.     Location:  OR  Urgency:  Elective  Indications for Airway Management:  Anesthesia      Spontaneous Ventilation: absent    Sedation Level:  Deep  Preoxygenated: Yes    Patient Position:  Sniffing  Mask Difficulty Assessment:  2 - vent by mask + OA or adjuvant +/- NMBA  Final Airway Type:  Endotracheal airway  Final Endotracheal Airway:  ETT  Cuffed: Yes    Technique Used for Successful ETT Placement:  Direct laryngoscopy    Insertion Site:  Oral  Blade Type:  Simone  Laryngoscope Blade/Videolaryngoscope Blade Size:  3  ETT Size (mm):  7.5  Measured from:  Gums  ETT to Gums (cm):  21  Placement Verified by: auscultation and capnometry    Cormack-Lehane Classification:  Grade I - full view of glottis  Number of Attempts at Approach:  1           Home with home care

## 2022-08-29 NOTE — PROGRESS NOTES
Med rec updated and complete, per pt had a list of medications at bedside went over list of medications and returned list of medications back to pts wife   Allergies reviewed, per pt   Interviewed pt with wife at bedside with permission from pt.

## 2022-08-29 NOTE — OR NURSING
Clamp removed from paz at 1606 per MD note. Paz bag attached. Dona colored output draining currently.

## 2022-08-29 NOTE — ANESTHESIA TIME REPORT
Anesthesia Start and Stop Event Times     Date Time Event    8/29/2022 1409 Ready for Procedure     1425 Anesthesia Start     1523 Anesthesia Stop        Responsible Staff  08/29/22    Name Role Begin End    German Kruse M.D. Anesth 1425 1523        Overtime Reason:  no overtime (within assigned shift)    Comments:

## 2022-08-29 NOTE — ANESTHESIA POSTPROCEDURE EVALUATION
Patient: Brian Lopez    Procedure Summary     Date: 08/29/22 Room / Location: [place] [address] / SURGERY [facility]    Anesthesia Start: 1425 Anesthesia Stop: 1523    Procedure: BIPOLAR TRANSURETHRAL RESECTION OF BLADDER TUMOR WITH INSTILLATION OF GEMCITABINE (Bladder) Diagnosis: (MALIGNANT TUMOR OF URINARY BLADDER)    Surgeons: Gil Kee M.D. Responsible Provider: German Kruse M.D.    Anesthesia Type: general ASA Status: 3          Final Anesthesia Type: general  Last vitals  BP   Blood Pressure : (!) 174/84    Temp   36.1 °C (97 °F)    Pulse   82   Resp   20    SpO2   97 %      Anesthesia Post Evaluation    Patient location during evaluation: PACU  Patient participation: complete - patient participated  Level of consciousness: awake and alert    Airway patency: patent  Anesthetic complications: no  Cardiovascular status: hemodynamically stable  Respiratory status: acceptable  Hydration status: euvolemic    PONV: none          No notable events documented.     Nurse Pain Score: 0 (NPRS)

## 2022-08-29 NOTE — OP REPORT
Urologic Surgery Operative Report     Pre-operative Diagnosis: 1. Bladder mass  2.  History of urothelial carcinoma the bladder   Post-operative Diagnosis: Same as above   Procedure: Transurethral Resection of Bladder tumor (5 cm in size)  Intravesical instillation of gemcitabine   Surgeon Gil Kee M.D.    Assistant: None   Anesthesia: General, with paralysis  Anesthesiologist: German Kruse M.D.   * No anesthesia type entered *   Estimated Blood Loss: minimal       Specimens: 1.  Right lateral anterior bladder tumor chips sent for surgical pathology  2.  Right lateral posterior (lateral to the right ureteral orifice)    Drains: 1. 20F paz catheter to bag   Complications: None   Condition: Stable, procedure well tolerated    Disposition:  1. PACU, discharge home after recovery, trial of void tomorrow going home with a catheter , f/u 2-3 wks to discuss pathology,   2. Gemcitabine to be removed at 1606   Findings: 1.  Normal anterior urethra without stricture,  very mild trilobar hyperplasia of the prostate with very mild intravesical median lobe.  Moderate trabeculation and 1 small diverticula in the right posterior bladder.  The trigone is normal without any masses.  Areas of concern are basically lateral to the right ureteral orifice or 2 main areas with hypervascularity and slight papillary carpeting concerning for cancer recurrence versus inflammatory changes from previous BCG.  2. Bimanual exam revealed 35 g prostate.  No appreciable nodules.  Mobile bladder.     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  Recent cystoscopy was concerning for possible recurrence in the right lateral wall and is therefore indicated for the above procedure.  We discussed preoperatively that this could be inflammatory changes from his BCG versus cancer  recurrence.  Ancef risks discussed, but not limited to, were infection, sepsis, bleeding, bladder injury, need for secondary procedure, inability to resect all of tumor, injury to ureters, need for paz post op, possible admission post operatively, and the cardiovascular and pulmonary complications of anesthesia/surgery including DVT, Mi, PE, and death    PROCEDURE IN DETAIL:   After informed consent was obtained, the patient was taken to the operating room and given Ancef for preoperative antibiotics.  After satisfactory induction of general anesthesia, he was then placed in the lithotomy position after a time out was called and prepped and draped in the normal sterile fashion.  Cystoscopy was initially performed using 30 degree lens which revealed the above operative findings.  I used the 26F resectoscope to proceed with excision of his tumor(s) using bipolar cautery. The lesion was resected down to muscular layer focusing on complete resection of the visualized tumor.  After resection, the bladder fragments were irrigated out with bladder tumor specimens brought out with the [email or web address] evacuator and sent for surgical pathology.  2 specimens were sent in the 2 areas in combination were approximately 5 cm, the majority of the right lateral wall but did not involve the ureteral orifice.  Filled and emptied multiple times and confirmed adequate hemostasis.    Hemostasis was assured using coagulation/fulguration over the course of the base of tumor, we also fulgarated a rim of tissue circumferentially to help eliminate any potentially residual tumor. We therefore terminated the case at this time and placed a 20 paz catheter was placed.     At this point 2g of gemcitabine in 100ml  Was instilled into bladder via use of cath tip syringe at 1506.  A clamp was placed, and this will be removed one hour post procedure in PACU.  The patient was then awakened from anesthesia and brought to recovery in stable condition.         Gil Kee MD  [address] [place] [address]  [phone]

## 2022-08-29 NOTE — ANESTHESIA PREPROCEDURE EVALUATION
Case: [id number] Date/Time: 08/29/22 1445    Procedure: BIPOLAR TRANSURETHRAL RESECTION OF BLADDER TUMOR WITH INSTILLATION OF GEMCITABINE    Pre-op diagnosis: MALIGNANT TUMOR OF URINARY BLADDER    Location: [facility] OR 18 / SURGERY [facility]    Surgeons: Gil Kee M.D.          Relevant Problems   PULMONARY   (positive) COPD      CARDIAC   (positive) Hypertension       Physical Exam    Airway   Mallampati: II  TM distance: >3 FB  Neck ROM: full       Cardiovascular - normal exam  Rhythm: regular  Rate: normal  (-) murmur     Dental   (+) lower dentures, upper dentures           Pulmonary - normal exam  Breath sounds clear to auscultation     Abdominal    Neurological - normal exam                 Anesthesia Plan    ASA 3       Plan - general       Airway plan will be ETT          Induction: intravenous    Postoperative Plan: Postoperative administration of opioids is intended.    Pertinent diagnostic labs and testing reviewed    Informed Consent:    Anesthetic plan and risks discussed with patient.

## 2022-08-30 NOTE — OR NURSING
Luna bag with gemcitabine replaced with new bag - chemo precautions in place. 350 cc drained out. Initially hazy clive output and now clearing - light pink.

## 2022-08-30 NOTE — OR NURSING
1705 Report received from PACU Rn and patient arrived to phase 2.     1715  Vital signs stable, except BP elevated in 180's/5\80's-90's. Pain level 1/10 at paz insertion site, no complaints of n/v, sipping apple juice.  Paz to gravity + yellow, slight pink tinge, clear urine. Perineum, scrotum cleaned with foam  to remove any possible residual chemotherapy.     1735 This RN sent message regarding elevated bp to Dr. Caal.  Values relayed.      1800 Dr. Caal in to see patient, ok to proceed with discharge     1808 Patient states ready to go home.  VSS, patient has all belongings. IV removed.  Patient got dressed with assistance of wife.  Discharge instructions discussed with patient and wife.  Questions answered. Patient and family verbalized understanding of discharge instructions and paz home care.  Home paz supplies provided to patient.  Patient taken out with wheelchair and all belongings.  Patient stated he will take BP meds when he gets home.

## 2022-10-28 NOTE — PROGRESS NOTES
Chemotherapy Verification - PRIMARY RN      Height = 165 cm  Weight = 83.6 kg  BSA = 1.96 m^2       Medication: pembrolizumab  Dose: 200 mg-set dose  Calculated Dose: 200 mg-set dose                             (In mg/m2, AUC, mg/kg)           I confirm this process was performed independently with the BSA and all final chemotherapy dosing calculations congruent.  Any discrepancies of 10% or greater have been addressed with the chemotherapy pharmacist. The resolution of the discrepancy has been documented in the EPIC progress notes.

## 2022-10-28 NOTE — PROGRESS NOTES
"Pharmacy Chemotherapy Calculation    Patient Name: Brian Lopez   Dx:Refractory superficial bladder cancer     Protocol: Pembrolizumab  Pembrolizumab 200mg IV on Day 1   21 day cycle until disease progression or unacceptable toxicity or up until 24 months of therapy has been completed   OR   Pembrolizumab 400mg on Day    42 day cycle until disease progression or unacceptable toxicity or up until 24 months of therapy has been completed   *Dosing Reference*  NCCN Guidelines for Bladder Cancer V.1.2022.  Sola AGUILLON, et al. N Engl J Med. 2017;376(11):6652-0020.   Venita BEAL et al. Eur J Cancer. 2020;131:68-75.   Powadrian T, et al. Lancet Oncol. 2021:22(7):931-945.     Allergies:  Patient has no known allergies.     BP (!) 153/75   Pulse 60   Temp 36.7 °C (98 °F) (Temporal)   Resp 18   Ht 1.65 m (5' 4.96\")   Wt 83.6 kg (184 lb 4.9 oz)   SpO2 94%   BMI 30.71 kg/m²  Body surface area is 1.96 meters squared.    Labs 10/28/22:  ANC~ 3830 Plt = 211k   Hgb = 16.6     SCr = 0.74mg/dL CrCl ~ 90mL/min   AST/ALT/ALP= 24/28/100 TBili = 0.3   TSH/Free T4= pending      Drug Order   (Drug name, dose, route, IV Fluid & volume, frequency, number of doses) Cycle 1      Previous treatment: Bladder Gemzar and TURBT 8/29/22      Medication = Pembrolizumab   Base Dose= 200 mg   Calc Dose: fixed dose= no calc required   Final Dose = 200 mg  Route = IV  Fluid & Volume = NS 50 mL  Admin Duration = Over 30 min          No calculation required, okay to treat with final dose       By my signature below, I confirm this process was performed independently with the BSA and all final chemotherapy dosing calculations congruent. I have reviewed the above chemotherapy order and that my calculation of the final dose and BSA (when applicable) corroborate those calculations of the  pharmacist. Discrepancies of 10% or greater in the written dose have been addressed and documented within the [facility] Progress notes.    Pao Leung, PharmD, " BCPS

## 2022-10-28 NOTE — PROGRESS NOTES
Brian and family into [facility] for Day 1/ Cycle 1 for keytruda. Pt denied having any new or acute complaints today, reports tolerating past treatments well. PIV started, had + blood return flushed briskly. Pt given keytruda as prescribed, tolerated well, denied having any complaints during or after infusion. PIV discontinued, bleeding controlled with gauze and coban. Next appointment scheduled, Brian discharged home to self care with family.

## 2022-10-28 NOTE — PROGRESS NOTES
Chemotherapy Verification - SECONDARY RN       Height = 165 cm  Weight = 83.6 kg  BSA = 1.96 m2       Medication: Keytruda  Dose: 200 mg (set dose)  Calculated Dose: 200 mg                             (In mg/m2, AUC, mg/kg)       I confirm that this process was performed independently.

## 2022-10-28 NOTE — PROGRESS NOTES
"Pharmacy Chemotherapy calculation:    DX: Bladder CA    Cycle 1  Previous treatment = TURBT with intravesical gemcitabine 8/29/22    Regimen: Pembrolizumab    *Dosing Reference*  Pembrolizumab 200 mg IV over 30 minutes on Day 1  21-day cycle until disease progression or unacceptable toxicity or until up to 24 months of therapy has been completed  NCCN Guidelines for Bladder Cancer V.1.2022.  Sola AGUILLON, et al. N Engl J Med. 2017;376(11):0988-9420.  Venita M, et al. Eur J Cancer. 2020;131:68-75.    Allergies: Patient has no known allergies.   BP (!) 153/75   Pulse 60   Temp 36.7 °C (98 °F) (Temporal)   Resp 18   Ht 1.65 m (5' 4.96\")   Wt 83.6 kg (184 lb 4.9 oz)   SpO2 94%   BMI 30.71 kg/m²   Body surface area is 1.96 meters squared.    Labs 10/28/22  ANC~ 3830 Plt = 211k   Hgb = 16.6     SCr = 0.74 mg/dL CrCl ~ 90 mL/min   LFT's = 24/28/100 TBili = 0.3   TSH = pending Free T4 = pending    Pembrolizumab 200 mg fixed dose   <10% difference, okay to treat with final dose = 200 mg IV    Reggie Ivy, PharmD    "

## 2022-10-31 NOTE — ASSESSMENT & PLAN NOTE
This is a chronic condition.  Patient is presently followed by urology service.  Patient denies fever chills dysuria or gross hematuria.

## 2022-10-31 NOTE — ASSESSMENT & PLAN NOTE
Chronic condition.  The patient denies shortness of breath or wheezing.  Patient uses albuterol as needed.

## 2022-10-31 NOTE — ASSESSMENT & PLAN NOTE
Chronic condition.  Patient is taking amlodipine and atenolol.  Blood pressure has been well controlled at home.  No significant side effects reported.

## 2022-10-31 NOTE — PROGRESS NOTES
Introduction to navigation letter sent via [employer] as well as message.  Letter and oncology support services calendar mailed to patient as well.  Available as needed.

## 2022-10-31 NOTE — PROGRESS NOTES
Chief Complaint   Patient presents with    Annual Exam       HPI:  Brian Lopez is a 82 y.o. here for Medicare Annual Wellness Visit     Patient Active Problem List    Diagnosis Date Noted    Bladder cancer (HCC) 01/10/2022    Mass of bladder 01/04/2022    Neck pain 02/11/2021    COPD 02/11/2021    Proteinuria 09/25/2020    Urinary incontinence 08/20/2020    Prediabetes 02/04/2020    Vitamin D deficiency 02/04/2020    Vitamin B12 deficiency 02/04/2020    Chronic left-sided low back pain without sciatica 02/04/2020    Intestinal polyp 01/09/2020    Former smoker 01/09/2020    Dyslipidemia 01/09/2020    Status post right knee replacement 11/14/2018    Hypertension 04/17/2014    BPH (benign prostatic hyperplasia) 04/17/2014       Current Outpatient Medications   Medication Sig Dispense Refill    gabapentin (NEURONTIN) 100 MG Cap Take 1 Capsule by mouth at bedtime as needed (pain). Indications: Neuropathic Pain 90 Capsule 3    amLODIPine (NORVASC) 5 MG Tab Take 1 Tablet by mouth every day. 90 Tablet 3    meloxicam (MOBIC) 15 MG tablet TAKE 1 TABLET BY MOUTH 1 TIME A DAY AS NEEDED FOR MODERATE PAIN. 30 Tablet 1    atenolol (TENORMIN) 50 MG Tab TAKE ONE TABLET BY MOUTH ONCE DAILY (Patient taking differently: Take 50 mg by mouth every day.) 90 Tablet 1    tamsulosin (FLOMAX) 0.4 MG capsule Take 1 Capsule by mouth at bedtime. 90 Capsule 3    finasteride (PROSCAR) 5 MG Tab Take 5 mg by mouth every evening.      Mirabegron ER (MYRBETRIQ) 50 MG TABLET SR 24 HR Take 50 mg by mouth every day.      Cholecalciferol (D3 2000) 2000 UNIT Cap Take 4,000 Units by mouth every day.      cyanocobalamin (VITAMIN B-12) 500 MCG Tab Take 500 mcg by mouth every day.      Multiple Vitamins-Minerals (MULTIVITAMIN ADULT PO) Take 1 Tablet by mouth every day.      Calcium Carb-Cholecalciferol (CALCIUM + D3 PO) Take 1 Tablet by mouth every day.       No current facility-administered medications for this visit.          Current supplements  as per medication list.     Allergies: Patient has no known allergies.    Current social contact/activities:      He  reports that he quit smoking about 12 years ago. His smoking use included cigarettes. He started smoking about 67 years ago. He has a 50.00 pack-year smoking history. He has never used smokeless tobacco. He reports current alcohol use of about 8.4 oz per week. He reports that he does not use drugs.  Counseling given: Not Answered      ROS:    Gait: Uses no assistive device  Ostomy: No  Other tubes: No  Amputations: No  Chronic oxygen use: No  Last eye exam: 2021  Wears hearing aids: No   : Denies any urinary leakage during the last 6 months    Screening:    Depression Screening  Little interest or pleasure in doing things?  0 - not at all  Feeling down, depressed , or hopeless? 0 - not at all  Patient Health Questionnaire Score: 0     If depressive symptoms identified deferred to follow up visit unless specifically addressed in assessment and plan.    Interpretation of PHQ-9 Total Score   Score Severity   1-4 No Depression   5-9 Mild Depression   10-14 Moderate Depression   15-19 Moderately Severe Depression   20-27 Severe Depression    Screening for Cognitive Impairment  Three Minute Recall (daughter, heaven, brooke) 1/3    Navneet clock face with all 12 numbers and set the hands to show 10 past 11.  Yes    Cognitive concerns identified deferred for follow up unless specifically addressed in assessment and plan.    Fall Risk Assessment  Has the patient had two or more falls in the last year or any fall with injury in the last year?  No    Safety Assessment  Throw rugs on floor.  Yes  Handrails on all stairs.  Yes  Good lighting in all hallways.  Yes  Difficulty hearing.  No  Patient counseled about all safety risks that were identified.    Functional Assessment ADLs  Are there any barriers preventing you from cooking for yourself or meeting nutritional needs?  No.    Are there any barriers  preventing you from driving safely or obtaining transportation?  No.    Are there any barriers preventing you from using a telephone or calling for help?  No.    Are there any barriers preventing you from shopping?  No.    Are there any barriers preventing you from taking care of your own finances?  No.    Are there any barriers preventing you from managing your medications?  No.    Are there any barriers preventing you from showering, bathing or dressing yourself?  No.    Are you currently engaging in any exercise or physical activity?  No.     What is your perception of your health?  Good    Advance Care Planning  Do you have an Advance Directive, Living Will, Durable Power of , or POLST? No                 Health Maintenance Summary            Overdue - COVID-19 Vaccine (5 - Booster for Pfizer series) Overdue since 6/29/2022 05/04/2022  Imm Admin: PFIZER IVAN CAP SARS-COV-2 VACCINATION (12+)    11/26/2021  Imm Admin: PFIZER PURPLE CAP SARS-COV-2 VACCINATION (12+)    03/13/2021  Imm Admin: PFIZER PURPLE CAP SARS-COV-2 VACCINATION (12+)    02/23/2021  Imm Admin: PFIZER PURPLE CAP SARS-COV-2 VACCINATION (12+)              Postponed - IMM ZOSTER VACCINES (1 of 2) Postponed until 3/31/2023      No completion history exists for this topic.              Postponed - IMM INFLUENZA (1) Postponed until 10/31/2023      11/12/2021  Imm Admin: Influenza Vaccine Adult HD    10/01/2020  Imm Admin: Influenza Vaccine Adult HD    09/19/2019  Imm Admin: Influenza Vaccine Adult HD    11/14/2018  Imm Admin: Influenza Vaccine Adult HD    11/02/2017  Imm Admin: Influenza Vaccine Adult HD    Only the first 5 history entries have been loaded, but more history exists.              COLORECTAL CANCER SCREENING (COLONOSCOPY - Every 5 Years) Next due on 8/22/2023 08/22/2018  REFERRAL TO GI FOR COLONOSCOPY    06/23/2015  AMB REFERRAL TO GI FOR COLONOSCOPY    05/28/2014  AMB REFERRAL TO GI FOR COLONOSCOPY              Annual  Wellness Visit (Every 366 Days) Next due on 2023      10/31/2022  Done    2021  Done    2021  Visit Dx: Medicare annual wellness visit, subsequent    2019  Done    2019  Initial Annual Wellness Visit - Includes PPPS ()    Only the first 5 history entries have been loaded, but more history exists.              IMM DTaP/Tdap/Td Vaccine (2 - Td or Tdap) Next due on 10/11/2024      10/11/2014  Imm Admin: Tdap Vaccine              IMM PNEUMOCOCCAL VACCINE: 65+ Years (Series Information) Completed      2017  Imm Admin: Pneumococcal polysaccharide vaccine (PPSV-23)    2015  Imm Admin: Pneumococcal Conjugate Vaccine (Prevnar/PCV-13)              IMM MENINGOCOCCAL ACWY VACCINE (Series Information) Aged Out      No completion history exists for this topic.              Discontinued - IMM HEP B VACCINE  Discontinued      No completion history exists for this topic.              Discontinued - Annual Pulmonary Function Test / Spirometry  Discontinued      2017  AMB PULMONARY FUNCTION TEST/LAB                    Patient Care Team:  Eduard Lake M.D. as PCP - General (Internal Medicine)  Carlos Jules M.D. as Consulting Physician (Gastroenterology)  BRET Fuentes as Consulting Physician (Urology)  Magi Rodríguez [facility] (Inactive) as [facility]         Social History     Tobacco Use    Smoking status: Former     Packs/day: 1.00     Years: 50.00     Pack years: 50.00     Types: Cigarettes     Start date: 1955     Quit date: 2010     Years since quittin.5    Smokeless tobacco: Never   Vaping Use    Vaping Use: Never used   Substance Use Topics    Alcohol use: Yes     Alcohol/week: 8.4 oz     Types: 14 Cans of beer per week     Comment: 3 beers per day    Drug use: No     Family History   Problem Relation Age of Onset    Stroke Mother     Heart Attack Father 85    Heart Disease Father     Cancer Neg Hx      He  has a past  medical history of Arthritis, Bladder cancer (HCC) (01/10/2022), BPH (benign prostatic hyperplasia) (04/17/2014), Cataract, COPD (chronic obstructive pulmonary disease) (HCC), Dental disorder, Heart burn, Hemorrhagic disorder (HCC), High cholesterol, Hypertension (04/17/2014), Hypertriglyceridemia (04/17/2014), Intestinal polyp, Pain (05/29/2019), Pulmonary emphysema (HCC) (02/11/2021), Snoring, Swelling of thyroid gland (02/04/2020), and Urinary incontinence (12/30/2021).    He has no past medical history of Anxiety, Arrhythmia, CHF (congestive heart failure) (HCC), Clotting disorder (HCC), Depression, Diabetes (HCC), Heart attack (HCC), IBD (inflammatory bowel disease), Kidney disease, Migraine, Muscle disorder, Osteoporosis, Seizure (HCC), Stroke (HCC), or Substance abuse (HCC).   Past Surgical History:   Procedure Laterality Date    TURBT (TRANSURETHRAL RESECTION OF BLADDER TUMOR) N/A 8/29/2022    Procedure: BIPOLAR TRANSURETHRAL RESECTION OF BLADDER TUMOR WITH INSTILLATION OF GEMCITABINE;  Surgeon: Gil Kee M.D.;  Location: [facility];  Service: Urology    TURBT (TRANSURETHRAL RESECTION OF BLADDER TUMOR) N/A 01/04/2022    Procedure: TURBT (TRANSURETHRAL RESECTION OF BLADDER TUMOR) - BIPOLAR WITH INSTILLATION OF GEMCITABINE;  Surgeon: Gil Kee M.D.;  Location: [facility];  Service: Urology    PB INCISE FINGER TENDON SHEATH Right 05/31/2019    Procedure: RELEASE, TRIGGER FINGER-  RING;  Surgeon: Simon Altamirano M.D.;  Location: SURGERY SAME DAY [facility];  Service: Orthopedics    SYNOVECTOMY Right 05/31/2019    Procedure: fasciatomy of palm;  Surgeon: Simon Altamirano M.D.;  Location: SURGERY SAME DAY [facility];  Service: Orthopedics    KNEE ARTHROPLASTY TOTAL Right 06/07/2017    Procedure: KNEE ARTHROPLASTY TOTAL;  Surgeon: Steffanie Del Angel M.D.;  Location: SURGERY [facility];  Service:     COLON RESECTION ROBOTIC  06/16/2014    Performed by Ezra Burks,  "M.D. at SURGERY [facility]    CATARACT EXTRACTION WITH IOL Bilateral 2011    KNEE ARTHROPLASTY TOTAL Left 2009    APPENDECTOMY  1970's    ARTHROSCOPY, KNEE Bilateral     CHOLECYSTECTOMY      HEMORRHOIDECTOMY      TRIGGER FINGER RELEASE Bilateral last 2000's    multiple        Exam:   /64 (BP Location: Left arm, Patient Position: Sitting, BP Cuff Size: Adult)   Pulse 73   Temp 37 °C (98.6 °F) (Temporal)   Resp 16   Ht 1.651 m (5' 5\")   Wt 83.5 kg (184 lb)   SpO2 98%  Body mass index is 30.62 kg/m².    Hearing good.    Dentition upper and lower dentures  Alert, oriented in no acute distress.  Eye contact is good, speech goal directed, affect calm    Assessment and Plan. The following treatment and monitoring plan is recommended:     Bladder cancer (HCC)  This is a chronic condition.    Patient is presently followed by cancer care specialist.  Patient stated that he started chemotherapy treatment last week.  The patient is tolerating the chemotherapy reasonably well.  He denies fever chills nausea vomiting GI bleeding or change in bowel movement.    Also patient is followed by urology service dr Gil Kee.  Patient denies fever chills dysuria or gross hematuria.  Stressed importance to follow-up with the cancer specialist and urologist as directed.  The patient voiced understanding.    COPD  Chronic condition.  The patient denies shortness of breath or wheezing.  Patient uses albuterol as needed.    Dyslipidemia  This is a chronic condition.  Patient presently on diet therapy.  Recent lab test result discussed with the patient.   Latest Reference Range & Units 7/28/22 07:39   Cholesterol,Tot 100 - 199 mg/dL 147   Triglycerides 0 - 149 mg/dL 175 (H)   HDL >=40 mg/dL 46   LDL <100 mg/dL 66   (H): Data is abnormally high    Hypertension  Chronic condition.  Patient is taking amlodipine and atenolol.  Blood pressure has been well controlled at home.  No significant side effects reported. "               Recommended influenza vaccine.  The patient declined.    Health Care Screening: Age-appropriate preventive services recommended by USPTF and ACIP covered by Medicare were discussed today. Services ordered if indicated and agreed upon by the patient.  Referrals offered: Community-based lifestyle interventions to reduce health risks and promote self-management and wellness, fall prevention, nutrition, physical activity, tobacco-use cessation, weight loss, and mental health services as per orders if indicated.    Discussion today about general wellness and lifestyle habits:    Prevent falls and reduce trip hazards; Cautioned about securing or removing rugs.  Have a working fire alarm and carbon monoxide detector;   Engage in regular physical activity and social activities     Follow-up: Return in about 3 months (around 1/31/2023).

## 2022-10-31 NOTE — ASSESSMENT & PLAN NOTE
This is a chronic condition.  Patient presently on diet therapy.  Recent lab test result discussed with the patient.   Latest Reference Range & Units 7/28/22 07:39   Cholesterol,Tot 100 - 199 mg/dL 147   Triglycerides 0 - 149 mg/dL 175 (H)   HDL >=40 mg/dL 46   LDL <100 mg/dL 66   (H): Data is abnormally high

## 2022-10-31 NOTE — LETTER
[facility]   [address]  [place] [address]  Phone: [phone] - Fax: [phone]              [place] [address]     Date: 10/31/22      Dear Brian,    I am a Cancer Nurse Navigator, a certified oncology nurse. My role is to assess any needs you may have with education, guidance and support. I am available to you and your family through your cancer treatment at [facility].       I am available to address your needs during your journey with the following services:     Care Coordination  I can assist you in facilitating communication between your cancer care treatment team to ensure timely treatment and follow-up.  I can also assist with transition of care back to your primary care provider, or other specialist, as needed.  My goal is to bridge gaps for you throughout the course of your active treatment.       Education Services  Understanding the recommended treatment course by your physician is key. I can provide educational resources personalized to your cancer diagnosis to help you understand your diagnosis and treatment. Please let me know if you would like to receive information about your diagnosis and treatment plan.  I am here to help.     Support Services/Resource Information  [facility] we offer a full scope of support services.  I can assist you with referral information to:  · Cancer Clinical Trials & Research  · Nutrition counseling  · Support groups  · Complementary Therapies such as Mind-Body Techniques Meditation  · Patient Financial Advocates  ·   · [facility], an [employer] affiliate office, our volunteers can assist you with accessing our [employer] library, support services information, head coverings and comfort items  · Community and national resources, included eligibility based gm assistance and pharmaceutical access programs, if you are in need of additional  information.     [facility] offers services that include:  · Behavioral Health  · Genetic counseling & testing  · Acupuncture  · Lymphedema prevention/treatment program  · Palliative care services.       I hope you have an excellent patient experience.  Please feel free to share with me your comments regarding the care you have received- we value your feedback.    Sincerely,     Darcie Hobbs R.N.  Cancer Nurse Navigator    Office: [phone]  Main:  [phone]   Email:  [email or web address]

## 2022-10-31 NOTE — PROGRESS NOTES
Annual Health Assessment Questions:    1.  Are you currently engaging in any exercise or physical activity? No    2.  How would you describe your mood or emotional well-being today? good    3.  Have you had any falls in the last year? No    4.  Have you noticed any problems with your balance or had difficulty walking? No    5.  In the last six months have you experienced any leakage of urine? No    6. DPA/Advanced Directive: Patient does not have an Advance Directive.  A packet and workshop information was given on Advance Directives.

## 2022-11-01 NOTE — PROGRESS NOTES
"Oncology Community Healthcare Specialist Intake    Diagnosis Type: Bladder Cancer  Preferred Language: English  Insurance: SCP  Dental Insurance: No; Last Appointment Date: N/A  Primary Care Provider Reviewed: yes  Last Appointment Date: 10/31/22 with Eduard Lake  Introduced Brian Lopez to Oncology Support Services and provided contact information on 11/1/2022 .  Current Barriers Identified Include: None at this time  MyChart Status: Activated, uses rarely  Communication Preferences:Phone calls; Best Contact Number: [phone]  Patient Contact's Reviewed: yes , updated.    *On 10/27/22 I left folder at  of [facility] infusion center for patients 10/28/22 (Holiday) appointment.    Outbound call to pt. For CECIL intake. Pt. States he does \"not really\" have a support system. Pt. Confirmed that he received CECIL resource folder and we reviewed the Cancer Support Group Calendar handout and reviewed the Resource Center. Pt. Also confirmed that he received BECKIE Sprague's and TWYLA Millan's card for contact information.Pt. states his biggest concern is pain caused from needles when \"they\" can not find veins. Pt. Inquiring if it would be possible to have a doc placed since nurses have a difficult time finding his veins. Pt.'s next appointment is 11/18/22. I informed pt. That I would route his concern to BECKIE Sprague.       Outcome:  No further need at this time.          "

## 2022-11-17 NOTE — PROGRESS NOTES
Follow up call placed to patient.  He reports things are going fine so far.  He had port placed today.  Having IV's placed was a concern for him in the past.  Pt reports placement went fine, it was just a little sore.  Pt denies needs or questions from navigator at this time.  Continue to be available as needed.

## 2022-11-17 NOTE — PROGRESS NOTES
"Pharmacy Chemotherapy calculation:    DX: Bladder CA    Cycle 2  Previous treatment = Cycle 1 10/28/22    Regimen: Pembrolizumab    *Dosing Reference*  Pembrolizumab 200 mg IV over 30 minutes on Day 1  21-day cycle until disease progression or unacceptable toxicity or until up to 24 months of therapy has been completed  NCCN Guidelines for Bladder Cancer V.1.2022.  Sola AGUILLON, et al. N Engl J Med. 2017;376(11):6741-5770.  Venita M, et al. Eur J Cancer. 2020;131:68-75.    Allergies: Patient has no known allergies.   BP (!) 149/67   Pulse 67   Temp 36 °C (96.8 °F) (Temporal)   Resp 18   Ht 1.63 m (5' 4.17\")   Wt 82.5 kg (181 lb 14.1 oz)   SpO2 94%   BMI 31.05 kg/m²   Body surface area is 1.93 meters squared.    Labs 11/11/22:*  ANC~ 5360 Plt = 232k   Hgb = 16.3     SCr = 0.8 mg/dL CrCl ~ 82 mL/min   AST/ALT/AP = 20/21/96 TBili = 0.4  TSH = 0.74  *Ok to use 11/11 labs for 11/18 treatment per Dr. Salmeron    Pembrolizumab 200 mg fixed dose   <10% difference, okay to treat with final dose = 200 mg IV    Filipe Christensen, PharmD  "

## 2022-11-18 NOTE — PROGRESS NOTES
Chemotherapy Verification - PRIMARY RN      Height = 163 cm  Weight = 82.5 kg  BSA = 1.93 m^2       Medication: Keytruda  Dose: SET DOSE  Calculated Dose: 200 mg                             (In mg/m2, AUC, mg/kg)         I confirm this process was performed independently with the BSA and all final chemotherapy dosing calculations congruent.  Any discrepancies of 10% or greater have been addressed with the chemotherapy pharmacist. The resolution of the discrepancy has been documented in the EPIC progress notes.

## 2022-11-18 NOTE — PROGRESS NOTES
"Pharmacy Chemotherapy Calculation    Patient Name: Brian Lopez   Dx:Refractory superficial bladder cancer     Protocol: Pembrolizumab    *Dosing Reference*  Pembrolizumab 200 mg IV on Day 1   21 day cycle until disease progression or unacceptable toxicity or up until 24 months of therapy has been completed   NCCN Guidelines for Bladder Cancer V.1.2022.  femi Jaquez al. N Engl J Med. 2017;376(11):0404-1958.   Venita M, et al. Eur J Cancer. 2020;131:68-75.   Merissa T et al. Lancet Oncol. 2021:22(7):931-945.     Allergies:  Patient has no known allergies.     BP (!) 149/67   Pulse 67   Temp 36 °C (96.8 °F) (Temporal)   Resp 18   Ht 1.63 m (5' 4.17\")   Wt 82.5 kg (181 lb 14.1 oz)   SpO2 94%   BMI 31.05 kg/m²  Body surface area is 1.93 meters squared.    Labs 11/11/22 (okay to use per Dr. Salmeron)  ANC~ 5360 Plt = 232k   Hgb = 16.3     SCr = 0.8 mg/dL CrCl ~ 82.1 mL/min   LFT's = WNLs  TBili = 0.4   TSH = 0.74     Drug Order   (Drug name, dose, route, IV Fluid & volume, frequency, number of doses) Cycle 2  Previous treatment: C1 on 10/28/22     Medication = Pembrolizumab   Base Dose= 200 mg   Calc Dose: fixed dose= no calc required   Final Dose = 200 mg  Route = IV  Fluid & Volume = NS 50 mL  Admin Duration = Over 30 minutes          No calculation required, okay to treat with final dose       By my signature below, I confirm this process was performed independently with the BSA and all final chemotherapy dosing calculations congruent. I have reviewed the above chemotherapy order and that my calculation of the final dose and BSA (when applicable) corroborate those calculations of the  pharmacist. Discrepancies of 10% or greater in the written dose have been addressed and documented within the [facility] Progress notes.    Reggie Ivy, PharmD    "

## 2022-11-18 NOTE — PROGRESS NOTES
Chemotherapy Verification - SECONDARY RN       Height = 1.63m  Weight = 82.5kg  BSA = 1.93m2       Medication: pembrolizumab  Dose: flat dose  Calculated Dose: 200mg                             (In mg/m2, AUC, mg/kg)       I confirm that this process was performed independently.

## 2022-11-19 NOTE — PROGRESS NOTES
Pt presented to infusion for C2 Keytruda. He had port placed yesterday, left accessed by MD. Dressing intact to rt chest port, line flushed and brisk blood return observed. Denied any s/s of infection or open wounds. Got OK to use labs from 11/11 from Dr. Salmeron. Pt's daughter knows labs need to be drawn within 4 days going forward. Keytruda infused without issue. Port flushed, heparinized and de-accessed with needle intact. Pt had blisters under previous tegaderm, added to allergies. Gauze and paper tape dressing placed, port incision approximated and healing well. Appts organized by daughter, left on foot to self care with family.

## 2022-11-23 NOTE — TELEPHONE ENCOUNTER
Received request via: Pharmacy    Was the patient seen in the last year in this department? Yes    Does the patient have an active prescription (recently filled or refills available) for medication(s) requested? No    Does the patient have nursing home Plus and need 100 day supply (blood pressure, diabetes and cholesterol meds only)? Yes, quantity updated to 100 days

## 2022-11-28 NOTE — TELEPHONE ENCOUNTER
VOICEMAIL  1. Caller Name: Brian Lopez                        Call Back Number: [phone] (home)       2. Message: Patient's wife is requesting a refill of pravastatin. Please advise. I do not see pravastatin on list of medications.     3. Patient approves office to leave a detailed voicemail/MyChart message: N\A

## 2022-12-16 NOTE — PROGRESS NOTES
"Pharmacy Chemotherapy calculation:    DX: Bladder CA    Cycle 3 (Delayed due to elevated LFTs)  Previous treatment = C2 on 11/18/22    Regimen: Pembrolizumab    *Dosing Reference*  Pembrolizumab 200 mg IV over 30 minutes on Day 1  21-day cycle until disease progression or unacceptable toxicity or until up to 24 months of therapy has been completed  NCCN Guidelines for Bladder Cancer V.1.2022.  Sola AGUILLON, et al. N Engl J Med. 2017;376(11):0569-0320.  Venita BEAL, et al. Eur J Cancer. 2020;131:68-75.    Allergies: Patient has no known allergies.   BP (!) 146/62   Pulse 60   Temp 36 °C (96.8 °F) (Temporal)   Resp 18   Ht 1.65 m (5' 4.96\")   Wt 82.1 kg (181 lb)   SpO2 95%   BMI 30.16 kg/m²   Body surface area is 1.94 meters squared.    Labs 12/12/22:  ANC~ 7220 Plt = 232k   Hgb = 15.8     SCr = 0.83 mg/dL CrCl ~ 80 mL/min   AST/ALT/ALK  = 21/48/102 TBili = 0.6   TSH = 0.79     Pembrolizumab (Keytruda) 200 mg fixed dose  No calculation required, okay to treat with final dose = 200 mg IV    Harshal La, PharmD  "

## 2022-12-16 NOTE — PROGRESS NOTES
Chemotherapy Verification - PRIMARY RN      Height = 165 cm  Weight = 82.1 kg  BSA = 1.94 m^2       Medication: pembrolizumab (Keytruda)  Dose: 200 mg set dose  Calculated Dose: 200 mg set dose                             (In mg/m2, AUC, mg/kg)     I confirm this process was performed independently with the BSA and all final chemotherapy dosing calculations congruent.  Any discrepancies of 10% or greater have been addressed with the chemotherapy pharmacist. The resolution of the discrepancy has been documented in the EPIC progress notes.

## 2022-12-16 NOTE — PROGRESS NOTES
"Pharmacy Chemotherapy Calculation    Patient Name: Brian Lopez   Dx:Refractory superficial bladder cancer     Protocol: Pembrolizumab    *Dosing Reference*  Pembrolizumab 200 mg IV on Day 1   21 day cycle until disease progression or unacceptable toxicity or up until 24 months of therapy has been completed   NCCN Guidelines for Bladder Cancer V.1.2022.  femi Jaquez al. N Engl J Med. 2017;376(11):4045-7029.   Venita M, et al. Eur J Cancer. 2020;131:68-75.   Merissa T et al. Lancet Oncol. 2021:22(7):931-945.     Allergies:  Patient has no known allergies.     BP (!) 146/62   Pulse 60   Temp 36 °C (96.8 °F) (Temporal)   Resp 18   Ht 1.65 m (5' 4.96\")   Wt 82.1 kg (181 lb)   SpO2 95%   BMI 30.16 kg/m²  Body surface area is 1.94 meters squared.    Labs 12/12/22:  ANC~ 7220 Plt = 232k   Hgb = 15.8     SCr = 0.83 mg/dL CrCl ~ 79 mL/min   AST/ALT/AP = 21/48/102 TBili = 0.6     TSH = 0.79        Drug Order   (Drug name, dose, route, IV Fluid & volume, frequency, number of doses) Cycle 3 (delayed due to LFTs)  Previous treatment: C2 on 11/18/22     Medication = Pembrolizumab   Base Dose= 200 mg   Calc Dose: fixed dose= no calc required   Final Dose = 200 mg  Route = IV  Fluid & Volume = NS 50 mL  Admin Duration = Over 30 minutes          No calculation required, okay to treat with final dose       By my signature below, I confirm this process was performed independently with the BSA and all final chemotherapy dosing calculations congruent. I have reviewed the above chemotherapy order and that my calculation of the final dose and BSA (when applicable) corroborate those calculations of the  pharmacist. Discrepancies of 10% or greater in the written dose have been addressed and documented within the [facility] Progress notes.    Faviola Navarro, PharmD    "

## 2022-12-16 NOTE — PROGRESS NOTES
Chemotherapy Verification - PRIMARY RN    C3 D1    Height = 165 cm  Weight = 82.1 kg  BSA = 1.94 m^2       Medication: Pembrolizumab (Keytruda)  Dose: set dose 200 mg  Calculated Dose: 200 mg set dose                             (In mg/m2, AUC, mg/kg)     I confirm this process was performed independently with the BSA and all final chemotherapy dosing calculations congruent.  Any discrepancies of 10% or greater have been addressed with the chemotherapy pharmacist. The resolution of the discrepancy has been documented in the EPIC progress notes.

## 2022-12-17 NOTE — PROGRESS NOTES
[facility] for Day 1 Cycle 3 of OP Urothelial Carcinoma Pembrolizumab for bladder cancer. Pt denied having any new or acute complaints today, reports tolerating past treatments well. Port accessed in a sterile manner and had possitive blood return, flushed briskly. Pt given Keytruda as prescribed, tolerated well, denied having any complaints during or after infusion. Port had positive blood return after, flushed per [facility] policy, de-accessed, needle intact, insertion site covered with sterile gauze and paper tape. Discussed with patient the need to move his appointments out one week as last cycle was deferred. Pt states understanding. Schedulers emailed to move upcoming appointments. Pt discharged to home with son in good condition.

## 2022-12-21 NOTE — TELEPHONE ENCOUNTER
Received request via: Pharmacy    Was the patient seen in the last year in this department? Yes    Does the patient have an active prescription (recently filled or refills available) for medication(s) requested? No    Does the patient have alf Plus and need 100 day supply (blood pressure, diabetes and cholesterol meds only)? Medication is not for cholesterol, blood pressure or diabetes

## 2022-12-22 NOTE — PROGRESS NOTES
"  Subjective:      82 y.o. male presents to urgent care for difficulties with urination.  Starting 3 days ago he began having problems starting his urinary stream, and states when it does, it is just dribbling.  No dysuria or hematuria.  He has known bladder cancer for which she is getting immunotherapy.  Last infusion was 12/16/2022.  Patient reports getting regular cystoscopies, most recent was about 4 weeks ago.  Report is not available to me today, but he does report small tumors currently in the bladder.  Last PSA was 10/11/2021 and was normal at 2.41. Bowel movements are regular.  He drinks an average of 1 L of water and 2 caffeinated beverages daily.  He denies any penile rashes or discharge.  He is not currently sexually active. Is an upcoming appointment 12/27/2022 with urology.    He denies any other questions or concerns at this time.    Current problem list, medication, and past medical/surgical history were reviewed in Epic.    ROS  See HPI     Objective:      /74 (BP Location: Right arm, Patient Position: Sitting, BP Cuff Size: Adult)   Pulse 71   Temp 36.8 °C (98.2 °F) (Temporal)   Resp 16   Ht 1.676 m (5' 6\")   Wt 80.7 kg (178 lb)   SpO2 94%   BMI 28.73 kg/m²     Physical Exam  Constitutional:       General: He is not in acute distress.     Appearance: He is not diaphoretic.   Cardiovascular:      Rate and Rhythm: Normal rate and regular rhythm.      Heart sounds: Normal heart sounds.   Pulmonary:      Effort: Pulmonary effort is normal. No respiratory distress.      Breath sounds: Normal breath sounds.   Abdominal:      General: Bowel sounds are normal.      Palpations: Abdomen is soft.      Tenderness: There is no abdominal tenderness. There is no right CVA tenderness or left CVA tenderness.   Neurological:      Mental Status: He is alert.   Psychiatric:         Mood and Affect: Affect normal.         Judgment: Judgment normal.     Assessment/Plan:     1. Urinary hesitancy  2. " Malignant neoplasm of urinary bladder, unspecified site (HCC)  3. Mass of bladder  No sign of infection on urinalysis.  No hematuria.  Discussed the need for imaging to rule out blockage of bladder from tumor versus enlarged prostate.  Imaging is close to me at this time.  Patient does not wish to proceed to the emergency department for imaging.  He will call urology to see if he can get his appointment moved up.  - POCT Urinalysis    Instructed to return to Urgent Care or nearest Emergency Department if symptoms fail to improve, for any change in condition, further concerns, or new concerning symptoms. Patient states understanding of the plan of care and discharge instructions.    Allison Turcios M.D.

## 2023-01-01 ENCOUNTER — HOSPITAL ENCOUNTER (OUTPATIENT)
Dept: RADIOLOGY | Facility: MEDICAL CENTER | Age: 83
End: 2023-05-08
Attending: INTERNAL MEDICINE
Payer: MEDICARE

## 2023-01-01 ENCOUNTER — APPOINTMENT (OUTPATIENT)
Dept: RADIOLOGY | Facility: MEDICAL CENTER | Age: 83
DRG: 656 | End: 2023-01-01
Attending: STUDENT IN AN ORGANIZED HEALTH CARE EDUCATION/TRAINING PROGRAM
Payer: MEDICARE

## 2023-01-01 ENCOUNTER — APPOINTMENT (OUTPATIENT)
Dept: RADIOLOGY | Facility: MEDICAL CENTER | Age: 83
DRG: 656 | End: 2023-01-01
Payer: MEDICARE

## 2023-01-01 ENCOUNTER — OUTPATIENT INFUSION SERVICES (OUTPATIENT)
Dept: ONCOLOGY | Facility: MEDICAL CENTER | Age: 83
End: 2023-01-01
Attending: INTERNAL MEDICINE
Payer: MEDICARE

## 2023-01-01 ENCOUNTER — APPOINTMENT (OUTPATIENT)
Dept: ADMISSIONS | Facility: MEDICAL CENTER | Age: 83
DRG: 656 | End: 2023-01-01
Attending: UROLOGY
Payer: MEDICARE

## 2023-01-01 ENCOUNTER — NON-PROVIDER VISIT (OUTPATIENT)
Dept: NEUROLOGY | Facility: MEDICAL CENTER | Age: 83
End: 2023-01-01
Attending: SPECIALIST
Payer: MEDICARE

## 2023-01-01 ENCOUNTER — OFFICE VISIT (OUTPATIENT)
Dept: MEDICAL GROUP | Facility: PHYSICIAN GROUP | Age: 83
End: 2023-01-01
Payer: MEDICARE

## 2023-01-01 ENCOUNTER — HOSPITAL ENCOUNTER (OUTPATIENT)
Dept: RADIOLOGY | Facility: MEDICAL CENTER | Age: 83
End: 2023-06-21
Attending: UROLOGY
Payer: MEDICARE

## 2023-01-01 ENCOUNTER — HOSPITAL ENCOUNTER (OUTPATIENT)
Facility: MEDICAL CENTER | Age: 83
End: 2023-04-21
Attending: INTERNAL MEDICINE | Admitting: INTERNAL MEDICINE
Payer: MEDICARE

## 2023-01-01 ENCOUNTER — HOSPITAL ENCOUNTER (OUTPATIENT)
Dept: LAB | Facility: MEDICAL CENTER | Age: 83
End: 2023-03-07
Attending: INTERNAL MEDICINE
Payer: MEDICARE

## 2023-01-01 ENCOUNTER — HOSPITAL ENCOUNTER (OUTPATIENT)
Dept: LAB | Facility: MEDICAL CENTER | Age: 83
End: 2023-03-15
Attending: INTERNAL MEDICINE
Payer: MEDICARE

## 2023-01-01 ENCOUNTER — HOSPITAL ENCOUNTER (OUTPATIENT)
Dept: LAB | Facility: MEDICAL CENTER | Age: 83
End: 2023-03-23
Attending: INTERNAL MEDICINE
Payer: MEDICARE

## 2023-01-01 ENCOUNTER — PRE-ADMISSION TESTING (OUTPATIENT)
Dept: ADMISSIONS | Facility: MEDICAL CENTER | Age: 83
End: 2023-01-01
Attending: INTERNAL MEDICINE
Payer: MEDICARE

## 2023-01-01 ENCOUNTER — HOSPITAL ENCOUNTER (OUTPATIENT)
Dept: LAB | Facility: MEDICAL CENTER | Age: 83
End: 2023-04-27
Attending: INTERNAL MEDICINE
Payer: MEDICARE

## 2023-01-01 ENCOUNTER — HOSPITAL ENCOUNTER (OUTPATIENT)
Dept: LAB | Facility: MEDICAL CENTER | Age: 83
End: 2023-02-14
Attending: INTERNAL MEDICINE
Payer: MEDICARE

## 2023-01-01 ENCOUNTER — HOSPITAL ENCOUNTER (OUTPATIENT)
Dept: LAB | Facility: MEDICAL CENTER | Age: 83
End: 2023-04-26
Attending: INTERNAL MEDICINE
Payer: MEDICARE

## 2023-01-01 ENCOUNTER — APPOINTMENT (OUTPATIENT)
Dept: MEDICAL GROUP | Facility: PHYSICIAN GROUP | Age: 83
End: 2023-01-01
Payer: MEDICARE

## 2023-01-01 ENCOUNTER — HOSPITAL ENCOUNTER (OUTPATIENT)
Dept: RADIOLOGY | Facility: MEDICAL CENTER | Age: 83
DRG: 656 | End: 2023-07-05
Attending: UROLOGY
Payer: MEDICARE

## 2023-01-01 ENCOUNTER — HOSPITAL ENCOUNTER (INPATIENT)
Facility: MEDICAL CENTER | Age: 83
LOS: 4 days | DRG: 656 | End: 2023-07-31
Attending: UROLOGY | Admitting: GENERAL PRACTICE
Payer: MEDICARE

## 2023-01-01 ENCOUNTER — PATIENT OUTREACH (OUTPATIENT)
Dept: ONCOLOGY | Facility: MEDICAL CENTER | Age: 83
End: 2023-01-01
Payer: MEDICARE

## 2023-01-01 ENCOUNTER — ANESTHESIA EVENT (OUTPATIENT)
Dept: SURGERY | Facility: MEDICAL CENTER | Age: 83
DRG: 656 | End: 2023-01-01
Payer: MEDICARE

## 2023-01-01 ENCOUNTER — ANESTHESIA (OUTPATIENT)
Dept: SURGERY | Facility: MEDICAL CENTER | Age: 83
DRG: 656 | End: 2023-01-01
Payer: MEDICARE

## 2023-01-01 ENCOUNTER — HOSPITAL ENCOUNTER (OUTPATIENT)
Dept: LAB | Facility: MEDICAL CENTER | Age: 83
End: 2023-06-15
Attending: UROLOGY
Payer: MEDICARE

## 2023-01-01 ENCOUNTER — HOSPITAL ENCOUNTER (OUTPATIENT)
Dept: LAB | Facility: MEDICAL CENTER | Age: 83
End: 2023-05-08
Attending: INTERNAL MEDICINE
Payer: MEDICARE

## 2023-01-01 ENCOUNTER — HOSPITAL ENCOUNTER (OUTPATIENT)
Dept: LAB | Facility: MEDICAL CENTER | Age: 83
End: 2023-01-24
Attending: INTERNAL MEDICINE
Payer: MEDICARE

## 2023-01-01 ENCOUNTER — ANESTHESIA (OUTPATIENT)
Dept: SURGERY | Facility: MEDICAL CENTER | Age: 83
End: 2023-01-01
Payer: MEDICARE

## 2023-01-01 ENCOUNTER — HOSPITAL ENCOUNTER (OUTPATIENT)
Dept: LAB | Facility: MEDICAL CENTER | Age: 83
End: 2023-01-03
Attending: INTERNAL MEDICINE
Payer: MEDICARE

## 2023-01-01 ENCOUNTER — ANESTHESIA EVENT (OUTPATIENT)
Dept: SURGERY | Facility: MEDICAL CENTER | Age: 83
End: 2023-01-01
Payer: MEDICARE

## 2023-01-01 ENCOUNTER — TELEPHONE (OUTPATIENT)
Dept: HEALTH INFORMATION MANAGEMENT | Facility: OTHER | Age: 83
End: 2023-01-01

## 2023-01-01 ENCOUNTER — APPOINTMENT (OUTPATIENT)
Dept: RADIOLOGY | Facility: MEDICAL CENTER | Age: 83
End: 2023-01-01
Attending: NURSE PRACTITIONER
Payer: MEDICARE

## 2023-01-01 ENCOUNTER — HOSPITAL ENCOUNTER (OUTPATIENT)
Facility: MEDICAL CENTER | Age: 83
End: 2023-05-16
Attending: UROLOGY | Admitting: UROLOGY
Payer: MEDICARE

## 2023-01-01 VITALS
HEIGHT: 65 IN | BODY MASS INDEX: 28.32 KG/M2 | DIASTOLIC BLOOD PRESSURE: 70 MMHG | WEIGHT: 169.97 LBS | RESPIRATION RATE: 18 BRPM | HEART RATE: 69 BPM | TEMPERATURE: 97.1 F | OXYGEN SATURATION: 95 % | SYSTOLIC BLOOD PRESSURE: 135 MMHG

## 2023-01-01 VITALS
HEIGHT: 66 IN | WEIGHT: 159.39 LBS | OXYGEN SATURATION: 95 % | TEMPERATURE: 98.4 F | RESPIRATION RATE: 18 BRPM | SYSTOLIC BLOOD PRESSURE: 130 MMHG | BODY MASS INDEX: 25.62 KG/M2 | HEART RATE: 78 BPM | DIASTOLIC BLOOD PRESSURE: 67 MMHG

## 2023-01-01 VITALS
BODY MASS INDEX: 28.72 KG/M2 | RESPIRATION RATE: 16 BRPM | WEIGHT: 172.4 LBS | HEART RATE: 68 BPM | TEMPERATURE: 97.8 F | HEIGHT: 65 IN | SYSTOLIC BLOOD PRESSURE: 132 MMHG | OXYGEN SATURATION: 95 % | DIASTOLIC BLOOD PRESSURE: 81 MMHG

## 2023-01-01 VITALS
HEIGHT: 66 IN | TEMPERATURE: 98.5 F | SYSTOLIC BLOOD PRESSURE: 122 MMHG | RESPIRATION RATE: 20 BRPM | DIASTOLIC BLOOD PRESSURE: 64 MMHG | HEART RATE: 80 BPM | OXYGEN SATURATION: 95 % | BODY MASS INDEX: 26.23 KG/M2 | WEIGHT: 163.19 LBS

## 2023-01-01 VITALS
RESPIRATION RATE: 18 BRPM | TEMPERATURE: 97.5 F | SYSTOLIC BLOOD PRESSURE: 142 MMHG | DIASTOLIC BLOOD PRESSURE: 65 MMHG | BODY MASS INDEX: 29.2 KG/M2 | HEART RATE: 63 BPM | HEIGHT: 65 IN | OXYGEN SATURATION: 96 % | WEIGHT: 175.27 LBS

## 2023-01-01 VITALS
HEIGHT: 65 IN | BODY MASS INDEX: 28.32 KG/M2 | TEMPERATURE: 98.3 F | HEART RATE: 64 BPM | OXYGEN SATURATION: 96 % | WEIGHT: 170 LBS | RESPIRATION RATE: 18 BRPM | DIASTOLIC BLOOD PRESSURE: 60 MMHG | SYSTOLIC BLOOD PRESSURE: 126 MMHG

## 2023-01-01 VITALS
HEART RATE: 88 BPM | SYSTOLIC BLOOD PRESSURE: 143 MMHG | BODY MASS INDEX: 25.88 KG/M2 | OXYGEN SATURATION: 94 % | HEIGHT: 66 IN | DIASTOLIC BLOOD PRESSURE: 74 MMHG | RESPIRATION RATE: 20 BRPM | WEIGHT: 161 LBS

## 2023-01-01 VITALS
SYSTOLIC BLOOD PRESSURE: 137 MMHG | DIASTOLIC BLOOD PRESSURE: 89 MMHG | HEIGHT: 67 IN | RESPIRATION RATE: 19 BRPM | TEMPERATURE: 98.1 F | BODY MASS INDEX: 24.22 KG/M2 | WEIGHT: 154.32 LBS | HEART RATE: 124 BPM | OXYGEN SATURATION: 96 %

## 2023-01-01 DIAGNOSIS — C67.9 MALIGNANT NEOPLASM OF URINARY BLADDER, UNSPECIFIED SITE (HCC): ICD-10-CM

## 2023-01-01 DIAGNOSIS — K59.00 COLONIC CONSTIPATION: ICD-10-CM

## 2023-01-01 DIAGNOSIS — M79.641 BILATERAL HAND PAIN: ICD-10-CM

## 2023-01-01 DIAGNOSIS — R74.01 NONSPECIFIC ELEVATION OF LEVELS OF TRANSAMINASE OR LACTIC ACID DEHYDROGENASE (LDH): ICD-10-CM

## 2023-01-01 DIAGNOSIS — G56.03 BILATERAL CARPAL TUNNEL SYNDROME: ICD-10-CM

## 2023-01-01 DIAGNOSIS — M79.642 BILATERAL HAND PAIN: ICD-10-CM

## 2023-01-01 DIAGNOSIS — E05.80 THYROTROPIN OVERPRODUCTION: ICD-10-CM

## 2023-01-01 DIAGNOSIS — C67.8 MALIGNANT NEOPLASM OF OVERLAPPING SITES OF BLADDER (HCC): ICD-10-CM

## 2023-01-01 DIAGNOSIS — I10 ESSENTIAL HYPERTENSION: Chronic | ICD-10-CM

## 2023-01-01 DIAGNOSIS — I10 PRIMARY HYPERTENSION: Chronic | ICD-10-CM

## 2023-01-01 DIAGNOSIS — M79.641 PAIN IN BOTH HANDS: ICD-10-CM

## 2023-01-01 DIAGNOSIS — Z01.812 PRE-OPERATIVE LABORATORY EXAMINATION: ICD-10-CM

## 2023-01-01 DIAGNOSIS — Z01.810 PRE-OPERATIVE CARDIOVASCULAR EXAMINATION: ICD-10-CM

## 2023-01-01 DIAGNOSIS — E78.5 DYSLIPIDEMIA: Chronic | ICD-10-CM

## 2023-01-01 DIAGNOSIS — T81.82XA SUBCUTANEOUS EMPHYSEMA RESULTING FROM A PROCEDURE, INITIAL ENCOUNTER: ICD-10-CM

## 2023-01-01 DIAGNOSIS — J43.9 PULMONARY EMPHYSEMA, UNSPECIFIED EMPHYSEMA TYPE (HCC): Chronic | ICD-10-CM

## 2023-01-01 DIAGNOSIS — R73.9 HYPERGLYCEMIA: ICD-10-CM

## 2023-01-01 DIAGNOSIS — Z01.811 PRE-OPERATIVE RESPIRATORY EXAMINATION: ICD-10-CM

## 2023-01-01 DIAGNOSIS — Z51.12 ENCOUNTER FOR ANTINEOPLASTIC IMMUNOTHERAPY: ICD-10-CM

## 2023-01-01 DIAGNOSIS — C67.9 MALIGNANT NEOPLASM OF BLADDER WALL (HCC): ICD-10-CM

## 2023-01-01 DIAGNOSIS — M79.642 PAIN IN BOTH HANDS: ICD-10-CM

## 2023-01-01 DIAGNOSIS — Z87.891 FORMER SMOKER: ICD-10-CM

## 2023-01-01 DIAGNOSIS — R74.02 NONSPECIFIC ELEVATION OF LEVELS OF TRANSAMINASE OR LACTIC ACID DEHYDROGENASE (LDH): ICD-10-CM

## 2023-01-01 LAB
ABO + RH BLD: NORMAL
ABO GROUP BLD: NORMAL
ALBUMIN SERPL BCP-MCNC: 3.6 G/DL (ref 3.2–4.9)
ALBUMIN SERPL BCP-MCNC: 3.9 G/DL (ref 3.2–4.9)
ALBUMIN SERPL BCP-MCNC: 3.9 G/DL (ref 3.2–4.9)
ALBUMIN SERPL BCP-MCNC: 4 G/DL (ref 3.2–4.9)
ALBUMIN SERPL BCP-MCNC: 4.1 G/DL (ref 3.2–4.9)
ALBUMIN/GLOB SERPL: 1 G/DL
ALBUMIN/GLOB SERPL: 1.1 G/DL
ALBUMIN/GLOB SERPL: 1.2 G/DL
ALBUMIN/GLOB SERPL: 1.2 G/DL
ALP SERPL-CCNC: 122 U/L (ref 30–99)
ALP SERPL-CCNC: 82 U/L (ref 30–99)
ALP SERPL-CCNC: 85 U/L (ref 30–99)
ALP SERPL-CCNC: 87 U/L (ref 30–99)
ALP SERPL-CCNC: 88 U/L (ref 30–99)
ALP SERPL-CCNC: 89 U/L (ref 30–99)
ALP SERPL-CCNC: 94 U/L (ref 30–99)
ALT SERPL-CCNC: 101 U/L (ref 2–50)
ALT SERPL-CCNC: 20 U/L (ref 2–50)
ALT SERPL-CCNC: 20 U/L (ref 2–50)
ALT SERPL-CCNC: 25 U/L (ref 2–50)
ALT SERPL-CCNC: 30 U/L (ref 2–50)
ALT SERPL-CCNC: 54 U/L (ref 2–50)
ALT SERPL-CCNC: 9 U/L (ref 2–50)
ANION GAP SERPL CALC-SCNC: 10 MMOL/L (ref 7–16)
ANION GAP SERPL CALC-SCNC: 11 MMOL/L (ref 7–16)
ANION GAP SERPL CALC-SCNC: 11 MMOL/L (ref 7–16)
ANION GAP SERPL CALC-SCNC: 12 MMOL/L (ref 7–16)
ANION GAP SERPL CALC-SCNC: 12 MMOL/L (ref 7–16)
ANION GAP SERPL CALC-SCNC: 13 MMOL/L (ref 7–16)
ANION GAP SERPL CALC-SCNC: 14 MMOL/L (ref 7–16)
ANION GAP SERPL CALC-SCNC: 15 MMOL/L (ref 7–16)
ANION GAP SERPL CALC-SCNC: 9 MMOL/L (ref 7–16)
APPEARANCE UR: ABNORMAL
APPEARANCE UR: CLEAR
AST SERPL-CCNC: 14 U/L (ref 12–45)
AST SERPL-CCNC: 19 U/L (ref 12–45)
AST SERPL-CCNC: 20 U/L (ref 12–45)
AST SERPL-CCNC: 21 U/L (ref 12–45)
AST SERPL-CCNC: 22 U/L (ref 12–45)
AST SERPL-CCNC: 6 U/L (ref 12–45)
AST SERPL-CCNC: 70 U/L (ref 12–45)
BACTERIA #/AREA URNS HPF: NEGATIVE /HPF
BACTERIA #/AREA URNS HPF: NEGATIVE /HPF
BACTERIA UR CULT: NORMAL
BACTERIA UR CULT: NORMAL
BASE EXCESS BLDA CALC-SCNC: -2 MMOL/L (ref -4–3)
BASE EXCESS BLDA CALC-SCNC: -4 MMOL/L (ref -4–3)
BASOPHILS # BLD AUTO: 0.4 % (ref 0–1.8)
BASOPHILS # BLD AUTO: 0.4 % (ref 0–1.8)
BASOPHILS # BLD AUTO: 0.5 % (ref 0–1.8)
BASOPHILS # BLD AUTO: 0.6 % (ref 0–1.8)
BASOPHILS # BLD AUTO: 0.6 % (ref 0–1.8)
BASOPHILS # BLD AUTO: 0.7 % (ref 0–1.8)
BASOPHILS # BLD AUTO: 0.7 % (ref 0–1.8)
BASOPHILS # BLD AUTO: 0.9 % (ref 0–1.8)
BASOPHILS # BLD AUTO: 0.9 % (ref 0–1.8)
BASOPHILS # BLD: 0.04 K/UL (ref 0–0.12)
BASOPHILS # BLD: 0.05 K/UL (ref 0–0.12)
BASOPHILS # BLD: 0.06 K/UL (ref 0–0.12)
BASOPHILS # BLD: 0.07 K/UL (ref 0–0.12)
BILIRUB SERPL-MCNC: 0.4 MG/DL (ref 0.1–1.5)
BILIRUB SERPL-MCNC: 0.5 MG/DL (ref 0.1–1.5)
BILIRUB UR QL STRIP.AUTO: ABNORMAL
BILIRUB UR QL STRIP.AUTO: NEGATIVE
BLD GP AB SCN SERPL QL: NORMAL
BODY TEMPERATURE: 36.9 CENTIGRADE
BODY TEMPERATURE: 37.6 CENTIGRADE
BUN SERPL-MCNC: 10 MG/DL (ref 8–22)
BUN SERPL-MCNC: 13 MG/DL (ref 8–22)
BUN SERPL-MCNC: 13 MG/DL (ref 8–22)
BUN SERPL-MCNC: 15 MG/DL (ref 8–22)
BUN SERPL-MCNC: 16 MG/DL (ref 8–22)
BUN SERPL-MCNC: 16 MG/DL (ref 8–22)
BUN SERPL-MCNC: 17 MG/DL (ref 8–22)
BUN SERPL-MCNC: 18 MG/DL (ref 8–22)
BUN SERPL-MCNC: 20 MG/DL (ref 8–22)
BUN SERPL-MCNC: 22 MG/DL (ref 8–22)
BUN SERPL-MCNC: 24 MG/DL (ref 8–22)
CALCIUM ALBUM COR SERPL-MCNC: 10.1 MG/DL (ref 8.5–10.5)
CALCIUM ALBUM COR SERPL-MCNC: 10.2 MG/DL (ref 8.5–10.5)
CALCIUM ALBUM COR SERPL-MCNC: 9.3 MG/DL (ref 8.5–10.5)
CALCIUM ALBUM COR SERPL-MCNC: 9.6 MG/DL (ref 8.5–10.5)
CALCIUM ALBUM COR SERPL-MCNC: 9.7 MG/DL (ref 8.5–10.5)
CALCIUM ALBUM COR SERPL-MCNC: 9.8 MG/DL (ref 8.5–10.5)
CALCIUM ALBUM COR SERPL-MCNC: 9.9 MG/DL (ref 8.5–10.5)
CALCIUM SERPL-MCNC: 10 MG/DL (ref 8.5–10.5)
CALCIUM SERPL-MCNC: 10.3 MG/DL (ref 8.5–10.5)
CALCIUM SERPL-MCNC: 8.5 MG/DL (ref 8.5–10.5)
CALCIUM SERPL-MCNC: 8.6 MG/DL (ref 8.5–10.5)
CALCIUM SERPL-MCNC: 8.9 MG/DL (ref 8.5–10.5)
CALCIUM SERPL-MCNC: 9 MG/DL (ref 8.5–10.5)
CALCIUM SERPL-MCNC: 9 MG/DL (ref 8.5–10.5)
CALCIUM SERPL-MCNC: 9.2 MG/DL (ref 8.5–10.5)
CALCIUM SERPL-MCNC: 9.5 MG/DL (ref 8.5–10.5)
CALCIUM SERPL-MCNC: 9.6 MG/DL (ref 8.5–10.5)
CALCIUM SERPL-MCNC: 9.6 MG/DL (ref 8.5–10.5)
CALCIUM SERPL-MCNC: 9.9 MG/DL (ref 8.5–10.5)
CAOX CRY #/AREA URNS HPF: ABNORMAL /HPF
CAOX CRY #/AREA URNS HPF: ABNORMAL /HPF
CCP IGG SERPL-ACNC: 2 UNITS (ref 0–19)
CCP IGG SERPL-ACNC: 3 UNITS (ref 0–19)
CHLORIDE SERPL-SCNC: 100 MMOL/L (ref 96–112)
CHLORIDE SERPL-SCNC: 101 MMOL/L (ref 96–112)
CHLORIDE SERPL-SCNC: 101 MMOL/L (ref 96–112)
CHLORIDE SERPL-SCNC: 102 MMOL/L (ref 96–112)
CHLORIDE SERPL-SCNC: 102 MMOL/L (ref 96–112)
CHLORIDE SERPL-SCNC: 103 MMOL/L (ref 96–112)
CHLORIDE SERPL-SCNC: 104 MMOL/L (ref 96–112)
CHLORIDE SERPL-SCNC: 105 MMOL/L (ref 96–112)
CHLORIDE SERPL-SCNC: 105 MMOL/L (ref 96–112)
CO2 SERPL-SCNC: 22 MMOL/L (ref 20–33)
CO2 SERPL-SCNC: 23 MMOL/L (ref 20–33)
CO2 SERPL-SCNC: 23 MMOL/L (ref 20–33)
CO2 SERPL-SCNC: 24 MMOL/L (ref 20–33)
CO2 SERPL-SCNC: 25 MMOL/L (ref 20–33)
CO2 SERPL-SCNC: 26 MMOL/L (ref 20–33)
CO2 SERPL-SCNC: 28 MMOL/L (ref 20–33)
COLOR UR: ABNORMAL
COLOR UR: YELLOW
CREAT SERPL-MCNC: 0.54 MG/DL (ref 0.5–1.4)
CREAT SERPL-MCNC: 0.57 MG/DL (ref 0.5–1.4)
CREAT SERPL-MCNC: 0.58 MG/DL (ref 0.5–1.4)
CREAT SERPL-MCNC: 0.63 MG/DL (ref 0.5–1.4)
CREAT SERPL-MCNC: 0.65 MG/DL (ref 0.5–1.4)
CREAT SERPL-MCNC: 0.66 MG/DL (ref 0.5–1.4)
CREAT SERPL-MCNC: 0.66 MG/DL (ref 0.5–1.4)
CREAT SERPL-MCNC: 0.67 MG/DL (ref 0.5–1.4)
CREAT SERPL-MCNC: 0.72 MG/DL (ref 0.5–1.4)
CREAT SERPL-MCNC: 0.73 MG/DL (ref 0.5–1.4)
CREAT SERPL-MCNC: 0.75 MG/DL (ref 0.5–1.4)
CREAT SERPL-MCNC: 0.81 MG/DL (ref 0.5–1.4)
CREAT SERPL-MCNC: 0.84 MG/DL (ref 0.5–1.4)
CREAT SERPL-MCNC: 0.89 MG/DL (ref 0.5–1.4)
CRP SERPL HS-MCNC: 0.97 MG/DL (ref 0–0.75)
CRP SERPL HS-MCNC: 5.79 MG/DL (ref 0–0.75)
D DIMER PPP IA.FEU-MCNC: 4.97 UG/ML (FEU) (ref 0–0.5)
EKG IMPRESSION: NORMAL
EOSINOPHIL # BLD AUTO: 0.01 K/UL (ref 0–0.51)
EOSINOPHIL # BLD AUTO: 0.04 K/UL (ref 0–0.51)
EOSINOPHIL # BLD AUTO: 0.1 K/UL (ref 0–0.51)
EOSINOPHIL # BLD AUTO: 0.12 K/UL (ref 0–0.51)
EOSINOPHIL # BLD AUTO: 0.15 K/UL (ref 0–0.51)
EOSINOPHIL # BLD AUTO: 0.17 K/UL (ref 0–0.51)
EOSINOPHIL # BLD AUTO: 0.21 K/UL (ref 0–0.51)
EOSINOPHIL # BLD AUTO: 0.25 K/UL (ref 0–0.51)
EOSINOPHIL # BLD AUTO: 0.28 K/UL (ref 0–0.51)
EOSINOPHIL NFR BLD: 0.1 % (ref 0–6.9)
EOSINOPHIL NFR BLD: 0.3 % (ref 0–6.9)
EOSINOPHIL NFR BLD: 1.2 % (ref 0–6.9)
EOSINOPHIL NFR BLD: 1.5 % (ref 0–6.9)
EOSINOPHIL NFR BLD: 1.8 % (ref 0–6.9)
EOSINOPHIL NFR BLD: 2.1 % (ref 0–6.9)
EOSINOPHIL NFR BLD: 2.4 % (ref 0–6.9)
EOSINOPHIL NFR BLD: 2.7 % (ref 0–6.9)
EOSINOPHIL NFR BLD: 2.7 % (ref 0–6.9)
EPI CELLS #/AREA URNS HPF: ABNORMAL /HPF
EPI CELLS #/AREA URNS HPF: ABNORMAL /HPF
ERYTHROCYTE [DISTWIDTH] IN BLOOD BY AUTOMATED COUNT: 42.6 FL (ref 35.9–50)
ERYTHROCYTE [DISTWIDTH] IN BLOOD BY AUTOMATED COUNT: 43.5 FL (ref 35.9–50)
ERYTHROCYTE [DISTWIDTH] IN BLOOD BY AUTOMATED COUNT: 43.5 FL (ref 35.9–50)
ERYTHROCYTE [DISTWIDTH] IN BLOOD BY AUTOMATED COUNT: 44.1 FL (ref 35.9–50)
ERYTHROCYTE [DISTWIDTH] IN BLOOD BY AUTOMATED COUNT: 44.1 FL (ref 35.9–50)
ERYTHROCYTE [DISTWIDTH] IN BLOOD BY AUTOMATED COUNT: 44.3 FL (ref 35.9–50)
ERYTHROCYTE [DISTWIDTH] IN BLOOD BY AUTOMATED COUNT: 44.5 FL (ref 35.9–50)
ERYTHROCYTE [DISTWIDTH] IN BLOOD BY AUTOMATED COUNT: 45.9 FL (ref 35.9–50)
ERYTHROCYTE [DISTWIDTH] IN BLOOD BY AUTOMATED COUNT: 46.2 FL (ref 35.9–50)
ERYTHROCYTE [DISTWIDTH] IN BLOOD BY AUTOMATED COUNT: 47 FL (ref 35.9–50)
ERYTHROCYTE [DISTWIDTH] IN BLOOD BY AUTOMATED COUNT: 47.1 FL (ref 35.9–50)
ERYTHROCYTE [DISTWIDTH] IN BLOOD BY AUTOMATED COUNT: 47.5 FL (ref 35.9–50)
ERYTHROCYTE [DISTWIDTH] IN BLOOD BY AUTOMATED COUNT: 48 FL (ref 35.9–50)
ERYTHROCYTE [SEDIMENTATION RATE] IN BLOOD BY WESTERGREN METHOD: 27 MM/HOUR (ref 0–20)
ERYTHROCYTE [SEDIMENTATION RATE] IN BLOOD BY WESTERGREN METHOD: 7 MM/HOUR (ref 0–20)
EST. AVERAGE GLUCOSE BLD GHB EST-MCNC: 120 MG/DL
EST. AVERAGE GLUCOSE BLD GHB EST-MCNC: 128 MG/DL
FASTING STATUS PATIENT QL REPORTED: NORMAL
GASTROCULT GAST QL: POSITIVE
GFR SERPLBLD CREATININE-BSD FMLA CKD-EPI: 85 ML/MIN/1.73 M 2
GFR SERPLBLD CREATININE-BSD FMLA CKD-EPI: 86 ML/MIN/1.73 M 2
GFR SERPLBLD CREATININE-BSD FMLA CKD-EPI: 88 ML/MIN/1.73 M 2
GFR SERPLBLD CREATININE-BSD FMLA CKD-EPI: 89 ML/MIN/1.73 M 2
GFR SERPLBLD CREATININE-BSD FMLA CKD-EPI: 90 ML/MIN/1.73 M 2
GFR SERPLBLD CREATININE-BSD FMLA CKD-EPI: 91 ML/MIN/1.73 M 2
GFR SERPLBLD CREATININE-BSD FMLA CKD-EPI: 93 ML/MIN/1.73 M 2
GFR SERPLBLD CREATININE-BSD FMLA CKD-EPI: 94 ML/MIN/1.73 M 2
GFR SERPLBLD CREATININE-BSD FMLA CKD-EPI: 97 ML/MIN/1.73 M 2
GFR SERPLBLD CREATININE-BSD FMLA CKD-EPI: 97 ML/MIN/1.73 M 2
GFR SERPLBLD CREATININE-BSD FMLA CKD-EPI: 99 ML/MIN/1.73 M 2
GLOBULIN SER CALC-MCNC: 3.4 G/DL (ref 1.9–3.5)
GLOBULIN SER CALC-MCNC: 3.5 G/DL (ref 1.9–3.5)
GLOBULIN SER CALC-MCNC: 3.6 G/DL (ref 1.9–3.5)
GLOBULIN SER CALC-MCNC: 3.9 G/DL (ref 1.9–3.5)
GLUCOSE BLD STRIP.AUTO-MCNC: 101 MG/DL (ref 65–99)
GLUCOSE BLD STRIP.AUTO-MCNC: 161 MG/DL (ref 65–99)
GLUCOSE SERPL-MCNC: 102 MG/DL (ref 65–99)
GLUCOSE SERPL-MCNC: 105 MG/DL (ref 65–99)
GLUCOSE SERPL-MCNC: 110 MG/DL (ref 65–99)
GLUCOSE SERPL-MCNC: 111 MG/DL (ref 65–99)
GLUCOSE SERPL-MCNC: 122 MG/DL (ref 65–99)
GLUCOSE SERPL-MCNC: 125 MG/DL (ref 65–99)
GLUCOSE SERPL-MCNC: 131 MG/DL (ref 65–99)
GLUCOSE SERPL-MCNC: 132 MG/DL (ref 65–99)
GLUCOSE SERPL-MCNC: 132 MG/DL (ref 65–99)
GLUCOSE SERPL-MCNC: 135 MG/DL (ref 65–99)
GLUCOSE SERPL-MCNC: 159 MG/DL (ref 65–99)
GLUCOSE SERPL-MCNC: 167 MG/DL (ref 65–99)
GLUCOSE SERPL-MCNC: 89 MG/DL (ref 65–99)
GLUCOSE SERPL-MCNC: 94 MG/DL (ref 65–99)
GLUCOSE UR STRIP.AUTO-MCNC: NEGATIVE MG/DL
GLUCOSE UR STRIP.AUTO-MCNC: NEGATIVE MG/DL
HBA1C MFR BLD: 5.8 % (ref 4–5.6)
HBA1C MFR BLD: 6.1 % (ref 4–5.6)
HCO3 BLDA-SCNC: 22 MMOL/L (ref 17–25)
HCO3 BLDA-SCNC: 23 MMOL/L (ref 17–25)
HCT VFR BLD AUTO: 38.4 % (ref 42–52)
HCT VFR BLD AUTO: 38.7 % (ref 42–52)
HCT VFR BLD AUTO: 38.9 % (ref 42–52)
HCT VFR BLD AUTO: 41 % (ref 42–52)
HCT VFR BLD AUTO: 41.8 % (ref 42–52)
HCT VFR BLD AUTO: 41.8 % (ref 42–52)
HCT VFR BLD AUTO: 42.2 % (ref 42–52)
HCT VFR BLD AUTO: 43.1 % (ref 42–52)
HCT VFR BLD AUTO: 43.6 % (ref 42–52)
HCT VFR BLD AUTO: 43.6 % (ref 42–52)
HCT VFR BLD AUTO: 44.6 % (ref 42–52)
HCT VFR BLD AUTO: 45.6 % (ref 42–52)
HCT VFR BLD AUTO: 46.3 % (ref 42–52)
HGB BLD-MCNC: 12 G/DL (ref 14–18)
HGB BLD-MCNC: 12 G/DL (ref 14–18)
HGB BLD-MCNC: 12.2 G/DL (ref 14–18)
HGB BLD-MCNC: 12.6 G/DL (ref 14–18)
HGB BLD-MCNC: 13 G/DL (ref 14–18)
HGB BLD-MCNC: 13.1 G/DL (ref 14–18)
HGB BLD-MCNC: 13.9 G/DL (ref 14–18)
HGB BLD-MCNC: 14.2 G/DL (ref 14–18)
HGB BLD-MCNC: 14.5 G/DL (ref 14–18)
HGB BLD-MCNC: 14.6 G/DL (ref 14–18)
HGB BLD-MCNC: 14.6 G/DL (ref 14–18)
HGB BLD-MCNC: 14.8 G/DL (ref 14–18)
HGB BLD-MCNC: 15 G/DL (ref 14–18)
HYALINE CASTS #/AREA URNS LPF: ABNORMAL /LPF
HYALINE CASTS #/AREA URNS LPF: ABNORMAL /LPF
IMM GRANULOCYTES # BLD AUTO: 0.01 K/UL (ref 0–0.11)
IMM GRANULOCYTES # BLD AUTO: 0.02 K/UL (ref 0–0.11)
IMM GRANULOCYTES # BLD AUTO: 0.02 K/UL (ref 0–0.11)
IMM GRANULOCYTES # BLD AUTO: 0.03 K/UL (ref 0–0.11)
IMM GRANULOCYTES # BLD AUTO: 0.06 K/UL (ref 0–0.11)
IMM GRANULOCYTES # BLD AUTO: 0.07 K/UL (ref 0–0.11)
IMM GRANULOCYTES # BLD AUTO: 0.1 K/UL (ref 0–0.11)
IMM GRANULOCYTES NFR BLD AUTO: 0.1 % (ref 0–0.9)
IMM GRANULOCYTES NFR BLD AUTO: 0.2 % (ref 0–0.9)
IMM GRANULOCYTES NFR BLD AUTO: 0.2 % (ref 0–0.9)
IMM GRANULOCYTES NFR BLD AUTO: 0.4 % (ref 0–0.9)
IMM GRANULOCYTES NFR BLD AUTO: 0.7 % (ref 0–0.9)
IMM GRANULOCYTES NFR BLD AUTO: 0.9 % (ref 0–0.9)
INHALED O2 FLOW RATE: 1.5 L/MIN
INHALED O2 FLOW RATE: 10 L/MIN
INR PPP: 1.13 (ref 0.87–1.13)
INR PPP: 1.14 (ref 0.87–1.13)
KETONES UR STRIP.AUTO-MCNC: ABNORMAL MG/DL
KETONES UR STRIP.AUTO-MCNC: NEGATIVE MG/DL
LACTATE SERPL-SCNC: 0.8 MMOL/L (ref 0.5–2)
LACTATE SERPL-SCNC: 0.9 MMOL/L (ref 0.5–2)
LACTATE SERPL-SCNC: 1 MMOL/L (ref 0.5–2)
LACTATE SERPL-SCNC: 1 MMOL/L (ref 0.5–2)
LEUKOCYTE ESTERASE UR QL STRIP.AUTO: ABNORMAL
LEUKOCYTE ESTERASE UR QL STRIP.AUTO: ABNORMAL
LYMPHOCYTES # BLD AUTO: 0.84 K/UL (ref 1–4.8)
LYMPHOCYTES # BLD AUTO: 1.38 K/UL (ref 1–4.8)
LYMPHOCYTES # BLD AUTO: 1.41 K/UL (ref 1–4.8)
LYMPHOCYTES # BLD AUTO: 1.47 K/UL (ref 1–4.8)
LYMPHOCYTES # BLD AUTO: 1.48 K/UL (ref 1–4.8)
LYMPHOCYTES # BLD AUTO: 1.49 K/UL (ref 1–4.8)
LYMPHOCYTES # BLD AUTO: 1.51 K/UL (ref 1–4.8)
LYMPHOCYTES # BLD AUTO: 1.57 K/UL (ref 1–4.8)
LYMPHOCYTES # BLD AUTO: 1.84 K/UL (ref 1–4.8)
LYMPHOCYTES NFR BLD: 12.1 % (ref 22–41)
LYMPHOCYTES NFR BLD: 15.1 % (ref 22–41)
LYMPHOCYTES NFR BLD: 17 % (ref 22–41)
LYMPHOCYTES NFR BLD: 17.8 % (ref 22–41)
LYMPHOCYTES NFR BLD: 18.3 % (ref 22–41)
LYMPHOCYTES NFR BLD: 18.4 % (ref 22–41)
LYMPHOCYTES NFR BLD: 18.5 % (ref 22–41)
LYMPHOCYTES NFR BLD: 19 % (ref 22–41)
LYMPHOCYTES NFR BLD: 5.9 % (ref 22–41)
MAGNESIUM SERPL-MCNC: 1.8 MG/DL (ref 1.5–2.5)
MCH RBC QN AUTO: 26.3 PG (ref 27–33)
MCH RBC QN AUTO: 26.4 PG (ref 27–33)
MCH RBC QN AUTO: 26.8 PG (ref 27–33)
MCH RBC QN AUTO: 27 PG (ref 27–33)
MCH RBC QN AUTO: 27.6 PG (ref 27–33)
MCH RBC QN AUTO: 28.2 PG (ref 27–33)
MCH RBC QN AUTO: 28.5 PG (ref 27–33)
MCH RBC QN AUTO: 28.9 PG (ref 27–33)
MCH RBC QN AUTO: 29.3 PG (ref 27–33)
MCHC RBC AUTO-ENTMCNC: 30.7 G/DL (ref 32.3–36.5)
MCHC RBC AUTO-ENTMCNC: 30.8 G/DL (ref 32.3–36.5)
MCHC RBC AUTO-ENTMCNC: 31.1 G/DL (ref 32.3–36.5)
MCHC RBC AUTO-ENTMCNC: 31.3 G/DL (ref 32.3–36.5)
MCHC RBC AUTO-ENTMCNC: 31.3 G/DL (ref 32.3–36.5)
MCHC RBC AUTO-ENTMCNC: 31.5 G/DL (ref 32.3–36.5)
MCHC RBC AUTO-ENTMCNC: 32.4 G/DL (ref 33.7–35.3)
MCHC RBC AUTO-ENTMCNC: 32.5 G/DL (ref 33.7–35.3)
MCHC RBC AUTO-ENTMCNC: 32.6 G/DL (ref 33.7–35.3)
MCHC RBC AUTO-ENTMCNC: 32.7 G/DL (ref 33.7–35.3)
MCHC RBC AUTO-ENTMCNC: 32.9 G/DL (ref 33.7–35.3)
MCHC RBC AUTO-ENTMCNC: 33.5 G/DL (ref 33.7–35.3)
MCHC RBC AUTO-ENTMCNC: 33.6 G/DL (ref 33.7–35.3)
MCV RBC AUTO: 84.9 FL (ref 81.4–97.8)
MCV RBC AUTO: 85.1 FL (ref 81.4–97.8)
MCV RBC AUTO: 85.3 FL (ref 81.4–97.8)
MCV RBC AUTO: 85.6 FL (ref 81.4–97.8)
MCV RBC AUTO: 85.9 FL (ref 81.4–97.8)
MCV RBC AUTO: 86 FL (ref 81.4–97.8)
MCV RBC AUTO: 86.2 FL (ref 81.4–97.8)
MCV RBC AUTO: 86.9 FL (ref 81.4–97.8)
MCV RBC AUTO: 87.1 FL (ref 81.4–97.8)
MCV RBC AUTO: 87.4 FL (ref 81.4–97.8)
MCV RBC AUTO: 87.7 FL (ref 81.4–97.8)
MICRO URNS: ABNORMAL
MICRO URNS: ABNORMAL
MONOCYTES # BLD AUTO: 0.54 K/UL (ref 0–0.85)
MONOCYTES # BLD AUTO: 0.62 K/UL (ref 0–0.85)
MONOCYTES # BLD AUTO: 0.63 K/UL (ref 0–0.85)
MONOCYTES # BLD AUTO: 0.64 K/UL (ref 0–0.85)
MONOCYTES # BLD AUTO: 0.65 K/UL (ref 0–0.85)
MONOCYTES # BLD AUTO: 0.67 K/UL (ref 0–0.85)
MONOCYTES # BLD AUTO: 0.75 K/UL (ref 0–0.85)
MONOCYTES NFR BLD AUTO: 4.4 % (ref 0–13.4)
MONOCYTES NFR BLD AUTO: 5.5 % (ref 0–13.4)
MONOCYTES NFR BLD AUTO: 6.3 % (ref 0–13.4)
MONOCYTES NFR BLD AUTO: 7 % (ref 0–13.4)
MONOCYTES NFR BLD AUTO: 7.2 % (ref 0–13.4)
MONOCYTES NFR BLD AUTO: 7.7 % (ref 0–13.4)
MONOCYTES NFR BLD AUTO: 8 % (ref 0–13.4)
MONOCYTES NFR BLD AUTO: 8.1 % (ref 0–13.4)
MONOCYTES NFR BLD AUTO: 8.2 % (ref 0–13.4)
NEUTROPHILS # BLD AUTO: 12.58 K/UL (ref 1.82–7.42)
NEUTROPHILS # BLD AUTO: 5.43 K/UL (ref 1.82–7.42)
NEUTROPHILS # BLD AUTO: 5.73 K/UL (ref 1.82–7.42)
NEUTROPHILS # BLD AUTO: 5.75 K/UL (ref 1.82–7.42)
NEUTROPHILS # BLD AUTO: 5.82 K/UL (ref 1.82–7.42)
NEUTROPHILS # BLD AUTO: 5.87 K/UL (ref 1.82–7.42)
NEUTROPHILS # BLD AUTO: 7.47 K/UL (ref 1.82–7.42)
NEUTROPHILS # BLD AUTO: 7.69 K/UL (ref 1.82–7.42)
NEUTROPHILS # BLD AUTO: 9.46 K/UL (ref 1.82–7.42)
NEUTROPHILS NFR BLD: 70.3 % (ref 44–72)
NEUTROPHILS NFR BLD: 70.7 % (ref 44–72)
NEUTROPHILS NFR BLD: 70.9 % (ref 44–72)
NEUTROPHILS NFR BLD: 71.6 % (ref 44–72)
NEUTROPHILS NFR BLD: 72 % (ref 44–72)
NEUTROPHILS NFR BLD: 72.1 % (ref 44–72)
NEUTROPHILS NFR BLD: 74.1 % (ref 44–72)
NEUTROPHILS NFR BLD: 80.8 % (ref 44–72)
NEUTROPHILS NFR BLD: 88.8 % (ref 44–72)
NITRITE UR QL STRIP.AUTO: NEGATIVE
NITRITE UR QL STRIP.AUTO: NEGATIVE
NRBC # BLD AUTO: 0 K/UL
NRBC BLD-RTO: 0 /100 WBC
NRBC BLD-RTO: 0 /100 WBC (ref 0–0.2)
NRBC BLD-RTO: 0 /100 WBC (ref 0–0.2)
NT-PROBNP SERPL IA-MCNC: 260 PG/ML (ref 0–125)
NT-PROBNP SERPL IA-MCNC: 315 PG/ML (ref 0–125)
NUCLEAR IGG SER QL IA: NORMAL
NUCLEAR IGG SER QL IA: NORMAL
OUTPT INFUS CBC COMMENT OICOM: ABNORMAL
PATHOLOGY CONSULT NOTE: NORMAL
PATHOLOGY CONSULT NOTE: NORMAL
PCO2 BLDA: 36 MMHG (ref 26–37)
PCO2 BLDA: 47.1 MMHG (ref 26–37)
PCO2 TEMP ADJ BLDA: 37 MMHG (ref 26–37)
PCO2 TEMP ADJ BLDA: 46.9 MMHG (ref 26–37)
PH BLDA: 7.31 [PH] (ref 7.4–7.5)
PH BLDA: 7.41 [PH] (ref 7.4–7.5)
PH TEMP ADJ BLDA: 7.31 [PH] (ref 7.4–7.5)
PH TEMP ADJ BLDA: 7.4 [PH] (ref 7.4–7.5)
PH UR STRIP.AUTO: 5.5 [PH] (ref 5–8)
PH UR STRIP.AUTO: 6 [PH] (ref 5–8)
PHOSPHATE SERPL-MCNC: 3.5 MG/DL (ref 2.5–4.5)
PLATELET # BLD AUTO: 206 K/UL (ref 164–446)
PLATELET # BLD AUTO: 226 K/UL (ref 164–446)
PLATELET # BLD AUTO: 235 K/UL (ref 164–446)
PLATELET # BLD AUTO: 250 K/UL (ref 164–446)
PLATELET # BLD AUTO: 285 K/UL (ref 164–446)
PLATELET # BLD AUTO: 289 K/UL (ref 164–446)
PLATELET # BLD AUTO: 292 K/UL (ref 164–446)
PLATELET # BLD AUTO: 308 K/UL (ref 164–446)
PLATELET # BLD AUTO: 318 K/UL (ref 164–446)
PLATELET # BLD AUTO: 321 K/UL (ref 164–446)
PLATELET # BLD AUTO: 353 K/UL (ref 164–446)
PMV BLD AUTO: 10 FL (ref 9–12.9)
PMV BLD AUTO: 10.4 FL (ref 9–12.9)
PMV BLD AUTO: 10.4 FL (ref 9–12.9)
PMV BLD AUTO: 10.5 FL (ref 9–12.9)
PMV BLD AUTO: 10.5 FL (ref 9–12.9)
PMV BLD AUTO: 10.6 FL (ref 9–12.9)
PMV BLD AUTO: 10.6 FL (ref 9–12.9)
PMV BLD AUTO: 9.6 FL (ref 9–12.9)
PMV BLD AUTO: 9.6 FL (ref 9–12.9)
PMV BLD AUTO: 9.7 FL (ref 9–12.9)
PMV BLD AUTO: 9.8 FL (ref 9–12.9)
PO2 BLDA: 121.4 MMHG (ref 64–87)
PO2 BLDA: 78 MMHG (ref 64–87)
PO2 TEMP ADJ BLDA: 120.8 MMHG (ref 64–87)
PO2 TEMP ADJ BLDA: 81.2 MMHG (ref 64–87)
POTASSIUM SERPL-SCNC: 3.5 MMOL/L (ref 3.6–5.5)
POTASSIUM SERPL-SCNC: 3.7 MMOL/L (ref 3.6–5.5)
POTASSIUM SERPL-SCNC: 3.7 MMOL/L (ref 3.6–5.5)
POTASSIUM SERPL-SCNC: 3.8 MMOL/L (ref 3.6–5.5)
POTASSIUM SERPL-SCNC: 3.9 MMOL/L (ref 3.6–5.5)
POTASSIUM SERPL-SCNC: 4 MMOL/L (ref 3.6–5.5)
POTASSIUM SERPL-SCNC: 4.1 MMOL/L (ref 3.6–5.5)
POTASSIUM SERPL-SCNC: 4.1 MMOL/L (ref 3.6–5.5)
POTASSIUM SERPL-SCNC: 4.2 MMOL/L (ref 3.6–5.5)
POTASSIUM SERPL-SCNC: 4.3 MMOL/L (ref 3.6–5.5)
POTASSIUM SERPL-SCNC: 4.4 MMOL/L (ref 3.6–5.5)
PROCALCITONIN SERPL-MCNC: 0.28 NG/ML
PROT SERPL-MCNC: 7.1 G/DL (ref 6–8.2)
PROT SERPL-MCNC: 7.4 G/DL (ref 6–8.2)
PROT SERPL-MCNC: 7.5 G/DL (ref 6–8.2)
PROT SERPL-MCNC: 7.6 G/DL (ref 6–8.2)
PROT SERPL-MCNC: 8 G/DL (ref 6–8.2)
PROT UR QL STRIP: 30 MG/DL
PROT UR QL STRIP: NEGATIVE MG/DL
PROTHROMBIN TIME: 14.4 SEC (ref 12–14.6)
PROTHROMBIN TIME: 14.4 SEC (ref 12–14.6)
RBC # BLD AUTO: 4.47 M/UL (ref 4.7–6.1)
RBC # BLD AUTO: 4.56 M/UL (ref 4.7–6.1)
RBC # BLD AUTO: 4.57 M/UL (ref 4.7–6.1)
RBC # BLD AUTO: 4.77 M/UL (ref 4.7–6.1)
RBC # BLD AUTO: 4.85 M/UL (ref 4.7–6.1)
RBC # BLD AUTO: 4.85 M/UL (ref 4.7–6.1)
RBC # BLD AUTO: 4.93 M/UL (ref 4.7–6.1)
RBC # BLD AUTO: 4.95 M/UL (ref 4.7–6.1)
RBC # BLD AUTO: 4.99 M/UL (ref 4.7–6.1)
RBC # BLD AUTO: 5.06 M/UL (ref 4.7–6.1)
RBC # BLD AUTO: 5.13 M/UL (ref 4.7–6.1)
RBC # BLD AUTO: 5.2 M/UL (ref 4.7–6.1)
RBC # BLD AUTO: 5.43 M/UL (ref 4.7–6.1)
RBC # URNS HPF: ABNORMAL /HPF
RBC # URNS HPF: ABNORMAL /HPF
RBC UR QL AUTO: NEGATIVE
RBC UR QL AUTO: NEGATIVE
RH BLD: NORMAL
RHEUMATOID FACT SER IA-ACNC: 10 IU/ML (ref 0–14)
RHEUMATOID FACT SER IA-ACNC: 12 IU/ML (ref 0–14)
SAO2 % BLDA: 94.7 % (ref 93–99)
SAO2 % BLDA: 97.6 % (ref 93–99)
SIGNIFICANT IND 70042: NORMAL
SIGNIFICANT IND 70042: NORMAL
SITE SITE: NORMAL
SITE SITE: NORMAL
SODIUM SERPL-SCNC: 136 MMOL/L (ref 135–145)
SODIUM SERPL-SCNC: 138 MMOL/L (ref 135–145)
SODIUM SERPL-SCNC: 139 MMOL/L (ref 135–145)
SODIUM SERPL-SCNC: 139 MMOL/L (ref 135–145)
SODIUM SERPL-SCNC: 140 MMOL/L (ref 135–145)
SODIUM SERPL-SCNC: 141 MMOL/L (ref 135–145)
SODIUM SERPL-SCNC: 143 MMOL/L (ref 135–145)
SOURCE SOURCE: NORMAL
SOURCE SOURCE: NORMAL
SP GR UR STRIP.AUTO: 1.02
SP GR UR STRIP.AUTO: 1.04
SPECIMEN SOURCE: ABNORMAL
T4 FREE SERPL-MCNC: 1.42 NG/DL (ref 0.93–1.7)
T4 FREE SERPL-MCNC: 1.51 NG/DL (ref 0.93–1.7)
TROPONIN T SERPL-MCNC: 16 NG/L (ref 6–19)
TROPONIN T SERPL-MCNC: 9 NG/L (ref 6–19)
TSH SERPL DL<=0.005 MIU/L-ACNC: 0.01 UIU/ML (ref 0.38–5.33)
TSH SERPL DL<=0.005 MIU/L-ACNC: 0.02 UIU/ML (ref 0.38–5.33)
TSH SERPL DL<=0.005 MIU/L-ACNC: 0.46 UIU/ML (ref 0.38–5.33)
TSH SERPL DL<=0.005 MIU/L-ACNC: 0.5 UIU/ML (ref 0.38–5.33)
TSH SERPL DL<=0.005 MIU/L-ACNC: 0.61 UIU/ML (ref 0.38–5.33)
TSH SERPL DL<=0.005 MIU/L-ACNC: 2.27 UIU/ML (ref 0.38–5.33)
URATE SERPL-MCNC: 3.7 MG/DL (ref 2.5–8.3)
UROBILINOGEN UR STRIP.AUTO-MCNC: 0.2 MG/DL
UROBILINOGEN UR STRIP.AUTO-MCNC: 1 MG/DL
WBC # BLD AUTO: 10.4 K/UL (ref 4.8–10.8)
WBC # BLD AUTO: 10.4 K/UL (ref 4.8–10.8)
WBC # BLD AUTO: 11.2 K/UL (ref 4.8–10.8)
WBC # BLD AUTO: 11.7 K/UL (ref 4.8–10.8)
WBC # BLD AUTO: 12.9 K/UL (ref 4.8–10.8)
WBC # BLD AUTO: 13.5 K/UL (ref 4.8–10.8)
WBC # BLD AUTO: 14.2 K/UL (ref 4.8–10.8)
WBC # BLD AUTO: 18.1 K/UL (ref 4.8–10.8)
WBC # BLD AUTO: 7.7 K/UL (ref 4.8–10.8)
WBC # BLD AUTO: 8 K/UL (ref 4.8–10.8)
WBC # BLD AUTO: 8.1 K/UL (ref 4.8–10.8)
WBC # BLD AUTO: 8.1 K/UL (ref 4.8–10.8)
WBC # BLD AUTO: 8.2 K/UL (ref 4.8–10.8)
WBC #/AREA URNS HPF: ABNORMAL /HPF
WBC #/AREA URNS HPF: ABNORMAL /HPF

## 2023-01-01 PROCEDURE — 770020 HCHG ROOM/CARE - TELE (206)

## 2023-01-01 PROCEDURE — 80053 COMPREHEN METABOLIC PANEL: CPT

## 2023-01-01 PROCEDURE — 84100 ASSAY OF PHOSPHORUS: CPT

## 2023-01-01 PROCEDURE — 80048 BASIC METABOLIC PNL TOTAL CA: CPT

## 2023-01-01 PROCEDURE — 95886 MUSC TEST DONE W/N TEST COMP: CPT | Mod: 26 | Performed by: SPECIALIST

## 2023-01-01 PROCEDURE — 700102 HCHG RX REV CODE 250 W/ 637 OVERRIDE(OP): Performed by: UROLOGY

## 2023-01-01 PROCEDURE — 700102 HCHG RX REV CODE 250 W/ 637 OVERRIDE(OP): Performed by: STUDENT IN AN ORGANIZED HEALTH CARE EDUCATION/TRAINING PROGRAM

## 2023-01-01 PROCEDURE — 71045 X-RAY EXAM CHEST 1 VIEW: CPT

## 2023-01-01 PROCEDURE — 700111 HCHG RX REV CODE 636 W/ 250 OVERRIDE (IP): Performed by: ANESTHESIOLOGY

## 2023-01-01 PROCEDURE — 83036 HEMOGLOBIN GLYCOSYLATED A1C: CPT

## 2023-01-01 PROCEDURE — 700111 HCHG RX REV CODE 636 W/ 250 OVERRIDE (IP): Mod: JZ | Performed by: PHYSICIAN ASSISTANT

## 2023-01-01 PROCEDURE — 88331 PATH CONSLTJ SURG 1 BLK 1SPC: CPT | Mod: 91

## 2023-01-01 PROCEDURE — 85652 RBC SED RATE AUTOMATED: CPT

## 2023-01-01 PROCEDURE — 700111 HCHG RX REV CODE 636 W/ 250 OVERRIDE (IP): Performed by: UROLOGY

## 2023-01-01 PROCEDURE — 160002 HCHG RECOVERY MINUTES (STAT): Performed by: UROLOGY

## 2023-01-01 PROCEDURE — 700102 HCHG RX REV CODE 250 W/ 637 OVERRIDE(OP): Performed by: GENERAL PRACTICE

## 2023-01-01 PROCEDURE — 97166 OT EVAL MOD COMPLEX 45 MIN: CPT

## 2023-01-01 PROCEDURE — 36415 COLL VENOUS BLD VENIPUNCTURE: CPT

## 2023-01-01 PROCEDURE — 84145 PROCALCITONIN (PCT): CPT

## 2023-01-01 PROCEDURE — 85025 COMPLETE CBC W/AUTO DIFF WBC: CPT

## 2023-01-01 PROCEDURE — A9270 NON-COVERED ITEM OR SERVICE: HCPCS | Performed by: GENERAL PRACTICE

## 2023-01-01 PROCEDURE — 74178 CT ABD&PLV WO CNTR FLWD CNTR: CPT

## 2023-01-01 PROCEDURE — 160048 HCHG OR STATISTICAL LEVEL 1-5: Performed by: UROLOGY

## 2023-01-01 PROCEDURE — 700105 HCHG RX REV CODE 258: Mod: JZ | Performed by: UROLOGY

## 2023-01-01 PROCEDURE — 700105 HCHG RX REV CODE 258: Performed by: INTERNAL MEDICINE

## 2023-01-01 PROCEDURE — 5A12012 PERFORMANCE OF CARDIAC OUTPUT, SINGLE, MANUAL: ICD-10-PCS | Performed by: INTERNAL MEDICINE

## 2023-01-01 PROCEDURE — 99215 OFFICE O/P EST HI 40 MIN: CPT | Performed by: INTERNAL MEDICINE

## 2023-01-01 PROCEDURE — 83605 ASSAY OF LACTIC ACID: CPT | Mod: 91

## 2023-01-01 PROCEDURE — 700117 HCHG RX CONTRAST REV CODE 255: Performed by: UROLOGY

## 2023-01-01 PROCEDURE — 92950 HEART/LUNG RESUSCITATION CPR: CPT | Performed by: INTERNAL MEDICINE

## 2023-01-01 PROCEDURE — 700102 HCHG RX REV CODE 250 W/ 637 OVERRIDE(OP): Performed by: ANESTHESIOLOGY

## 2023-01-01 PROCEDURE — 86200 CCP ANTIBODY: CPT

## 2023-01-01 PROCEDURE — 99100 ANES PT EXTEME AGE<1 YR&>70: CPT | Performed by: STUDENT IN AN ORGANIZED HEALTH CARE EDUCATION/TRAINING PROGRAM

## 2023-01-01 PROCEDURE — A9270 NON-COVERED ITEM OR SERVICE: HCPCS | Performed by: STUDENT IN AN ORGANIZED HEALTH CARE EDUCATION/TRAINING PROGRAM

## 2023-01-01 PROCEDURE — 160039 HCHG SURGERY MINUTES - EA ADDL 1 MIN LEVEL 3: Performed by: UROLOGY

## 2023-01-01 PROCEDURE — 99222 1ST HOSP IP/OBS MODERATE 55: CPT | Mod: AI | Performed by: GENERAL PRACTICE

## 2023-01-01 PROCEDURE — 700111 HCHG RX REV CODE 636 W/ 250 OVERRIDE (IP)

## 2023-01-01 PROCEDURE — 8E0W4CZ ROBOTIC ASSISTED PROCEDURE OF TRUNK REGION, PERCUTANEOUS ENDOSCOPIC APPROACH: ICD-10-PCS | Performed by: UROLOGY

## 2023-01-01 PROCEDURE — 700105 HCHG RX REV CODE 258: Performed by: PHYSICIAN ASSISTANT

## 2023-01-01 PROCEDURE — 160035 HCHG PACU - 1ST 60 MINS PHASE I: Performed by: UROLOGY

## 2023-01-01 PROCEDURE — 86038 ANTINUCLEAR ANTIBODIES: CPT

## 2023-01-01 PROCEDURE — 07BC4ZZ EXCISION OF PELVIS LYMPHATIC, PERCUTANEOUS ENDOSCOPIC APPROACH: ICD-10-PCS | Performed by: UROLOGY

## 2023-01-01 PROCEDURE — 99233 SBSQ HOSP IP/OBS HIGH 50: CPT | Performed by: STUDENT IN AN ORGANIZED HEALTH CARE EDUCATION/TRAINING PROGRAM

## 2023-01-01 PROCEDURE — A4212 NON CORING NEEDLE OR STYLET: HCPCS

## 2023-01-01 PROCEDURE — 36590 REMOVAL TUNNELED CV CATH: CPT

## 2023-01-01 PROCEDURE — 82962 GLUCOSE BLOOD TEST: CPT

## 2023-01-01 PROCEDURE — 700111 HCHG RX REV CODE 636 W/ 250 OVERRIDE (IP): Performed by: STUDENT IN AN ORGANIZED HEALTH CARE EDUCATION/TRAINING PROGRAM

## 2023-01-01 PROCEDURE — 84443 ASSAY THYROID STIM HORMONE: CPT

## 2023-01-01 PROCEDURE — 700111 HCHG RX REV CODE 636 W/ 250 OVERRIDE (IP): Performed by: INTERNAL MEDICINE

## 2023-01-01 PROCEDURE — 700101 HCHG RX REV CODE 250: Performed by: STUDENT IN AN ORGANIZED HEALTH CARE EDUCATION/TRAINING PROGRAM

## 2023-01-01 PROCEDURE — A9270 NON-COVERED ITEM OR SERVICE: HCPCS | Performed by: ANESTHESIOLOGY

## 2023-01-01 PROCEDURE — 84484 ASSAY OF TROPONIN QUANT: CPT

## 2023-01-01 PROCEDURE — 99291 CRITICAL CARE FIRST HOUR: CPT | Performed by: STUDENT IN AN ORGANIZED HEALTH CARE EDUCATION/TRAINING PROGRAM

## 2023-01-01 PROCEDURE — 86431 RHEUMATOID FACTOR QUANT: CPT

## 2023-01-01 PROCEDURE — 00914 ANES TRURL PX RESCJ PRST8: CPT | Performed by: ANESTHESIOLOGY

## 2023-01-01 PROCEDURE — 85027 COMPLETE CBC AUTOMATED: CPT

## 2023-01-01 PROCEDURE — 99233 SBSQ HOSP IP/OBS HIGH 50: CPT | Mod: 25 | Performed by: STUDENT IN AN ORGANIZED HEALTH CARE EDUCATION/TRAINING PROGRAM

## 2023-01-01 PROCEDURE — 0T180ZC BYPASS BILATERAL URETERS TO ILEOCUTANEOUS, OPEN APPROACH: ICD-10-PCS | Performed by: UROLOGY

## 2023-01-01 PROCEDURE — 93010 ELECTROCARDIOGRAM REPORT: CPT | Performed by: INTERNAL MEDICINE

## 2023-01-01 PROCEDURE — 160036 HCHG PACU - EA ADDL 30 MINS PHASE I: Performed by: UROLOGY

## 2023-01-01 PROCEDURE — 302098 PASTE RING (FLAT): Performed by: STUDENT IN AN ORGANIZED HEALTH CARE EDUCATION/TRAINING PROGRAM

## 2023-01-01 PROCEDURE — 85379 FIBRIN DEGRADATION QUANT: CPT

## 2023-01-01 PROCEDURE — 83605 ASSAY OF LACTIC ACID: CPT

## 2023-01-01 PROCEDURE — 96413 CHEMO IV INFUSION 1 HR: CPT

## 2023-01-01 PROCEDURE — 86140 C-REACTIVE PROTEIN: CPT

## 2023-01-01 PROCEDURE — 160028 HCHG SURGERY MINUTES - 1ST 30 MINS LEVEL 3: Performed by: UROLOGY

## 2023-01-01 PROCEDURE — 160046 HCHG PACU - 1ST 60 MINS PHASE II: Performed by: UROLOGY

## 2023-01-01 PROCEDURE — 160042 HCHG SURGERY MINUTES - EA ADDL 1 MIN LEVEL 5: Performed by: UROLOGY

## 2023-01-01 PROCEDURE — 83880 ASSAY OF NATRIURETIC PEPTIDE: CPT

## 2023-01-01 PROCEDURE — 97162 PT EVAL MOD COMPLEX 30 MIN: CPT

## 2023-01-01 PROCEDURE — 82271 OCCULT BLOOD OTHER SOURCES: CPT

## 2023-01-01 PROCEDURE — 81001 URINALYSIS AUTO W/SCOPE: CPT

## 2023-01-01 PROCEDURE — 700117 HCHG RX CONTRAST REV CODE 255: Performed by: STUDENT IN AN ORGANIZED HEALTH CARE EDUCATION/TRAINING PROGRAM

## 2023-01-01 PROCEDURE — 88307 TISSUE EXAM BY PATHOLOGIST: CPT

## 2023-01-01 PROCEDURE — 88309 TISSUE EXAM BY PATHOLOGIST: CPT

## 2023-01-01 PROCEDURE — 99497 ADVNCD CARE PLAN 30 MIN: CPT | Performed by: STUDENT IN AN ORGANIZED HEALTH CARE EDUCATION/TRAINING PROGRAM

## 2023-01-01 PROCEDURE — 770001 HCHG ROOM/CARE - MED/SURG/GYN PRIV*

## 2023-01-01 PROCEDURE — 84439 ASSAY OF FREE THYROXINE: CPT

## 2023-01-01 PROCEDURE — 87086 URINE CULTURE/COLONY COUNT: CPT

## 2023-01-01 PROCEDURE — 82803 BLOOD GASES ANY COMBINATION: CPT

## 2023-01-01 PROCEDURE — 302098 PASTE RING (FLAT): Performed by: UROLOGY

## 2023-01-01 PROCEDURE — 71260 CT THORAX DX C+: CPT

## 2023-01-01 PROCEDURE — 700111 HCHG RX REV CODE 636 W/ 250 OVERRIDE (IP): Mod: JZ | Performed by: GENERAL PRACTICE

## 2023-01-01 PROCEDURE — 86850 RBC ANTIBODY SCREEN: CPT

## 2023-01-01 PROCEDURE — 700101 HCHG RX REV CODE 250: Performed by: ANESTHESIOLOGY

## 2023-01-01 PROCEDURE — 93005 ELECTROCARDIOGRAM TRACING: CPT

## 2023-01-01 PROCEDURE — 87040 BLOOD CULTURE FOR BACTERIA: CPT | Mod: 91

## 2023-01-01 PROCEDURE — 95910 NRV CNDJ TEST 7-8 STUDIES: CPT | Mod: 26 | Performed by: SPECIALIST

## 2023-01-01 PROCEDURE — 502714 HCHG ROBOTIC SURGERY SERVICES: Performed by: UROLOGY

## 2023-01-01 PROCEDURE — 97602 WOUND(S) CARE NON-SELECTIVE: CPT

## 2023-01-01 PROCEDURE — 86900 BLOOD TYPING SEROLOGIC ABO: CPT

## 2023-01-01 PROCEDURE — 84550 ASSAY OF BLOOD/URIC ACID: CPT

## 2023-01-01 PROCEDURE — 97535 SELF CARE MNGMENT TRAINING: CPT

## 2023-01-01 PROCEDURE — 0DT80ZZ RESECTION OF SMALL INTESTINE, OPEN APPROACH: ICD-10-PCS | Performed by: UROLOGY

## 2023-01-01 PROCEDURE — 88307 TISSUE EXAM BY PATHOLOGIST: CPT | Mod: 59

## 2023-01-01 PROCEDURE — 110371 HCHG SHELL REV 272: Performed by: UROLOGY

## 2023-01-01 PROCEDURE — 93005 ELECTROCARDIOGRAM TRACING: CPT | Performed by: STUDENT IN AN ORGANIZED HEALTH CARE EDUCATION/TRAINING PROGRAM

## 2023-01-01 PROCEDURE — 302111 WAFER OST 2.25IN N IMG RD 2 PC (BARRIER): Performed by: STUDENT IN AN ORGANIZED HEALTH CARE EDUCATION/TRAINING PROGRAM

## 2023-01-01 PROCEDURE — 700111 HCHG RX REV CODE 636 W/ 250 OVERRIDE (IP): Mod: JZ | Performed by: STUDENT IN AN ORGANIZED HEALTH CARE EDUCATION/TRAINING PROGRAM

## 2023-01-01 PROCEDURE — 73130 X-RAY EXAM OF HAND: CPT | Mod: RT

## 2023-01-01 PROCEDURE — 74018 RADEX ABDOMEN 1 VIEW: CPT

## 2023-01-01 PROCEDURE — A9270 NON-COVERED ITEM OR SERVICE: HCPCS | Performed by: UROLOGY

## 2023-01-01 PROCEDURE — C2617 STENT, NON-COR, TEM W/O DEL: HCPCS | Performed by: UROLOGY

## 2023-01-01 PROCEDURE — 700105 HCHG RX REV CODE 258: Performed by: ANESTHESIOLOGY

## 2023-01-01 PROCEDURE — 73030 X-RAY EXAM OF SHOULDER: CPT | Mod: RT

## 2023-01-01 PROCEDURE — 99214 OFFICE O/P EST MOD 30 MIN: CPT | Performed by: INTERNAL MEDICINE

## 2023-01-01 PROCEDURE — 83735 ASSAY OF MAGNESIUM: CPT

## 2023-01-01 PROCEDURE — 86901 BLOOD TYPING SEROLOGIC RH(D): CPT

## 2023-01-01 PROCEDURE — 94760 N-INVAS EAR/PLS OXIMETRY 1: CPT

## 2023-01-01 PROCEDURE — 85610 PROTHROMBIN TIME: CPT

## 2023-01-01 PROCEDURE — 700101 HCHG RX REV CODE 250: Performed by: UROLOGY

## 2023-01-01 PROCEDURE — 71275 CT ANGIOGRAPHY CHEST: CPT

## 2023-01-01 PROCEDURE — 160009 HCHG ANES TIME/MIN: Performed by: UROLOGY

## 2023-01-01 PROCEDURE — 302111 WAFER OST 2.25IN N IMG RD 2 PC (BARRIER): Performed by: UROLOGY

## 2023-01-01 PROCEDURE — 31720 CLEARANCE OF AIRWAYS: CPT

## 2023-01-01 PROCEDURE — 95886 MUSC TEST DONE W/N TEST COMP: CPT | Performed by: SPECIALIST

## 2023-01-01 PROCEDURE — 00865 ANES XTRPRT LWR ABD PRST8ECT: CPT | Performed by: STUDENT IN AN ORGANIZED HEALTH CARE EDUCATION/TRAINING PROGRAM

## 2023-01-01 PROCEDURE — 88305 TISSUE EXAM BY PATHOLOGIST: CPT | Mod: 59

## 2023-01-01 PROCEDURE — 700105 HCHG RX REV CODE 258: Performed by: UROLOGY

## 2023-01-01 PROCEDURE — 160031 HCHG SURGERY MINUTES - 1ST 30 MINS LEVEL 5: Performed by: UROLOGY

## 2023-01-01 PROCEDURE — 160025 RECOVERY II MINUTES (STATS): Performed by: UROLOGY

## 2023-01-01 PROCEDURE — 99100 ANES PT EXTEME AGE<1 YR&>70: CPT | Performed by: ANESTHESIOLOGY

## 2023-01-01 PROCEDURE — 73130 X-RAY EXAM OF HAND: CPT | Mod: LT

## 2023-01-01 PROCEDURE — 95910 NRV CNDJ TEST 7-8 STUDIES: CPT | Performed by: SPECIALIST

## 2023-01-01 DEVICE — STENT UROLOGICAL POLARIS 6X28  ULTRA: Type: IMPLANTABLE DEVICE | Site: URETER | Status: FUNCTIONAL

## 2023-01-01 RX ORDER — EPINEPHRINE 1 MG/ML(1)
0.5 AMPUL (ML) INJECTION PRN
Status: CANCELLED | OUTPATIENT
Start: 2023-01-01

## 2023-01-01 RX ORDER — HEPARIN SODIUM (PORCINE) LOCK FLUSH IV SOLN 100 UNIT/ML 100 UNIT/ML
500 SOLUTION INTRAVENOUS PRN
Status: CANCELLED | OUTPATIENT
Start: 2023-01-01

## 2023-01-01 RX ORDER — SODIUM CHLORIDE 9 MG/ML
INJECTION, SOLUTION INTRAVENOUS CONTINUOUS
Status: DISCONTINUED | OUTPATIENT
Start: 2023-01-01 | End: 2023-01-01

## 2023-01-01 RX ORDER — DIPHENHYDRAMINE HYDROCHLORIDE 50 MG/ML
50 INJECTION INTRAMUSCULAR; INTRAVENOUS PRN
Status: CANCELLED | OUTPATIENT
Start: 2023-01-01

## 2023-01-01 RX ORDER — HYDROMORPHONE HYDROCHLORIDE 1 MG/ML
0.4 INJECTION, SOLUTION INTRAMUSCULAR; INTRAVENOUS; SUBCUTANEOUS
Status: DISCONTINUED | OUTPATIENT
Start: 2023-01-01 | End: 2023-01-01 | Stop reason: HOSPADM

## 2023-01-01 RX ORDER — METHOCARBAMOL 750 MG/1
TABLET, FILM COATED ORAL
Status: ON HOLD | COMMUNITY
End: 2023-01-01

## 2023-01-01 RX ORDER — MELOXICAM 15 MG/1
TABLET ORAL
Qty: 100 TABLET | Refills: 0 | Status: SHIPPED | OUTPATIENT
Start: 2023-01-01 | End: 2023-08-01 | Stop reason: CLARIF

## 2023-01-01 RX ORDER — PRAVASTATIN SODIUM 40 MG
40 TABLET ORAL DAILY
Qty: 100 TABLET | Refills: 0 | Status: SHIPPED | OUTPATIENT
Start: 2023-01-01 | End: 2023-01-01

## 2023-01-01 RX ORDER — METHYLPREDNISOLONE SODIUM SUCCINATE 125 MG/2ML
125 INJECTION, POWDER, LYOPHILIZED, FOR SOLUTION INTRAMUSCULAR; INTRAVENOUS PRN
Status: CANCELLED | OUTPATIENT
Start: 2023-01-01

## 2023-01-01 RX ORDER — 0.9 % SODIUM CHLORIDE 0.9 %
VIAL (ML) INJECTION PRN
Status: CANCELLED | OUTPATIENT
Start: 2023-01-01

## 2023-01-01 RX ORDER — AMOXICILLIN 250 MG
2 CAPSULE ORAL 2 TIMES DAILY
Status: DISCONTINUED | OUTPATIENT
Start: 2023-01-01 | End: 2023-01-01 | Stop reason: HOSPADM

## 2023-01-01 RX ORDER — FUROSEMIDE 10 MG/ML
40 INJECTION INTRAMUSCULAR; INTRAVENOUS ONCE
Status: COMPLETED | OUTPATIENT
Start: 2023-01-01 | End: 2023-01-01

## 2023-01-01 RX ORDER — TROSPIUM CHLORIDE 20 MG/1
20 TABLET, FILM COATED ORAL EVERY MORNING
Status: DISCONTINUED | OUTPATIENT
Start: 2023-01-01 | End: 2023-01-01

## 2023-01-01 RX ORDER — OXYCODONE HCL 5 MG/5 ML
5 SOLUTION, ORAL ORAL
Status: COMPLETED | OUTPATIENT
Start: 2023-01-01 | End: 2023-01-01

## 2023-01-01 RX ORDER — HYDROMORPHONE HYDROCHLORIDE 1 MG/ML
0.2 INJECTION, SOLUTION INTRAMUSCULAR; INTRAVENOUS; SUBCUTANEOUS
Status: DISCONTINUED | OUTPATIENT
Start: 2023-01-01 | End: 2023-01-01 | Stop reason: HOSPADM

## 2023-01-01 RX ORDER — GABAPENTIN 100 MG/1
100 CAPSULE ORAL NIGHTLY PRN
Qty: 90 CAPSULE | Refills: 11 | Status: SHIPPED | OUTPATIENT
Start: 2023-01-01 | End: 2023-08-01 | Stop reason: CLARIF

## 2023-01-01 RX ORDER — EPHEDRINE SULFATE 50 MG/ML
5 INJECTION, SOLUTION INTRAVENOUS
Status: DISCONTINUED | OUTPATIENT
Start: 2023-01-01 | End: 2023-01-01 | Stop reason: HOSPADM

## 2023-01-01 RX ORDER — OXYCODONE HYDROCHLORIDE 5 MG/1
5 TABLET ORAL
Status: DISCONTINUED | OUTPATIENT
Start: 2023-01-01 | End: 2023-01-01

## 2023-01-01 RX ORDER — IPRATROPIUM BROMIDE AND ALBUTEROL SULFATE 2.5; .5 MG/3ML; MG/3ML
3 SOLUTION RESPIRATORY (INHALATION)
Status: DISCONTINUED | OUTPATIENT
Start: 2023-01-01 | End: 2023-01-01 | Stop reason: HOSPADM

## 2023-01-01 RX ORDER — ALBUTEROL SULFATE 90 UG/1
1-2 AEROSOL, METERED RESPIRATORY (INHALATION) EVERY 6 HOURS PRN
Qty: 1 EACH | Refills: 6 | Status: ON HOLD | OUTPATIENT
Start: 2023-01-01 | End: 2023-01-01

## 2023-01-01 RX ORDER — MIDAZOLAM HYDROCHLORIDE 1 MG/ML
INJECTION INTRAMUSCULAR; INTRAVENOUS PRN
Status: DISCONTINUED | OUTPATIENT
Start: 2023-01-01 | End: 2023-01-01 | Stop reason: SURG

## 2023-01-01 RX ORDER — BUPIVACAINE HYDROCHLORIDE AND EPINEPHRINE 5; 5 MG/ML; UG/ML
INJECTION, SOLUTION EPIDURAL; INTRACAUDAL; PERINEURAL
Status: DISCONTINUED | OUTPATIENT
Start: 2023-01-01 | End: 2023-01-01 | Stop reason: HOSPADM

## 2023-01-01 RX ORDER — SODIUM CHLORIDE, SODIUM LACTATE, POTASSIUM CHLORIDE, CALCIUM CHLORIDE 600; 310; 30; 20 MG/100ML; MG/100ML; MG/100ML; MG/100ML
INJECTION, SOLUTION INTRAVENOUS CONTINUOUS
Status: DISCONTINUED | OUTPATIENT
Start: 2023-01-01 | End: 2023-01-01

## 2023-01-01 RX ORDER — HEPARIN SODIUM,PORCINE 1000/ML
VIAL (ML) INJECTION PRN
Status: DISCONTINUED | OUTPATIENT
Start: 2023-01-01 | End: 2023-01-01 | Stop reason: SURG

## 2023-01-01 RX ORDER — ENOXAPARIN SODIUM 100 MG/ML
40 INJECTION SUBCUTANEOUS DAILY
Status: DISCONTINUED | OUTPATIENT
Start: 2023-01-01 | End: 2023-01-01 | Stop reason: HOSPADM

## 2023-01-01 RX ORDER — ONDANSETRON 2 MG/ML
4 INJECTION INTRAMUSCULAR; INTRAVENOUS PRN
Status: CANCELLED | OUTPATIENT
Start: 2023-01-01

## 2023-01-01 RX ORDER — TROSPIUM CHLORIDE 20 MG/1
20 TABLET, FILM COATED ORAL EVERY MORNING
COMMUNITY

## 2023-01-01 RX ORDER — HYDROMORPHONE HYDROCHLORIDE 1 MG/ML
0.1 INJECTION, SOLUTION INTRAMUSCULAR; INTRAVENOUS; SUBCUTANEOUS
Status: DISCONTINUED | OUTPATIENT
Start: 2023-01-01 | End: 2023-01-01 | Stop reason: HOSPADM

## 2023-01-01 RX ORDER — ONDANSETRON 2 MG/ML
4 INJECTION INTRAMUSCULAR; INTRAVENOUS
Status: DISCONTINUED | OUTPATIENT
Start: 2023-01-01 | End: 2023-01-01 | Stop reason: HOSPADM

## 2023-01-01 RX ORDER — PHENAZOPYRIDINE HYDROCHLORIDE 100 MG/1
200 TABLET, FILM COATED ORAL
Status: DISCONTINUED | OUTPATIENT
Start: 2023-01-01 | End: 2023-01-01 | Stop reason: HOSPADM

## 2023-01-01 RX ORDER — OXYBUTYNIN CHLORIDE 5 MG/1
5 TABLET ORAL 2 TIMES DAILY
Status: DISCONTINUED | OUTPATIENT
Start: 2023-01-01 | End: 2023-01-01

## 2023-01-01 RX ORDER — SODIUM CHLORIDE, SODIUM LACTATE, POTASSIUM CHLORIDE, CALCIUM CHLORIDE 600; 310; 30; 20 MG/100ML; MG/100ML; MG/100ML; MG/100ML
INJECTION, SOLUTION INTRAVENOUS CONTINUOUS
Status: ACTIVE | OUTPATIENT
Start: 2023-01-01 | End: 2023-01-01

## 2023-01-01 RX ORDER — 0.9 % SODIUM CHLORIDE 0.9 %
10 VIAL (ML) INJECTION PRN
Status: CANCELLED | OUTPATIENT
Start: 2023-01-01

## 2023-01-01 RX ORDER — DIPHENHYDRAMINE HYDROCHLORIDE 50 MG/ML
12.5 INJECTION INTRAMUSCULAR; INTRAVENOUS
Status: DISCONTINUED | OUTPATIENT
Start: 2023-01-01 | End: 2023-01-01 | Stop reason: HOSPADM

## 2023-01-01 RX ORDER — SODIUM CHLORIDE 9 MG/ML
INJECTION, SOLUTION INTRAVENOUS CONTINUOUS
Status: CANCELLED | OUTPATIENT
Start: 2023-01-01

## 2023-01-01 RX ORDER — POLYETHYLENE GLYCOL 3350 17 G/17G
1 POWDER, FOR SOLUTION ORAL EVERY EVENING
Status: DISCONTINUED | OUTPATIENT
Start: 2023-01-01 | End: 2023-01-01 | Stop reason: HOSPADM

## 2023-01-01 RX ORDER — ACETAMINOPHEN 325 MG/1
650 TABLET ORAL EVERY 6 HOURS PRN
Status: DISCONTINUED | OUTPATIENT
Start: 2023-01-01 | End: 2023-01-01

## 2023-01-01 RX ORDER — CEFOTETAN DISODIUM 2 G/20ML
INJECTION, POWDER, FOR SOLUTION INTRAMUSCULAR; INTRAVENOUS PRN
Status: DISCONTINUED | OUTPATIENT
Start: 2023-01-01 | End: 2023-01-01 | Stop reason: SURG

## 2023-01-01 RX ORDER — HYDRALAZINE HYDROCHLORIDE 20 MG/ML
10 INJECTION INTRAMUSCULAR; INTRAVENOUS EVERY 4 HOURS PRN
Status: DISCONTINUED | OUTPATIENT
Start: 2023-01-01 | End: 2023-01-01 | Stop reason: HOSPADM

## 2023-01-01 RX ORDER — 0.9 % SODIUM CHLORIDE 0.9 %
3 VIAL (ML) INJECTION PRN
Status: CANCELLED | OUTPATIENT
Start: 2023-01-01

## 2023-01-01 RX ORDER — GABAPENTIN 100 MG/1
100 CAPSULE ORAL EVERY EVENING
Status: DISCONTINUED | OUTPATIENT
Start: 2023-01-01 | End: 2023-01-01 | Stop reason: HOSPADM

## 2023-01-01 RX ORDER — ONDANSETRON 4 MG/1
4 TABLET, ORALLY DISINTEGRATING ORAL EVERY 4 HOURS PRN
Status: DISCONTINUED | OUTPATIENT
Start: 2023-01-01 | End: 2023-01-01 | Stop reason: HOSPADM

## 2023-01-01 RX ORDER — BISACODYL 10 MG
10 SUPPOSITORY, RECTAL RECTAL
Status: DISCONTINUED | OUTPATIENT
Start: 2023-01-01 | End: 2023-01-01 | Stop reason: HOSPADM

## 2023-01-01 RX ORDER — ROCURONIUM BROMIDE 10 MG/ML
INJECTION, SOLUTION INTRAVENOUS PRN
Status: DISCONTINUED | OUTPATIENT
Start: 2023-01-01 | End: 2023-01-01 | Stop reason: SURG

## 2023-01-01 RX ORDER — LIDOCAINE HYDROCHLORIDE AND EPINEPHRINE BITARTRATE 20; .01 MG/ML; MG/ML
INJECTION, SOLUTION SUBCUTANEOUS
Status: DISCONTINUED
Start: 2023-01-01 | End: 2023-01-01 | Stop reason: HOSPADM

## 2023-01-01 RX ORDER — ACETAMINOPHEN 500 MG
1000 TABLET ORAL EVERY 6 HOURS
Status: DISCONTINUED | OUTPATIENT
Start: 2023-01-01 | End: 2023-01-01 | Stop reason: HOSPADM

## 2023-01-01 RX ORDER — PRAVASTATIN SODIUM 40 MG
40 TABLET ORAL DAILY
Qty: 100 TABLET | Refills: 0 | Status: SHIPPED | OUTPATIENT
Start: 2023-01-01 | End: 2023-08-01 | Stop reason: CLARIF

## 2023-01-01 RX ORDER — ACETAMINOPHEN 500 MG
1000 TABLET ORAL EVERY 6 HOURS PRN
Status: DISCONTINUED | OUTPATIENT
Start: 2023-08-01 | End: 2023-01-01 | Stop reason: HOSPADM

## 2023-01-01 RX ORDER — PROCHLORPERAZINE MALEATE 10 MG
10 TABLET ORAL EVERY 6 HOURS PRN
Status: CANCELLED | OUTPATIENT
Start: 2023-01-01

## 2023-01-01 RX ORDER — HALOPERIDOL 5 MG/ML
1 INJECTION INTRAMUSCULAR
Status: DISCONTINUED | OUTPATIENT
Start: 2023-01-01 | End: 2023-01-01 | Stop reason: HOSPADM

## 2023-01-01 RX ORDER — ONDANSETRON 2 MG/ML
INJECTION INTRAMUSCULAR; INTRAVENOUS PRN
Status: DISCONTINUED | OUTPATIENT
Start: 2023-01-01 | End: 2023-01-01 | Stop reason: SURG

## 2023-01-01 RX ORDER — SODIUM CHLORIDE, SODIUM LACTATE, POTASSIUM CHLORIDE, AND CALCIUM CHLORIDE .6; .31; .03; .02 G/100ML; G/100ML; G/100ML; G/100ML
1000 INJECTION, SOLUTION INTRAVENOUS ONCE
Status: COMPLETED | OUTPATIENT
Start: 2023-01-01 | End: 2023-01-01

## 2023-01-01 RX ORDER — EPINEPHRINE 0.1 MG/ML
SYRINGE (ML) INJECTION
Status: COMPLETED | OUTPATIENT
Start: 2023-01-01 | End: 2023-01-01

## 2023-01-01 RX ORDER — ONDANSETRON 8 MG/1
8 TABLET, ORALLY DISINTEGRATING ORAL PRN
Status: CANCELLED | OUTPATIENT
Start: 2023-01-01

## 2023-01-01 RX ORDER — SODIUM CHLORIDE, SODIUM LACTATE, POTASSIUM CHLORIDE, CALCIUM CHLORIDE 600; 310; 30; 20 MG/100ML; MG/100ML; MG/100ML; MG/100ML
INJECTION, SOLUTION INTRAVENOUS
Status: DISCONTINUED | OUTPATIENT
Start: 2023-01-01 | End: 2023-01-01 | Stop reason: SURG

## 2023-01-01 RX ORDER — HEPARIN SODIUM (PORCINE) LOCK FLUSH IV SOLN 100 UNIT/ML 100 UNIT/ML
500 SOLUTION INTRAVENOUS PRN
Status: DISCONTINUED | OUTPATIENT
Start: 2023-01-01 | End: 2023-01-01 | Stop reason: HOSPADM

## 2023-01-01 RX ORDER — LIDOCAINE HYDROCHLORIDE 20 MG/ML
INJECTION, SOLUTION EPIDURAL; INFILTRATION; INTRACAUDAL; PERINEURAL PRN
Status: DISCONTINUED | OUTPATIENT
Start: 2023-01-01 | End: 2023-01-01 | Stop reason: SURG

## 2023-01-01 RX ORDER — LIDOCAINE HYDROCHLORIDE 40 MG/ML
SOLUTION TOPICAL PRN
Status: DISCONTINUED | OUTPATIENT
Start: 2023-01-01 | End: 2023-01-01 | Stop reason: SURG

## 2023-01-01 RX ORDER — HEPARIN SODIUM (PORCINE) LOCK FLUSH IV SOLN 100 UNIT/ML 100 UNIT/ML
SOLUTION INTRAVENOUS
Status: COMPLETED
Start: 2023-01-01 | End: 2023-01-01

## 2023-01-01 RX ORDER — ONDANSETRON 2 MG/ML
4 INJECTION INTRAMUSCULAR; INTRAVENOUS EVERY 4 HOURS PRN
Status: DISCONTINUED | OUTPATIENT
Start: 2023-01-01 | End: 2023-01-01 | Stop reason: HOSPADM

## 2023-01-01 RX ORDER — MELOXICAM 15 MG/1
TABLET ORAL
Qty: 100 TABLET | Refills: 0 | Status: SHIPPED | OUTPATIENT
Start: 2023-01-01 | End: 2023-01-01

## 2023-01-01 RX ORDER — HYDRALAZINE HYDROCHLORIDE 20 MG/ML
5 INJECTION INTRAMUSCULAR; INTRAVENOUS
Status: DISCONTINUED | OUTPATIENT
Start: 2023-01-01 | End: 2023-01-01 | Stop reason: HOSPADM

## 2023-01-01 RX ORDER — DEXMEDETOMIDINE HYDROCHLORIDE 100 UG/ML
INJECTION, SOLUTION INTRAVENOUS PRN
Status: DISCONTINUED | OUTPATIENT
Start: 2023-01-01 | End: 2023-01-01 | Stop reason: SURG

## 2023-01-01 RX ORDER — TROSPIUM CHLORIDE 20 MG/1
20 TABLET, FILM COATED ORAL
COMMUNITY
Start: 2023-01-01 | End: 2023-01-01

## 2023-01-01 RX ORDER — CEFAZOLIN SODIUM 1 G/3ML
INJECTION, POWDER, FOR SOLUTION INTRAMUSCULAR; INTRAVENOUS PRN
Status: DISCONTINUED | OUTPATIENT
Start: 2023-01-01 | End: 2023-01-01 | Stop reason: SURG

## 2023-01-01 RX ORDER — HYDROMORPHONE HYDROCHLORIDE 1 MG/ML
0.5 INJECTION, SOLUTION INTRAMUSCULAR; INTRAVENOUS; SUBCUTANEOUS
Status: DISCONTINUED | OUTPATIENT
Start: 2023-01-01 | End: 2023-01-01 | Stop reason: HOSPADM

## 2023-01-01 RX ORDER — PREDNISONE 20 MG/1
40 TABLET ORAL DAILY
Status: DISCONTINUED | OUTPATIENT
Start: 2023-01-01 | End: 2023-01-01

## 2023-01-01 RX ORDER — HEPARIN SODIUM 5000 [USP'U]/ML
5000 INJECTION, SOLUTION INTRAVENOUS; SUBCUTANEOUS EVERY 8 HOURS
Status: DISCONTINUED | OUTPATIENT
Start: 2023-01-01 | End: 2023-01-01

## 2023-01-01 RX ORDER — LABETALOL HYDROCHLORIDE 5 MG/ML
5 INJECTION, SOLUTION INTRAVENOUS
Status: DISCONTINUED | OUTPATIENT
Start: 2023-01-01 | End: 2023-01-01 | Stop reason: HOSPADM

## 2023-01-01 RX ORDER — SODIUM CHLORIDE 9 MG/ML
500 INJECTION, SOLUTION INTRAVENOUS
Status: CANCELLED | OUTPATIENT
Start: 2023-01-01 | End: 2023-01-01

## 2023-01-01 RX ORDER — MIDAZOLAM HYDROCHLORIDE 1 MG/ML
.5-2 INJECTION INTRAMUSCULAR; INTRAVENOUS PRN
Status: CANCELLED | OUTPATIENT
Start: 2023-01-01 | End: 2023-01-01

## 2023-01-01 RX ORDER — ACETAMINOPHEN 500 MG
1000 TABLET ORAL ONCE
Status: COMPLETED | OUTPATIENT
Start: 2023-01-01 | End: 2023-01-01

## 2023-01-01 RX ORDER — ATENOLOL 50 MG/1
TABLET ORAL
Qty: 100 TABLET | Refills: 0 | Status: SHIPPED | OUTPATIENT
Start: 2023-01-01 | End: 2023-01-01

## 2023-01-01 RX ORDER — PHENYLEPHRINE HCL IN 0.9% NACL 0.5 MG/5ML
SYRINGE (ML) INTRAVENOUS PRN
Status: DISCONTINUED | OUTPATIENT
Start: 2023-01-01 | End: 2023-01-01 | Stop reason: SURG

## 2023-01-01 RX ORDER — PREDNISONE 10 MG/1
TABLET ORAL
COMMUNITY
End: 2023-01-01

## 2023-01-01 RX ORDER — OXYCODONE HYDROCHLORIDE 5 MG/1
2.5 TABLET ORAL
Status: DISCONTINUED | OUTPATIENT
Start: 2023-01-01 | End: 2023-01-01

## 2023-01-01 RX ORDER — HYDROMORPHONE HYDROCHLORIDE 2 MG/ML
INJECTION, SOLUTION INTRAMUSCULAR; INTRAVENOUS; SUBCUTANEOUS PRN
Status: DISCONTINUED | OUTPATIENT
Start: 2023-01-01 | End: 2023-01-01 | Stop reason: SURG

## 2023-01-01 RX ORDER — TRIAMCINOLONE ACETONIDE 1 MG/G
CREAM TOPICAL
Status: ON HOLD | COMMUNITY
Start: 2023-01-01 | End: 2023-01-01

## 2023-01-01 RX ORDER — OXYCODONE HCL 5 MG/5 ML
10 SOLUTION, ORAL ORAL
Status: DISCONTINUED | OUTPATIENT
Start: 2023-01-01 | End: 2023-01-01 | Stop reason: HOSPADM

## 2023-01-01 RX ORDER — LABETALOL HYDROCHLORIDE 5 MG/ML
INJECTION, SOLUTION INTRAVENOUS PRN
Status: DISCONTINUED | OUTPATIENT
Start: 2023-01-01 | End: 2023-01-01 | Stop reason: SURG

## 2023-01-01 RX ORDER — METHYLPREDNISOLONE SODIUM SUCCINATE 125 MG/2ML
40 INJECTION, POWDER, LYOPHILIZED, FOR SOLUTION INTRAMUSCULAR; INTRAVENOUS EVERY 12 HOURS
Status: DISCONTINUED | OUTPATIENT
Start: 2023-01-01 | End: 2023-01-01 | Stop reason: HOSPADM

## 2023-01-01 RX ORDER — HYDROMORPHONE HYDROCHLORIDE 1 MG/ML
0.25 INJECTION, SOLUTION INTRAMUSCULAR; INTRAVENOUS; SUBCUTANEOUS
Status: DISCONTINUED | OUTPATIENT
Start: 2023-01-01 | End: 2023-01-01

## 2023-01-01 RX ORDER — PRAVASTATIN SODIUM 20 MG
40 TABLET ORAL
Status: DISCONTINUED | OUTPATIENT
Start: 2023-01-01 | End: 2023-01-01 | Stop reason: HOSPADM

## 2023-01-01 RX ORDER — ACETAMINOPHEN 500 MG
TABLET ORAL
Status: COMPLETED
Start: 2023-01-01 | End: 2023-01-01

## 2023-01-01 RX ORDER — TAMSULOSIN HYDROCHLORIDE 0.4 MG/1
0.4 CAPSULE ORAL
Status: DISCONTINUED | OUTPATIENT
Start: 2023-01-01 | End: 2023-01-01 | Stop reason: HOSPADM

## 2023-01-01 RX ORDER — KETOROLAC TROMETHAMINE 30 MG/ML
15 INJECTION, SOLUTION INTRAMUSCULAR; INTRAVENOUS EVERY 6 HOURS
Status: DISPENSED | OUTPATIENT
Start: 2023-01-01 | End: 2023-01-01

## 2023-01-01 RX ORDER — CALCIUM CHLORIDE 100 MG/ML
INJECTION INTRAVENOUS; INTRAVENTRICULAR
Status: COMPLETED | OUTPATIENT
Start: 2023-01-01 | End: 2023-01-01

## 2023-01-01 RX ORDER — OXYCODONE HCL 5 MG/5 ML
5 SOLUTION, ORAL ORAL
Status: DISCONTINUED | OUTPATIENT
Start: 2023-01-01 | End: 2023-01-01 | Stop reason: HOSPADM

## 2023-01-01 RX ORDER — POLYETHYLENE GLYCOL 3350 17 G/17G
1 POWDER, FOR SOLUTION ORAL
Status: DISCONTINUED | OUTPATIENT
Start: 2023-01-01 | End: 2023-01-01 | Stop reason: HOSPADM

## 2023-01-01 RX ORDER — OXYCODONE HCL 5 MG/5 ML
10 SOLUTION, ORAL ORAL
Status: COMPLETED | OUTPATIENT
Start: 2023-01-01 | End: 2023-01-01

## 2023-01-01 RX ORDER — IBUPROFEN 800 MG/1
800 TABLET ORAL 3 TIMES DAILY PRN
Status: DISCONTINUED | OUTPATIENT
Start: 2023-01-01 | End: 2023-01-01 | Stop reason: HOSPADM

## 2023-01-01 RX ORDER — ONDANSETRON 2 MG/ML
4 INJECTION INTRAMUSCULAR; INTRAVENOUS PRN
Status: CANCELLED | OUTPATIENT
Start: 2023-01-01 | End: 2023-01-01

## 2023-01-01 RX ORDER — DEXAMETHASONE SODIUM PHOSPHATE 4 MG/ML
INJECTION, SOLUTION INTRA-ARTICULAR; INTRALESIONAL; INTRAMUSCULAR; INTRAVENOUS; SOFT TISSUE PRN
Status: DISCONTINUED | OUTPATIENT
Start: 2023-01-01 | End: 2023-01-01 | Stop reason: SURG

## 2023-01-01 RX ORDER — ATENOLOL 50 MG/1
50 TABLET ORAL
Qty: 100 TABLET | Refills: 2 | Status: ON HOLD | OUTPATIENT
Start: 2023-01-01 | End: 2023-01-01

## 2023-01-01 RX ORDER — ATENOLOL 50 MG/1
TABLET ORAL
Qty: 100 TABLET | Refills: 2 | Status: SHIPPED | OUTPATIENT
Start: 2023-01-01 | End: 2023-01-01 | Stop reason: SDUPTHER

## 2023-01-01 RX ORDER — SODIUM CHLORIDE, SODIUM LACTATE, POTASSIUM CHLORIDE, CALCIUM CHLORIDE 600; 310; 30; 20 MG/100ML; MG/100ML; MG/100ML; MG/100ML
INJECTION, SOLUTION INTRAVENOUS CONTINUOUS
Status: DISCONTINUED | OUTPATIENT
Start: 2023-01-01 | End: 2023-01-01 | Stop reason: HOSPADM

## 2023-01-01 RX ORDER — ACETAMINOPHEN 325 MG/1
TABLET ORAL PRN
Status: DISCONTINUED | OUTPATIENT
Start: 2023-01-01 | End: 2023-01-01 | Stop reason: SURG

## 2023-01-01 RX ORDER — ONDANSETRON 2 MG/ML
4 INJECTION INTRAMUSCULAR; INTRAVENOUS EVERY 4 HOURS PRN
Status: DISCONTINUED | OUTPATIENT
Start: 2023-01-01 | End: 2023-01-01

## 2023-01-01 RX ADMIN — FENTANYL CITRATE 25 MCG: 50 INJECTION, SOLUTION INTRAMUSCULAR; INTRAVENOUS at 15:55

## 2023-01-01 RX ADMIN — PRAVASTATIN SODIUM 40 MG: 20 TABLET ORAL at 20:52

## 2023-01-01 RX ADMIN — SUGAMMADEX 200 MG: 100 INJECTION, SOLUTION INTRAVENOUS at 15:56

## 2023-01-01 RX ADMIN — HYDROMORPHONE HYDROCHLORIDE 0.6 MG: 2 INJECTION INTRAMUSCULAR; INTRAVENOUS; SUBCUTANEOUS at 09:01

## 2023-01-01 RX ADMIN — HYDROMORPHONE HYDROCHLORIDE 0.4 MG: 2 INJECTION INTRAMUSCULAR; INTRAVENOUS; SUBCUTANEOUS at 08:45

## 2023-01-01 RX ADMIN — DEXMEDETOMIDINE 10 MCG: 100 INJECTION, SOLUTION INTRAVENOUS at 12:15

## 2023-01-01 RX ADMIN — ONDANSETRON 4 MG: 2 INJECTION INTRAMUSCULAR; INTRAVENOUS at 22:12

## 2023-01-01 RX ADMIN — PRAVASTATIN SODIUM 40 MG: 20 TABLET ORAL at 21:35

## 2023-01-01 RX ADMIN — Medication 1 APPLICATOR: at 04:13

## 2023-01-01 RX ADMIN — KETOROLAC TROMETHAMINE 15 MG: 30 INJECTION, SOLUTION INTRAMUSCULAR; INTRAVENOUS at 17:43

## 2023-01-01 RX ADMIN — KETOROLAC TROMETHAMINE 15 MG: 30 INJECTION, SOLUTION INTRAMUSCULAR; INTRAVENOUS at 23:54

## 2023-01-01 RX ADMIN — TAMSULOSIN HYDROCHLORIDE 0.4 MG: 0.4 CAPSULE ORAL at 20:52

## 2023-01-01 RX ADMIN — ACETAMINOPHEN 1000 MG: 500 TABLET, FILM COATED ORAL at 23:54

## 2023-01-01 RX ADMIN — Medication 1 APPLICATOR: at 04:33

## 2023-01-01 RX ADMIN — LIDOCAINE HYDROCHLORIDE 100 MG: 20 INJECTION, SOLUTION EPIDURAL; INFILTRATION; INTRACAUDAL at 15:19

## 2023-01-01 RX ADMIN — PROPOFOL 150 MG: 10 INJECTION, EMULSION INTRAVENOUS at 08:20

## 2023-01-01 RX ADMIN — ROCURONIUM BROMIDE 60 MG: 50 INJECTION, SOLUTION INTRAVENOUS at 08:20

## 2023-01-01 RX ADMIN — ACETAMINOPHEN 1000 MG: 500 TABLET, FILM COATED ORAL at 05:00

## 2023-01-01 RX ADMIN — OXYCODONE HYDROCHLORIDE 5 MG: 5 SOLUTION ORAL at 15:35

## 2023-01-01 RX ADMIN — EPINEPHRINE 1 MG: 0.1 INJECTION, SOLUTION INTRAVENOUS at 12:17

## 2023-01-01 RX ADMIN — SENNOSIDES AND DOCUSATE SODIUM 2 TABLET: 50; 8.6 TABLET ORAL at 05:06

## 2023-01-01 RX ADMIN — ONDANSETRON 4 MG: 4 TABLET, ORALLY DISINTEGRATING ORAL at 03:13

## 2023-01-01 RX ADMIN — TAMSULOSIN HYDROCHLORIDE 0.4 MG: 0.4 CAPSULE ORAL at 19:57

## 2023-01-01 RX ADMIN — PRAVASTATIN SODIUM 40 MG: 20 TABLET ORAL at 20:26

## 2023-01-01 RX ADMIN — Medication 100 MCG: at 15:36

## 2023-01-01 RX ADMIN — Medication 1 APPLICATOR: at 05:06

## 2023-01-01 RX ADMIN — OXYBUTYNIN CHLORIDE 5 MG: 5 TABLET ORAL at 05:06

## 2023-01-01 RX ADMIN — CEFTRIAXONE SODIUM 1000 MG: 10 INJECTION, POWDER, FOR SOLUTION INTRAVENOUS at 18:51

## 2023-01-01 RX ADMIN — ONDANSETRON 4 MG: 2 INJECTION INTRAMUSCULAR; INTRAVENOUS at 20:01

## 2023-01-01 RX ADMIN — POLYETHYLENE GLYCOL 3350 1 PACKET: 17 POWDER, FOR SOLUTION ORAL at 18:22

## 2023-01-01 RX ADMIN — ACETAMINOPHEN 1000 MG: 500 TABLET, FILM COATED ORAL at 18:36

## 2023-01-01 RX ADMIN — PROPOFOL 120 MG: 10 INJECTION, EMULSION INTRAVENOUS at 15:19

## 2023-01-01 RX ADMIN — ENOXAPARIN SODIUM 40 MG: 100 INJECTION SUBCUTANEOUS at 18:51

## 2023-01-01 RX ADMIN — POLYETHYLENE GLYCOL 3350 1 PACKET: 17 POWDER, FOR SOLUTION ORAL at 18:51

## 2023-01-01 RX ADMIN — DEXMEDETOMIDINE 10 MCG: 100 INJECTION, SOLUTION INTRAVENOUS at 12:33

## 2023-01-01 RX ADMIN — METOPROLOL TARTRATE 25 MG: 25 TABLET, FILM COATED ORAL at 04:13

## 2023-01-01 RX ADMIN — KETOROLAC TROMETHAMINE 15 MG: 30 INJECTION, SOLUTION INTRAMUSCULAR; INTRAVENOUS at 11:54

## 2023-01-01 RX ADMIN — CEFTRIAXONE SODIUM 1000 MG: 10 INJECTION, POWDER, FOR SOLUTION INTRAVENOUS at 17:48

## 2023-01-01 RX ADMIN — POLYETHYLENE GLYCOL 3350 1 PACKET: 17 POWDER, FOR SOLUTION ORAL at 18:00

## 2023-01-01 RX ADMIN — KETOROLAC TROMETHAMINE 15 MG: 30 INJECTION, SOLUTION INTRAMUSCULAR; INTRAVENOUS at 05:02

## 2023-01-01 RX ADMIN — ACETAMINOPHEN 1000 MG: 325 TABLET ORAL at 08:05

## 2023-01-01 RX ADMIN — ROCURONIUM BROMIDE 40 MG: 50 INJECTION, SOLUTION INTRAVENOUS at 09:50

## 2023-01-01 RX ADMIN — HEPARIN SODIUM 5000 UNITS: 5000 INJECTION, SOLUTION INTRAVENOUS; SUBCUTANEOUS at 05:02

## 2023-01-01 RX ADMIN — IOHEXOL 25 ML: 240 INJECTION, SOLUTION INTRATHECAL; INTRAVASCULAR; INTRAVENOUS; ORAL at 15:25

## 2023-01-01 RX ADMIN — SODIUM CHLORIDE: 9 INJECTION, SOLUTION INTRAVENOUS at 15:30

## 2023-01-01 RX ADMIN — CEFOTETAN DISODIUM 2 G: 2 INJECTION, POWDER, FOR SOLUTION INTRAMUSCULAR; INTRAVENOUS at 08:33

## 2023-01-01 RX ADMIN — ACETAMINOPHEN 1000 MG: 500 TABLET, FILM COATED ORAL at 11:13

## 2023-01-01 RX ADMIN — SODIUM CHLORIDE, POTASSIUM CHLORIDE, SODIUM LACTATE AND CALCIUM CHLORIDE: 600; 310; 30; 20 INJECTION, SOLUTION INTRAVENOUS at 17:15

## 2023-01-01 RX ADMIN — SENNOSIDES AND DOCUSATE SODIUM 2 TABLET: 50; 8.6 TABLET ORAL at 05:00

## 2023-01-01 RX ADMIN — OXYBUTYNIN CHLORIDE 5 MG: 5 TABLET ORAL at 18:33

## 2023-01-01 RX ADMIN — PRAVASTATIN SODIUM 40 MG: 20 TABLET ORAL at 19:57

## 2023-01-01 RX ADMIN — GABAPENTIN 100 MG: 100 CAPSULE ORAL at 17:44

## 2023-01-01 RX ADMIN — METOPROLOL TARTRATE 25 MG: 25 TABLET, FILM COATED ORAL at 20:26

## 2023-01-01 RX ADMIN — SODIUM CHLORIDE, POTASSIUM CHLORIDE, SODIUM LACTATE AND CALCIUM CHLORIDE: 600; 310; 30; 20 INJECTION, SOLUTION INTRAVENOUS at 15:12

## 2023-01-01 RX ADMIN — SODIUM CHLORIDE, POTASSIUM CHLORIDE, SODIUM LACTATE AND CALCIUM CHLORIDE: 600; 310; 30; 20 INJECTION, SOLUTION INTRAVENOUS at 08:10

## 2023-01-01 RX ADMIN — ROCURONIUM BROMIDE 50 MG: 50 INJECTION, SOLUTION INTRAVENOUS at 15:19

## 2023-01-01 RX ADMIN — ACETAMINOPHEN 1000 MG: 500 TABLET, FILM COATED ORAL at 18:52

## 2023-01-01 RX ADMIN — FENTANYL CITRATE 25 MCG: 50 INJECTION, SOLUTION INTRAMUSCULAR; INTRAVENOUS at 15:35

## 2023-01-01 RX ADMIN — HEPARIN SODIUM 5000 UNITS: 5000 INJECTION, SOLUTION INTRAVENOUS; SUBCUTANEOUS at 21:04

## 2023-01-01 RX ADMIN — FENTANYL CITRATE 50 MCG: 50 INJECTION, SOLUTION INTRAMUSCULAR; INTRAVENOUS at 08:42

## 2023-01-01 RX ADMIN — SODIUM CHLORIDE: 9 INJECTION, SOLUTION INTRAVENOUS at 05:03

## 2023-01-01 RX ADMIN — SUGAMMADEX 200 MG: 100 INJECTION, SOLUTION INTRAVENOUS at 13:10

## 2023-01-01 RX ADMIN — METOPROLOL TARTRATE 25 MG: 25 TABLET, FILM COATED ORAL at 18:23

## 2023-01-01 RX ADMIN — ACETAMINOPHEN 1000 MG: 500 TABLET, FILM COATED ORAL at 05:01

## 2023-01-01 RX ADMIN — METOPROLOL TARTRATE 25 MG: 25 TABLET, FILM COATED ORAL at 05:00

## 2023-01-01 RX ADMIN — EPINEPHRINE 1 MG: 0.1 INJECTION, SOLUTION INTRAVENOUS at 12:20

## 2023-01-01 RX ADMIN — GEMCITABINE 1000 MG: 100 INJECTION, SOLUTION INTRAVENOUS at 15:50

## 2023-01-01 RX ADMIN — DEXAMETHASONE SODIUM PHOSPHATE 8 MG: 4 INJECTION INTRA-ARTICULAR; INTRALESIONAL; INTRAMUSCULAR; INTRAVENOUS; SOFT TISSUE at 08:44

## 2023-01-01 RX ADMIN — ACETAMINOPHEN 1000 MG: 500 TABLET, FILM COATED ORAL at 05:06

## 2023-01-01 RX ADMIN — Medication 1 APPLICATOR: at 17:49

## 2023-01-01 RX ADMIN — CEFAZOLIN 2 G: 1 INJECTION, POWDER, FOR SOLUTION INTRAMUSCULAR; INTRAVENOUS at 15:23

## 2023-01-01 RX ADMIN — IOHEXOL 52 ML: 350 INJECTION, SOLUTION INTRAVENOUS at 11:11

## 2023-01-01 RX ADMIN — SODIUM CHLORIDE 200 MG: 9 INJECTION, SOLUTION INTRAVENOUS at 10:47

## 2023-01-01 RX ADMIN — LABETALOL HYDROCHLORIDE 5 MG: 5 INJECTION, SOLUTION INTRAVENOUS at 09:33

## 2023-01-01 RX ADMIN — ROCURONIUM BROMIDE 20 MG: 50 INJECTION, SOLUTION INTRAVENOUS at 11:00

## 2023-01-01 RX ADMIN — SODIUM CHLORIDE 200 MG: 9 INJECTION, SOLUTION INTRAVENOUS at 10:55

## 2023-01-01 RX ADMIN — POLYETHYLENE GLYCOL 3350 1 PACKET: 17 POWDER, FOR SOLUTION ORAL at 18:45

## 2023-01-01 RX ADMIN — DEXMEDETOMIDINE 10 MCG: 100 INJECTION, SOLUTION INTRAVENOUS at 11:09

## 2023-01-01 RX ADMIN — ONDANSETRON 4 MG: 2 INJECTION INTRAMUSCULAR; INTRAVENOUS at 12:16

## 2023-01-01 RX ADMIN — MIDAZOLAM 2 MG: 1 INJECTION, SOLUTION INTRAMUSCULAR; INTRAVENOUS at 08:20

## 2023-01-01 RX ADMIN — FENTANYL CITRATE 50 MCG: 50 INJECTION, SOLUTION INTRAMUSCULAR; INTRAVENOUS at 12:33

## 2023-01-01 RX ADMIN — ENOXAPARIN SODIUM 40 MG: 100 INJECTION SUBCUTANEOUS at 18:22

## 2023-01-01 RX ADMIN — FENTANYL CITRATE 25 MCG: 50 INJECTION, SOLUTION INTRAMUSCULAR; INTRAVENOUS at 15:31

## 2023-01-01 RX ADMIN — Medication 100 MCG: at 15:30

## 2023-01-01 RX ADMIN — SODIUM CHLORIDE: 9 INJECTION, SOLUTION INTRAVENOUS at 20:09

## 2023-01-01 RX ADMIN — KETOROLAC TROMETHAMINE 15 MG: 30 INJECTION, SOLUTION INTRAMUSCULAR; INTRAVENOUS at 05:06

## 2023-01-01 RX ADMIN — SENNOSIDES AND DOCUSATE SODIUM 2 TABLET: 50; 8.6 TABLET ORAL at 18:52

## 2023-01-01 RX ADMIN — EPINEPHRINE 1 MG: 0.1 INJECTION, SOLUTION INTRAVENOUS at 12:25

## 2023-01-01 RX ADMIN — TAMSULOSIN HYDROCHLORIDE 0.4 MG: 0.4 CAPSULE ORAL at 21:35

## 2023-01-01 RX ADMIN — HEPARIN 500 UNITS: 100 SYRINGE at 11:34

## 2023-01-01 RX ADMIN — Medication 100 MCG: at 15:22

## 2023-01-01 RX ADMIN — LABETALOL HYDROCHLORIDE 5 MG: 5 INJECTION, SOLUTION INTRAVENOUS at 11:28

## 2023-01-01 RX ADMIN — PREDNISONE 40 MG: 20 TABLET ORAL at 08:35

## 2023-01-01 RX ADMIN — HYDROMORPHONE HYDROCHLORIDE 0.6 MG: 2 INJECTION INTRAMUSCULAR; INTRAVENOUS; SUBCUTANEOUS at 09:17

## 2023-01-01 RX ADMIN — ONDANSETRON 4 MG: 2 INJECTION INTRAMUSCULAR; INTRAVENOUS at 15:46

## 2023-01-01 RX ADMIN — FENTANYL CITRATE 25 MCG: 50 INJECTION, SOLUTION INTRAMUSCULAR; INTRAVENOUS at 15:45

## 2023-01-01 RX ADMIN — KETOROLAC TROMETHAMINE 15 MG: 30 INJECTION, SOLUTION INTRAMUSCULAR; INTRAVENOUS at 13:21

## 2023-01-01 RX ADMIN — Medication 1 APPLICATOR: at 18:00

## 2023-01-01 RX ADMIN — LIDOCAINE HYDROCHLORIDE 4 ML: 40 SOLUTION TOPICAL at 08:23

## 2023-01-01 RX ADMIN — SENNOSIDES AND DOCUSATE SODIUM 2 TABLET: 50; 8.6 TABLET ORAL at 18:23

## 2023-01-01 RX ADMIN — Medication 100 MCG: at 15:26

## 2023-01-01 RX ADMIN — FUROSEMIDE 40 MG: 10 INJECTION INTRAMUSCULAR; INTRAVENOUS at 10:17

## 2023-01-01 RX ADMIN — METOPROLOL TARTRATE 25 MG: 25 TABLET, FILM COATED ORAL at 09:49

## 2023-01-01 RX ADMIN — SODIUM CHLORIDE, POTASSIUM CHLORIDE, SODIUM LACTATE AND CALCIUM CHLORIDE 1000 ML: 600; 310; 30; 20 INJECTION, SOLUTION INTRAVENOUS at 11:55

## 2023-01-01 RX ADMIN — KETOROLAC TROMETHAMINE 15 MG: 30 INJECTION, SOLUTION INTRAMUSCULAR; INTRAVENOUS at 18:34

## 2023-01-01 RX ADMIN — ACETAMINOPHEN 1000 MG: 500 TABLET, FILM COATED ORAL at 18:23

## 2023-01-01 RX ADMIN — Medication 100 MCG: at 08:27

## 2023-01-01 RX ADMIN — HEPARIN 500 UNITS: 100 SYRINGE at 10:35

## 2023-01-01 RX ADMIN — GABAPENTIN 100 MG: 100 CAPSULE ORAL at 18:32

## 2023-01-01 RX ADMIN — ACETAMINOPHEN 1000 MG: 500 TABLET, FILM COATED ORAL at 17:44

## 2023-01-01 RX ADMIN — FENTANYL CITRATE 50 MCG: 50 INJECTION, SOLUTION INTRAMUSCULAR; INTRAVENOUS at 09:04

## 2023-01-01 RX ADMIN — CALCIUM CHLORIDE 1 G: 100 INJECTION, SOLUTION INTRAVENOUS at 12:23

## 2023-01-01 RX ADMIN — GABAPENTIN 100 MG: 100 CAPSULE ORAL at 18:51

## 2023-01-01 RX ADMIN — FENTANYL CITRATE 100 MCG: 50 INJECTION, SOLUTION INTRAMUSCULAR; INTRAVENOUS at 08:20

## 2023-01-01 RX ADMIN — SENNOSIDES AND DOCUSATE SODIUM 2 TABLET: 50; 8.6 TABLET ORAL at 05:01

## 2023-01-01 RX ADMIN — GABAPENTIN 100 MG: 100 CAPSULE ORAL at 18:23

## 2023-01-01 RX ADMIN — SODIUM CHLORIDE 200 MG: 9 INJECTION, SOLUTION INTRAVENOUS at 10:04

## 2023-01-01 RX ADMIN — TAMSULOSIN HYDROCHLORIDE 0.4 MG: 0.4 CAPSULE ORAL at 20:26

## 2023-01-01 RX ADMIN — CEFTRIAXONE SODIUM 1000 MG: 10 INJECTION, POWDER, FOR SOLUTION INTRAVENOUS at 18:33

## 2023-01-01 RX ADMIN — KETOROLAC TROMETHAMINE 15 MG: 30 INJECTION, SOLUTION INTRAMUSCULAR; INTRAVENOUS at 05:00

## 2023-01-01 RX ADMIN — IOHEXOL 100 ML: 350 INJECTION, SOLUTION INTRAVENOUS at 15:25

## 2023-01-01 RX ADMIN — SODIUM CHLORIDE: 9 INJECTION, SOLUTION INTRAVENOUS at 09:51

## 2023-01-01 RX ADMIN — HEPARIN 500 UNITS: 100 SYRINGE at 11:24

## 2023-01-01 RX ADMIN — HEPARIN SODIUM 5000 UNITS: 1000 INJECTION, SOLUTION INTRAVENOUS; SUBCUTANEOUS at 08:40

## 2023-01-01 RX ADMIN — OXYBUTYNIN CHLORIDE 5 MG: 5 TABLET ORAL at 05:02

## 2023-01-01 RX ADMIN — Medication: at 18:37

## 2023-01-01 RX ADMIN — SENNOSIDES AND DOCUSATE SODIUM 2 TABLET: 50; 8.6 TABLET ORAL at 18:33

## 2023-01-01 RX ADMIN — ACETAMINOPHEN 1000 MG: 500 TABLET, FILM COATED ORAL at 13:21

## 2023-01-01 RX ADMIN — HYDROMORPHONE HYDROCHLORIDE 0.4 MG: 2 INJECTION INTRAMUSCULAR; INTRAVENOUS; SUBCUTANEOUS at 10:44

## 2023-01-01 RX ADMIN — ENOXAPARIN SODIUM 40 MG: 100 INJECTION SUBCUTANEOUS at 17:45

## 2023-01-01 RX ADMIN — FENTANYL CITRATE 50 MCG: 50 INJECTION, SOLUTION INTRAMUSCULAR; INTRAVENOUS at 15:17

## 2023-01-01 RX ADMIN — CEFTRIAXONE SODIUM 1000 MG: 10 INJECTION, POWDER, FOR SOLUTION INTRAVENOUS at 18:23

## 2023-01-01 RX ADMIN — OXYBUTYNIN CHLORIDE 5 MG: 5 TABLET ORAL at 17:44

## 2023-01-01 RX ADMIN — ONDANSETRON 4 MG: 2 INJECTION INTRAMUSCULAR; INTRAVENOUS at 08:37

## 2023-01-01 RX ADMIN — KETOROLAC TROMETHAMINE 15 MG: 30 INJECTION, SOLUTION INTRAMUSCULAR; INTRAVENOUS at 18:51

## 2023-01-01 ASSESSMENT — PAIN DESCRIPTION - PAIN TYPE
TYPE: ACUTE PAIN;SURGICAL PAIN
TYPE: SURGICAL PAIN
TYPE: ACUTE PAIN;SURGICAL PAIN
TYPE: SURGICAL PAIN
TYPE: SURGICAL PAIN
TYPE: CHRONIC PAIN
TYPE: ACUTE PAIN
TYPE: CHRONIC PAIN
TYPE: ACUTE PAIN
TYPE: ACUTE PAIN;SURGICAL PAIN
TYPE: ACUTE PAIN
TYPE: SURGICAL PAIN
TYPE: SURGICAL PAIN;ACUTE PAIN
TYPE: ACUTE PAIN;SURGICAL PAIN
TYPE: ACUTE PAIN;SURGICAL PAIN
TYPE: ACUTE PAIN
TYPE: SURGICAL PAIN;ACUTE PAIN
TYPE: ACUTE PAIN;SURGICAL PAIN
TYPE: ACUTE PAIN;SURGICAL PAIN
TYPE: ACUTE PAIN
TYPE: ACUTE PAIN;SURGICAL PAIN
TYPE: SURGICAL PAIN
TYPE: ACUTE PAIN;SURGICAL PAIN
TYPE: ACUTE PAIN
TYPE: ACUTE PAIN;SURGICAL PAIN
TYPE: SURGICAL PAIN
TYPE: ACUTE PAIN;SURGICAL PAIN
TYPE: ACUTE PAIN
TYPE: ACUTE PAIN;SURGICAL PAIN
TYPE: SURGICAL PAIN
TYPE: ACUTE PAIN
TYPE: SURGICAL PAIN
TYPE: ACUTE PAIN;SURGICAL PAIN
TYPE: SURGICAL PAIN
TYPE: ACUTE PAIN
TYPE: ACUTE PAIN;SURGICAL PAIN
TYPE: SURGICAL PAIN
TYPE: ACUTE PAIN
TYPE: ACUTE PAIN;SURGICAL PAIN
TYPE: SURGICAL PAIN
TYPE: ACUTE PAIN;SURGICAL PAIN
TYPE: ACUTE PAIN;SURGICAL PAIN

## 2023-01-01 ASSESSMENT — COGNITIVE AND FUNCTIONAL STATUS - GENERAL
DRESSING REGULAR LOWER BODY CLOTHING: A LITTLE
CLIMB 3 TO 5 STEPS WITH RAILING: A LITTLE
DRESSING REGULAR UPPER BODY CLOTHING: A LITTLE
CLIMB 3 TO 5 STEPS WITH RAILING: A LITTLE
DAILY ACTIVITIY SCORE: 18
MOVING FROM LYING ON BACK TO SITTING ON SIDE OF FLAT BED: A LITTLE
SUGGESTED CMS G CODE MODIFIER DAILY ACTIVITY: CK
SUGGESTED CMS G CODE MODIFIER MOBILITY: CK
HELP NEEDED FOR BATHING: A LITTLE
WALKING IN HOSPITAL ROOM: A LITTLE
SUGGESTED CMS G CODE MODIFIER MOBILITY: CK
DRESSING REGULAR UPPER BODY CLOTHING: A LITTLE
HELP NEEDED FOR BATHING: A LITTLE
STANDING UP FROM CHAIR USING ARMS: A LITTLE
TOILETING: A LOT
MOVING FROM LYING ON BACK TO SITTING ON SIDE OF FLAT BED: A LITTLE
WALKING IN HOSPITAL ROOM: A LITTLE
DRESSING REGULAR LOWER BODY CLOTHING: A LOT
MOVING TO AND FROM BED TO CHAIR: A LITTLE
SUGGESTED CMS G CODE MODIFIER DAILY ACTIVITY: CJ
MOBILITY SCORE: 16
STANDING UP FROM CHAIR USING ARMS: A LITTLE
TOILETING: A LITTLE
DAILY ACTIVITIY SCORE: 20
TURNING FROM BACK TO SIDE WHILE IN FLAT BAD: UNABLE
MOBILITY SCORE: 18
TURNING FROM BACK TO SIDE WHILE IN FLAT BAD: A LITTLE
MOVING TO AND FROM BED TO CHAIR: A LITTLE

## 2023-01-01 ASSESSMENT — GAIT ASSESSMENTS
ASSISTIVE DEVICE: FRONT WHEEL WALKER
DEVIATION: BRADYKINETIC;DECREASED HEEL STRIKE;DECREASED TOE OFF
DISTANCE (FEET): 200
GAIT LEVEL OF ASSIST: CONTACT GUARD ASSIST

## 2023-01-01 ASSESSMENT — ENCOUNTER SYMPTOMS
COUGH: 1
FEVER: 0
COUGH: 0
VOMITING: 1
CHILLS: 0
NAUSEA: 0
PALPITATIONS: 0
SPUTUM PRODUCTION: 1
FEVER: 0
FEVER: 0
SHORTNESS OF BREATH: 0
VOMITING: 1
VOMITING: 0
VOMITING: 1
VOMITING: 0
NAUSEA: 1
CHILLS: 0
ABDOMINAL PAIN: 1
FEVER: 0
SHORTNESS OF BREATH: 1
ABDOMINAL PAIN: 1
VOMITING: 0
ORTHOPNEA: 1
NAUSEA: 0
ABDOMINAL PAIN: 1
ABDOMINAL PAIN: 1
CHILLS: 0
NAUSEA: 0
NAUSEA: 1
PALPITATIONS: 0
NAUSEA: 1
CHILLS: 0

## 2023-01-01 ASSESSMENT — PATIENT HEALTH QUESTIONNAIRE - PHQ9
1. LITTLE INTEREST OR PLEASURE IN DOING THINGS: NOT AT ALL
1. LITTLE INTEREST OR PLEASURE IN DOING THINGS: NOT AT ALL
2. FEELING DOWN, DEPRESSED, IRRITABLE, OR HOPELESS: NOT AT ALL
2. FEELING DOWN, DEPRESSED, IRRITABLE, OR HOPELESS: NOT AT ALL
CLINICAL INTERPRETATION OF PHQ2 SCORE: 0
SUM OF ALL RESPONSES TO PHQ9 QUESTIONS 1 AND 2: 0
SUM OF ALL RESPONSES TO PHQ9 QUESTIONS 1 AND 2: 0

## 2023-01-01 ASSESSMENT — LIFESTYLE VARIABLES
CONSUMPTION TOTAL: NEGATIVE
EVER FELT BAD OR GUILTY ABOUT YOUR DRINKING: NO
ALCOHOL_USE: NO
ON A TYPICAL DAY WHEN YOU DRINK ALCOHOL HOW MANY DRINKS DO YOU HAVE: 0
TOTAL SCORE: 0
HAVE PEOPLE ANNOYED YOU BY CRITICIZING YOUR DRINKING: NO
AVERAGE NUMBER OF DAYS PER WEEK YOU HAVE A DRINK CONTAINING ALCOHOL: 0
HOW MANY TIMES IN THE PAST YEAR HAVE YOU HAD 5 OR MORE DRINKS IN A DAY: 0
HAVE YOU EVER FELT YOU SHOULD CUT DOWN ON YOUR DRINKING: NO
EVER HAD A DRINK FIRST THING IN THE MORNING TO STEADY YOUR NERVES TO GET RID OF A HANGOVER: NO
TOTAL SCORE: 0
TOTAL SCORE: 0

## 2023-01-01 ASSESSMENT — FIBROSIS 4 INDEX
FIB4 SCORE: 0.84
FIB4 SCORE: 1.47
FIB4 SCORE: 1.47
FIB4 SCORE: 0.57
FIB4 SCORE: 1.58
FIB4 SCORE: 0.84
FIB4 SCORE: 0.57
FIB4 SCORE: 0.67
FIB4 SCORE: 0.84
FIB4 SCORE: 1.6

## 2023-01-01 ASSESSMENT — ACTIVITIES OF DAILY LIVING (ADL): TOILETING: INDEPENDENT

## 2023-01-06 NOTE — PROGRESS NOTES
Chemotherapy Verification - PRIMARY RN      Height = 164 cm  Weight = 79.5 kg  BSA = 1.9 m2       Medication: Keytruda  Dose: 200 mg (set dose)  Calculated Dose: 200 mg                             (In mg/m2, AUC, mg/kg)       I confirm this process was performed independently with the BSA and all final chemotherapy dosing calculations congruent.  Any discrepancies of 10% or greater have been addressed with the chemotherapy pharmacist. The resolution of the discrepancy has been documented in the EPIC progress notes.

## 2023-01-06 NOTE — PROGRESS NOTES
Pt arrived to [facility], ambulatory, for Keytruda. Pt voices no complaints. Port accessed in sterile manner, positive blood return noted. Labs drawn and reviewed, pt within parameters to treat today. Keytruda infused with no s/sx of adverse reaction. Port flushed and heparin locked per policy, port de-accessed. Pt left [facility] with no s/sx of distress. Follow up appointment confirmed.

## 2023-01-06 NOTE — PROGRESS NOTES
Chemotherapy Verification - SECONDARY RN       Height = 164 cm  Weight = 79.5 kg  BSA = 1.9 m2       Medication: Keytruda  Dose: 200 mg set dose  Calculated Dose: 200 mg set dose                             (In mg/m2, AUC, mg/kg)       I confirm that this process was performed independently.

## 2023-01-06 NOTE — PROGRESS NOTES
"Pharmacy Chemotherapy Calculation    Patient Name: Brian Lopez   Dx:Refractory superficial bladder cancer     Protocol: Pembrolizumab    *Dosing Reference*  Pembrolizumab 200 mg IV on Day 1   21 day cycle until disease progression or unacceptable toxicity or up until 24 months of therapy has been completed   NCCN Guidelines for Bladder Cancer V.1.2022.  suleman Jaquez. N Engl J Med. 2017;376(11):5970-0833.   Venita M, et al. Eur J Cancer. 2020;131:68-75.   Merissa T et al. Lancet Oncol. 2021:22(7):931-945.     Allergies:  Patient has no known allergies.     BP (!) 142/65   Pulse 63   Temp 36.4 °C (97.5 °F) (Temporal)   Resp 18   Ht 1.64 m (5' 4.57\")   Wt 79.5 kg (175 lb 4.3 oz)   SpO2 96%   BMI 29.56 kg/m²  Body surface area is 1.9 meters squared.    Labs 1/6/23:  ANC~ 5730 Plt = 206k   Hgb = 14.5   Labs 1/3/23:    SCr = 0.81 mg/dL CrCl ~ 79 mL/min   LFT's = WNL TBili = 0.5   TSH = pending    Free T4 = pending     Drug Order   (Drug name, dose, route, IV Fluid & volume, frequency, number of doses) Cycle 4  Previous treatment: C3 on 12/16/22     Medication = Pembrolizumab   Base Dose= 200 mg   Calc Dose: fixed dose= no calc required   Final Dose = 200 mg  Route = IV  Fluid & Volume = NS 50 mL  Admin Duration = Over 30 minutes          No calculation required, okay to treat with final dose       By my signature below, I confirm this process was performed independently with the BSA and all final chemotherapy dosing calculations congruent. I have reviewed the above chemotherapy order and that my calculation of the final dose and BSA (when applicable) corroborate those calculations of the  pharmacist. Discrepancies of 10% or greater in the written dose have been addressed and documented within the [facility] Progress notes.    Harshal La, PharmD    "

## 2023-01-24 NOTE — PROGRESS NOTES
"Pharmacy Chemotherapy Calculation    Patient Name: Brian Lopez   Dx:Refractory superficial bladder cancer     Protocol: Pembrolizumab    *Dosing Reference*  Pembrolizumab 200 mg IV on Day 1   21 day cycle until disease progression or unacceptable toxicity or up until 24 months of therapy has been completed   NCCN Guidelines for Bladder Cancer V.1.2022.  femi Jaquez al. N Engl J Med. 2017;376(11):7352-2958.   Venita M, et al. Eur J Cancer. 2020;131:68-75.   Merissa T et al. Lancet Oncol. 2021:22(7):931-945.     Allergies:  Patient has no known allergies.     /81   Pulse 68   Temp 36.6 °C (97.8 °F) (Temporal)   Resp 16   Ht 1.64 m (5' 4.57\")   Wt 78.2 kg (172 lb 6.4 oz)   SpO2 95%   BMI 29.07 kg/m²  Body surface area is 1.89 meters squared.    Labs 1/27/23:  ANC~ 5820 Plt = 235k   Hgb = 14.2  TSH = pending Free T4 = pending    Labs 1/24/23:     SCr = 0.72 mg/dL CrCl ~ 86 mL/min   AST/ALT/AP = 22/30/88 TBili = 0.5       Drug Order   (Drug name, dose, route, IV Fluid & volume, frequency, number of doses) Cycle 5  Previous treatment: C4 on 1/6/23     Medication = Pembrolizumab   Base Dose= 200 mg   Calc Dose: fixed dose= no calc required   Final Dose = 200 mg  Route = IV  Fluid & Volume = NS 50 mL  Admin Duration = Over 30 minutes          No calculation required, okay to treat with final dose       By my signature below, I confirm this process was performed independently with the BSA and all final chemotherapy dosing calculations congruent. I have reviewed the above chemotherapy order and that my calculation of the final dose and BSA (when applicable) corroborate those calculations of the  pharmacist. Discrepancies of 10% or greater in the written dose have been addressed and documented within the [facility] Progress notes.    Filipe Christensen, PharmD  "

## 2023-01-26 NOTE — PROGRESS NOTES
"Pharmacy Chemotherapy calculation:    DX: Bladder CA    Cycle 5  Previous treatment = C4 on 1/6/23    Regimen: Pembrolizumab  *Dosing Reference*  Pembrolizumab 200 mg IV over 30 minutes on Day 1  21-day cycle until disease progression or unacceptable toxicity or until up to 24 months of therapy has been completed  NCCN Guidelines for Bladder Cancer V.1.2022.  Sola AGUILLON, et al. N Engl J Med. 2017;376(11):0257-4151.  Venita M, et al. Eur J Cancer. 2020;131:68-75.    Allergies: Patient has no known allergies.   /81   Pulse 68   Temp 36.6 °C (97.8 °F) (Temporal)   Resp 16   Ht 1.64 m (5' 4.57\")   Wt 78.2 kg (172 lb 6.4 oz)   SpO2 95%   BMI 29.07 kg/m²   Body surface area is 1.89 meters squared.    CBC (1/27/23), CMP (1/24/23), and thyroid panel (pending) within treatment plan parameters.      Pembrolizumab (Keytruda) 200 mg fixed dose  No calculation required, okay to treat with final dose = 200 mg IV      Kelly Carr, PharmD  "

## 2023-01-27 NOTE — PROGRESS NOTES
Brian is here for Day 1, Cycle 5 pembrolizumab (KEYTRUDA) for bladder cancer. Right port accessed using sterile technique; brisk blood return noted. Labs drawn as ordered. Labs reviewed and within parameters to proceed with treatment. Pembrolizumab (Keytruda) given per MAR. Heparin instilled prior to de-accessing port. Port de-accessed; gauze/tape applied to site. Next appointment scheduled. Discharged to self care; no apparent distress noted.

## 2023-01-27 NOTE — PROGRESS NOTES
Chemotherapy Verification - PRIMARY RN      Height = 164 cm  Weight = 78.2 kg  BSA = 1.89 m^2       Medication: pembrolizumab (KEYTRUDA)  Dose: 200 mg (set dose)  Calculated Dose: 200 mg (set dose)                             (In mg/m2, AUC, mg/kg)     I confirm this process was performed independently with the BSA and all final chemotherapy dosing calculations congruent.  Any discrepancies of 10% or greater have been addressed with the chemotherapy pharmacist. The resolution of the discrepancy has been documented in the EPIC progress notes.

## 2023-01-27 NOTE — PROGRESS NOTES
Chemotherapy Verification - SECONDARY RN       Height = 164 cm  Weight = 78.2 kg  BSA = 1.89 m2       Medication: Keytruda  Dose: 200 mg (set dose)  Calculated Dose: 200 mg                             (In mg/m2, AUC, mg/kg)       I confirm that this process was performed independently.

## 2023-02-16 PROBLEM — M79.641 BILATERAL HAND PAIN: Status: ACTIVE | Noted: 2023-01-01

## 2023-02-16 PROBLEM — M79.642 BILATERAL HAND PAIN: Status: ACTIVE | Noted: 2023-01-01

## 2023-02-16 PROBLEM — R73.9 HYPERGLYCEMIA: Status: ACTIVE | Noted: 2020-02-04

## 2023-02-16 NOTE — PROGRESS NOTES
Annual Health Assessment Questions:    1.  Are you currently engaging in any exercise or physical activity? Yes    2.  How would you describe your mood or emotional well-being today? fair    3.  Have you had any falls in the last year? No    4.  Have you noticed any problems with your balance or had difficulty walking? No    5.  In the last six months have you experienced any leakage of urine? Yes    6. DPA/Advanced Directive: Patient does not have an Advance Directive.  A packet and workshop information was given on Advance Directives.

## 2023-02-16 NOTE — ASSESSMENT & PLAN NOTE
This is a new condition.  Recent lab test show elevated glucose of 131.  Patient was unsure of the recent blood test was fasting.  I recommend another fasting labs to be done for confirmation

## 2023-02-16 NOTE — ASSESSMENT & PLAN NOTE
Chronic ongoing condition.  The patient takes albuterol as needed.  The patient was seen by pulmonologist previously.  Patient has 50-pack-year smoking history.  Currently he denies shortness of breath or wheezing or significant cough.

## 2023-02-16 NOTE — PROGRESS NOTES
"Pharmacy Chemotherapy calculation:    DX: Bladder CA    Cycle 6  Previous treatment = C5 on 1/27/23    Regimen: Pembrolizumab  *Dosing Reference*  Pembrolizumab 200 mg IV over 30 minutes on Day 1  21-day cycle until disease progression or unacceptable toxicity or until up to 24 months of therapy has been completed  NCCN Guidelines for Bladder Cancer V.1.2022.  Sola AGUILLON et al. N Engl J Med. 2017;376(11):2570-9269.  Venita M, et al. Eur J Cancer. 2020;131:68-75.    Allergies: Patient has no known allergies.   /70   Pulse 69   Temp 36.2 °C (97.1 °F) (Temporal)   Resp 18   Ht 1.64 m (5' 4.57\")   Wt 77.1 kg (169 lb 15.6 oz)   SpO2 95%   BMI 28.67 kg/m²   Body surface area is 1.87 meters squared.    Labs 2/14/23:  ANC~ 5750 Plt = 235k   Hgb = 14.6     SCr = 0.58 mg/dL CrCl ~ 87 mL/min   AST/ALT/AP = 21/25/89 TBili = 0.5  TSH = 0.46    Pembrolizumab (Keytruda) 200 mg fixed dose  No calculation required, okay to treat with final dose = 200 mg IV    Filipe Christensen, PharmD  "

## 2023-02-16 NOTE — ASSESSMENT & PLAN NOTE
Chronic condition.  The patient presently followed by urologist Dr. Kee as well as oncologist Dr. Salmeron    Patient reported that he started chemotherapy treatment.  Patient denies fever chills dysuria or hematuria.

## 2023-02-16 NOTE — ASSESSMENT & PLAN NOTE
Chronic ongoing condition.  The patient currently taking amlodipine 5 mg daily and atenolol 50 Mg daily.  Blood pressure has been well controlled at home.  No significant side effects reported.  BP today 126/60.

## 2023-02-16 NOTE — ASSESSMENT & PLAN NOTE
Chronic condition.  The patient takes pravastatin 40 Mg daily.  Patient tolerating medication well without any side effects.

## 2023-02-16 NOTE — ASSESSMENT & PLAN NOTE
This is a new condition.  The patient reported bilateral hand pain especially worse at nighttime.  Condition has been noted since the last few months.  Patient also reported numbness and tingling sensation especially the thumb index and third fingers bilaterally right more than left.  Patient has been using arthritis cream with only some improvement.  Patient denies recent trauma or injury.  He denies fever or chills

## 2023-02-16 NOTE — PROGRESS NOTES
PRIMARY CARE CLINIC VISIT    Chief complaint:    Bilateral hand pain, numbness tingling  Bladder cancer  COPD  Hyperlipidemia  Blood pressure check      History of Present Illness     Bladder cancer (HCC)  Chronic condition.  The patient presently followed by urologist Dr. Kee as well as oncologist Dr. Salmeron    Patient reported that he started chemotherapy treatment.  Patient denies fever chills dysuria or hematuria.    COPD  Chronic ongoing condition.  The patient takes albuterol as needed.  The patient was seen by pulmonologist previously.  Patient has 50-pack-year smoking history.  Currently he denies shortness of breath or wheezing or significant cough.    Dyslipidemia  Chronic condition.  The patient takes pravastatin 40 Mg daily.  Patient tolerating medication well without any side effects.    Hypertension  Chronic ongoing condition.  The patient currently taking amlodipine 5 mg daily and atenolol 50 Mg daily.  Blood pressure has been well controlled at home.  No significant side effects reported.  BP today 126/60.    Bilateral hand pain  This is a new condition.  The patient reported bilateral hand pain especially worse at nighttime.  Condition has been noted since the last few months.  Patient also reported numbness and tingling sensation especially the thumb index and third fingers bilaterally right more than left.  Patient has been using arthritis cream with only some improvement.  Patient denies recent trauma or injury.  He denies fever or chills    Hyperglycemia  This is a new condition.  Recent lab test show elevated glucose of 131.  Patient was unsure of the recent blood test was fasting.  I recommend another fasting labs to be done for confirmation    Current Outpatient Medications on File Prior to Visit   Medication Sig Dispense Refill    methocarbamol (ROBAXIN) 750 MG Tab methocarbamol 750 mg tablet   TAKE ONE TABLET BY MOUTH EVERY EIGHT HOURS AS NEEDED FOR MUSCLE SPASM      atenolol (TENORMIN)  50 MG Tab TAKE ONE TABLET BY MOUTH EVERY  Tablet 0    gabapentin (NEURONTIN) 100 MG Cap Take 1 Capsule by mouth at bedtime as needed (pain). Indications: Neuropathic Pain 90 Capsule 11    tamsulosin (FLOMAX) 0.4 MG capsule TAKE ONE CAPSULE BY MOUTH EVERY NIGHT AT BEDTIME 90 Capsule 3    pravastatin (PRAVACHOL) 40 MG tablet Take 1 Tablet by mouth every day. 100 Tablet 0    amLODIPine (NORVASC) 5 MG Tab Take 1 Tablet by mouth every day. 90 Tablet 3    finasteride (PROSCAR) 5 MG Tab Take 5 mg by mouth every evening.      Mirabegron ER (MYRBETRIQ) 50 MG TABLET SR 24 HR Take 50 mg by mouth every day.      triamcinolone acetonide (KENALOG) 0.1 % Cream       lidocaine-prilocaine (EMLA) 2.5-2.5 % Cream APPLY TO THE AFFECTED AREA 30 MINTUES PRIOR TO PORT ACCESS      meloxicam (MOBIC) 15 MG tablet TAKE 1 TABLET BY MOUTH EVERY DAY AS NEEDED FOR MODERATE PAIN 100 Tablet 0    Cholecalciferol (D3 2000) 2000 UNIT Cap Take 4,000 Units by mouth every day.      cyanocobalamin (VITAMIN B-12) 500 MCG Tab Take 500 mcg by mouth every day.      Multiple Vitamins-Minerals (MULTIVITAMIN ADULT PO) Take 1 Tablet by mouth every day.      Calcium Carb-Cholecalciferol (CALCIUM + D3 PO) Take 1 Tablet by mouth every day.       No current facility-administered medications on file prior to visit.        Allergies: Wound dressing adhesive    Current Outpatient Medications Ordered in Epic   Medication Sig Dispense Refill    methocarbamol (ROBAXIN) 750 MG Tab methocarbamol 750 mg tablet   TAKE ONE TABLET BY MOUTH EVERY EIGHT HOURS AS NEEDED FOR MUSCLE SPASM      albuterol 108 (90 Base) MCG/ACT Aero Soln inhalation aerosol Inhale 1-2 Puffs every 6 hours as needed for Shortness of Breath. 1 Each 6    atenolol (TENORMIN) 50 MG Tab TAKE ONE TABLET BY MOUTH EVERY  Tablet 0    gabapentin (NEURONTIN) 100 MG Cap Take 1 Capsule by mouth at bedtime as needed (pain). Indications: Neuropathic Pain 90 Capsule 11    tamsulosin (FLOMAX) 0.4 MG capsule  "TAKE ONE CAPSULE BY MOUTH EVERY NIGHT AT BEDTIME 90 Capsule 3    pravastatin (PRAVACHOL) 40 MG tablet Take 1 Tablet by mouth every day. 100 Tablet 0    amLODIPine (NORVASC) 5 MG Tab Take 1 Tablet by mouth every day. 90 Tablet 3    finasteride (PROSCAR) 5 MG Tab Take 5 mg by mouth every evening.      Mirabegron ER (MYRBETRIQ) 50 MG TABLET SR 24 HR Take 50 mg by mouth every day.      triamcinolone acetonide (KENALOG) 0.1 % Cream       lidocaine-prilocaine (EMLA) 2.5-2.5 % Cream APPLY TO THE AFFECTED AREA 30 MINTUES PRIOR TO PORT ACCESS      meloxicam (MOBIC) 15 MG tablet TAKE 1 TABLET BY MOUTH EVERY DAY AS NEEDED FOR MODERATE PAIN 100 Tablet 0    Cholecalciferol (D3 2000) 2000 UNIT Cap Take 4,000 Units by mouth every day.      cyanocobalamin (VITAMIN B-12) 500 MCG Tab Take 500 mcg by mouth every day.      Multiple Vitamins-Minerals (MULTIVITAMIN ADULT PO) Take 1 Tablet by mouth every day.      Calcium Carb-Cholecalciferol (CALCIUM + D3 PO) Take 1 Tablet by mouth every day.       No current [facility]-ordered facility-administered medications on file.       Past Medical History:   Diagnosis Date    Arthritis     hands    Bladder cancer (HCC) 01/10/2022    BPH (benign prostatic hyperplasia) 04/17/2014    Cataract     removed bilat    COPD (chronic obstructive pulmonary disease) (HCC)     Dental disorder     upper/lower dentures    Heart burn     Hemorrhagic disorder (HCC)     bloody noses    High cholesterol     Hypertension 04/17/2014    Hypertriglyceridemia 04/17/2014    Intestinal polyp     Pain 05/29/2019    right hand, 5-6/10    Pulmonary emphysema (HCC) 02/11/2021    Saw [facility] previously dr Merary Wang  \"...However he was found to have some changes consistent with emphysema. A 50-pack-year smoking     Snoring     sleep study done, pt was not diagnosed with OSMAN    Swelling of thyroid gland 02/04/2020    Urinary incontinence 12/30/2021       Past Surgical History:   Procedure Laterality Date    TURBT (TRANSURETHRAL " RESECTION OF BLADDER TUMOR) N/A 2022    Procedure: BIPOLAR TRANSURETHRAL RESECTION OF BLADDER TUMOR WITH INSTILLATION OF GEMCITABINE;  Surgeon: Gil Kee M.D.;  Location: SURGERY [facility];  Service: Urology    TURBT (TRANSURETHRAL RESECTION OF BLADDER TUMOR) N/A 2022    Procedure: TURBT (TRANSURETHRAL RESECTION OF BLADDER TUMOR) - BIPOLAR WITH INSTILLATION OF GEMCITABINE;  Surgeon: Gil Kee M.D.;  Location: SURGERY [facility];  Service: Urology    PB INCISE FINGER TENDON SHEATH Right 2019    Procedure: RELEASE, TRIGGER FINGER-  RING;  Surgeon: Simon Altamirano M.D.;  Location: SURGERY SAME DAY [facility];  Service: Orthopedics    SYNOVECTOMY Right 2019    Procedure: fasciatomy of palm;  Surgeon: Simon Altamirano M.D.;  Location: SURGERY SAME DAY [facility];  Service: Orthopedics    KNEE ARTHROPLASTY TOTAL Right 2017    Procedure: KNEE ARTHROPLASTY TOTAL;  Surgeon: Steffanie Del Angel M.D.;  Location: SURGERY [facility];  Service:     COLON RESECTION ROBOTIC  2014    Performed by Ezra Burks M.D. at SURGERY [facility]    CATARACT EXTRACTION WITH IOL Bilateral     KNEE ARTHROPLASTY TOTAL Left 2009    APPENDECTOMY  1970's    ARTHROSCOPY, KNEE Bilateral     CHOLECYSTECTOMY      HEMORRHOIDECTOMY      TRIGGER FINGER RELEASE Bilateral last 's    multiple        Family History   Problem Relation Age of Onset    Stroke Mother     Heart Attack Father 85    Heart Disease Father     Cancer Neg Hx        Social History     Tobacco Use   Smoking Status Former    Packs/day: 1.00    Years: 50.00    Pack years: 50.00    Types: Cigarettes    Start date: 1955    Quit date: 2010    Years since quittin.8   Smokeless Tobacco Never       Social History     Substance and Sexual Activity   Alcohol Use Yes    Alcohol/week: 8.4 oz    Types: 14 Cans of beer per week    Comment: 3 beers per day       Review of systems.  As per HPI above. All  "other systems reviewed and negative.      Past Medical, Social, and Family history reviewed and updated in EPIC     Objective     /60   Pulse 64   Temp 36.8 °C (98.3 °F) (Temporal)   Resp 18   Ht 1.651 m (5' 5\")   Wt 77.1 kg (170 lb)   SpO2 96%    Body mass index is 28.29 kg/m².    General: alert in no apparent distress.  Cardiovascular: regular rate and rhythm  Pulmonary: lungs : no wheezing   Gastrointestinal: BS present. No obvious mass noted  Hands there are no swelling redness.  There are some changes in the MCP joints suggestive of osteoarthritis.  Handgrip difficult to evaluate due to pain.  Questionable Tinel's sign    Lab Results   Component Value Date/Time    WBC 8.1 02/14/2023 01:10 PM    HEMOGLOBIN 14.6 02/14/2023 01:10 PM    HEMATOCRIT 43.6 02/14/2023 01:10 PM    MCV 86.2 02/14/2023 01:10 PM    PLATELETCT 235 02/14/2023 01:10 PM         Lab Results   Component Value Date/Time    SODIUM 140 02/14/2023 01:10 PM    POTASSIUM 3.9 02/14/2023 01:10 PM    GLUCOSE 131 (H) 02/14/2023 01:10 PM    BUN 16 02/14/2023 01:10 PM    CREATININE 0.58 02/14/2023 01:10 PM       Lab Results   Component Value Date/Time    CHOLSTRLTOT 147 07/28/2022 07:39 AM    LDL 66 07/28/2022 07:39 AM    HDL 46 07/28/2022 07:39 AM    TRIGLYCERIDE 175 (H) 07/28/2022 07:39 AM       Lab Results   Component Value Date/Time    ALTSGPT 25 02/14/2023 01:10 PM             Assessment and Plan     1. Bilateral hand pain  This is a new condition.  Exam suggestive of osteoarthritis.  Rule out possible metabolic syndrome.    - Referral to conduction test both upper extremities  - DX-HAND 3+ LEFT; Future  - DX-HAND 3+ RIGHT; Future  Recommend follow-up after the above study done    2. Malignant neoplasm of urinary bladder, unspecified site (HCC)  Chronic condition.  Patient will start chemotherapy as above.  Continue follow-up with urology and oncology services as above.    3. Pulmonary emphysema, unspecified emphysema type (HCC)  Chronic " stable condition.  Advised the patient to use albuterol as needed.  Currently is asymptomatic.  Continue to monitor.    4. Dyslipidemia  Chronic condition.  Current control unclear.  Lipid panel requested.  Recommend to continue with pravastatin 40 Mg daily.  Continue with low-fat low-cholesterol diet.    5. Primary hypertension  Chronic stable condition.  /60 today.  Continue with amlodipine 5 Mg daily and atenolol 50 mg daily.  Continue to monitor blood pressure on regular basis at home    6. Hyperglycemia  This is a new condition.  Fasting lab requested.  Recommend to continue with low sweet and low carb diet.  Follow-up after lab test done.  - HEMOGLOBIN A1C; Future  - Blood Glucose; Future    Other orders  - methocarbamol (ROBAXIN) 750 MG Tab; methocarbamol 750 mg tablet   TAKE ONE TABLET BY MOUTH EVERY EIGHT HOURS AS NEEDED FOR MUSCLE SPASM  - triamcinolone acetonide (KENALOG) 0.1 % Cream  - albuterol 108 (90 Base) MCG/ACT Aero Soln inhalation aerosol; Inhale 1-2 Puffs every 6 hours as needed for Shortness of Breath.  Dispense: 1 Each; Refill: 6                      Healthcare Maintenance       There are no preventive care reminders to display for this patient.            Please note that this dictation was created using voice recognition software. I have made every reasonable attempt to correct obvious errors, but I expect that there are errors of grammar and possibly content that I did not discover before finalizing the note.    Eduard Lake MD  Internal Medicine  [facility]

## 2023-02-17 NOTE — PROGRESS NOTES
Mr Jessica is here today for Keytruda, he has no new concerns to report.    Medi port to upper right chest was flushed and had good blood return. Labs drawn on 2/14/2023 and are within parameters for treatment today.    Keytruda was tolerated well.     Medi port was flushed and HL.  Removed parra needle intact.    Discharged in stable condition.

## 2023-02-17 NOTE — PROGRESS NOTES
Chemotherapy Verification - PRIMARY RN      Height = 1.64 m  Weight = 77.1 kg  BSA = 1.87 m2       Medication: pembrolizumab (keytruda)  Dose: 200 mg  Calculated Dose: 200 mg set dose                             (In mg/m2, AUC, mg/kg)           I confirm this process was performed independently with the BSA and all final chemotherapy dosing calculations congruent.  Any discrepancies of 10% or greater have been addressed with the chemotherapy pharmacist. The resolution of the discrepancy has been documented in the EPIC progress notes.

## 2023-02-17 NOTE — PROGRESS NOTES
Chemotherapy Verification - SECONDARY RN       Height = 164 cm  Weight = 77.1 kg  BSA = 1.87 m2       Medication: Pembrolizumab  Dose: 200 mg fixed dose  Calculated Dose: 200 mg                             (In mg/m2, AUC, mg/kg)     I confirm that this process was performed independently.

## 2023-02-17 NOTE — PROGRESS NOTES
"Pharmacy Chemotherapy Calculation    Patient Name: Brian Lopez   Dx:Refractory superficial bladder cancer     Protocol: Pembrolizumab    *Dosing Reference*  Pembrolizumab 200 mg IV on Day 1   21 day cycle until disease progression or unacceptable toxicity or up until 24 months of therapy has been completed   NCCN Guidelines for Bladder Cancer V.1.2022.  femi Jaquez al. N Engl J Med. 2017;376(11):4619-5658.   Venita M, et al. Eur J Cancer. 2020;131:68-75.   Merissa T et al. Lancet Oncol. 2021:22(7):931-945.     Allergies:  Patient has no known allergies.     /70   Pulse 69   Temp 36.2 °C (97.1 °F) (Temporal)   Resp 18   Ht 1.64 m (5' 4.57\")   Wt 77.1 kg (169 lb 15.6 oz)   SpO2 95%   BMI 28.67 kg/m²  Body surface area is 1.87 meters squared.    Labs 2/14/23  ANC~ 5750 Plt = 235k   Hgb = 14.6     SCr = 0.58 mg/dL CrCl ~ 87.3 mL/min   LFT's = WNLs  TBili = 0.5   TSH = 0.46     Drug Order   (Drug name, dose, route, IV Fluid & volume, frequency, number of doses) Cycle 6  Previous treatment: C5 on 1/27/23     Medication = Pembrolizumab   Base Dose= 200 mg   Calc Dose: fixed dose= no calc required   Final Dose = 200 mg  Route = IV  Fluid & Volume = NS 50 mL  Admin Duration = Over 30 minutes          No calculation required, okay to treat with final dose       By my signature below, I confirm this process was performed independently with the BSA and all final chemotherapy dosing calculations congruent. I have reviewed the above chemotherapy order and that my calculation of the final dose and BSA (when applicable) corroborate those calculations of the  pharmacist. Discrepancies of 10% or greater in the written dose have been addressed and documented within the [facility] Progress notes.    Reggie Ivy, PharmD  "

## 2023-03-06 NOTE — TELEPHONE ENCOUNTER
Received request via: Pharmacy    Was the patient seen in the last year in this department? Yes    Does the patient have an active prescription (recently filled or refills available) for medication(s) requested? No    Does the patient have [facility] Plus and need 100 day supply (blood pressure, diabetes and cholesterol meds only)? Yes, quantity updated to 100 days

## 2023-03-16 NOTE — PROGRESS NOTES
Call placed to patient for follow up cancer navigation and DST of 5.  Pt reporting currently treatments on hold related to side effects.  Last treatment was last month.  Pt reporting he follows up with oncologist, he thinks, next week.  Pt denies current questions or barriers to care. Encouraged him to reach out to navigator if he returns to [facility] for cancer treatment for additional support and resources.  Will send info via [employer] per his request.  Sent message and support calendar via [employer] per his request.

## 2023-04-21 NOTE — H&P
"History and Physical    Date: 4/21/2023    PCP: Eduard Lake M.D.      CC: Port removal    HPI: This is a 83 y.o. male who is presenting for port removal, no longer needed.    Past Medical History:   Diagnosis Date    Bladder cancer (HCC) 01/10/2022    Bowel habit changes     Constipation.    BPH (benign prostatic hyperplasia) 04/17/2014    Cancer (HCC)     Bladder cancer    Cataract     removed bilat    COPD (chronic obstructive pulmonary disease) (HCC)     Dental disorder     upper/lower dentures    Diabetes (HCC)     borderline pre-diabetes    Heart burn     Hemorrhagic disorder (HCC)     bloody noses    High cholesterol     Hypertension 04/17/2014    Hypertriglyceridemia 04/17/2014    Intestinal polyp     Pain 05/29/2019    right hand, 5-6/10    Pulmonary emphysema (HCC) 02/11/2021    Saw [facility] previously dr Merary Wang  \"...However he was found to have some changes consistent with emphysema. A 50-pack-year smoking     Snoring     sleep study done, pt was not diagnosed with OSMAN    Swelling of thyroid gland 02/04/2020    Urinary bladder disorder     Urinary incontinence 12/30/2021 4/14/23: Pt. denies       Past Surgical History:   Procedure Laterality Date    TURBT (TRANSURETHRAL RESECTION OF BLADDER TUMOR) N/A 8/29/2022    Procedure: BIPOLAR TRANSURETHRAL RESECTION OF BLADDER TUMOR WITH INSTILLATION OF GEMCITABINE;  Surgeon: Gil Kee M.D.;  Location: SURGERY [facility];  Service: Urology    TURBT (TRANSURETHRAL RESECTION OF BLADDER TUMOR) N/A 01/04/2022    Procedure: TURBT (TRANSURETHRAL RESECTION OF BLADDER TUMOR) - BIPOLAR WITH INSTILLATION OF GEMCITABINE;  Surgeon: Gil Kee M.D.;  Location: SURGERY [facility];  Service: Urology    PB INCISE FINGER TENDON SHEATH Right 05/31/2019    Procedure: RELEASE, TRIGGER FINGER-  RING;  Surgeon: Simon Altamirano M.D.;  Location: SURGERY SAME DAY [facility];  Service: Orthopedics    SYNOVECTOMY Right 05/31/2019    Procedure: fasciatomy of palm;  " Surgeon: Simon Altamirano M.D.;  Location: SURGERY SAME DAY [facility];  Service: Orthopedics    KNEE ARTHROPLASTY TOTAL Right 2017    Procedure: KNEE ARTHROPLASTY TOTAL;  Surgeon: Steffanie Del Angel M.D.;  Location: SURGERY [facility];  Service:     COLON RESECTION ROBOTIC  2014    Performed by Ezra Burks M.D. at SURGERY [facility]    CATARACT EXTRACTION WITH IOL Bilateral     KNEE ARTHROPLASTY TOTAL Left 2009    APPENDECTOMY  's    ARTHROSCOPY, KNEE Bilateral     CHOLECYSTECTOMY      HEMORRHOIDECTOMY      TRIGGER FINGER RELEASE Bilateral last 's    multiple        No current facility-administered medications for this encounter.        Social History     Socioeconomic History    Marital status:      Spouse name: Not on file    Number of children: Not on file    Years of education: Not on file    Highest education level: Not on file   Occupational History    Not on file   Tobacco Use    Smoking status: Former     Packs/day: 1.00     Years: 50.00     Pack years: 50.00     Types: Cigarettes     Start date: 1955     Quit date: 2010     Years since quittin.0    Smokeless tobacco: Never   Vaping Use    Vaping Use: Never used   Substance and Sexual Activity    Alcohol use: Yes     Alcohol/week: 8.4 oz     Types: 14 Cans of beer per week     Comment: 3 beers per day. 23:  2 beers per week.    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  Lives with his wife.  No social or domestic concerns.  No hearing aids or hearing concerns  no walking issues or balance or falls.  He has no issues driving.  No major memory concerns.  He did have eye surgery done for his cataracts and his colon partially removed for a polyp.  He also had operation on a trigger finger.  No hospitalizations in the last year though.     Social Determinants of Health     Financial Resource Strain: Not on file   Food Insecurity: Not on file   Transportation Needs: Not on file   Physical Activity: Not on file   Stress: Not on file   Social Connections: Not on file   Intimate Partner Violence: Not on file   Housing Stability: Not on file       Family History   Problem Relation Age of Onset    Stroke Mother     Heart Attack Father 85    Heart Disease Father     Cancer Neg Hx        Allergies:  Wound dressing adhesive    Review of Systems:  Negative except for joint pains due to chemotherapy    Physical Exam  Nursing note reviewed.   Constitutional:       General: He is not in acute distress.     Appearance: Normal appearance.   Pulmonary:      Effort: Pulmonary effort is normal. No respiratory distress.   Skin:     General: Skin is warm and dry.   Neurological:      General: No focal deficit present.      Mental Status: He is alert and oriented to person, place, and time.   Psychiatric:         Mood and Affect: Mood normal.         Behavior: Behavior normal.         Thought Content: Thought content normal.         Judgment: Judgment normal.        Labs:        Reviewed            Radiology:  IR-CVC TUNNEL WITH PORT REMOVAL    (Results Pending)             Assessment and Plan: This is a 83 y.o. with h/o colon and bladder CA presenting today for a port removal. Consent obtained and risks, benefits and alternatives discussed. Plan to proceed with procedure under local/sedation.

## 2023-04-21 NOTE — PROGRESS NOTES
IR Nursing Note    Port removal by MD Santiago assisted by RT Leal, right chest access site.  Patient refusing sedation stating his initial port was placed with lidocaine only. Patient also refusing an IV referencing pain and swelling in the hands and arms. Patient was placed in a supine position and prepped in a sterile fashion.    IV 3000 dressing applied with gauze to incision site. Discharge instructions reviewed with patient and spouse with a verbalized understanding. Due to patient's request to not have sedation, patient did not receive sedation and discharged home in stable unchanged condition

## 2023-04-21 NOTE — OR SURGEON
Immediate Post- Operative Note        Findings: Rt IJV port removed      Procedure(s): Rt IJV port removal      Estimated Blood Loss: Less than 5 ml        Complications: None            4/21/2023     3:48 PM     Gordon Burden M.D.

## 2023-05-05 NOTE — PROCEDURES
"NERVE CONDUCTION STUDIES AND ELECTROMYOGRAPHY REPORT        05/05/23      Referring provider: Eduard Lake M.D.      SUMMARY OF PATIENT'S CLINICAL HISTORY,PHYSICAL EXAM, AND RATIONALE FOR TESTING:    Mr. Brian Lopez 83 y.o. presenting with bilateral hand numbness worse at night and bilateral hand pain.    Past Medical History is significant for :   Past Medical History:   Diagnosis Date    Bladder cancer (HCC) 01/10/2022    Bowel habit changes     Constipation.    BPH (benign prostatic hyperplasia) 04/17/2014    Cancer (HCC)     Bladder cancer    Cataract     removed bilat    COPD (chronic obstructive pulmonary disease) (HCC)     Dental disorder     upper/lower dentures    Diabetes (HCC)     borderline pre-diabetes    Heart burn     Hemorrhagic disorder ([facility])     bloody noses    High cholesterol     Hypertension 04/17/2014    Hypertriglyceridemia 04/17/2014    Intestinal polyp     Pain 05/29/2019    right hand, 5-6/10    Pulmonary emphysema ([facility]) 02/11/2021    Saw [facility] previously dr Merary Wang  \"...However he was found to have some changes consistent with emphysema. A 50-pack-year smoking     Snoring     sleep study done, pt was not diagnosed with OSMAN    Swelling of thyroid gland 02/04/2020    Urinary bladder disorder     Urinary incontinence 12/30/2021 4/14/23: Pt. denies       The electrodiagnostic studies were performed to evaluate for possible peripheral neuropathy versus radiculopathy.      ELECTRODIAGNOSTIC EXAMINATION:  Nerve conduction studies (NCS) and electromyography (EMG) are utilized to evaluate direct or indirect damage to the peripheral nervous system. NCS are performed to measure the nerve(s) response(s) to electrostimulation across a given nerve segment. EMG evaluates the passive and active electrical activity of the muscle(s) in question.  Muscles are innervated by specific peripheral nerves and roots. Often times, several nerves the muscle to be examined in order to determine the " presence or absence of the disease process. Furthermore, nerves and muscles may need to be tested in a [id number] comparison, as well as in additional extremities, as this may be crucial in characterizing the extent of the disease process, which may be diffuse or isolated and of varying degree of severity. The extent of the neurodiagnostic exam is justified as it may help arrive to a proper diagnosis, which ultimately may contribute to better management of the patient. Therefore, the nerves to muscles examined during the study were medically necessary.    Unless otherwise noted, temperature of the extremity(s) study was monitored before and during the examination and remained between 32 and 36 degrees C for the upper extremities, and between 30 and 36 degrees C for the lower extremities.      NERVE CONDUCTION STUDIES:  Sensory nerves:   - Bilateral Median sensory nerves were examined.The responses were abnormal bilaterally.  On the left onset latency was prolonged at 4.0 ms and amplitude was decreased to 4.4 µV.  On the right onset latency was within normal limits and amplitude was decreased to 5.4 µV.  - Bilateral Ulnar sensory nerves were examined. The responses were within normal limits.    Motor nerves:   - Bilateral Median motor nerves were examined. Recording electrodes placed at the Abductor Pollicis Brevis muscles. The responses were abnormal bilaterally.  On the left onset latency was prolonged at 5.3 milliseconds, amplitude and conduction velocity were within normal limits.  On the right onset latency was prolonged to 4.7 ms and amplitude and conduction velocity were within normal limits.  - Bilateral Ulnar motor nerves were examined. Recording electrodes placed at the Abductor Digiti Minimi muscles. The responses were abnormal bilaterally.  Onset latency was normal bilaterally, amplitude was decreased to 6.2 mV on the left and 6.0 mV on the right, conduction velocity was normal  bilaterally.      ELECTROMYOGRAPHY:  The study was performed the concentric needle electrode. Fibrillation and fasciculation activity is graded by convention from none (0) to continuous (4+).  Needle electrode examination was performed in the following muscles: Bilateral deltoid, biceps, triceps, first dorsal interosseous, abductor pollicis brevis.  No acute denervation changes were noted.  There was no increased insertional activity fibrillation potentials or positive sharp waves.  Mild chronic neuropathic changes with decreased recruitment were noted in the left abductor pollicis brevis muscle, other muscles studied were normal electrophysiologically.      Nerve Conduction Studies     Stim Site NR Onset (ms) Norm Onset (ms) O-P Amp (µV) Norm O-P Amp Site1 Site2 Delta-P (ms) Dist (cm) Nehemias (m/s) Norm Nehemias (m/s)   Left Median Anti Sensory (2nd Digit)   Wrist    *4.0 <3.8 *4.4 >10 Wrist 2nd Digit 4.8 14.0 *29 >50   Right Median Anti Sensory (2nd Digit)   Wrist    3.8 <3.8 *5.4 >10 Wrist 2nd Digit 4.3 14.0 *33 >50   Left Ulnar Anti Sensory (5th Digit)   Wrist    2.6 <3.2 17.8 >5 Wrist 5th Digit 3.4 14.0 *41 >50   Right Ulnar Anti Sensory (5th Digit)   Wrist    2.6 <3.2 9.1 >5 Wrist 5th Digit 3.5 14.0 *40 >50        Stim Site NR Onset (ms) Norm Onset (ms) O-P Amp (mV) Norm O-P Amp Site1 Site2 Delta-0 (ms) Dist (cm) Nehemias (m/s) Norm Nehemias (m/s)   Left Median Motor (Abd Poll Brev)   Wrist    *5.3 <4 7.6 >5 Elbow Wrist 5.4 28.0 52 >50   Elbow    10.7  5.3          Right Median Motor (Abd Poll Brev)   Wrist    *4.7 <4 5.9 >5 Elbow Wrist 5.3 27.0 51 >50   Elbow    10.0  3.9          Left Ulnar Motor (Abd Dig Min)   Wrist    2.7 <3.1 *6.2 >7 B Elbow Wrist 3.8 21.0 55 >50   B Elbow    6.5  6.2  A Elbow B Elbow 1.3 10.0 77    A Elbow    7.8  5.9          Right Ulnar Motor (Abd Dig Min)   Wrist    2.7 <3.1 *6.0 >7 B Elbow Wrist 3.6 21.0 58 >50   B Elbow    6.3  5.5  A Elbow B Elbow 1.7 10.0 59    A Elbow    8.0  5.7                                 Electromyography     Side Muscle Nerve Root Ins Act Fibs Psw Amp Dur Poly Recrt Int Pat Comment   Left Deltoid Axillary C5-6 Nml Nml Nml Nml Nml 0 Nml Nml    Left Biceps Musculocut C5-6 Nml Nml Nml Nml Nml 0 Nml Nml    Left Triceps Radial C6-7-8 Nml Nml Nml Nml Nml 0 Nml Nml    Left 1stDorInt Ulnar C8-T1 Nml Nml Nml Nml Nml 0 Nml Nml    Left Abd Poll Brev Median C8-T1 Nml Nml Nml Nml Nml 0 *Reduced *75%    Right Deltoid Axillary C5-6 Nml Nml Nml Nml Nml 0 Nml Nml    Right Biceps Musculocut C5-6 Nml Nml Nml Nml Nml 0 Nml Nml    Right Triceps Radial C6-7-8 Nml Nml Nml Nml Nml 0 Nml Nml    Right 1stDorInt Ulnar C8-T1 Nml Nml Nml Nml Nml 0 Nml Nml    Right Abd Poll Brev Median C8-T1 Nml Nml Nml Nml Nml 0 Nml Nml              DIAGNOSTIC INTERPRETATION:   Extensive electrodiagnostic studies were performed to the bilateral upper extremities.  The results are as follows:    1.  Left carpal tunnel syndrome which is moderate to severe electrophysiologically and associated with mild chronic neuropathic changes in median nerve supplied hand muscles.    2.  Right carpal tunnel syndrome which is mild to moderate electrophysiologically and not associated with motor unit changes in median nerve supplied hand muscles.    3.  Mild decrease in amplitude of the bilateral ulnar motor conduction studies.  This could be a reflection of a generalized axonal neuropathy possibly due to the patient's recent chemotherapy.  Clinical correlation is suggested.    4.  No evidence of radiculopathy in selected muscles studied bilateral upper extremities.        STEPHANIA Pulliam M.D. (No note.)

## 2023-05-10 NOTE — PROGRESS NOTES
PRIMARY CARE CLINIC VISIT    Chief complaint:    Follow-up bilateral hand pain  Nerve Conduction test results  Bladder cancer discussion  Follow-up hypertension and hyperlipidemia      History of Present Illness     Bilateral hand pain  Chronic ongoing condition.  Patient reported bilateral hand pain noted since last several months.  Recent nerve conduction study completed..  Results discussed with the patient and his wife.  His daughter was also listening in through patient's wife's hand phone  Patient was referred to hand surgery for consultation.     DIAGNOSTIC INTERPRETATION:   Extensive electrodiagnostic studies were performed to the bilateral upper extremities.  The results are as follows:     1.  Left carpal tunnel syndrome which is moderate to severe electrophysiologically and associated with mild chronic neuropathic changes in median nerve supplied hand muscles.     2.  Right carpal tunnel syndrome which is mild to moderate electrophysiologically and not associated with motor unit changes in median nerve supplied hand muscles.     3.  Mild decrease in amplitude of the bilateral ulnar motor conduction studies.  This could be a reflection of a generalized axonal neuropathy possibly due to the patient's recent chemotherapy.  Clinical correlation is suggested.     4.  No evidence of radiculopathy in selected muscles studied bilateral upper extremities.       Bladder cancer (HCC)  Chronic condition.  The patient followed by urology service.  Patient is scheduled to undergo surgery  Advised the patient to discontinue meloxicam and all NSAIDs approximately 10 days prior to surgery.  Patient however may take Tylenol 500 mg 3 times a day as needed for pain control.    Hypertension  Chronic ongoing condition for the patient is taking amlodipine 5 mg daily and atenolol 50 mg daily.  No significant side effects reported.    Dyslipidemia  This is a chronic condition.  Patient currently taking pravastatin 40 Mg daily.   Patient tolerating medication well no significant side effects noted.    Current Outpatient Medications on File Prior to Visit   Medication Sig Dispense Refill    trospium (SANCTURA) 20 MG Tab trospium 20 mg tablet   TAKE 1 TABLET BY MOUTH EVERY DAY AS DIRECTED      trospium (SANCTURA) 20 MG Tab Take 20 mg by mouth every day.      diclofenac sodium (VOLTAREN) 1 % Gel APPLY TO ACHEY FINGERS TWICE DAILY AS NEEDED FOR PAIN      meloxicam (MOBIC) 15 MG tablet TAKE 1 TABLET BY MOUTH EVERY DAY AS NEEDED FOR MODERATE PAIN (Patient taking differently: 7.5 mg as needed.) 100 Tablet 0    pravastatin (PRAVACHOL) 40 MG tablet TAKE 1 TABLET BY MOUTH EVERY  Tablet 0    gabapentin (NEURONTIN) 100 MG Cap Take 1 Capsule by mouth at bedtime as needed (pain). Indications: Neuropathic Pain 90 Capsule 11    tamsulosin (FLOMAX) 0.4 MG capsule TAKE ONE CAPSULE BY MOUTH EVERY NIGHT AT BEDTIME 90 Capsule 3    lidocaine-prilocaine (EMLA) 2.5-2.5 % Cream       amLODIPine (NORVASC) 5 MG Tab Take 1 Tablet by mouth every day. 90 Tablet 3    Cholecalciferol (D3 2000) 2000 UNIT Cap Take 4,000 Units by mouth every day.      finasteride (PROSCAR) 5 MG Tab Take 5 mg by mouth every evening.      Mirabegron ER (MYRBETRIQ) 50 MG TABLET SR 24 HR Take 50 mg by mouth every day.      cyanocobalamin (VITAMIN B-12) 500 MCG Tab Take 500 mcg by mouth every day.      Multiple Vitamins-Minerals (MULTIVITAMIN ADULT PO) Take 1 Tablet by mouth every day.      Calcium Carb-Cholecalciferol (CALCIUM + D3 PO) Take 1 Tablet by mouth every day.       No current facility-administered medications on file prior to visit.        Allergies: Wound dressing adhesive    Current Outpatient Medications Ordered in Epic   Medication Sig Dispense Refill    trospium (SANCTURA) 20 MG Tab trospium 20 mg tablet   TAKE 1 TABLET BY MOUTH EVERY DAY AS DIRECTED      trospium (SANCTURA) 20 MG Tab Take 20 mg by mouth every day.      atenolol (TENORMIN) 50 MG Tab Take 1 Tablet by mouth  every day. 100 Tablet 2    diclofenac sodium (VOLTAREN) 1 % Gel APPLY TO ACHEY FINGERS TWICE DAILY AS NEEDED FOR PAIN      meloxicam (MOBIC) 15 MG tablet TAKE 1 TABLET BY MOUTH EVERY DAY AS NEEDED FOR MODERATE PAIN (Patient taking differently: 7.5 mg as needed.) 100 Tablet 0    pravastatin (PRAVACHOL) 40 MG tablet TAKE 1 TABLET BY MOUTH EVERY  Tablet 0    gabapentin (NEURONTIN) 100 MG Cap Take 1 Capsule by mouth at bedtime as needed (pain). Indications: Neuropathic Pain 90 Capsule 11    tamsulosin (FLOMAX) 0.4 MG capsule TAKE ONE CAPSULE BY MOUTH EVERY NIGHT AT BEDTIME 90 Capsule 3    lidocaine-prilocaine (EMLA) 2.5-2.5 % Cream       amLODIPine (NORVASC) 5 MG Tab Take 1 Tablet by mouth every day. 90 Tablet 3    Cholecalciferol (D3 2000) 2000 UNIT Cap Take 4,000 Units by mouth every day.      finasteride (PROSCAR) 5 MG Tab Take 5 mg by mouth every evening.      Mirabegron ER (MYRBETRIQ) 50 MG TABLET SR 24 HR Take 50 mg by mouth every day.      cyanocobalamin (VITAMIN B-12) 500 MCG Tab Take 500 mcg by mouth every day.      Multiple Vitamins-Minerals (MULTIVITAMIN ADULT PO) Take 1 Tablet by mouth every day.      Calcium Carb-Cholecalciferol (CALCIUM + D3 PO) Take 1 Tablet by mouth every day.       No current [facility]-ordered facility-administered medications on file.       Past Medical History:   Diagnosis Date    Bladder cancer (HCC) 01/10/2022    Bowel habit changes     Constipation.    BPH (benign prostatic hyperplasia) 04/17/2014    Cancer (HCC)     Bladder cancer    Cataract     removed bilat    COPD (chronic obstructive pulmonary disease) (HCC)     Dental disorder     upper/lower dentures    Diabetes (HCC)     borderline pre-diabetes    Heart burn     Hemorrhagic disorder (HCC)     bloody noses    High cholesterol     Hypertension 04/17/2014    Hypertriglyceridemia 04/17/2014    Intestinal polyp     Pain 05/29/2019    right hand, 5-6/10    Pulmonary emphysema (HCC) 02/11/2021    Saw PUL previously dr Reagan  "Wang  \"...However he was found to have some changes consistent with emphysema. A 50-pack-year smoking     Snoring     sleep study done, pt was not diagnosed with OSMAN    Swelling of thyroid gland 02/04/2020    Urinary bladder disorder     Urinary incontinence 12/30/2021 4/14/23: Pt. denies       Past Surgical History:   Procedure Laterality Date    TURBT (TRANSURETHRAL RESECTION OF BLADDER TUMOR) N/A 8/29/2022    Procedure: BIPOLAR TRANSURETHRAL RESECTION OF BLADDER TUMOR WITH INSTILLATION OF GEMCITABINE;  Surgeon: Gil Kee M.D.;  Location: [facility];  Service: Urology    TURBT (TRANSURETHRAL RESECTION OF BLADDER TUMOR) N/A 01/04/2022    Procedure: TURBT (TRANSURETHRAL RESECTION OF BLADDER TUMOR) - BIPOLAR WITH INSTILLATION OF GEMCITABINE;  Surgeon: Gil Kee M.D.;  Location: [facility];  Service: Urology    PB INCISE FINGER TENDON SHEATH Right 05/31/2019    Procedure: RELEASE, TRIGGER FINGER-  RING;  Surgeon: Simon Altamirano M.D.;  Location: SURGERY SAME DAY [facility]S;  Service: Orthopedics    SYNOVECTOMY Right 05/31/2019    Procedure: fasciatomy of palm;  Surgeon: Simon Altamirano M.D.;  Location: SURGERY SAME DAY [facility] ORS;  Service: Orthopedics    KNEE ARTHROPLASTY TOTAL Right 06/07/2017    Procedure: KNEE ARTHROPLASTY TOTAL;  Surgeon: Steffanie Del Angel M.D.;  Location: SURGERY [facility];  Service:     COLON RESECTION ROBOTIC  06/16/2014    Performed by Ezra Burks M.D. at [facility]    CATARACT EXTRACTION WITH IOL Bilateral 2011    KNEE ARTHROPLASTY TOTAL Left 2009    APPENDECTOMY  1970's    ARTHROSCOPY, KNEE Bilateral     CHOLECYSTECTOMY      HEMORRHOIDECTOMY      TRIGGER FINGER RELEASE Bilateral last 2000's    multiple        Family History   Problem Relation Age of Onset    Stroke Mother     Heart Attack Father 85    Heart Disease Father     Cancer Neg Hx        Social History     Tobacco Use   Smoking Status Former    Packs/day: " "1.00    Years: 50.00    Pack years: 50.00    Types: Cigarettes    Start date: 1955    Quit date: 2010    Years since quittin.0   Smokeless Tobacco Never   Vaping Use    Vaping Use: Never used       Social History     Substance and Sexual Activity   Alcohol Use Yes    Alcohol/week: 8.4 oz    Types: 14 Cans of beer per week    Comment: 3 beers per day. 23:  2 beers per week.       Review of systems.  As per HPI above. All other systems reviewed and negative.      Past Medical, Social, and Family history reviewed and updated in EPIC     Objective     /64 (BP Location: Left arm, Patient Position: Sitting, BP Cuff Size: Adult)   Pulse 80   Temp 36.9 °C (98.5 °F) (Temporal)   Resp 20   Ht 1.676 m (5' 6\")   Wt 74 kg (163 lb 3 oz)   SpO2 95%    Body mass index is 26.34 kg/m².    General: alert in no apparent distress.  Cardiovascular: regular rate and rhythm  Pulmonary: lungs : no wheezing   Gastrointestinal: BS present. No obvious mass noted        Lab Results   Component Value Date/Time    HBA1C 5.8 (H) 2023 10:20 AM    HBA1C 5.8 (H) 2022 07:39 AM    HBA1C 5.9 (H) 2020 07:11 AM       Lab Results   Component Value Date/Time    WBC 10.4 2023 10:43 AM    HEMOGLOBIN 13.9 (L) 2023 10:43 AM    HEMATOCRIT 42.2 2023 10:43 AM    MCV 85.6 2023 10:43 AM    PLATELETCT 308 2023 10:43 AM         Lab Results   Component Value Date/Time    SODIUM 139 2023 10:43 AM    POTASSIUM 3.5 (L) 2023 10:43 AM    GLUCOSE 132 (H) 2023 10:43 AM    BUN 10 2023 10:43 AM    CREATININE 0.57 2023 10:43 AM       Lab Results   Component Value Date/Time    CHOLSTRLTOT 147 2022 07:39 AM    TRIGLYCERIDE 175 (H) 2022 07:39 AM    HDL 46 2022 07:39 AM    LDL 66 2022 07:39 AM       Lab Results   Component Value Date/Time    ALTSGPT 20 2023 10:43 AM             Assessment and Plan     1. Essential hypertension  - atenolol " (TENORMIN) 50 MG Tab; Take 1 Tablet by mouth every day.  Dispense: 100 Tablet; Refill: 2  Chronic stable condition.  Continue to use amlodipine and Tylenol.  Continue to monitor blood pressure weekly basis at home.    2. Bilateral hand pain.  Abnormal nerve conduction study/EMG  Results of the test discussed with the patient and wife today.  Refer the patient to hand surgery for consultation.  The patient to consider using wrist splints which can be purchased over-the-counter  Patient may take Tylenol 500 mg 3 times daily as needed for pain.    3. Malignant neoplasm of urinary bladder, unspecified site (HCC)  Chronic condition.  Patient reported that he is scheduled to undergo cystoscopy soon.  Advised the patient to avoid NSAID 10 days prior to the procedure.  Follow-up with urology as directed.    4. Dyslipidemia  Chronic stable condition per continue with pravastatin 40 Mg daily.  Lipid panel requested.  Other orders  - trospium (SANCTURA) 20 MG Tab; trospium 20 mg tablet   TAKE 1 TABLET BY MOUTH EVERY DAY AS DIRECTED  - trospium (SANCTURA) 20 MG Tab; Take 20 mg by mouth every day.      Attestation: I spent:   46   min -  That includes time for chart review before the visit, the actual patient visit, and time spent on documentation in EMR after the visit.  Chart review/prep, review of other providers' records, imaging/lab review, face-to-face time for history/examination, ordering, prescribing,  review of results/meds/ treatment plan with patient, and care coordination.    The patient is of extensive complexity. This pt is at high risk for complications and morbidity.             Please note that this dictation was created using voice recognition software. I have made every reasonable attempt to correct obvious errors, but I expect that there are errors of grammar and possibly content that I did not discover before finalizing the note.    Eduard Lake MD  Internal Medicine  [facility]

## 2023-05-10 NOTE — ASSESSMENT & PLAN NOTE
This is a chronic condition.  Patient currently taking pravastatin 40 Mg daily.  Patient tolerating medication well no significant side effects noted.

## 2023-05-10 NOTE — ASSESSMENT & PLAN NOTE
Chronic ongoing condition.  Patient reported bilateral hand pain noted since last several months.  Recent nerve conduction study completed..  Results discussed with the patient and his wife.  His daughter was also listening in through patient's wife's hand phone  Patient was referred to hand surgery for consultation.     DIAGNOSTIC INTERPRETATION:   Extensive electrodiagnostic studies were performed to the bilateral upper extremities.  The results are as follows:     1.  Left carpal tunnel syndrome which is moderate to severe electrophysiologically and associated with mild chronic neuropathic changes in median nerve supplied hand muscles.     2.  Right carpal tunnel syndrome which is mild to moderate electrophysiologically and not associated with motor unit changes in median nerve supplied hand muscles.     3.  Mild decrease in amplitude of the bilateral ulnar motor conduction studies.  This could be a reflection of a generalized axonal neuropathy possibly due to the patient's recent chemotherapy.  Clinical correlation is suggested.     4.  No evidence of radiculopathy in selected muscles studied bilateral upper extremities.

## 2023-05-10 NOTE — ASSESSMENT & PLAN NOTE
Chronic condition.  The patient followed by urology service.  Patient is scheduled to undergo surgery  Advised the patient to discontinue meloxicam and all NSAIDs approximately 10 days prior to surgery.  Patient however may take Tylenol 500 mg 3 times a day as needed for pain control.

## 2023-05-10 NOTE — ASSESSMENT & PLAN NOTE
Chronic ongoing condition for the patient is taking amlodipine 5 mg daily and atenolol 50 mg daily.  No significant side effects reported.

## 2023-05-16 NOTE — ANESTHESIA PROCEDURE NOTES
Airway    Date/Time: 5/16/2023 3:19 PM    Performed by: Jana Freeman M.D.  Authorized by: Jana Freeman M.D.    Location:  OR  Urgency:  Elective  Indications for Airway Management:  Anesthesia      Spontaneous Ventilation: absent    Sedation Level:  Deep  Preoxygenated: Yes    Patient Position:  Sniffing  Mask Difficulty Assessment:  0 - not attempted  Final Airway Type:  Endotracheal airway  Final Endotracheal Airway:  ETT  Cuffed: Yes    Technique Used for Successful ETT Placement:  Direct laryngoscopy    Insertion Site:  Oral  Blade Type:  Simone  Laryngoscope Blade/Videolaryngoscope Blade Size:  3  ETT Size (mm):  7.0  Measured from:  Lips  ETT to Lips (cm):  22  Placement Verified by: auscultation and capnometry    Cormack-Lehane Classification:  Grade I - full view of glottis  Number of Attempts at Approach:  1

## 2023-05-16 NOTE — PROGRESS NOTES
"Pharmacy Chemotherapy calculation:    Pt Name: Brian Lopez  DX: Bladder Cancer    Cycle 3  Previous treatment = 1/4/2022, 8/29/2022    Regimen: Intravesical Gemcitabine  Gemcitabine 2000 mg in  ml instilled intravesically within 3 hours after TURBT  Donna DELGADO, et al. Effect of Intravesical Instillation of Gemcitabine vs Saline Immediately Following Resection of Suspected Low-Grade Non-Muscle-Invasive Bladder Cancer on Tumor Recurrence SWOG  Randomized Clinical Trial. BASIL. 2018;319(18):2283-6949       Ht 1.676 m (5' 6\")   Wt 74 kg (163 lb 2.3 oz)   BMI 26.33 kg/m²   Body surface area is 1.86 meters squared.  LABS: not required       Gemcitabine 1000 mg in NS 50ml intravesically via cath tipped syringe x 2 syringes   Fixed dose, no calculation required. To be administered by MD. Luis Armando Ndiaye, PharmD    "

## 2023-05-16 NOTE — ANESTHESIA TIME REPORT
Anesthesia Start and Stop Event Times     Date Time Event    5/16/2023 1452 Ready for Procedure     1512 Anesthesia Start     1605 Anesthesia Stop        Responsible Staff  05/16/23    Name Role Begin End    Jana Freeman M.D. Anesth 1512 1605        Overtime Reason:  overtime    Comments:

## 2023-05-16 NOTE — OR NURSING
Dr Freeman updated that pat had Ukrainian this am at 0730, per MD surgery delayed until at least 1330, ot and family updated

## 2023-05-16 NOTE — OP REPORT
Urologic Surgery Operative Report     Pre-operative Diagnosis: 1. Bladder mass  2. History of recurrent urothelial carcinoma of the baldder   Post-operative Diagnosis: Same as above   Procedure: Transurethral Resection of Bladder tumor (6 cm in size)  Intravesical instillation of gemcitabine   Surgeon Gil Kee M.D.    Assistant: None   Anesthesia: General, with paralysis  Anesthesiologist: Jana Freeman M.D.   GET   Estimated Blood Loss: Minimal       Specimens: 1. Posterior dome bladder tumor chips sent for surgical pathology   Drains: 1. 20F paz catheter to bag   Complications: None   Condition: Stable, procedure well tolerated    Disposition:  1. PACU, discharge home after recovery, remove chemo, discharge with paz to be removed tomorrow in clinic, f/u 2-3 wks to discuss pathology,   2. Gemcitabine to be removed at one hour after placement   Findings: 1.  Cystoscopy demonstrated -overall the patient had normal anterior urethra, the prostate is small nonobstructing and large open bladder neck.  Slightly high riding bladder neck.  Within the bladder there were multiple previous TUR scars.  There are stones layering in the posterior bladder, there is a previous resection site   Right lateral bladder with stone present.  No masses around this.  The main bladder tumors were anterior bladder and right lateral bladder wall.  These appeared relatively low-grade but around 6 cm total area of multiple tumors.  Full-thickness resection.  No other significant tumors identified on full surveillance cystoscopy.         INDICATIONS FOR PROCEDURE:  Brian Lopez is a history of recurrent noninvasive bladder cancer and carcinoma in situ who has failed BCG and also was managed with Keytruda but this was stopped due to multiple side effects, and recent cystoscopy demonstrated likely recurrence near the posterior dome concerning for bladder cancer recurrence. Risks discussed, but not limited to, were  infection, sepsis, bleeding, bladder injury, need for secondary procedure, inability to resect all of tumor, injury to ureters, need for paz post op, possible admission post operatively, and the cardiovascular and pulmonary complications of anesthesia/surgery including DVT, Mi, PE, and death    PROCEDURE IN DETAIL:   After informed consent was obtained, the patient was taken to the operating room and given Ancef for preoperative antibiotics.  After satisfactory induction of general anesthesia, he was then placed in the lithotomy position after a time out was called and prepped and draped in the normal sterile fashion.      Cystoscopy was initially performed using 30 degree lens which revealed the above operative findings.    I used the 26F resectoscope to proceed with excision of his tumor(s) using bipolar cautery. The lesion was resected down to muscular layer focusing on complete resection of the visualized tumor.  After resection, the bladder fragments were irrigated out with bladder tumor specimens brought out with the Makoondi evacuator and sent for surgical pathology.  He has a very large capacity bladder and the tumors were difficult to reach and needed to be resected at low fill volume of the bladder.  The multiple tumors were throughout and the resection site was 1 continuous resection site incorporating all of the tumors.  This was right anterior and lateral near the dome.    Hemostasis was assured using coagulation/fulguration over the course of the base of tumor, we also fulgarated a rim of tissue circumferentially to help eliminate any potentially residual tumor. We therefore terminated the case at this time and placed a 20 Wolof paz catheter was placed.     At this point 2g of gemcitabine in 100ml  Was instilled into bladder via use of cath tip syringe at 355pm.  A clamp was placed, and this will be removed one hour post procedure in PACU.  The patient was then awakened from anesthesia and brought  to recovery in stable condition.        Gil Kee MD  [address] [place] [address]  [phone]

## 2023-05-16 NOTE — PROGRESS NOTES
"Pharmacy Chemotherapy verification:       Dx: Bladder Cancer        Protocol: Intravesical Gemcitabine     Dosing Reference:  Gemcitabine 2000 mg in  ml instilled intravesically within 3 hours after TURBT  Donna DELGADO, et al. Effect of Intravesical Instillation of Gemcitabine vs Saline Immediately Following Resection of Suspected Low-Grade Non-Muscle-Invasive Bladder Cancer on Tumor Recurrence SWOG  Randomized Clinical Trial. BASIL. 2018;319(18):8230-3745     Ht 1.676 m (5' 6\")   Wt 74 kg (163 lb 2.3 oz)   BMI 26.33 kg/m²     Body surface area is 1.86 meters squared.       Allergies: Wound dressing adhesive  No labs required     Drug Order   (Drug name, dose, route, IV Fluid & volume, frequency, number of doses) Cycle: 3      Previous treatment: Bladder gem 1/2022 and 8/2022, s/p 6 cycles Keytruda, last 2/17/23      Medication = Gemcitabine  Base Dose= 2000 mg total  Calc Dose: No calc required  Final Dose = 2000 mg total split into 1000 mg x 2 in cath tip syringe  Route = Intravesical  Fluid & Volume = NS 50 mL in cath tip syringe each  Administered by physician    To be administered by MD sandoval to treat with final dose      By my signature below, I confirm this process was performed independently with the BSA and all final chemotherapy dosing calculations congruent. I have reviewed the above chemotherapy order and that my calculation of the final dose and BSA (when applicable) corroborate those calculations of the  pharmacist. Discrepancies of 10% or greater in the written dose have been addressed and documented within the [facility] notes.    Kateryna Addison, PharmD, BCOP     "

## 2023-05-16 NOTE — DISCHARGE INSTRUCTIONS
DR. Kee DISCHARGE INSTRUCTIONS FOLLOWING    TRANSURETHRAL RESECTION OF BLADDER TUMOR (TURBT)     DIET:  You can resume your regular diet. We encourage you to eat well-balanced and nutritious meals.      ACTIVITY:  Please restrain from strenuous activity or heavy lifting (more than 10 pounds) for two weeks following your TUR procedure.  Please walk daily as much as tolerated, making exercise a part of your daily life. Do not drive while using narcotics for pain control if you are using them.  Please avoid excessive straining with defecation and use stool softeners as directed to prevent constipation!     WOUND CARE:  You have no dressing or wound to manage.     CATHETER CARE:  You may go home with a urethral catheter (“paz). The purpose of this catheter is to make sure your bladder is continuously drained and does not become too distended as it heals over the next couple of days. Take care of the paz as you were shown in the recovery area.     MEDICATIONS:  1. Please use plain Tylenol or Advil (Motrin, Ibuprofen, etc) for pain and stool softeners (Miralax and Colace) as directed.   2. Continue home myrbetriq for bladder spasms and urgency/frequency      FOLLOW-UP:  We will call you to schedule your follow up appointment in 1-3 days for your void trial and catheter removal. If you have not heard from us in 1-2 business days, please call [phone] to schedule your follow-up appointment. You may also contact this number if you have questions or concerns that can be answered by Dr. Kee staff.     We will call you to schedule your follow up appointment in 2 weeks for follow up. If you have not heard from us in 1-2 business days, please call [phone] to schedule your follow-up appointment. You may also contact this number if you have questions or concerns that can be answered by Dr. Kee staff.     WARNING SIGNS:  Fever greater than 101 degrees Fahrenheit, chills, nausea or vomiting, Large amount  of clots in urine that make it difficult to urinate or for urine to drain from paz, increasing pain, or abdominal swelling. If you are experiencing these symptoms, call the Urology Clinic or go to your local PCP or emergency room.    It is normal to see blood in your urine for up to 2 weeks even from surgery. The urine may clear up entirely, and then turn bloody again a few days later depending on your activity level; do not be alarmed. However, if you experience severe pain or tenderness, have a lot of increased bleeding, or find that you are unable to urinate because of large clots, please notify your doctor immediately          HOME CARE INSTRUCTIONS    ACTIVITY: Rest and take it easy for the first 24 hours.  A responsible adult is recommended to remain with you during that time.  It is normal to feel sleepy.  We encourage you to not do anything that requires balance, judgment or coordination.    FOR 24 HOURS DO NOT:  Drive, operate machinery or run household appliances.  Drink beer or alcoholic beverages.  Make important decisions or sign legal documents.    DIET: To avoid nausea, slowly advance diet as tolerated, avoiding spicy or greasy foods for the first day.  Add more substantial food to your diet according to your physician's instructions.  INCREASE FLUIDS AND FIBER TO AVOID CONSTIPATION.      MEDICATIONS: Resume taking daily medication.  Take prescribed pain medication with food.  If no medication is prescribed, you may take non-aspirin pain medication if needed.  PAIN MEDICATION CAN BE VERY CONSTIPATING.  Take a stool softener or laxative such as senokot, pericolace, or milk of magnesia if needed.    Prescription given for -none    Last pain medication given at 11:13am Tylenol 1000mg.    You should CALL YOUR PHYSICIAN if you develop:  Fever greater than 101 degrees F.  Pain not relieved by medication, or persistent nausea or vomiting.  Excessive bleeding (blood soaking through dressing) or unexpected  drainage from the wound.  Extreme redness or swelling around the incision site, drainage of pus or foul smelling drainage.  Inability to urinate or empty your bladder within 8 hours.  Problems with breathing or chest pain.    You should call 911 if you develop problems with breathing or chest pain.  If you are unable to contact your doctor or surgical center, you should go to the nearest emergency room or urgent care center.      Physician's telephone #: [phone] Dr Kee    MILD FLU-LIKE SYMPTOMS ARE NORMAL.  YOU MAY EXPERIENCE GENERALIZED MUSCLE ACHES, THROAT IRRITATION, HEADACHE AND/OR SOME NAUSEA.    If any questions arise, call your doctor.  If your doctor is not available, please feel free to call the Surgical Center at [phone].  The Center is open Monday through Friday from 7AM to 7PM.      A registered nurse may call you a few days after your surgery to see how you are doing after your procedure.    You may also receive a survey in the mail within the next two weeks and we ask that you take a few moments to complete the survey and return it to us.  Our goal is to provide you with very good care and we value your comments.     Depression / Suicide Risk    As you are discharged from this [facility] facility, it is important to learn how to keep safe from harming yourself.    Recognize the warning signs:  Abrupt changes in personality, positive or negative- including increase in energy   Giving away possessions  Change in eating patterns- significant weight changes-  positive or negative  Change in sleeping patterns- unable to sleep or sleeping all the time   Unwillingness or inability to communicate  Depression  Unusual sadness, discouragement and loneliness  Talk of wanting to die  Neglect of personal appearance   Rebelliousness- reckless behavior  Withdrawal from people/activities they love  Confusion- inability to concentrate     If you or a loved one observes any of these behaviors or  has concerns about self-harm, here's what you can do:  Talk about it- your feelings and reasons for harming yourself  Remove any means that you might use to hurt yourself (examples: pills, rope, extension cords, firearm)  Get professional help from the community (Mental Health, Substance Abuse, psychological counseling)  Do not be alone:Call your Safe Contact- someone whom you trust who will be there for you.  Call your local CRISIS HOTLINE 642-4027 or 673-527-8912  Call your local Children's Mobile Crisis Response Team [place] (126) 782-2532 or www.[employer]  Call the toll free National Suicide Prevention Hotlines   National Suicide Prevention Lifeline 929-803-UYMT (6983)  National Hope Line Network 800-SUICIDE (913-5530)    I acknowledge receipt and understanding of these Home Care instructions.

## 2023-05-16 NOTE — OR NURSING
1602 Pt arrived from OR via gurney. Report given by anesthesia and RN. 98.2f 6L sleeping, perrla, 6L mask even non labored breathing. Lungs clear airway patent. SR. Skin pink warm dry. Piv patent. Paz clamped, to be unclamped at 1650. Penile tip cdi. No drainage.     1615 awake clear speech, follows commands, DENIES pain and nausea.     1630 sleeping, face relaxed, even non labored breathing. Skin pink warm dry.     1650 paz unclamped, gemcitabine removed.     1655 updated Anita, daughter in law    1700 per MD Kee, pt to go home with paz.     1705 catheter tube leaking, hole in tube. Pt wet. MD Kee at bedside ordered to remove paz.  Give bed bath with CHG and soap water. Replace with 18 Finnish paz cath to go home with.     1710 Paz removed. bed bath given with CHG, soap and water. All linens and gowns changed.      1720 18 Upper sorbian paz cath placed with sterile technique.    1740 resting comfortably. RA even non labored breathing. DENIES pain and nausea. Paz patent.     1802 anju Howard, daughter in law    1806 report given to Nga MCCONNELL, rm 25 phase. Ready for transfer to phase 2

## 2023-05-16 NOTE — ANESTHESIA PREPROCEDURE EVALUATION
Case: [id number] Date/Time: 05/16/23 1443    Procedure: BIPOLAR TRANSURETHRAL RESECTION OF BLADDER TUMOR WITH INSTALLATION OF GEMCITABINE    Pre-op diagnosis: MALIGNANT TUMOR OF URINARY BLADDER    Location: [facility] OR 18 / SURGERY [facility]    Surgeons: Gil Kee M.D.          Relevant Problems   ANESTHESIA   (negative) History of anesthesia complications   (negative) PONV (postoperative nausea and vomiting)   (negative) Sleep apnea      PULMONARY   (positive) COPD      NEURO   (negative) CVA (cerebral vascular accident) (HCC)   (negative) TIA (transient ischemic attack)      CARDIAC   (positive) Hypertension      Other   (positive) BPH (benign prostatic hyperplasia)   (positive) Dyslipidemia   (positive) Former smoker       Physical Exam    Airway   Mallampati: II  TM distance: >3 FB  Neck ROM: full       Cardiovascular - normal exam  Rhythm: regular  Rate: normal  (-) murmur     Dental - normal exam  (+) upper dentures, lower dentures        Facial Hair   Pulmonary - normal exam  Breath sounds clear to auscultation     Abdominal    Neurological - normal exam                 Anesthesia Plan    ASA 3       Plan - general       Airway plan will be ETT          Induction: intravenous    Postoperative Plan: Postoperative administration of opioids is intended.    Pertinent diagnostic labs and testing reviewed    Informed Consent:    Anesthetic plan and risks discussed with patient.    Use of blood products discussed with: patient whom consented to blood products.

## 2023-05-17 NOTE — ANESTHESIA POSTPROCEDURE EVALUATION
Patient: Brian Lopez    Procedure Summary     Date: 05/16/23 Room / Location: [place] [address] / SURGERY [facility]    Anesthesia Start: 1512 Anesthesia Stop: 1605    Procedure: BIPOLAR TRANSURETHRAL RESECTION OF BLADDER TUMOR WITH INSTALLATION OF GEMCITABINE (Bladder) Diagnosis: (MALIGNANT TUMOR OF URINARY BLADDER)    Surgeons: Gil Kee M.D. Responsible Provider: Jana Freeman M.D.    Anesthesia Type: general ASA Status: 3          Final Anesthesia Type: general  Last vitals  BP   Blood Pressure : 130/70    Temp   36.7 °C (98.1 °F)    Pulse   83   Resp   20    SpO2   96 %      Anesthesia Post Evaluation    Patient location during evaluation: PACU  Patient participation: complete - patient participated  Level of consciousness: awake and alert    Airway patency: patent  Anesthetic complications: no  Cardiovascular status: hemodynamically stable  Respiratory status: acceptable  Hydration status: euvolemic    PONV: none          No notable events documented.     Nurse Pain Score: 0 (NPRS)

## 2023-05-17 NOTE — OR NURSING
1809H- Received patient from Pacu 83 year old male alert and oriented x 4 and does not complain of any type of pain. On bed with IFC anchored securely on the right thigh draining with 100ml of pinkish urine with scant clots noted.  V/s taken and recorded.  Family at the bedside.  1825H discharge instructions given.  1827H wheeled down to the lobby  by Loreta orellana to his transport .

## 2023-07-27 NOTE — OP REPORT
[facility] Operative Report    Pre-operative Diagnosis: Bladder cancer   Post-operative Diagnosis: Same as above   Procedure: 1. Robotic assisted laparoscopic Radical cystoprosatectomy   2. Bilateral pelvic lymph node dissection.  3. Small bowel resection  4. Open Ileal conduit urinary diversion   Surgeon: Gil Kee M.D.,   Assistant: Eduard Claire MD   Anesthesia: General, ET, Gera Davis MD   Estimated Blood Loss: 200cc       Specimens: 1. Bladder and prostate  2. Right and Left pelvic lymph nodes (sent togeether)  3. R and left distal ureteral margins for frozen  4. R and L proximal ureteral margins for permanent   Drains: 1. 6x28JJ stents bilaterally  2.  Red rubber catheter 16 Citizen of [place] in the ostomy  3.  19 round drain pelvis  4.  15 round drain subcutaneous  5.  Prevena wound VAC (incisional)   Complications: None   Wound class II clean contaminated   Condition: Stable, procedure well tolerated    Disposition:  PACU, admission, slow advancement of diet, early ambulation, 30 days of subcutaneous Lovenox   Findings: 1. Normal pelvic anatomy and vasculature  2. No obvious pathologic or bulky adenopathy  3.  The patient had previous right hemicolectomy and we were unable to visualize the small bowel to transverse colon anastomosis and therefore the decision was made to complete the ileal conduit urinary diversion in an open fashion and therefore we opened.  4.  Pelvic lymph nodes were distal to the common iliac including the obturators all the way to the bladder medially.     Indications for Procedure:  Brian Lopez is a 83 y.o. 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  He had tried BCG as well as Keytruda.  After a full discussion of the alternatives risks and benefits of the procedure, the patient consented to proceeding with the planned procedure. Risks discussed, but not limited to, included infection, sepsis, bleeding requiring tranfusion,  herniation of incision or stomal, worsened renal function, ileus, urinary leak, need for secondary procedures, bowel obstructions, and the cardiovascular and pulmonary complications of anesthesia/surgery including DVT, Pe, and death.     Procedure in Detail:  The patient was given perioperative antibiotics as well as 5000 subcutaneous heparin.  Perioperative timeout identify the correct patient laterality procedure and surgeon.  He was positioned and padded appropriately for the headdown position for robotic portion of the procedure.  He was sterilely prepped and draped in the standard fashion using ChloraPrep.    A 16-[place] Luna catheter was placed per urethra.     We entered the abdomen superior to the umbilicus using a Veress needle gaining access and insufflating the abdomen to 15 mmHg.  Drop test is negative and low opening pressure.  We used a 5 mm Visiport and enter the abdomen in the right upper quadrant.  There were minimal intra-abdominal adhesions only small adhesions in the left upper quadrant.  All remaining robotic ports including a 12 mm left lower quadrant robotic port as well as a 12 mm assistant port were all placed under direct vision after infiltrating the skin using Marcaine.  The robot was then docked and we started a robotic portion.    Each ureter was dissected down to the level of the bladder and then Hem-o-ashley Weck clip was applied.  The ureter was transected and then brought out of the pelvis.  This was repeated on the left and right side.  We continued with the posterior dissection carefully dissecting the rectum posteriorly and creating a plane towards the apex of the prostate.  To take the bladder and prostate pedicles we used a LigaSure electrocautery pain careful attention to the location of the rectum.  Once the pedicles were taken and the endopelvic fascia was opened we dropped the bladder anteriorly.  We used LigaSure and Bovie electrocautery to come through the DVC.  The DVC and  the urethra were then oversewn with a 2-0 strata fix suture.    The bladder and prostate were then placed into an Endo Catch bag.  We passed the ureter posterior to the sigmoid mesentery and created a nice window.    We performed a standard pelvic lymph node dissection going from the bifurcation of the external and internal distally and medially.  These nodes appeared nonpathologic and were placed into a Endo Catch bag.      We then attempted to identify the distal ileum using cautery a robotic instruments and given the previous right hemicolectomy were unable to do so safely and therefore decided to open the abdomen.  The reports were removed and the previously placed 0 Vicryl stay ties for the fascia for the 12 mm ports were tied down.  The robot was undocked.  We open the abdomen sharply from just superior to the umbilicus down to the pubic bone and then opened up the fascia.  All specimens were removed.  We used the Omni retractor for the remainder of the ileal conduit.    We are able to run the small bowel and identify the transverse colon to distal ileum anastomosis.  From there we took our ileal conduit from approximately 20 cm from this anastomosis.  This was the most mobile portion of our ileum.      We divided deeply in that location with the LigaSure device, and then divided the bowel with a MIKEL 75 stapler.  We measured off an approximately 20cm segment for our conduit.  We discarded another 5 cm proximal to that and divided the bowel with the MIKEL stapler.  We then re-established bowel continuity with a side-to-side stapled anastomosis with a MIKEL 55 stapler, closed the top with a TA 60. We also placed a silk in the crotch of the small bowel anastomosis.  The mesenteric trap was closed with interrupted 3-0 Vicryl.         We then positioned the conduit with the butt end overlying in the presacral space.  We brought the left ureter through the mesenteric trap and then anastomosed it to the conduit with  interrupted 4-0 vicryl.  The right ureter was brought down and anastomosed in a similar fashion.  Both ureters were stented with an double-J 6 x 28 cm standard stent, which was then passed up to the kidney and down into the conduit.     We removed the skin and subcutaneous fat from the predetermined location.      We divided the subcutaneous tissue and the fascia in Cross fashion and placed 4 sutures of 2-0 Vicryl through the fascia to help fix the conduit in place.  We came through the muscle belly of the rectus fascia and then brought a knuckle of the distal conduit up through that until it extended about an inch above the skin.  We fixed it to the fascia using those Vicryl sutures and then incised along the upper edge on the mucous fistula side and everted a nice stoma.  That was matured all the way around with 2-0 and 3-0 Vicryls in 4 quadrants using a mucosa to bowl to dermis stitch, and then circumferentially using mucosa to dermis stitch.  Red rubber catheter was placed and then secured to the skin with a 2-0 nylon.  19 [place] round drain was placed into the pelvis through one of the previous port sites.  An additional 15 round drain was placed in the subcutaneous space.     We irrigated the abdomen again with sterile water. We then closed the fascia with running #1 PDS in 2 segments.  Subcutaneous tissue was irrigated and closed with 3-0 Vicryl, and the skin was closed with 4-0 Monocryl subcuticular stitch.  Subcutaneous space and incision were covered with a Prevena wound VAC.         KEIRY Hernandez MD [id number]  [phone]

## 2023-07-27 NOTE — ASSESSMENT & PLAN NOTE
Hx of high risk noninvasive bladder cancer s/p TURBT and Keytruda  Now with high-grade papillary urothelial carcinoma.    Patient is for cystoprostatectomy today 07/27/2023 with Dr. Kee.    Pain control with oral and IV opioids -monitor for any signs of respiratory depression  Serial CBC, BMP, mag, Phos  Wound care - ileal conduit and wound vac  PT/OT evaluated recommended home health.

## 2023-07-27 NOTE — HOSPITAL COURSE
This is an 83-year-old male with past medical history of hypertension, dyslipidemia, COPD, BPH, high risk noninvasive bladder cancer s/p TURBT on Keytruda, high-grade papillary urothelial carcinoma for cystoprostatectomy scheduled 07/27/2023

## 2023-07-27 NOTE — ANESTHESIA PROCEDURE NOTES
Airway    Date/Time: 7/27/2023 8:23 AM    Performed by: Gera Davis M.D.  Authorized by: Gera Davis M.D.    Location:  OR  Urgency:  Elective  Indications for Airway Management:  Anesthesia      Spontaneous Ventilation: absent    Sedation Level:  Deep  Preoxygenated: Yes    Patient Position:  Sniffing  Mask Difficulty Assessment:  1 - vent by mask  Final Airway Type:  Endotracheal airway  Final Endotracheal Airway:  ETT  Cuffed: Yes    Technique Used for Successful ETT Placement:  Direct laryngoscopy  Devices/Methods Used in Placement:  Intubating stylet    Insertion Site:  Oral  Blade Type:  Simone  Laryngoscope Blade/Videolaryngoscope Blade Size:  3  ETT Size (mm):  7.0  Measured from:  Teeth  ETT to Teeth (cm):  22  Placement Verified by: capnometry    Cormack-Lehane Classification:  Grade IIa - partial view of glottis  Number of Attempts at Approach:  1

## 2023-07-27 NOTE — ANESTHESIA PREPROCEDURE EVALUATION
Case: [id number] Date/Time: 07/27/23 0730    Procedure: ROBOTIC ASSISTED LAPAROSCOPIC CYSTOPROSTATECTOMY WITH URINARY DIVERSION    Pre-op diagnosis: MALIGNANT TUMOR OF URINARY BLADDER    Location: [facility] OR 11 / SURGERY [facility]    Surgeons: Gil Kee M.D.      84yo M w/ HTN, COPD, bladder CA. NPO. Had TURBT in 5/2023, denies any new cardiopulmonary symptoms. METS >4. HR high 90s in preop. /93. COD sent.    Relevant Problems   PULMONARY   (positive) COPD      CARDIAC   (positive) Hypertension       Physical Exam    Airway   Mallampati: II  TM distance: >3 FB  Neck ROM: full       Cardiovascular - normal exam  Rhythm: regular  Rate: normal  (-) murmur     Dental   (+) upper dentures, lower dentures           Pulmonary - normal exam  Breath sounds clear to auscultation     Abdominal    Neurological - normal exam               Anesthesia Plan    ASA 3   ASA physical status 3 criteria: COPD - poorly controlled    Plan - general       Airway plan will be ETT          Induction: intravenous    Postoperative Plan: Postoperative administration of opioids is intended.    Pertinent diagnostic labs and testing reviewed    Informed Consent:    Anesthetic plan and risks discussed with patient.    Use of blood products discussed with: patient whom consented to blood products.

## 2023-07-27 NOTE — PROGRESS NOTES
Med rec complete per pt nad rx bottles- reviewed and returned to family at bedside with pt's permission

## 2023-07-27 NOTE — H&P
Hospital Medicine History & Physical Note    Date of Service  7/27/2023    Primary Care Physician  Eduard Lake M.D.    Consultants  urology    Specialist Names: Dr. Kee    Code Status  Full Code    Chief Complaint  No chief complaint on file.    History of Presenting Illness  This is an 83-year-old male with past medical history of dyslipidemia, COPD, BPH, high risk noninvasive bladder cancer s/p TURBT, now with high-grade papillary urothelial carcinoma.  Patient is for cystoprostatectomy today 07/27/2023 with Dr. Kee.    Medicine team consulted for medical management.    Patient seen postoperatively in PACU.  Patient is quite sleepy, states he is currently comfortable at this time.  He is on 2 L of oxygen. Patient noted to have wound VAC, ileal conduit, and 2 RIKI drains in place.    Patient to be evaluated by PT/OT postoperatively.    I discussed the plan of care with patient and bedside RN.    Review of Systems  Review of Systems   All other systems reviewed and are negative.      Past Medical History   has a past medical history of Arthritis, Bladder cancer (HCC) (01/10/2022), Bowel habit changes, BPH (benign prostatic hyperplasia) (04/17/2014), Cancer (HCC), Cataract, COPD (chronic obstructive pulmonary disease) (HCC), Dental disorder, Diabetes (HCC), Heart burn, Hemorrhagic disorder (HCC), High cholesterol, Hypertension (04/17/2014), Hypertriglyceridemia (04/17/2014), Intestinal polyp, Pain (05/29/2019), Pulmonary emphysema (HCC) (02/11/2021), Sleep apnea, Snoring, Swelling of thyroid gland (02/04/2020), Urinary bladder disorder, and Urinary incontinence (12/30/2021).    Surgical History   has a past surgical history that includes cataract extraction with iol (Bilateral, 2011); colon resection robotic (06/16/2014); arthroscopy, knee (Bilateral); pb incise finger tendon sheath (Right, 05/31/2019); knee arthroplasty total (Left, 2009); trigger finger release (Bilateral, last 2000's); knee arthroplasty  total (Right, 06/07/2017); synovectomy (Right, 05/31/2019); appendectomy (1970's); hemorrhoidectomy; cholecystectomy; turbt (transurethral resection of bladder tumor) (N/A, 01/04/2022); turbt (transurethral resection of bladder tumor) (N/A, 8/29/2022); and turbt (transurethral resection of bladder tumor) (N/A, 5/16/2023).     Family History  family history includes Heart Attack (age of onset: 85) in his father; Heart Disease in his father; Stroke in his mother.   Family history reviewed with patient. There is no family history that is pertinent to the chief complaint.     Social History   reports that he quit smoking about 13 years ago. His smoking use included cigarettes. He started smoking about 68 years ago. He has a 50.00 pack-year smoking history. He has never used smokeless tobacco. He reports that he does not currently use alcohol. He reports that he does not use drugs.    Allergies  Allergies   Allergen Reactions    Wound Dressing Adhesive Unspecified     Tegaderm gives blisters around port, use hypoallergenic       Medications  Prior to Admission Medications   Prescriptions Last Dose Informant Patient Reported? Taking?   Calcium Carb-Cholecalciferol (CALCIUM + D3 PO) 7/26/2023 at [facility] Patient Yes No   Sig: Take 1 Tablet by mouth every morning.   GLUCOSAMINE SULFATE PO 7/26/2023 at [facility]  Yes Yes   Sig: Take 1 Tablet by mouth every day.   Multiple Vitamins-Minerals (MULTIVITAMIN ADULT PO) 7/26/2023 at [facility] Patient Yes No   Sig: Take 1 Tablet by mouth every morning.   gabapentin (NEURONTIN) 100 MG Cap 7/26/2023 at  Patient No No   Sig: Take 1 Capsule by mouth at bedtime as needed (pain). Indications: Neuropathic Pain   meloxicam (MOBIC) 15 MG tablet 7/19/2023 Patient No No   Sig: TAKE 1 TABLET BY MOUTH EVERY DAY AS NEEDED FOR MODERATE PAIN   Patient taking differently: Take 15 mg by mouth 1 time a day as needed (for pain).   pravastatin (PRAVACHOL) 40 MG tablet 7/26/2023 at  Patient No No   Sig: TAKE 1 TABLET  BY MOUTH EVERY DAY   Patient taking differently: Take 40 mg by mouth at bedtime.   tamsulosin (FLOMAX) 0.4 MG capsule 7/26/2023 at  Patient No No   Sig: TAKE ONE CAPSULE BY MOUTH EVERY NIGHT AT BEDTIME   Patient taking differently: Take 0.4 mg by mouth at bedtime.   trospium (SANCTURA) 20 MG Tab 7/26/2023 at [facility] Patient Yes No   Sig: Take 20 mg by mouth every morning.      Facility-Administered Medications: None       Physical Exam  Temp:  [36 °C (96.8 °F)-36.7 °C (98 °F)] 36.6 °C (97.9 °F)  Pulse:  [] 97  Resp:  [12-20] 13  BP: (143-184)/(64-95) 163/80  SpO2:  [96 %-99 %] 97 %  Blood Pressure : (!) 176/93 (RN notified.)   Temperature: 36 °C (96.8 °F)   Pulse: (!) 110   Respiration: 20   Pulse Oximetry: 96 %       Physical Exam  Vitals and nursing note reviewed.   Constitutional:       General: He is not in acute distress.     Appearance: Normal appearance.   HENT:      Head: Normocephalic and atraumatic.   Eyes:      Extraocular Movements: Extraocular movements intact.      Conjunctiva/sclera: Conjunctivae normal.      Pupils: Pupils are equal, round, and reactive to light.   Cardiovascular:      Rate and Rhythm: Normal rate and regular rhythm.      Pulses: Normal pulses.      Heart sounds: No murmur heard.     No friction rub. No gallop.   Pulmonary:      Effort: Pulmonary effort is normal. No respiratory distress.      Breath sounds: Normal breath sounds. No wheezing, rhonchi or rales.   Abdominal:      General: Bowel sounds are normal. There is no distension.      Palpations: Abdomen is soft.      Tenderness: There is no abdominal tenderness.      Comments: Abdominal wall with wound VAC in place, ileal conduit with output, 2 RIKI drains in the left lower quadrant with minimal output   Musculoskeletal:         General: No swelling or tenderness. Normal range of motion.      Cervical back: Normal range of motion and neck supple. No muscular tenderness.      Right lower leg: No edema.      Left lower leg: No  edema.   Skin:     General: Skin is warm and dry.      Capillary Refill: Capillary refill takes less than 2 seconds.      Findings: No bruising, erythema or rash.   Neurological:      General: No focal deficit present.      Mental Status: He is alert and oriented to person, place, and time.         Laboratory:          No results for input(s): ALTSGPT, ASTSGOT, ALKPHOSPHAT, TBILIRUBIN, DBILIRUBIN, GAMMAGT, AMYLASE, LIPASE, ALB, PREALBUMIN, GLUCOSE in the last 72 hours.      No results for input(s): NTPROBNP in the last 72 hours.      No results for input(s): TROPONINT in the last 72 hours.    Imaging:  No orders to display       no X-Ray or EKG requiring interpretation    Assessment/Plan:  Justification for Admission Status  I anticipate this patient will require at least two midnights for appropriate medical management, necessitating inpatient admission because continuous monitoring, serial labs    Patient will need a Med/Surg bed on SURGICAL service .  The need is secondary to continuous monitoring, serial labs.    * Bladder cancer (HCC)- (present on admission)  Assessment & Plan  Hx of high risk noninvasive bladder cancer s/p TURBT and Keytruda  Now with high-grade papillary urothelial carcinoma.    Patient is for cystoprostatectomy today 07/27/2023 with Dr. Kee.    Pain control with oral and IV opioids -monitor for any signs of respiratory depression  Serial CBC, BMP, mag, Phos  Wound care - ileal conduit and wound vac  PT/OT postoperatively    COPD- (present on admission)  Assessment & Plan  Not in acute exacerbation  DuoNebs as needed    Dyslipidemia- (present on admission)  Assessment & Plan  Resume statin therapy    BPH (benign prostatic hyperplasia)- (present on admission)  Assessment & Plan  Resume Flomax        VTE prophylaxis: SCDs/TEDs

## 2023-07-27 NOTE — ANESTHESIA PROCEDURE NOTES
Peripheral IV    Date/Time: 7/27/2023 8:26 AM    Performed by: Gera Davis M.D.  Authorized by: Gera Davis M.D.    Size:  16 G  Laterality:  Left  Peripheral IV Location:  Forearm  Local Anesthetic:  None  Site Prep:  Alcohol  Technique:  Ultrasound guided  Attempts:  1

## 2023-07-27 NOTE — ANESTHESIA TIME REPORT
Anesthesia Start and Stop Event Times     Date Time Event    7/27/2023 0805 Ready for Procedure     0810 Anesthesia Start     1321 Anesthesia Stop        Responsible Staff  07/27/23    Name Role Begin End    Min EMIGDIO Davis M.D. Anesth 0810 1321        Overtime Reason:  no overtime (within assigned shift)    Comments:

## 2023-07-27 NOTE — OR NURSING
1317 Patient arrived to PACU from OR.  Report from anesthesia and RN.  Patient is sleeping at this time.  Midline dressing with Prevena wound vac in place, urostomy noted to RIKI MANE drains x2 to abdomen.    1415 Patient more awake at this time, denies pain or nausea.  Patient updated on plan of care.  1420 Wife Liliana called and updated on patient status.  Wife has belongings.  1425 Dr. Simental at bedside.  1500 Patient arouses to voice, denies pain or discomfort.  Updated on plan of care.  Urostomy output is pink tinged.  1525 Report called to Steven MCCONNELL.    1535 Patient medicated per MAR for pain.  1543 Wife called and updated.  1545 Wound care at bedside.  1630 Patient reports tolerable pain.  Dressings unchanged.  RN updated.  1640 Patient transferred to [facility] via [facility] with transport.  Wife updated.

## 2023-07-28 NOTE — CARE PLAN
The patient is Stable - Low risk of patient condition declining or worsening    Shift Goals  Clinical Goals: pain control with PCA  Patient Goals: rest, pain control    Progress made toward(s) clinical / shift goals:  Pt pain is well controlled with PCA with pain ranging from 0-3/10. Pt has has been resting comfortably during the shift.    Patient is not progressing towards the following goals:

## 2023-07-28 NOTE — PROGRESS NOTES
Note to reader: this note follows the APSO format rather than the historical SOAP format. Assessment and plan located at the top of the note for ease of use.    Chief Complaint  83 y.o. year old male here with bladder cancer.     Assessment/Plan  Interval History   Bladder cancer s/p robotic assisted laparoscopic radical cystoprostatectomy with pelvic lymph node dissection, small bowel resection and ileal conduit urinary diversion    Diet at limited clears  Change Heparin to Lovenox  Continue Rocephin. Will change to Keflex when taking PO  Incisional wound vac until day prior to discharge      Case discussed with patient, wound team and Urology-Dr. Chilango CURRY  Patient seen and examined    7/28. POD #1. Robotic assisted laparoscopic radical cystoprostatectomy with pelvic lymph node dissection, small bowel resection and ileal conduit urinary diversion yesterday. Doing well. Has PCA for pain management. Good UOP. Ostomy teaching underway. Feels hungry. No flatus. RIKI with 50 cc/24 hrs. On Heparin. Ambulating           Review of Systems  Physical Exam   Review of Systems   Constitutional:  Negative for chills and fever.   Gastrointestinal:  Positive for abdominal pain. Negative for nausea and vomiting.   Genitourinary:  Negative for hematuria.     Vitals:    07/27/23 2332 07/28/23 0313 07/28/23 0829 07/28/23 0838   BP: (!) 158/95 (!) 138/100 131/85    Pulse: (!) 110 (!) 102 99 (!) 103   Resp: 17 17 18    Temp: 37.1 °C (98.8 °F) 36.4 °C (97.6 °F) 37.1 °C (98.8 °F)    TempSrc: Temporal Temporal Temporal    SpO2: 93% 96% 92% 94%   Weight:       Height:         Physical Exam  Vitals and nursing note reviewed.   Constitutional:       Appearance: Normal appearance.   HENT:      Head: Normocephalic.      Nose: Nose normal.      Mouth/Throat:      Mouth: Mucous membranes are moist.   Eyes:      Pupils: Pupils are equal, round, and reactive to light.   Pulmonary:      Effort: Pulmonary effort is normal.   Abdominal:       Comments: Midline surgical incision with wound vac in place. RIKI drains with serosanguinous drainage. Urostomy with pink stoma and red caal catheter protruding, urine clive   Skin:     General: Skin is warm and dry.   Neurological:      General: No focal deficit present.      Mental Status: He is alert and oriented to person, place, and time.   Psychiatric:         Mood and Affect: Mood normal.         Behavior: Behavior normal.          Hematology Chemistry   Lab Results   Component Value Date/Time    WBC 11.2 (H) 07/28/2023 07:13 AM    HEMOGLOBIN 12.0 (L) 07/28/2023 07:13 AM    HEMATOCRIT 38.9 (L) 07/28/2023 07:13 AM    PLATELETCT 235 07/28/2023 07:13 AM     Lab Results   Component Value Date/Time    SODIUM 138 07/28/2023 07:13 AM    POTASSIUM 4.3 07/28/2023 07:13 AM    CHLORIDE 103 07/28/2023 07:13 AM    CO2 26 07/28/2023 07:13 AM    GLUCOSE 125 (H) 07/28/2023 07:13 AM    BUN 13 07/28/2023 07:13 AM    CREATININE 0.84 07/28/2023 07:13 AM         Labs not explicitly included in this progress note were reviewed by the author.   Radiology/imaging not explicitly included in this progress note was reviewed by the author.     Labs reviewed and Medications reviewed

## 2023-07-28 NOTE — ANESTHESIA POSTPROCEDURE EVALUATION
Patient: Brian Lopez    Procedure Summary     Date: 07/27/23 Room / Location: [place] [address] / SURGERY [facility]    Anesthesia Start: 0810 Anesthesia Stop: 1321    Procedure: ROBOTIC ASSISTED LAPAROSCOPIC CYSTOPROSTATECTOMY WITH URINARY DIVERSION (Ureter) Diagnosis: (MALIGNANT TUMOR OF URINARY BLADDER)    Surgeons: Gil Kee M.D. Responsible Provider: Gera Davis M.D.    Anesthesia Type: general ASA Status: 3          Final Anesthesia Type: general  Last vitals  BP   Blood Pressure : (!) 155/101    Temp   36.5 °C (97.7 °F)    Pulse   (!) 106   Resp   18    SpO2   94 %      Anesthesia Post Evaluation    Patient location during evaluation: PACU  Patient participation: complete - patient participated  Level of consciousness: awake and alert    Airway patency: patent  Anesthetic complications: no  Cardiovascular status: hemodynamically stable  Respiratory status: acceptable  Hydration status: euvolemic    PONV: none          No notable events documented.     Nurse Pain Score: 4 (NPRS)

## 2023-07-28 NOTE — PROGRESS NOTES
Assumed care of patient at 1700. Bedside report received. Assessment complete.  AA&Ox4. Denies CP/SOB.  Reporting 4/10 pain. PCA Provided to Patient for Pain management   Educated patient regarding pharmacologic and non pharmacologic modalities for pain management.  Skin per flowsheets  NPO at this time. Denies N/V.  + void via Ileal Conduit Last BM PTA   Pt refused to ambulate at this time, Education Provided, Patient reinforced decision.  All needs met at this time. Call light within reach. Pt calls appropriately. Bed low and locked, non skid socks in place. Hourly rounding in place.

## 2023-07-28 NOTE — THERAPY
Physical Therapy   Initial Evaluation     Patient Name: Brian Lopez  Age:  83 y.o., Sex:  male  Medical Record #: [id number]  Today's Date: 7/28/2023     Precautions  Precautions: (P) Fall Risk  Comments: (P) LLQ RIKI x 2, midline prevena.    Assessment  Patient is an 83 y.o. male with a diagnosis of dx with bladder Ca. s/p laparoscopic cystoprostatectomy with urinary diversion.   Pt mobilizing as detailed below. Pt resides with his spouse in a single level home with no steps to enter. Spouse is able to assist as needed. Pt currently limited by post op pain and decreased activity tolerance.   Pt will benefit from continued inpatient PT while in house. Anticipate pt should progress to mobility level adequate for home with family assistance and home health services.      Plan    Physical Therapy Initial Treatment Plan   Treatment Plan : (P) Bed Mobility, Equipment, Gait Training, Neuro Re-Education / Balance, Self Care / Home Evaluation, Therapeutic Activities  Treatment Frequency: (P) 4 Times per Week  Duration: (P) Until Therapy Goals Met    DC Equipment Recommendations: (P) None  Discharge Recommendations: (P) Recommend home health for continued physical therapy services         Initial Contact Note    Initial Contact Note Order Received and Verified, Physical Therapy Evaluation in Progress with Full Report to Follow.   Precautions   Precautions Fall Risk   Comments LLQ RIKI x 2, midline prevena.   Vitals   Pulse Oximetry 92 %   O2 (LPM) 2   O2 Delivery Device Silicone Nasal Cannula   Pain 0 - 10 Group   Location Abdomen   Location Orientation Mid   Pain Rating Scale (NPRS) 6   Therapist Pain Assessment Post Activity Pain Same as Prior to Activity;5   Prior Living Situation   Prior Services None   Housing / Facility 1 Story House   Steps Into Home 0   Steps In Home 0   Equipment Owned Front-Wheel Walker   Lives with - Patient's Self Care Capacity Spouse   Prior Level of Functional Mobility   Bed Mobility  Independent   Transfer Status Independent   Ambulation Independent   Ambulation Distance community   Assistive Devices Used None   Comments Pt states he has a FWW from past knee surgeries.   History of Falls   History of Falls No   Cognition    Speech/ Communication Hard of Hearing   Level of Consciousness Alert   Active ROM Lower Body    Active ROM Lower Body  WDL   Strength Lower Body   Lower Body Strength  WDL   Comments no isolated weakness.   Sensation Lower Body   Lower Extremity Sensation   WDL   Neurological Concerns   Neurological Concerns No   Vision   Vision Comments no c/o visual deficits.   Other Treatments   Other Treatments Provided eduction on log roll, side <> sit, body mechanics, pacing activity and use of  FWW   Balance Assessment   Sitting Balance (Static) Fair +   Sitting Balance (Dynamic) Fair   Standing Balance (Static) Fair   Standing Balance (Dynamic) Fair -   Weight Shift Sitting Fair   Weight Shift Standing Fair   Comments w/FWW   Bed Mobility    Supine to Sit Minimal Assist   Sit to Supine Minimal Assist   Rolling Minimal Assist to Rt.   Comments cues for log roll.   Gait Analysis   Gait Level Of Assist Contact Guard Assist   Assistive Device Front Wheel Walker   Distance (Feet) 200   # of Times Distance was Traveled 1   Deviation Bradykinetic;Decreased Heel Strike;Decreased Toe Off  (Heavy reliance on FWW.)   # of Stairs Climbed 0   Weight Bearing Status no restrictions.   Functional Mobility   Sit to Stand Contact Guard Assist   Transfer Method Stand Step   Mobility supine>side>EOB> amb in hallway> BTB   How much difficulty does the patient currently have...   Turning over in bed (including adjusting bedclothes, sheets and blankets)? 1   Sitting down on and standing up from a chair with arms (e.g., wheelchair, bedside commode, etc.) 3   Moving from lying on back to sitting on the side of the bed? 3   How much help from another person does the patient currently need...   Moving to and  from a bed to a chair (including a wheelchair)? 3   Need to walk in a hospital room? 3   Climbing 3-5 steps with a railing? 3   6 clicks Mobility Score 16   Activity Tolerance   Sitting Edge of Bed 8 min   Standing 10 min   Edema / Skin Assessment   Comments RIKI, vac in place to suction.   Short Term Goals    Short Term Goal # 1 Pt will perform bed mobility at IND level by tx 6   Short Term Goal # 2 Pt will transfer with FWW at IND level by tx 6   Short Term Goal # 3 Pt will ambulate for 250 ft with FWW a S level by tx 6   Short Term Goal # 4 Pt will demonstrate understanding of log roll and proper mechanics for OOB by tx 6   Education Group   Education Provided Role of Physical Therapist;Gait Training;Use of Assistive Device;Transfer Status   Role of Physical Therapist Patient Response Patient;Acceptance;Explanation;Verbal Demonstration   Gait Training Patient Response Patient;Acceptance;Explanation;Reinforcement Needed   Use of Assistive Device Patient Response Patient;Acceptance;Explanation;Demonstration;Action Demonstration   Transfer Status Patient Response Patient;Acceptance;Explanation;Action Demonstration   Physical Therapy Initial Treatment Plan    Treatment Plan  Bed Mobility;Equipment;Gait Training;Neuro Re-Education / Balance;Self Care / Home Evaluation;Therapeutic Activities   Treatment Frequency 4 Times per Week   Duration Until Therapy Goals Met   Problem List    Problems Pain;Impaired Bed Mobility;Impaired Transfers;Impaired Ambulation;Decreased Activity Tolerance;Limited Knowledge of Post-Op Precautions   Anticipated Discharge Equipment and Recommendations   DC Equipment Recommendations None   Discharge Recommendations Recommend home health for continued physical therapy services   Interdisciplinary Plan of Care Collaboration   IDT Collaboration with  Nursing   Patient Position at End of Therapy In Bed;Call Light within Reach;Tray Table within Reach;Phone within Reach  (PCA in reach.)   Collaboration  Comments Staff updated.   Session Information   Date / Session Number  7/28-1 ( 1/4,8/3)

## 2023-07-28 NOTE — THERAPY
Occupational Therapy   Initial Evaluation     Patient Name: Brian Lopez  Age:  83 y.o., Sex:  male  Medical Record #: [id number]  Today's Date: 7/28/2023    Precautions: Fall Risk  Comments: LLQ RIKI x 2, midline prevena.    Assessment  Patient is 83 y.o. male seen s/p cystoprostatectomy with urinary diversion. PMHx of bladder cancer, dyslipidemia and COPD. Pt currently resides with his wife in a 1-story house and was independent with most ADLs and IADLs. Pt reports spouse is able to provide assist if needed. Pt currently limited by decreased activity tolerance, functional mobility, and lightheadedness impacting ability to independently complete ADLs and txfs safely. If family is able to provide consistent support for safety, pt would benefit from HHOT. He will benefit from ongoing acute OT services while in house.       Plan    Occupational Therapy Initial Treatment Plan   Treatment Interventions: Self Care / Activities of Daily Living, Adaptive Equipment, Neuro Re-Education / Balance, Therapeutic Exercises, Therapeutic Activity  Treatment Frequency: 3 Times per Week  Duration: Until Therapy Goals Met    DC Equipment Recommendations: Unable to determine at this time  Discharge Recommendations: Recommend home health for continued occupational therapy services (If family is able to provide consistent support for safety. If family is not able to provide consistent support, pt will benefit from post-acute)        Objective      Prior Living Situation   Prior Services Home-Independent   Housing / Facility 1 Story House   Steps Into Home 0   Steps In Home 0   Bathroom Set up Walk In Shower;Built-In Shower Chair   Equipment Owned Front-Wheel Walker;Tub / Shower Seat   Lives with - Patient's Self Care Capacity Spouse   Comments Pt reports that spouse is able to provide assist if needed.   Prior Level of ADL Function   Self Feeding Independent   Grooming / Hygiene Independent   Bathing Independent   Dressing Requires  Assist   Toileting Independent   Prior Level of IADL Function   Medication Management Independent   Laundry Independent   Kitchen Mobility Independent   Finances Independent   Home Management Independent   Shopping Independent   Prior Level Of Mobility Independent With Device in Community;Independent With Device in Home   Driving / Transportation Driving Independent   Precautions   Precautions Fall Risk   Comments LLQ RIKI x2, mindline prevena   Vitals   O2 (LPM) 2   O2 Delivery Device Silicone Nasal Cannula   Pain 0 - 10 Group   Therapist Pain Assessment Post Activity Pain Same as Prior to Activity;Nurse Notified  (Not rated, agreeable to activity)   Non Verbal Descriptors   Non Verbal Scale  Calm;Unlabored Breathing   Cognition    Cognition / Consciousness X   Speech/ Communication Hard of Hearing   Level of Consciousness Alert   Comments Pt is very pleasant and cooperative   Active ROM Upper Body   Active ROM Upper Body  X   Comments Distal BUE WDL, decreased shoulder flexion/abduction in B shoulders   Strength Upper Body   Upper Body Strength  X   Gross Strength Generalized Weakness, Equal Bilaterally.    Balance Assessment   Sitting Balance (Static) Fair +   Sitting Balance (Dynamic) Fair   Standing Balance (Static) Fair   Standing Balance (Dynamic) Fair -   Weight Shift Sitting Fair   Weight Shift Standing Fair   Comments w/FWW   Bed Mobility    Supine to Sit Minimal Assist   Sit to Supine Minimal Assist   Rolling Minimal Assist to Rt.   Comments HOB flat, use of rails   ADL Assessment   Grooming Standby Assist;Standing  (Washed hands and face at sink; completed denture care prior to session)   Upper Body Dressing Minimal Assist   Lower Body Dressing Maximal Assist  (Rpeorts wife helps him with socks at baseline)   Toileting   (Luna)   How much help from another person does the patient currently need...   Putting on and taking off regular lower body clothing? 2   Bathing (including washing, rinsing, and  drying)? 3   Toileting, which includes using a toilet, bedpan, or urinal? 2   Putting on and taking off regular upper body clothing? 3   Taking care of personal grooming such as brushing teeth? 4   Eating meals? 4   6 Clicks Daily Activity Score 18   Functional Mobility   Sit to Stand Contact Guard Assist   Bed, Chair, Wheelchair Transfer Contact Guard Assist   Toilet Transfers   (Declined need)   Mobility Functional mobility in room, w/FWW   Comments Reported fatigue and lightheadedness with functional mobility.   Activity Tolerance   Sitting Edge of Bed 8 min   Standing 10 min   Patient / Family Goals   Patient / Family Goal #1 To go home   Short Term Goals   Short Term Goal # 1 Pt will complete ADL txfs with supv   Short Term Goal # 2 Pt will complete toileting with supv   Short Term Goal # 3 Pt will complete standing g/h with supv   Education Group   Education Provided Role of Occupational Therapist;Activities of Daily Living   Role of Occupational Therapist Patient Response Patient;Acceptance;Explanation;Verbal Demonstration   ADL Patient Response Patient;Acceptance;Explanation;Verbal Demonstration

## 2023-07-28 NOTE — DISCHARGE PLANNING
Case Management Discharge Planning    Admission Date: 7/27/2023  GMLOS: 1.8  ALOS: 1    6-Clicks ADL Score: 20  6-Clicks Mobility Score: 18      Anticipated Discharge Dispo: Discharge Disposition: D/T to home under care of home health w/planned hosp IP readmit (86)     DME Needed: Pending PT/OT recs    Action(s) Taken: Updated Provider/Nurse on Discharge Plan    Pt is S/P Procedure: ROBOTIC ASSISTED LAPAROSCOPIC CYSTOPROSTATECTOMY WITH URINARY DIVERSION (Ureter) on 7/27/23.    Pt has spouse , Liliana  for support  and has HTH/SCP for Insurance.     David Jolly HTH /SCP TCN obtained HH and DME choice. Faxed to [facility].    For HH the choice is [facility], and [facility] is DME choice if ordered/needed.   Pt owns a FWW.    Escalations Completed: None    Medically Clear: No    Next Steps:   This RN CM to continue to assist Pt with discharge as needed    Barriers to Discharge:   Medical clearance  Home Health?  DME ?    Is the patient up for discharge tomorrow: No

## 2023-07-28 NOTE — PROGRESS NOTES
4 Eyes Skin Assessment Completed by Steven RN and Sol, RN.    Head WDL  Ears WDL  Nose WDL  Mouth WDL  Neck WDL  Breast/Chest WDL  Shoulder Blades WDL  Spine WDL  (R) Arm/Elbow/Hand WDL  (L) Arm/Elbow/Hand WDL  Abdomen RUQ Ileal Conduit, Stoma Pink/ Red, Moist  x2 RIKI Drains to the LUQ, MYRIAM Insertion Site, Dressing in place  Midline Prevena Wound Vac   Groin WDL  Coccyx/Buttocks R Buttock Boil, Wife States PTA   (R) Leg WDL  (L) Leg WDL  (R) Heel/Foot/Toe WDL  (L) Heel/Foot/Toe WDL          Devices In Places Blood Pressure Cuff, Pulse Ox, SCD's, and Nasal Cannula, Midline Wound Vac, RLQ Ileostomy, x2 RIKI Drains to LUQ      Interventions In Place Gray Ear Foams, Pillows, Low Air Loss Mattress, and Heels Loaded W/Pillows    Possible Skin Injury No    Pictures Uploaded Into Epic N/A  Wound Consult Placed Yes, S/p Iieal Conduit   RN Wound Prevention Protocol Ordered Yes

## 2023-07-28 NOTE — WOUND TEAM
Wound team consulted for patient education regarding new ileal conduit. WT to begin education tomorrow 7/28. Consult completed.

## 2023-07-28 NOTE — PROGRESS NOTES
Bedside report received.  Assessment complete.  A&O x 4. Patient calls appropriately.  Patient ambulates with x1 assist and FWW.  Patient has 2/10 pain. Pain managed with PCA.  Denies N&V. Pt is NPO.  LUQ RIKI x2, scant output, dressings CDI. Midline incision with wound vac set to prevena settings, no leaks, CDI. RUQ ileoconduit with output. LLQ lap site closed with dermabond, CDI  - BM.  Patient denies SOB.  SCD's on.  Review plan with of care with patient. Call light and personal belongings within reach. Hourly rounding in place. All needs met at this time.

## 2023-07-28 NOTE — WOUND TEAM
[facility]  Inpatient Services  New Ostomy Initial Evaluation    HPI:  Reviewed  PMH: Reviewed   SH: Reviewed    Past Surgical History:   Procedure Laterality Date    NV LAP,PROSTATECTOMY,RADICAL,W/NERVE SPARE N/A 7/27/2023    Procedure: ROBOTIC ASSISTED LAPAROSCOPIC CYSTOPROSTATECTOMY WITH URINARY DIVERSION;  Surgeon: Gil Kee M.D.;  Location: [facility];  Service: Uro Robotic    TURBT (TRANSURETHRAL RESECTION OF BLADDER TUMOR) N/A 5/16/2023    Procedure: BIPOLAR TRANSURETHRAL RESECTION OF BLADDER TUMOR WITH INSTALLATION OF GEMCITABINE;  Surgeon: Gil Kee M.D.;  Location: [facility];  Service: Urology    TURBT (TRANSURETHRAL RESECTION OF BLADDER TUMOR) N/A 8/29/2022    Procedure: BIPOLAR TRANSURETHRAL RESECTION OF BLADDER TUMOR WITH INSTILLATION OF GEMCITABINE;  Surgeon: Gil Kee M.D.;  Location: [facility];  Service: Urology    TURBT (TRANSURETHRAL RESECTION OF BLADDER TUMOR) N/A 01/04/2022    Procedure: TURBT (TRANSURETHRAL RESECTION OF BLADDER TUMOR) - BIPOLAR WITH INSTILLATION OF GEMCITABINE;  Surgeon: Gil Kee M.D.;  Location: [facility];  Service: Urology    PB INCISE FINGER TENDON SHEATH Right 05/31/2019    Procedure: RELEASE, TRIGGER FINGER-  RING;  Surgeon: Simon Altamirano M.D.;  Location: SURGERY SAME DAY [facility];  Service: Orthopedics    SYNOVECTOMY Right 05/31/2019    Procedure: fasciatomy of palm;  Surgeon: Simon Altamirano M.D.;  Location: SURGERY SAME DAY [facility];  Service: Orthopedics    KNEE ARTHROPLASTY TOTAL Right 06/07/2017    Procedure: KNEE ARTHROPLASTY TOTAL;  Surgeon: Steffanie Del Angel M.D.;  Location: SURGERY [facility];  Service:     COLON RESECTION ROBOTIC  06/16/2014    Performed by Ezra Burks M.D. at [facility]    CATARACT EXTRACTION WITH IOL Bilateral 2011    KNEE ARTHROPLASTY TOTAL Left 2009    APPENDECTOMY  1970's    ARTHROSCOPY, KNEE Bilateral      "CHOLECYSTECTOMY      HEMORRHOIDECTOMY      TRIGGER FINGER RELEASE Bilateral last 2000's    multiple        Surgery Date: 7/27/23    Surgeon(s):  Chico Claire M.D.  Gil Kee M.D.    Procedure(s):  ROBOTIC ASSISTED LAPAROSCOPIC CYSTOPROSTATECTOMY WITH URINARY DIVERSION     Permanence: To be determined    Pertinent History:   Bladder cancer s/p robotic assisted laparoscopic radical cystoprostatectomy with pelvic lymph node dissection, small bowel resection and ileal conduit urinary diversion                       Urostomy 07/27/23 Ileal conduit RUQ (Active)   Wound Image   07/28/23 1000   Stomal Appliance Assessment Changed 07/28/23 1000   Stoma Assessment Beefy red;Red 07/28/23 1000   Stoma Shape Budded Greater Than One Inch;Round 07/28/23 1000   Peristomal Assessment Intact 07/28/23 1000   Mucocutaneous Junction Intact 07/28/23 1000   Treatment Appliance Changed;Cleansed with water/washcloth;Site care 07/28/23 1000   Peristomal Protectant Paste Ring 07/28/23 1000   Stomal Appliance Paste Ring, 2\";Down Drain Bag;Urostomy Pouch;2 1/4\" (57mm) CTF 07/28/23 1000   Output (mL) 350 mL 07/28/23 0313   Output Color Yellow 07/28/23 1000   WOUND RN ONLY - Stomal Appliance  2 Piece;Paste Ring, 2\";2 1/4\" (57mm) CTF;Urostomy Pouch 07/28/23 1000   Appliance Brand Thornton 07/28/23 1000   Secure Start completed No 07/28/23 1000   Ostomy Care Resources Provided UOAA Tip Sheet 07/28/23 1000   WOUND NURSE ONLY - Time Spent with Patient (mins) 45 07/28/23 1000                       Urostomy Interventions:  Removed appliance (using push pull method) - By Ostomy RN  Cleansed everette-stomal skin with moist warm washcloth - By Ostomy RN  Created/Checked template fit - By Ostomy RN  Traced Shape to back of barrier - By Ostomy RN  Confirmed fit - By Ostomy RN  Removed plastic backing and \"Dog Eared\" paper edges - By Ostomy RN  Stretched paste ring to fit barrier opening - By Ostomy RN  Applied paste ring to back of barrier - By " "Ostomy RN  Applied barrier to skin and adhered with friction - By Ostomy RN  Attached pouch - By Ostomy RN  Closed Pouch end - Connected to down drain bag     Patient Education: Ostomy RN to follow-up daily for education     Date:  07/28/23  Patient not medically stable, no education provided    Needs for next visit:     Hands on education   Discuss folder     Evaluation:      Date:  07/28/23      Stoma with stent (red martin) in place. There is urine output. Pt observed appliance change today. States his wife and daughter (who will be staying with them for a week) can help with appliance changes. Discussed dc options of home health vs outpatient wound care. Plan for education this Sunday with wife and patient.         Flatus: N/A  Stool Output:    Urine Output: Yellow  Mobility: Up to chair and Ambulate     DIET ORDERS (From admission to next 24h)      None            Plan: Ostomy nurses to continue to follow for ostomy needs and teaching until patient independent with care or discharge.  Ostomy Nurse follow-up frequency:  Every other day    Secure Start Signed:  No  Outpatient Referral Placed:  No  5 Sets of appliances in Ostomy bag for discharge:  Yes    INSURANCE OPTIONS:     [employer] [facility]    Anticipated Discharge Plans:  TBD, Home Health Care, and Outpatient Wound Center    Ostomy Supplies for DC:  BARRIER (15 per month):  New Image Flextend Extended-Wear FLAT Skin Barrier 2 1/4\" ([place] 60499)   UROSTOMY 2 PIECE POUCH (20 per month):  9in Urostomy Appliance 2 1/4\" (Ana 84587)  ACCESSORIES:  Skin Prep Wipes (3M [place] 5314) - 2 box per month, Adapt No-Sting Universal Remover Wipes (Ana 5218) - 2 box per month, Adapt Stoma Powder (Ana 4606) - 1 per month, Adapt Flat paste ring 2\" (Ana 8795) - 15 per month, Urostomy Drain Tube Adapter (Ana 4825) , and Down Drain Bag ([place] 676000) - 2 per month  "

## 2023-07-28 NOTE — CARE PLAN
Problem: Pain - Standard  Goal: Alleviation of pain or a reduction in pain to the patient’s comfort goal  Outcome: Progressing     Problem: Knowledge Deficit - Standard  Goal: Patient and family/care givers will demonstrate understanding of plan of care, disease process/condition, diagnostic tests and medications  Outcome: Progressing     Problem: Skin Integrity  Goal: Skin integrity is maintained or improved  Outcome: Progressing   The patient is Stable - Low risk of patient condition declining or worsening    Shift Goals  Clinical Goals: Admission  Patient Goals: Pain Control  Family Goals: Communication of Plan of Care    Progress made toward(s) clinical / shift goals:  Patient to T418 @ 1700, Placed on Zandra Mattress, Skin Check Completed, Educated patient and family on plan of care this shift, patient verbalized understanding     Patient is not progressing towards the following goals:

## 2023-07-28 NOTE — DISCHARGE PLANNING
HTH/SCP TCN chart review completed. Collaborated with OLIVIER Hoffmann, PT and OT, as well as wound care prior to meeting with the pt. The most current review of medical record, knowledge of pt's PLOF and social support, LACE+ score of 57, 6 clicks scores of 18 mobility were considered.      Pt seen at bedside. Introduced TCN program. Provided education regarding post acute levels of care. Discussed HTH/SCP plan benefits (Meds to Beds, medical uber and [facility] transitional care). Pt verbalizes understanding.     Note that pt is currently with wound care and was just evaluated by PT and OT. PT and OT are recommending HH at time of dc and pt in agreement. Pt reports that he has FWW for dc to home. Note that he is currently requiring supplemental 02 and does not have home 02 per report.     No additional provider requests at this time. Given aforementioned, choice proactively obtained for HH and DME (02 if needed), faxed to [facility] and given to OLIVIER. TCN will continue to follow and collaborate with discharge planning team as additional post acute needs arise. Thank you.     Completed today:  PT/OT with recommendations for HH on 7/28  Choice obtained: [facility], DME (02)  [facility] referral not sent; pt has SCP/RPCP

## 2023-07-29 PROBLEM — Z71.89 ADVANCE CARE PLANNING: Status: ACTIVE | Noted: 2023-01-01

## 2023-07-29 PROBLEM — R00.0 TACHYCARDIA: Status: ACTIVE | Noted: 2023-01-01

## 2023-07-29 NOTE — PROGRESS NOTES
Received report from previous shift RN.  Assessment complete.  A&O x 4. Patient calls appropriately.  Patient ambulates x1 assist with FWW.  SpO2 >90% on 1.5L NC.  Patient denies SOB.  Patient denies pain at this time.  Denies N&V. Tolerating diet.  Skin per flowsheets.  + void, - flatus, last BM PTA.  Patient pleasant and cooperative with plan of care.  Call light and personal belongings with in reach. Hourly rounding in place. All needs met at this time.

## 2023-07-29 NOTE — PROGRESS NOTES
4 Eyes Skin Assessment Completed by ENEDINA Sanchez and ENEDINA Alegria.    Head WDL  Ears WDL  Nose WDL  Mouth WDL  Neck WDL  Breast/Chest - Scar from previous port site  Shoulder Blades - Cyst to R posterior shoulder  Spine WDL  (R) Arm/Elbow/Hand - PIV to hand  (L) Arm/Elbow/Hand - PIV to forearm  Abdomen - MLI with [place], x2 LUQ RIKI drains, RLQ ileal conduit  Groin WDL  Scrotum/Coccyx/Buttocks Redness and Blanching, Mepilex in place; Boil to R buttock  (R) Leg - Scar to knee  (L) Leg - Scar to knee  (R) Heel/Foot/Toe WDL  (L) Heel/Foot/Toe WDL          Devices In Places Tele Box, Blood Pressure Cuff, SCD's, and Nasal Cannula, Midline wound vac, RLQ ileostomy, LUQ RIKI drains x2      Interventions In Place Gray Ear Foams, Sacral Mepilex, Pillows, Low Air Loss Mattress, and Pressure Redistribution Mattress    Possible Skin Injury No    Pictures Uploaded Into Epic N/A  Wound Consult Placed N/A  RN Wound Prevention Protocol Ordered No

## 2023-07-29 NOTE — PROGRESS NOTES
Note to reader: this note follows the APSO format rather than the historical SOAP format. Assessment and plan located at the top of the note for ease of use.    Chief Complaint  83 y.o. year old male here with bladder cancer.     Assessment/Plan  Interval History   Bladder cancer s/p robotic assisted laparoscopic radical cystoprostatectomy with pelvic lymph node dissection, small bowel resection and ileal conduit urinary diversion    Overall appears dry on exam, clive urine and dry mucus membranes and sinus tachycardia, will fluid bolus challenge x1L LR, continue metoprolol per hospitalist (hold for hypotension)     Unlimited clears  DVT ppx Lovenox, x30d  Continue Rocephin. Will change to Keflex when taking PO  Incisional wound vac until day prior to discharge  RIKI drains per urology, likely remove prior to discharge  Red rubber until day prior to discharge      Patient seen and examined    7/29/23 POD #2 - 1L UOP, new onset sinus tachycardia, started on metoprolol per hospitalist. Reports flatus, no BM. Mild nausea without emesis. Thirsty (was npo). JPs 100ml output. Up to chair only. Pain overall well controlled 3/10    7/28. POD #1. Robotic assisted laparoscopic radical cystoprostatectomy with pelvic lymph node dissection, small bowel resection and ileal conduit urinary diversion yesterday. Doing well. Has PCA for pain management. Good UOP. Ostomy teaching underway. Feels hungry. No flatus. RIKI with 50 cc/24 hrs. On Heparin. Ambulating           Review of Systems  Physical Exam   Review of Systems   Constitutional:  Negative for chills and fever.   Gastrointestinal:  Positive for abdominal pain. Negative for nausea and vomiting.   Genitourinary:  Negative for hematuria.     Vitals:    07/28/23 2300 07/29/23 0358 07/29/23 0747 07/29/23 1007   BP: 133/87 (!) 141/89 (!) 155/97    Pulse: (!) 113 (!) 111 (!) 128 (!) 135   Resp: 19 17 18    Temp: 36.2 °C (97.1 °F) 36.7 °C (98.1 °F) 36.8 °C (98.2 °F)    TempSrc:  Temporal Temporal Temporal    SpO2: 96% 96% 95% 95%   Weight:       Height:         Physical Exam  Vitals and nursing note reviewed.   Constitutional:       Appearance: Normal appearance.      Comments: Appears dry mucus membranes   HENT:      Head: Normocephalic.      Nose: Nose normal.      Mouth/Throat:      Mouth: Mucous membranes are moist.   Eyes:      Pupils: Pupils are equal, round, and reactive to light.   Cardiovascular:      Rate and Rhythm: Tachycardia present.   Pulmonary:      Comments: Tachypnic with nasal canula O2  Abdominal:      General: There is distension.      Tenderness: There is no abdominal tenderness. There is no guarding.      Comments: Midline surgical incision with wound vac in place. RIKI drains with serosanguinous drainage. Urostomy with pink stoma and red caal catheter protruding, urine clive/dark. Minimal tenderness on palpation, port incisions c/d/I.    Skin:     General: Skin is warm and dry.   Neurological:      General: No focal deficit present.      Mental Status: He is alert and oriented to person, place, and time.   Psychiatric:         Mood and Affect: Mood normal.         Behavior: Behavior normal.          Hematology Chemistry   Lab Results   Component Value Date/Time    WBC 12.9 (H) 07/29/2023 02:48 AM    HEMOGLOBIN 12.0 (L) 07/29/2023 02:48 AM    HEMATOCRIT 38.4 (L) 07/29/2023 02:48 AM    PLATELETCT 226 07/29/2023 02:48 AM     Lab Results   Component Value Date/Time    SODIUM 138 07/29/2023 02:48 AM    POTASSIUM 4.3 07/29/2023 02:48 AM    CHLORIDE 103 07/29/2023 02:48 AM    CO2 22 07/29/2023 02:48 AM    GLUCOSE 102 (H) 07/29/2023 02:48 AM    BUN 18 07/29/2023 02:48 AM    CREATININE 0.89 07/29/2023 02:48 AM         Labs not explicitly included in this progress note were reviewed by the author.   Radiology/imaging not explicitly included in this progress note was reviewed by the author.     Core Measures

## 2023-07-29 NOTE — ASSESSMENT & PLAN NOTE
Likely in setting of deconditioning from recent surgery.  EKG unremarkable  Continue metoprolol  Continue to monitor

## 2023-07-29 NOTE — PROGRESS NOTES
This RN notified by tele monitor room of possible a flutter. MD Polina notified, new orders received.

## 2023-07-29 NOTE — CARE PLAN
The patient is Watcher - Medium risk of patient condition declining or worsening    Shift Goals  Clinical Goals: Pain control, rest  Patient Goals: Pain control    Progress made toward(s) clinical / shift goals:  Pain managed with PCA and prescribed medications per MAR. Patient able to rest comfortably this shift.    Problem: Pain - Standard  Goal: Alleviation of pain or a reduction in pain to the patient’s comfort goal  Outcome: Progressing     Problem: Knowledge Deficit - Standard  Goal: Patient and family/care givers will demonstrate understanding of plan of care, disease process/condition, diagnostic tests and medications  Outcome: Progressing     Problem: Fall Risk  Goal: Patient will remain free from falls  Outcome: Progressing       Patient is not progressing towards the following goals:

## 2023-07-29 NOTE — PROGRESS NOTES
Hospital Medicine Daily Progress Note    Date of Service  7/29/2023    Chief Complaint  Brian Lopez is a 83 y.o. male admitted 7/27/2023 for cystoprostatectomy     Hospital Course    This is an 83-year-old male with past medical history of dyslipidemia, COPD, BPH, high risk noninvasive bladder cancer s/p TURBT, now with high-grade papillary urothelial carcinoma.      S/p  Radical cystoprosatectomy ,Small bowel resection,Open Ileal conduit urinary diversion.  Medicine team consulted for medical management.      Interval Problem Update    7/29/2023    Patient remained tachycardic, blood pressure stable, on 1 L oxygen saturating 90%.    Labs reviewed, leukocytosis trending down, hemoglobin 12.    EKG reviewed noted sinus tachycardia    Currently on continuous hydromorphone PCA for pain management    Continue Rocephin  Started on clear liquid diet  Continue on hydromorphone PCA, titrate down to oral pain med when possible.  Added metoprolol for sinus tachycardia  Repeat BMP in a.m. to monitor electrolytesV antibiotics  Repeat CBC in a.m. to monitor white count and hemoglobin     Discussed case with urology Dr. Kee    Closely monitor under cardiac monitor.  Monitor for respiratory status as patient is requiring hydromorphone PCA.    I have discussed this patient's plan of care and discharge plan at IDT rounds today with Case Management, Nursing, Nursing leadership, and other members of the IDT team.      Code Status  Full Code    Disposition    Anticipate discharge to: home with close outpatient follow-up    I have placed the appropriate orders for post-discharge needs.    Review of Systems  Review of Systems   Gastrointestinal:  Positive for abdominal pain, nausea and vomiting.        Physical Exam  Temp:  [36.2 °C (97.1 °F)-36.8 °C (98.2 °F)] 36.8 °C (98.2 °F)  Pulse:  [111-135] 115  Resp:  [17-32] 18  BP: (133-159)/(76-97) 149/76  SpO2:  [94 %-96 %] 96 %    Physical Exam  Constitutional:        Appearance: He is ill-appearing.   Cardiovascular:      Rate and Rhythm: Regular rhythm. Tachycardia present.      Pulses: Normal pulses.      Heart sounds: Normal heart sounds.   Pulmonary:      Effort: Pulmonary effort is normal.      Breath sounds: Normal breath sounds.   Abdominal:      Comments: Urostomy noted.  RIKI drain noted at left lower quadrant .  Abdominal wound VAC noted       Neurological:      General: No focal deficit present.      Mental Status: He is alert and oriented to person, place, and time.         Fluids    Intake/Output Summary (Last 24 hours) at 7/29/2023 1306  Last data filed at 7/29/2023 0800  Gross per 24 hour   Intake 939.5 ml   Output 1027 ml   Net -87.5 ml       Laboratory  Recent Labs     07/28/23  0713 07/28/23 1908 07/29/23  0248   WBC 11.2* 14.2* 12.9*   RBC 4.57* 4.56* 4.47*   HEMOGLOBIN 12.0* 12.2* 12.0*   HEMATOCRIT 38.9* 38.7* 38.4*   MCV 85.1 84.9 85.9   MCH 26.3* 26.8* 26.8*   MCHC 30.8* 31.5* 31.3*   RDW 46.2 47.1 47.0   PLATELETCT 235 250 226   MPV 10.0 9.8 9.7     Recent Labs     07/28/23  0713 07/28/23 1908 07/29/23  0248   SODIUM 138 136 138   POTASSIUM 4.3 4.1 4.3   CHLORIDE 103 101 103   CO2 26 22 22   GLUCOSE 125* 111* 102*   BUN 13 15 18   CREATININE 0.84 0.75 0.89   CALCIUM 8.6 9.2 9.0                   Imaging  DX-CHEST-PORTABLE (1 VIEW)   Final Result      1.  Hypoinflation with LEFT lung base atelectasis and small effusion.   2.  No pneumothorax.   3.  LEFT chest wall emphysema.           Assessment/Plan  * Bladder cancer (HCC)- (present on admission)  Assessment & Plan  Hx of high risk noninvasive bladder cancer s/p TURBT and Keytruda  Now with high-grade papillary urothelial carcinoma.    Patient is for cystoprostatectomy today 07/27/2023 with Dr. Kee.    Pain control with oral and IV opioids -monitor for any signs of respiratory depression  Serial CBC, BMP, mag, Phos  Wound care - ileal conduit and wound vac  PT/OT evaluated recommended home  health.    Tachycardia  Assessment & Plan  Likely in setting of deconditioning from recent surgery.  EKG unremarkable  Continue metoprolol  Continue to monitor    Advance care planning  Assessment & Plan  I had a prolonged discussion with the patient regarding goals of care, diagnoses, prognosis, and CODE STATUS. We discussed his   prognosis and comorbidities. I spent 13 minutes on advanced care planning in addition to the time spent managing the other medical problems.    He requested to remain full code    COPD- (present on admission)  Assessment & Plan  Not in acute exacerbation  DuoNebs as needed    Dyslipidemia- (present on admission)  Assessment & Plan  Resume statin therapy    BPH (benign prostatic hyperplasia)- (present on admission)  Assessment & Plan  Resume Flomax         VTE prophylaxis: lovenox    I have performed a physical exam and reviewed and updated ROS and Plan today (7/29/2023). In review of yesterday's note (7/28/2023), there are no changes except as documented above.       Greater than 51 minutes spent preparing to see patient (e.g. review of tests) obtaining and/or reviewing separately obtained history. Performing a medically appropriate examination and/ evaluation.  Counseling and educating the patient/family/caregiver.  Ordering medications, tests, or procedures.  Referring and communicating with other health care professionals.  Documenting clinical information in EPIC.  Independently interpreting results and communicating results to patient/family/caregiver.  Care coordination.

## 2023-07-29 NOTE — ASSESSMENT & PLAN NOTE
I had a prolonged discussion with the patient regarding goals of care, diagnoses, prognosis, and CODE STATUS. We discussed his   prognosis and comorbidities. I spent 13 minutes on advanced care planning in addition to the time spent managing the other medical problems.    He requested to remain full code

## 2023-07-29 NOTE — PROGRESS NOTES
Bedside report received from night shift nurse. Assumed care at 0645.   Pt A&Ox4.  Tolerating NPO diet, intermittent n/v. Hypoactive bowel sounds, passing flatus, LBM PTA. IV access through 20g LFA and 22g R wrist that is running NS @ 75 mL/hr.  RLQ ileal conduit, CDI, + output. MLI w/ prevena [place], CDI. LUQ Jpx2, CDI.   Saturating >90% on 1.5L NC.  Pt ambulates x1 w/ FWW.  Pain is controlled through medication orders. Updated on plan of care. Safety education provided. Bed locked in low. Call light within reach. Rounding in place.

## 2023-07-30 NOTE — PROGRESS NOTES
Report received from Melanie MCCONNELL, assumed care at 0645.   Pt is A0X4, and responds appropriately   Pt declines any SOB, chest pain, new onset of numbness/ tingling  Pt declines pain at this time. Pt has + PCA with bolus button available.   Pt has + RLQ ileal conduit, CDI with + output noted.  Pt has + flatus, + bowel sounds, + BM PTA  Pt ambulates with a x1 assist and FWW.   Pt is tolerating a clear liquid diet, pt denies any nausea/vomiting.  NS @ 75 mL/hr.  Pt on 1.5 L NC with oxygen saturation >90%.  MLI w/ prevena in place, CDI. X2 LUQ RIKI with serosanguinous output noted. Dressings CDI.  Plan of care discussed, all questions answered. Explained importance of calling before getting OOB and pt verbalizes understanding. Explained importance of oral care. Call light is within reach, treaded slipper socks on, bed in lowest/ locked position, hourly rounding in place, all needs met at this time

## 2023-07-30 NOTE — PROGRESS NOTES
Hospital Medicine Daily Progress Note    Date of Service  7/30/2023    Chief Complaint  Brian Lopez is a 83 y.o. male admitted 7/27/2023 for cystoprostatectomy     Hospital Course    This is an 83-year-old male with past medical history of dyslipidemia, COPD, BPH, high risk noninvasive bladder cancer s/p TURBT, now with high-grade papillary urothelial carcinoma.      S/p  Radical cystoprosatectomy ,Small bowel resection,Open Ileal conduit urinary diversion.  Medicine team consulted for medical management.      Interval Problem Update    7/30/2023    Blood pressure remained better  On 2.5 L saturating 90%  Labs reviewed, leukocytosis trending up 18.1  Renal function is stable    Pain well controlled    Continue Rocephin  On hydromorphone PCA, titrate to oral narcotics when able  Get blood culture for worsening leukocytosis    Repeat BMP in a.m. to monitor electrolytes and and renal function  Repeat CBC in a.m. to monitor white count and hemoglobin     Closely monitor under cardiac monitor.  Currently on hydromorphone PCA.  Monitor closely for respiratory status    I have discussed this patient's plan of care and discharge plan at IDT rounds today with Case Management, Nursing, Nursing leadership, and other members of the IDT team.      Code Status  Full Code    Disposition  The patient is not medically cleared for discharge to home or a post-acute facility.  Anticipate discharge to: home with close outpatient follow-up    I have placed the appropriate orders for post-discharge needs.    Review of Systems  Review of Systems   Gastrointestinal:  Positive for abdominal pain, nausea and vomiting.        Physical Exam  Temp:  [36.8 °C (98.2 °F)-37.5 °C (99.5 °F)] 37.5 °C (99.5 °F)  Pulse:  [] 92  Resp:  [17-26] 18  BP: (126-164)/(85-98) 157/91  SpO2:  [92 %-95 %] 92 %    Physical Exam  Constitutional:       Appearance: He is ill-appearing.   Cardiovascular:      Rate and Rhythm: Regular rhythm.  Tachycardia present.      Pulses: Normal pulses.      Heart sounds: Normal heart sounds.   Pulmonary:      Effort: Pulmonary effort is normal.      Breath sounds: Normal breath sounds.   Abdominal:      Comments: Urostomy noted.  RIKI drain noted at left lower quadrant .  Abdominal wound VAC noted       Neurological:      General: No focal deficit present.      Mental Status: He is alert and oriented to person, place, and time.         Fluids    Intake/Output Summary (Last 24 hours) at 7/30/2023 1428  Last data filed at 7/30/2023 0411  Gross per 24 hour   Intake --   Output 705 ml   Net -705 ml         Laboratory  Recent Labs     07/28/23 1908 07/29/23 0248 07/30/23  0739   WBC 14.2* 12.9* 18.1*   RBC 4.56* 4.47* 4.85   HEMOGLOBIN 12.2* 12.0* 13.0*   HEMATOCRIT 38.7* 38.4* 41.8*   MCV 84.9 85.9 86.2   MCH 26.8* 26.8* 26.8*   MCHC 31.5* 31.3* 31.1*   RDW 47.1 47.0 47.5   PLATELETCT 250 226 321   MPV 9.8 9.7 9.8       Recent Labs     07/28/23 1908 07/29/23  0248 07/30/23  0739   SODIUM 136 138 138   POTASSIUM 4.1 4.3 4.3   CHLORIDE 101 103 105   CO2 22 22 24   GLUCOSE 111* 102* 167*   BUN 15 18 24*   CREATININE 0.75 0.89 0.65   CALCIUM 9.2 9.0 8.9                     Imaging  DX-CHEST-PORTABLE (1 VIEW)   Final Result      1.  Hypoinflation with LEFT lung base atelectasis and small effusion.   2.  No pneumothorax.   3.  LEFT chest wall emphysema.             Assessment/Plan  * Bladder cancer (HCC)- (present on admission)  Assessment & Plan  Hx of high risk noninvasive bladder cancer s/p TURBT and Keytruda  Now with high-grade papillary urothelial carcinoma.    Patient is for cystoprostatectomy today 07/27/2023 with Dr. Kee.    Pain control with oral and IV opioids -monitor for any signs of respiratory depression  Serial CBC, BMP, mag, Phos  Wound care - ileal conduit and wound vac  PT/OT evaluated recommended home health.    Tachycardia  Assessment & Plan  Likely in setting of deconditioning from recent  surgery.  EKG unremarkable  Continue metoprolol  Continue to monitor    Advance care planning  Assessment & Plan  I had a prolonged discussion with the patient regarding goals of care, diagnoses, prognosis, and CODE STATUS. We discussed his   prognosis and comorbidities. I spent 13 minutes on advanced care planning in addition to the time spent managing the other medical problems.    He requested to remain full code    COPD- (present on admission)  Assessment & Plan  Not in acute exacerbation  DuoNebs as needed    Dyslipidemia- (present on admission)  Assessment & Plan  Resume statin therapy    BPH (benign prostatic hyperplasia)- (present on admission)  Assessment & Plan  Resume Flomax         VTE prophylaxis:   SCDs/TEDs   enoxaparin ppx      I have performed a physical exam and reviewed and updated ROS and Plan today (7/30/2023). In review of yesterday's note (7/29/2023), there are no changes except as documented above.

## 2023-07-30 NOTE — PROGRESS NOTES
Note to reader: this note follows the APSO format rather than the historical SOAP format. Assessment and plan located at the top of the note for ease of use.    Chief Complaint  83 y.o. year old male here with bladder cancer.     Assessment/Plan  Interval History   Bladder cancer s/p robotic assisted laparoscopic radical cystoprostatectomy with pelvic lymph node dissection, small bowel resection and ileal conduit urinary diversion      Ambulate TID  Incentive spirometry  Diet at unlimited clears. Will advance further once nausea improves  Continue Lovenox with plan for 30 total days  Continue Rocephin. Will change to Keflex when taking PO  Incisional wound vac until day prior to discharge  Red Albarado to be removed day prior to discharge  RIKI drains to remain, likely to be removed on day of discharge  Incisional vac to remain until day prior to discharge  Continue PCA and transition to PO pain medications as tolerates diet further  Ureteral stents out in 2-3 weeks      Case discussed with patient, wound team and Urology-Dr. Chilango CURRY  Patient seen and examined    7/30. POD #3. Minimal flatus since yesterday. On unlimited clears with some nausea when sits up. No emesis. Pain controlled. WBC up. No fever. Requiring more O2. Has not ambulated today.    7/28. POD #1. Robotic assisted laparoscopic radical cystoprostatectomy with pelvic lymph node dissection, small bowel resection and ileal conduit urinary diversion yesterday. Doing well. Has PCA for pain management. Good UOP. Ostomy teaching underway. Feels hungry. No flatus. RIKI with 50 cc/24 hrs. On Heparin. Ambulating           Review of Systems  Physical Exam   Review of Systems   Constitutional:  Negative for chills and fever.   Respiratory:  Negative for cough and shortness of breath.    Cardiovascular:  Negative for chest pain and palpitations.   Gastrointestinal:  Positive for abdominal pain. Negative for nausea and vomiting.   Genitourinary:  Negative for  hematuria.     Vitals:    07/30/23 0411 07/30/23 0424 07/30/23 0730 07/30/23 1130   BP: (!) 164/98  126/85 (!) 157/91   Pulse: (!) 120 90 (!) 116 92   Resp: (!) 26 17 18    Temp: 37.1 °C (98.8 °F)  36.8 °C (98.2 °F) 37.5 °C (99.5 °F)   TempSrc: Temporal  Temporal Temporal   SpO2: 93%  93% 92%   Weight:       Height:         Physical Exam  Vitals and nursing note reviewed.   Constitutional:       Appearance: Normal appearance.   HENT:      Head: Normocephalic.      Nose: Nose normal.      Mouth/Throat:      Mouth: Mucous membranes are moist.   Eyes:      Pupils: Pupils are equal, round, and reactive to light.   Pulmonary:      Effort: Pulmonary effort is normal.   Abdominal:      Comments: Midline surgical incision with wound vac in place. RIKI drains with serosanguinous drainage. Urostomy with pink stoma and red caal catheter protruding, urine clive   Skin:     General: Skin is warm and dry.   Neurological:      General: No focal deficit present.      Mental Status: He is alert and oriented to person, place, and time.   Psychiatric:         Mood and Affect: Mood normal.         Behavior: Behavior normal.          Hematology Chemistry   Lab Results   Component Value Date/Time    WBC 18.1 (H) 07/30/2023 07:39 AM    HEMOGLOBIN 13.0 (L) 07/30/2023 07:39 AM    HEMATOCRIT 41.8 (L) 07/30/2023 07:39 AM    PLATELETCT 321 07/30/2023 07:39 AM     Lab Results   Component Value Date/Time    SODIUM 138 07/30/2023 07:39 AM    POTASSIUM 4.3 07/30/2023 07:39 AM    CHLORIDE 105 07/30/2023 07:39 AM    CO2 24 07/30/2023 07:39 AM    GLUCOSE 167 (H) 07/30/2023 07:39 AM    BUN 24 (H) 07/30/2023 07:39 AM    CREATININE 0.65 07/30/2023 07:39 AM         Labs not explicitly included in this progress note were reviewed by the author.   Radiology/imaging not explicitly included in this progress note was reviewed by the author.     Labs reviewed and Medications reviewed

## 2023-07-30 NOTE — WOUND TEAM
[facility]  Inpatient Services  New Ostomy Initial Evaluation    HPI:  Reviewed  PMH: Reviewed   SH: Reviewed    Past Surgical History:   Procedure Laterality Date    DC LAP,PROSTATECTOMY,RADICAL,W/NERVE SPARE N/A 7/27/2023    Procedure: ROBOTIC ASSISTED LAPAROSCOPIC CYSTOPROSTATECTOMY WITH URINARY DIVERSION;  Surgeon: Gil Kee M.D.;  Location: [facility];  Service: Uro Robotic    TURBT (TRANSURETHRAL RESECTION OF BLADDER TUMOR) N/A 5/16/2023    Procedure: BIPOLAR TRANSURETHRAL RESECTION OF BLADDER TUMOR WITH INSTALLATION OF GEMCITABINE;  Surgeon: Gil Kee M.D.;  Location: [facility];  Service: Urology    TURBT (TRANSURETHRAL RESECTION OF BLADDER TUMOR) N/A 8/29/2022    Procedure: BIPOLAR TRANSURETHRAL RESECTION OF BLADDER TUMOR WITH INSTILLATION OF GEMCITABINE;  Surgeon: Gil Kee M.D.;  Location: [facility];  Service: Urology    TURBT (TRANSURETHRAL RESECTION OF BLADDER TUMOR) N/A 01/04/2022    Procedure: TURBT (TRANSURETHRAL RESECTION OF BLADDER TUMOR) - BIPOLAR WITH INSTILLATION OF GEMCITABINE;  Surgeon: Gil Kee M.D.;  Location: [facility];  Service: Urology    PB INCISE FINGER TENDON SHEATH Right 05/31/2019    Procedure: RELEASE, TRIGGER FINGER-  RING;  Surgeon: Simon Altamirano M.D.;  Location: SURGERY SAME DAY [facility];  Service: Orthopedics    SYNOVECTOMY Right 05/31/2019    Procedure: fasciatomy of palm;  Surgeon: Simon Altamirano M.D.;  Location: SURGERY SAME DAY [facility];  Service: Orthopedics    KNEE ARTHROPLASTY TOTAL Right 06/07/2017    Procedure: KNEE ARTHROPLASTY TOTAL;  Surgeon: Steffanie Del Angel M.D.;  Location: SURGERY [facility];  Service:     COLON RESECTION ROBOTIC  06/16/2014    Performed by Ezra Burks M.D. at [facility]    CATARACT EXTRACTION WITH IOL Bilateral 2011    KNEE ARTHROPLASTY TOTAL Left 2009    APPENDECTOMY  1970's    ARTHROSCOPY, KNEE Bilateral      "CHOLECYSTECTOMY      HEMORRHOIDECTOMY      TRIGGER FINGER RELEASE Bilateral last 2000's    multiple        Surgery Date: 7/27/23    Surgeon(s):  Chico Claire M.D.  Gil Kee M.D.    Procedure(s):  ROBOTIC ASSISTED LAPAROSCOPIC CYSTOPROSTATECTOMY WITH URINARY DIVERSION     Permanence: To be determined    Pertinent History:   Bladder cancer s/p robotic assisted laparoscopic radical cystoprostatectomy with pelvic lymph node dissection, small bowel resection and ileal conduit urinary diversion                             Urostomy 07/27/23 Ileal conduit RUQ (Active)   Wound Image   07/28/23 1000   Stomal Appliance Assessment Clean;Dry;Intact;Changed    Stoma Assessment Beefy red    Stoma Shape Budded Less Than One Inch;Oval    Stoma Size (in) 1.25    Peristomal Assessment Clean;Dry;Intact    Mucocutaneous Junction Intact    Treatment Appliance Changed;Bag Change;Cleansed with water/washcloth;Site care    Peristomal Protectant Paste Ring;No Sting Skin Prep    Stomal Appliance Paste Ring, 2\";2 1/4\" (57mm) CTF;Urostomy Pouch;Down Drain Bag    Output (mL) 300 mL    Output Color Yellow    WOUND RN ONLY - Stomal Appliance  2 Piece;Paste Ring, 2\";2 1/4\" (57mm) CTF;Urostomy Pouch    Appliance Brand Ana    Secure Start completed No    Ostomy Care Resources Provided UOAA Tip Sheet    WOUND NURSE ONLY - Time Spent with Patient (mins) 75            Urostomy Interventions:  Removed appliance (using push pull method) - By Ostomy RN  Cleansed everette-stomal skin with moist warm washcloth - By Ostomy RN  Created/Checked template fit - By Ostomy RN  Traced Shape to back of barrier - By Ostomy RN  Confirmed fit - By Ostomy RN  Removed plastic backing and \"Dog Eared\" paper edges - By Ostomy RN  Stretched paste ring to fit barrier opening - By Ostomy RN  Applied paste ring to back of barrier - By Ostomy RN  Applied barrier to skin and adhered with friction - By Ostomy RN  Attached pouch - By Ostomy RN  Closed Pouch end - " "Connected to down drain bag     Patient Education: Ostomy RN to follow-up every other day for education     Date:  07/30/23  Patient wife and daughter at bedside for education. Discussed folder, frequency of change, follow up, and how to change appliance. Ostomy RN did all hands on today.     Date:  07/28/23  Patient not medically stable, no education provided    Needs for next visit:    Hands on education        Evaluation:      Date:  07/30/23      Patient, wife, and daughter all observed appliance change today and asked appropriate questions. Patient wife will be assisting him at home, as well as home health. They may have difficulty with cutting barrier but will begin hands on at next change. Plan to continue education On Tuesday with wife and daughter around [id number].     Date:  07/28/23      Stoma with stent (red martin) in place. There is urine output. Pt observed appliance change today. States his wife and daughter (who will be staying with them for a week) can help with appliance changes. Discussed dc options of home health vs outpatient wound care. Plan for education this Sunday with wife and patient.         Flatus: N/A  Stool Output:    Urine Output: Yellow  Mobility: Up to chair and Ambulate     DIET ORDERS (From admission to next 24h)      None            Plan: Ostomy nurses to continue to follow for ostomy needs and teaching until patient independent with care or discharge.  Ostomy Nurse follow-up frequency:  Every other day    Secure Start Signed:  No  Outpatient Referral Placed:  Yes 7/30/23  5 Sets of appliances in Ostomy bag for discharge:  Yes    INSURANCE OPTIONS:     [employer] ([facility])    Anticipated Discharge Plans:  TBD, Home Health Care, and Outpatient Wound Center    Ostomy Supplies for DC:  BARRIER (15 per month):  New Image Flextend Extended-Wear FLAT Skin Barrier 2 1/4\" ([place] [address])   UROSTOMY 2 PIECE POUCH (20 per month):  9in Urostomy Appliance 2 1/4\" " "([place]a 55664)  ACCESSORIES:  Skin Prep Wipes (3M [place] 4624) - 2 box per month, Adapt No-Sting Universal Remover Wipes ([place] 6784) - 2 box per month, Adapt Stoma Powder (Ana 7004) - 1 per month, Adapt Flat paste ring 2\" (Ana 6387) - 15 per month, Urostomy Drain Tube Adapter ([place] 0885) , and Down Drain Bag ([place] 850664) - 2 per month  "

## 2023-07-30 NOTE — CARE PLAN
Problem: Pain - Standard  Goal: Alleviation of pain or a reduction in pain to the patient’s comfort goal  Outcome: Progressing     Problem: Knowledge Deficit - Standard  Goal: Patient and family/care givers will demonstrate understanding of plan of care, disease process/condition, diagnostic tests and medications  Outcome: Progressing     Problem: Skin Care - Ostomy  Goal: Skin remains free from irritation  Outcome: Progressing     Problem: Knowledge Deficit - Ostomy  Goal: Patient will demonstrate ability to manage and maintain ostomy  Outcome: Progressing     Problem: Discharge Barriers/Self-Care - Ostomy  Goal: Ostomy patient's continuum of care needs will be met  Outcome: Progressing     Problem: Skin Integrity  Goal: Skin integrity is maintained or improved  Outcome: Progressing     Problem: Fall Risk  Goal: Patient will remain free from falls  Outcome: Progressing   The patient is Stable - Low risk of patient condition declining or worsening    Shift Goals  Clinical Goals: pain mgt-PCA pump, control Heart rate  Patient Goals: rest  Family Goals: Communication of Plan of Care    Progress made toward(s) clinical / shift goals:  improving    Patient is not progressing towards the following goals:

## 2023-07-31 PROBLEM — J96.01 ACUTE RESPIRATORY FAILURE WITH HYPOXIA (HCC): Status: ACTIVE | Noted: 2023-01-01

## 2023-07-31 PROBLEM — J44.1 ACUTE EXACERBATION OF CHRONIC OBSTRUCTIVE PULMONARY DISEASE (COPD) (HCC): Status: ACTIVE | Noted: 2023-01-01

## 2023-07-31 PROBLEM — J18.9 PNEUMONIA: Status: ACTIVE | Noted: 2023-01-01

## 2023-07-31 NOTE — PROGRESS NOTES
Note to reader: this note follows the APSO format rather than the historical SOAP format. Assessment and plan located at the top of the note for ease of use.    Chief Complaint  83 y.o. year old male here with bladder cancer.     Assessment/Plan  Interval History   Bladder cancer s/p robotic assisted laparoscopic radical cystoprostatectomy with pelvic lymph node dissection, small bowel resection and ileal conduit urinary diversion          Case discussed with patient, nursing, hospitalist, code team, family, case management, and Urology-Dr. Andria CURRY  Patient seen and examined     POD#4. Overnight became hypoxic with increased SOB and work of breathing. Reports coughing. Hospitalist notified, workup in progress, CTA pending. Also notes worsening nausea and several episodes of emesis, but notes that this may have been induced by coughing. Denies hiccups, belching. Reports +flatus, 2 BM's since yesterday. Abd more distended and TTP from yesterday per pt. WBC 13.5 (18.1) on rocephin, H/H stable, Cr WNL. UOP 1350cc (1065), urine slightly pink tinged in tubing, urostomy pink and patent with red rubber in place. Drains with 10cc/0cc in 24h. Feeling thirsty, remains on CLD. Denies F/C. Unable to ambulate due to SOB. Pain controlled on PCA. KUB obtained, demonstrates mild dilation of bowel with gas in small bowel and in colon.    Later this am patient went for CTA and per nursing report, upon returning from CT scan he was observed sitting at edge of bed, sipping fluids, in NAD. Moments later he was found unresponsive and code blue was called. For further documentation, please see critical care physician notes. Unfortunately patient  at 12:27 pm.      . POD #3. Minimal flatus since yesterday. On unlimited clears with some nausea when sits up. No emesis. Pain controlled. WBC up. No fever. Requiring more O2. Has not ambulated today.    . POD #1. Robotic assisted laparoscopic radical cystoprostatectomy  with pelvic lymph node dissection, small bowel resection and ileal conduit urinary diversion yesterday. Doing well. Has PCA for pain management. Good UOP. Ostomy teaching underway. Feels hungry. No flatus. RIKI with 50 cc/24 hrs. On Heparin. Ambulating           Review of Systems  Physical Exam   Review of Systems   Constitutional:  Negative for chills and fever.   Respiratory:  Positive for cough, sputum production and shortness of breath.    Cardiovascular:  Positive for orthopnea. Negative for chest pain and palpitations.   Gastrointestinal:  Positive for abdominal pain, nausea and vomiting.   Genitourinary:  Negative for hematuria.     Vitals:    07/30/23 2337 07/31/23 0407 07/31/23 0710 07/31/23 0931   BP: (!) 156/94 (!) 143/98 (!) 144/94 (!) 144/93   Pulse: (!) 119 (!) 135 (!) 125 (!) 121   Resp: 18 20 20 (!) 26   Temp: 36.9 °C (98.4 °F) 36.8 °C (98.2 °F) 37 °C (98.6 °F) 37 °C (98.6 °F)   TempSrc: Temporal Temporal Temporal Temporal   SpO2: 93% 91% 93% 93%   Weight:       Height:         Physical Exam  Vitals and nursing note reviewed.   Constitutional:       Appearance: He is ill-appearing.   HENT:      Head: Normocephalic.      Nose: Nose normal.      Comments: O2 via NC     Mouth/Throat:      Mouth: Mucous membranes are moist.   Eyes:      Pupils: Pupils are equal, round, and reactive to light.   Pulmonary:      Comments: Increased work of breathing. O2 via NC. Able to speak short sentences.  Abdominal:      General: There is distension.      Comments: Midline surgical incision with wound vac in place. RIKI drains with minimal serosanguinous drainage. Urostomy with pink stoma and red caal catheter protruding, urine clive colored in tubing. Abd mildly distended, TTP in epigastric region and around wound vac   Musculoskeletal:         General: Normal range of motion.      Cervical back: Normal range of motion.   Skin:     General: Skin is warm and dry.   Neurological:      General: No focal deficit present.       Mental Status: He is alert and oriented to person, place, and time.   Psychiatric:         Mood and Affect: Mood normal.         Behavior: Behavior normal.          Hematology Chemistry   Lab Results   Component Value Date/Time    WBC 13.5 (H) 07/31/2023 03:04 AM    HEMOGLOBIN 12.6 (L) 07/31/2023 03:04 AM    HEMATOCRIT 41.0 (L) 07/31/2023 03:04 AM    PLATELETCT 353 07/31/2023 03:04 AM     Lab Results   Component Value Date/Time    SODIUM 140 07/31/2023 03:04 AM    POTASSIUM 3.8 07/31/2023 03:04 AM    CHLORIDE 105 07/31/2023 03:04 AM    CO2 25 07/31/2023 03:04 AM    GLUCOSE 159 (H) 07/31/2023 03:04 AM    BUN 20 07/31/2023 03:04 AM    CREATININE 0.66 07/31/2023 03:04 AM         Labs not explicitly included in this progress note were reviewed by the author.   Radiology/imaging not explicitly included in this progress note was reviewed by the author.     Radiology images reviewed, Labs reviewed and Medications reviewed

## 2023-07-31 NOTE — CARE PLAN
Problem: Pain - Standard  Goal: Alleviation of pain or a reduction in pain to the patient’s comfort goal  Outcome: Progressing     Problem: Knowledge Deficit - Standard  Goal: Patient and family/care givers will demonstrate understanding of plan of care, disease process/condition, diagnostic tests and medications  Outcome: Progressing     Problem: Skin Integrity  Goal: Skin integrity is maintained or improved  Outcome: Progressing   The patient is Stable - Low risk of patient condition declining or worsening    Shift Goals  Clinical Goals: monitor PCA, Q2 pain assessments, Tele, ambulate  Patient Goals: pain control, rest  Family Goals: Communication of Plan of Care    Progress made toward(s) clinical / shift goals:  Patient pain controlled with bolus button. Patient maintaining adequate PO on clear liquid diet. Q2 turns in place. RIKI drain dressing changed.

## 2023-07-31 NOTE — PROGRESS NOTES
Report received from Shanique MCCONNELL, assumed care at 0645.  Pt is A0X4, and responds appropriately   Pt displaying increased WOB, states chest tightness, with increased oxygen demands. Pt on 7L NC with oxygen saturation at 92%. Drown sputum noted. Pt refusing to wear oxy mask. Orders placed.  Pt declines pain at this time.  Pt has + ileoconduit with pink urinary output noted.   Pt has - flatus,  increased hypoactive bowel sounds, + BM PTA. PA-C notified.   Pt ambulates with a x1 assist and FWW.  Pt is tolerating a clear liquid diet.   Midline incision w/ wound vac CDI.   X2 RIKI to bulb suction with serosanguinous output noted.  Plan of care discussed, all questions answered. Explained importance of calling before getting OOB and pt verbalizes understanding. Explained importance of oral care. Call light is within reach, treaded slipper socks on, bed in lowest/ locked position, hourly rounding in place, all needs met at this time

## 2023-07-31 NOTE — DISCHARGE PLANNING
"Care Transition Team Discharge Planning    Anticipated Discharge Information  Discharge Disposition: Pt  S/P  Code Blue and Now               Discharge Plan:  Pt S/P Code Blue and Now      Glo Unit Manager requested that this RN CM call Pt's wife for  Pt's sudden change of condition.  Pt coded.     Per [place],Pt's Dtr she will tell her mother , Liliana  and they re on their way to [facility].    Faviola  Urology PA is at bedside who will talk to Pt's family  once family is available.   Faviola to give update to Dr Kee.     ENEDINA AGUAYO to  provide stand by assist /  assistance to Pt's family during this difficult times.     Glo Nielsen Unit Manager ,  bedside ENEDINA Saez and Charge ENDEINA Justice  and this  RN OLIVIER with [facility] PA  met with Pt's wife Liliana and Daughter Carmen  at the [facility].    Dr Fish and Unit Manager Glo explained to Liliana and Carmen that  Pt is S/P Code Blue and is now .    Pt' wife Liliana  and Daughter Carmen were escorted to Pt's bedside by Glo and ENEDINA Saez and Charge ENEDINA Justice.     This RN CM provided Pt's family a list of the Brochure: \" Helpful Information for This Difficult time\"  and list of Mortuaries available in [email or web address].     Will continue to assist Pt's family  as needed.    "

## 2023-07-31 NOTE — PROGRESS NOTES
Upon shift change, received report from NOC RN that patient had increased WOB, oxygen demands,  and wet cough with increased secretions. Chest x-ray and pro calcitonin ordered on NOC shift. Sputum sample sent to lab.     This RN and NOC RN at bedside to evaluate pt. Patient RR 23 on 7L NC. Patient refusing to wear oxy mask d/t nausea/vomiting with mask.     This RN notified AM hospitalist @ 07:20 about patients current condition. Chest CT, BNP, ABG, troponin, EKG, and PEP therapy ordered for patient.     Close monitoring in place at this time.

## 2023-07-31 NOTE — PROGRESS NOTES
Bedside report received.  Assessment complete.  A&O x 4. Patient calls appropriately.  Patient ambulates with x1 assist and FWW. Bed alarm on.   Patient has 0/10 pain. Pain managed with PCA.  One episode of emesis, medicated per MAR. Tolerating clear liquid diet.  Midline incision with prevena wound vac, CDI. RLQ urostomy with pink/red output. LLQ RIKI x2 with scant output, dressing CDI.   - BM.  Patient denies SOB.  SCD's on.  Review plan with of care with patient. Call light and personal belongings within reach. Hourly rounding in place. All needs met at this time.

## 2023-07-31 NOTE — PROCEDURES
Code blue response:    In brief, 83 year old male with bladder cancer and COPD who had episodes of increased work of breathing this AM. Following CTPA performed for these episodes he had an increase in work of breathing followed by unresponsiveness. He was determined to be in asystole. Around this time it appears he aspirated.     The patient was resuscitated per ACLS guidelines - see code sheet for details. He had adequate seal and ventilation via [place]el, epi every 3-5 minutes, good quality CPR confirmed by doppler on his femoral artery. He remained in asystole except one brief episode of fine v.fib for which he was defibrillated for then re-entered asystole. Labs this AM reassuring. FS glucose WNL.     Following 15 minutes of asystole without signs of recovery or reversible causes a time of death was called at 12:27 pm. A moment of silence was held out of respect, I am told the family is on their way.    Curtis Harmon M.D.

## 2023-07-31 NOTE — CONSULTS
Palliative Care   EMR reviewed, patient s/p code blue and now .       Dorothy Tejada, MSN, APRN, ACNPC-AG.  Palliative Care Nurse Practitioner  [phone]

## 2023-07-31 NOTE — PROGRESS NOTES
Hospital Medicine Daily Progress Note    Date of Service  2023    Chief Complaint  Brian Lopez is a 83 y.o. male admitted 2023 for cystoprostatectomy     Hospital Course    This is an 83-year-old male with past medical history of dyslipidemia, COPD, BPH, high risk noninvasive bladder cancer s/p TURBT, now with high-grade papillary urothelial carcinoma.      S/p  Radical cystoprosatectomy ,Small bowel resection,Open Ileal conduit urinary diversion.  Medicine team consulted for medical management.      Interval Problem Update    2023    Patient was seen and examined in morning rounds.  Blood pressure stable.  Oxygen requirement increased to 7 L.  Complains of shortness of breath.  He was able to complete full sentence.  He had diffuse wheezing on exam.      A.m.'s labs were reviewed.  Noted leukocytosis improving.  Troponin negative, ABG noted with respiratory acidosis.    CT angio chest negative for PE, noted atelectasis, possible pneumonia      Patient was started on DuoNeb, IV Solu-Medrol, IV Zosyn.    Pulmonology evaluation requested with Dr. Hennessy for acute COPD exacerbation.    Multiple rounds made to evaluate patient respiratory condition.  Patient was seen last sitting on bed without any respiratory distress.    Unfortunately patient coded and   Case was discussed with intensivist, urology MAXX Salmeron.                Code Status  Full Code        Review of Systems  Review of Systems   Respiratory:  Positive for shortness of breath.    Gastrointestinal:  Positive for abdominal pain, nausea and vomiting.        Physical Exam  Temp:  [36.7 °C (98.1 °F)-37.2 °C (99 °F)] 36.7 °C (98.1 °F)  Pulse:  [105-135] 124  Resp:  [18-26] 19  BP: (137-157)/() 137/89  SpO2:  [90 %-96 %] 96 %    Physical Exam  Constitutional:       Appearance: He is ill-appearing.   Cardiovascular:      Rate and Rhythm: Regular rhythm. Tachycardia present.      Pulses: Normal pulses.      Heart sounds:  Normal heart sounds.   Pulmonary:      Effort: Pulmonary effort is normal.      Breath sounds: Wheezing present.   Abdominal:      Comments: Urostomy noted.  RIKI drain noted at left lower quadrant .  Abdominal wound VAC noted       Neurological:      General: No focal deficit present.      Mental Status: He is alert and oriented to person, place, and time.         Fluids    Intake/Output Summary (Last 24 hours) at 7/31/2023 1230  Last data filed at 7/31/2023 1200  Gross per 24 hour   Intake 1577.62 ml   Output 1660 ml   Net -82.38 ml         Laboratory  Recent Labs     07/29/23  0248 07/30/23  0739 07/31/23  0304   WBC 12.9* 18.1* 13.5*   RBC 4.47* 4.85 4.77   HEMOGLOBIN 12.0* 13.0* 12.6*   HEMATOCRIT 38.4* 41.8* 41.0*   MCV 85.9 86.2 86.0   MCH 26.8* 26.8* 26.4*   MCHC 31.3* 31.1* 30.7*   RDW 47.0 47.5 48.0   PLATELETCT 226 321 353   MPV 9.7 9.8 9.8       Recent Labs     07/29/23  0248 07/30/23  0739 07/31/23  0304   SODIUM 138 138 140   POTASSIUM 4.3 4.3 3.8   CHLORIDE 103 105 105   CO2 22 24 25   GLUCOSE 102* 167* 159*   BUN 18 24* 20   CREATININE 0.89 0.65 0.66   CALCIUM 9.0 8.9 8.5                     Imaging  CT-CTA CHEST PULMONARY ARTERY W/ RECONS   Final Result      1.  No evidence of pulmonary embolus.   2.  Small bilateral pleural effusions with associated compressive atelectasis and/or consolidation. Underlying infection is possible.   3.  Left chest wall emphysema of uncertain etiology and significance.   4.  Emphysematous changes of the upper lungs.            [id number] VIEW   Final Result      Nonspecific bowel gas pattern with mildly distended gas-filled loops of bowel in the abdomen. Findings could be seen in the setting of ileus or partial small bowel obstruction.      DX-CHEST-PORTABLE (1 VIEW)   Final Result      New mild bibasilar pleuroparenchymal opacification which could represent atelectasis, airspace disease, and/or pleural fluid. Low lung volumes also noted.      DX-CHEST-PORTABLE (1  VIEW)   Final Result      1.  Hypoinflation with LEFT lung base atelectasis and small effusion.   2.  No pneumothorax.   3.  LEFT chest wall emphysema.             Assessment/Plan  Patient Active Problem List   Diagnosis    Hypertension    BPH (benign prostatic hyperplasia)    Status post right knee replacement    Intestinal polyp    Former smoker    Dyslipidemia    Hyperglycemia    Vitamin D deficiency    Vitamin B12 deficiency    Chronic left-sided low back pain without sciatica    Urinary incontinence    Proteinuria    Neck pain    COPD    Bladder cancer (HCC)    Bilateral hand pain    Advance care planning    Tachycardia    Pneumonia    Acute respiratory failure with hypoxia (HCC)    Acute exacerbation of chronic obstructive pulmonary disease (COPD) (HCC)         VTE prophylaxis:     I have performed a physical exam and reviewed and updated ROS and Plan today (7/31/2023). In review of yesterday's note (7/30/2023), there are no changes except as documented above.      Extensive amount of time spent in patient care.  Managing CODE BLUE.Critical care time spent managing patient in acute respiratory status, ordering CT of the chest reviewing labs, starting appropriate treatment, consulting pulmonology, multiple rounds made  to assess clinical status.   Counseling provided to patient's family member.  All question answered    Total critical care time spent 33 minutes

## 2023-07-31 NOTE — PROGRESS NOTES
"0330: This RN updated NOC hospitalist about pt increase need for O2, increased work of breathing, wet cough, sputum characteristics and tachycardia. Orders for RT suction, chest xray, occult stool from sputum and procalcitonin placed.    0410: RT to bedside to suction, pt sounding better    0630: CNA called this RN to bedside to find pt O2 at 58%, pale, increased work of breathing, tachycardia in 140s, prevena track pad torn off and pt sitting at edge of bed. 15L nonrebreather applied for about 10 min until able to wean to 8L nasal cannula. Pt refusing oxy mask stating \"the smell makes me throw up\". Hospitalist, day RN, and day/NOC charge updated.  "

## 2023-07-31 NOTE — PROGRESS NOTES
Upon shift arrival patient had increased oxygen demands and WOB. Patient was at 7L NC upon initial assessment @ 07:20. AM hospitalist notified of recent change in patient condition.     Hospitalist to bedside to examine pt. Chest x-ray, ABG, D-Dimer, troponin, BNP labs ordered.     This RN notified [facility] @ 0856 via voalt to inform them of increased abdominal distention, pain in abdomen upon palpation, brown-blood tinged sputum, and decreased bowel sounds.   Urology PA laid eyes on patient at bedside @ 0940. Abdominal x-ray ordered.     Patient switched over to oxy mask due to oxygen saturation at 86 on 7L NC.   Respiratory Therapist called at 10:01 am for NT suctioning d/t increased respiratory secretions. RT at bedside to suction patient. Patient oxygen increased to 10L via oxy mask.     VS @ 1134 taken. Temp: 98.1 temporal,  on Tele monitor, RR 19, /89, O2 96% on 7L oxymask    Chest CT order changed to stat, patient taken down to CT by this RN @ 1100. Pt tolerated well, patient brought back up to floor by this RN and back placed on 10L oxymask with pulse ox in place. Oxygen saturation of 93%.     Patient sitting edge of bed with CNA for lunch. Nasal cannula placed on patient during meal.     This RN entered the room @ 12:11 to find pt unresponsive on his right side in bed with brown vomitus. Patient pulse checked by this RN. Patient pulseless. Staff assist called.     CODE BLUE called @ 12:12.     Patient , time of death 12:27.

## 2023-08-04 LAB
BACTERIA BLD CULT: NORMAL
BACTERIA BLD CULT: NORMAL
SIGNIFICANT IND 70042: NORMAL
SIGNIFICANT IND 70042: NORMAL
SITE SITE: NORMAL
SITE SITE: NORMAL
SOURCE SOURCE: NORMAL
SOURCE SOURCE: NORMAL

## 2023-08-14 NOTE — DISCHARGE SUMMARY
"Death Summary    Cause of Death  Cardiopulmonary collapse    Comorbid Conditions at the Time of Death  Principal Problem:    Bladder cancer (HCC) (POA: Yes)      Overview: 10.31.22  cancer care specialist . Started chemotherapy last week       and UROlogist  Dr Gil Kee/  And oncology dr Salmeron      --------------------------------------------            Jan 2022 : biopsy showed            \"...A. Posterior dome bladder mass:              High grade urothelial carcinoma, no evidence of invasion              Muscularis propria is present and uninvolved by carcinoma   Active Problems:    BPH (benign prostatic hyperplasia) (POA: Yes)    Dyslipidemia (Chronic) (POA: Yes)    COPD (Chronic) (POA: Yes)      Overview: Takes albuterol prn            Saw [facility] previously dr Merary Wang            \"...However he was found to have some changes consistent with emphysema. A       50-pack-year smoking     Advance care planning (POA: Unknown)    Tachycardia (POA: Unknown)    Pneumonia (POA: Unknown)    Acute respiratory failure with hypoxia (HCC) (POA: Unknown)    Acute exacerbation of chronic obstructive pulmonary disease (COPD) (HCC) (POA: Unknown)  Resolved Problems:    * No resolved hospital problems. *      History of Presenting Illness and Hospital Course  This is a 83 y.o. male admitted 7/27/2023 with history of T1 and CIS bladder cancer, refractory to medical therapy. He underwent radical cystoprostatectomy with lymph node dissection on 7/27/2023. He has a history of COPD and had increased work of breathing. CT chest demonstrated no PE. He underwent ACLS code as noted below by Dr. Harmon. Despite no PE on CT chest scan, given the rapid pulmonary decline, pulmonary embolism is still suspected as likely cause of death. He was given appropriate perioperative 5,000 units of heparin prior to incision and was maintained on prophylaxis heparin and or lovenox daily.     Per Dr. Harmon,   \"In brief, 83 year old male with bladder " "cancer and COPD who had episodes of increased work of breathing this AM. Following CTPA performed for these episodes he had an increase in work of breathing followed by unresponsiveness. He was determined to be in asystole. Around this time it appears he aspirated.      The patient was resuscitated per ACLS guidelines - see code sheet for details. He had adequate seal and ventilation via Igel, epi every 3-5 minutes, good quality CPR confirmed by doppler on his femoral artery. He remained in asystole except one brief episode of fine v.fib for which he was defibrillated for then re-entered asystole. Labs this AM reassuring. FS glucose WNL.     Following 15 minutes of asystole without signs of recovery or reversible causes a time of death was called at 12:27 pm. A moment of silence was held out of respect, I am told the family is on their way.\"      Death Date: 07/31/23   Death Time: 1227      Pronounced By (MD): Curtis Harmon  Pronounced By (RN1): Nadja Huffman  Pronounced By (RN2): Veena Oakley  "

## 2023-08-17 NOTE — DOCUMENTATION QUERY
[facility]                                                                       Query Response Note      PATIENT:               LEANDRO SUAREZ  ACCT #:                  [id number]  MRN:                     [id number]  :                      1940  ADMIT DATE:       2023 5:33 AM  DISCH DATE:        2023 12:27 PM  RESPONDING  PROVIDER #:        [id number]           QUERY TEXT:    Documentation within the record reveals Pneumonia. Can pneumonia be further clarified based on the findings above. Response includes Possible, likely, probable diagnosis.    The patient's Clinical Indicators include:  Findings:  PN: CT angio chest negative for PE, noted atelectasis, possible pneumonia, Overnight became hypoxic with increased SOB and work of breathing, Also notes worsening nausea and several episodes of emesis, but notes that this may have been induced  by coughing     Discharge summary: pneumonia, episodes of increased work of breathing this AM. Following CTPA performed for these episodes he had an increase in work of breathing followed by unresponsiveness. He was determined to be  in asystole. Around this time it appears he aspirated    Treatment:  PN: started on DuoNeb, IV Solu-Medrol, IV Zosyn.    Risk Factors: multiple episodes of emesis     Brittany Begum RN BSN  Clinical Documentation   [email or web address]  Connect via [email or web address] Messenger  Options provided:   -- Aspiration Pneumonia   -- Pneumonia Unspecified   -- Other explanation, (please specify other explanation)      Query created by: Brittany Boateng on 2023 2:48 AM    RESPONSE TEXT:    Pneumonia Unspecified          Electronically signed by:  KENIA GAGE MD 2023 9:54 PM

## 2025-02-07 NOTE — OP THERAPY EVALUATION
Outpatient Physical Therapy  INITIAL EVALUATION    [facility] [place]  [address]  [place] [address]  Phone:  [phone]  Fax:  [phone]    Date of Evaluation: 2021    Patient: Brian Lopez  YOB: 1940  MRN: [id number]     Referring Provider: Melo Villela M.D.  [address] [facility]  [address]  [place] [address]   Referring Diagnosis Other symptoms and signs involving the musculoskeletal system [R29.898];Spondylosis without myelopathy or radiculopathy, cervical region [M47.812];Cervicalgia [M54.2];Myalgia, unspecified site [M79.10];Trigger point [M79.10]     Time Calculation    Start time: 0900  Stop time: 1000 Time Calculation (min): 60 minutes         Chief Complaint: Neck Problem    Visit Diagnoses     ICD-10-CM   1. Diffuse idiopathic skeletal hyperostosis  M48.10   2. Decreased range of motion of neck  R29.898   3. Cervical spondylosis  M47.812   4. Neck pain  M54.2   5. Myalgia  M79.10   6. Trigger point  M79.10       No data found  Subjective:   History of Present Illness:     Date of onset:  3/25/2016  Quality of life:  Fair  Prior level of function:  Ongoing neck diffiuclty for 10 + years  Pain:     Current pain ratin    At best pain ratin    At worst pain ratin  Diagnostic Tests:     X-ray: abnormal    Activities of Daily Living:     Patient reported ADL status: Limited with driving due to vision from decreased ROM  Limited lifting tolerance  Difficulty talking to spouse due to restrictions in the neck  Patient Goals:     Patient goals for therapy:  Decreased pain, increased strength and increased motion    Patient is a 81 y.o. male that presents to therapy with neck pain. States that symptoms were insidious in onset. Reports the pain quality to be sharp/dull, intermittent (based on movement) and are primarily in the neck but occasionally into the mid back. Reports that symptoms now worsening. States that aggravating  Pt reports heartburn   "factors are movement, lifting, standing.  States that easng factors are rest. Notes numbness and tingling in the L hand. Notes difficulty with urination over the last 6 months, is on medication.    Past Medical History:   Diagnosis Date   • Arthritis     hands   • BPH (benign prostatic hyperplasia) 4/17/2014   • Cataract     removed bilat   • COPD (chronic obstructive pulmonary disease) (HCC)    • Dental disorder     upper/lower dentures   • High cholesterol    • Hypertension 4/17/2014   • Hypertriglyceridemia 4/17/2014   • Intestinal polyp    • Pain 05/29/2019    right hand, 5-6/10   • Pulmonary emphysema (HCC) 2/11/2021    Saw [facility] previously dr Merary Wang  \"...However he was found to have some changes consistent with emphysema. A 50-pack-year smoking    • Snoring    • Swelling of thyroid gland 2/4/2020     Past Surgical History:   Procedure Laterality Date   • PB INCISE FINGER TENDON SHEATH Right 5/31/2019    Procedure: RELEASE, TRIGGER FINGER-  RING;  Surgeon: Simon Altamirano M.D.;  Location: SURGERY SAME DAY [facility];  Service: Orthopedics   • SYNOVECTOMY Right 5/31/2019    Procedure: fasciatomy of palm;  Surgeon: Simon Altamirano M.D.;  Location: SURGERY SAME DAY [facility];  Service: Orthopedics   • KNEE ARTHROPLASTY TOTAL Right 6/7/2017    Procedure: KNEE ARTHROPLASTY TOTAL;  Surgeon: Steffanie Del Angel M.D.;  Location: SURGERY [facility];  Service:    • COLON RESECTION ROBOTIC  6/16/2014    Performed by Ezra Burks M.D. at SURGERY [facility]   • CATARACT EXTRACTION WITH IOL Bilateral 2011   • KNEE ARTHROPLASTY TOTAL Left 2009   • APPENDECTOMY  1970's   • ARTHROSCOPY, KNEE     • CHOLECYSTECTOMY     • HEMORRHOIDECTOMY     • TRIGGER FINGER RELEASE Bilateral last 2000's    multiple      Social History     Tobacco Use   • Smoking status: Former Smoker     Packs/day: 1.00     Years: 50.00     Pack years: 50.00     Types: Cigarettes     Start date: 1/9/1955     Quit date: 4/17/2010    "  Years since quitting: 10.9   • Smokeless tobacco: Never Used   Substance Use Topics   • Alcohol use: Yes     Alcohol/week: 8.4 oz     Types: 14 Cans of beer per week     Comment: 3 per day     Family and Occupational History     Socioeconomic History   • Marital status:      Spouse name: Not on file   • Number of children: Not on file   • Years of education: Not on file   • Highest education level: Not on file   Occupational History   • Not on file       Objective     Postural Observations  Seated posture: poor  Standing posture: poor        Shoulder Screen    Shoulder range of motion within functional limits with the following exceptions: Limited into flexion, abd, ER    Shoulder strength within functional limits.    Neurological Testing     Reflexes   Left   Biceps (C5/C6): normal (2+)  Triceps (C7): normal (2+)    Right   Biceps (C5/C6): normal (2+)  Triceps (C7): normal (2+)    Myotome testing   Cervical (left)   C4 (shoulder shrug): 5  C5 (deltoid): 5  C6 (biceps): 5  C7 (triceps): 5  C8 (thumb extension): 4+  T1 (intrinsics): 5    Cervical (right)   C4 (shoulder shrug): 5  C5 (deltoid): 5  C6 (biceps): 5  C7 (triceps): 5  C8 (thumb extension): 5  T1 (intrinsics): 5    Dermatome testing   Cervical (left)   All left cervical dermatomes intact    Cervical (right)   All right cervical dermatomes intact    Palpation   Left   Hypertonic in the cervical paraspinals, levator scapulae and upper trapezius.   Tenderness of the cervical paraspinals, levator scapulae and upper trapezius.     Right   Hypertonic in the cervical paraspinals, levator scapulae and upper trapezius.   Tenderness of the cervical paraspinals, levator scapulae and upper trapezius.     Active Range of Motion     Cervical Spine   Flexion: Active cervical flexion: 25deg.  Extension: Active cervical extension: 11deg.  Left lateral flexion: Active left cervical lateral flexion: 5deg.  Right lateral flexion: Active right cervical lateral flexion:  12deg.  Left rotation: Active left cervical rotation: 13deg.  Right rotation: Active right cervical rotation: 16deg.    Tests   Cervical spine     Left Spine   Negative Sharp-Shine test and vertebral artery test (limited due to ROM).     Right spine   Negative Sharp-Shine test and vertebral artery test (limited due to ROM).         Therapeutic Exercises (CPT 94211):     1. Seated row / scapular retraction, x20    2. Mini upper trap stretch, [id number]      Time-based treatments/modalities:    Physical Therapy Timed Treatment Charges  Therapeutic exercise minutes (CPT 48939): 10 minutes      Assessment, Response and Plan:   Impairments: abnormal or restricted ROM, activity intolerance, impaired physical strength and pain with function    Assessment details:  Patient presents with signs and symptoms consistent with cervical spondylosis. Patient limitations include weakness, decreased ROM, and pain. Patient demonstrated poor ROM. Patient will benefit from skilled therapy to improve the aforementioned deficits and decrease further functional decline.   Prognosis: poor    Goals:   Short Term Goals:   1) Patient's cervical ROM will improve by 5deg to facilitate improved ability to turn and talk with his wife.  2) Patient's symptoms will improve to facilitate standing >10min.  Short term goal time span:  2-4 weeks      Long Term Goals:    1) Patient's cervical rotation will improve by 10deg to allow for improved view of traffic.  2) Patient's NDI will improve by 10 to demonstrate functional improvement  Long term goal time span:  6-8 weeks    Plan:   Therapy options:  Physical therapy treatment to continue  Planned therapy interventions:  E Stim Unattended (CPT 75090), Manual Therapy (CPT 85907), Neuromuscular Re-education (CPT 20547), Therapeutic Exercise (CPT 19684) and Hot or Cold Pack Therapy (CPT 75159)  Frequency:  2x week  Duration in weeks:  8  Discussed with:  Patient      Functional Assessment Used  PT Functional  Assessment Tool Used: NDI  PT Functional Assessment Score: 30     Referring provider co-signature:  I have reviewed this plan of care and my co-signature certifies the need for services.    Certification Period: 03/25/2021 to  05/20/21    Physician Signature: ________________________________ Date: ______________

## (undated) DEVICE — KIT 2.25IN UROS 2 PC DRN - 57MM 2 1/4 INCH (5/BX)

## (undated) DEVICE — GLOVE SZ 6.5 BIOGEL PI MICRO - PF LF (50PR/BX)

## (undated) DEVICE — BAG RETRIEVAL 12/15 MM INZII (5EA/CA) THIS WILL REPLACE ITEM 75018

## (undated) DEVICE — SLEEVE, VASO, THIGH, MED

## (undated) DEVICE — GOWN SURGEONS X-LARGE - DISP. (30/CA)

## (undated) DEVICE — TOURNIQUET CUFF 34 X 4 ONE PORT DISP - STERILE (10/BX)

## (undated) DEVICE — ELECTRODE DUAL RETURN W/ CORD - (50/PK)

## (undated) DEVICE — CANISTER SUCTION RIGID RED 1500CC (40EA/CA)

## (undated) DEVICE — SENSOR SPO2 NEO LNCS ADHESIVE (20/BX) SEE USER NOTES

## (undated) DEVICE — SEAL 5MM-8MM UNIVERSAL  BOX OF 10

## (undated) DEVICE — KIT ANESTHESIA W/CIRCUIT & 3/LT BAG W/FILTER (20EA/CA)

## (undated) DEVICE — CONNECTOR HOSE NEPTUNE FOR CYSTO ROOM

## (undated) DEVICE — CANISTER SUCTION 3000ML MECHANICAL FILTER AUTO SHUTOFF MEDI-VAC NONSTERILE LF DISP  (40EA/CA)

## (undated) DEVICE — Device

## (undated) DEVICE — PACK TOTAL KNEE  (1/CA)

## (undated) DEVICE — NEEDLE W/FACET TIP DULL VERSION W/STIMULATION CABLE SONOPLEX 21G X 4 (10/EA)"

## (undated) DEVICE — TROCAR Z THREAD12MM OPTICAL - NON BLADED (6/BX)

## (undated) DEVICE — AQUACEL 4X4

## (undated) DEVICE — TOWEL STOP TIMEOUT SAFETY FLAG (40EA/CA)

## (undated) DEVICE — MIXER BONE CEMENT REVOLUTION - W/FEMORAL PRESSURIZER (6/CA)

## (undated) DEVICE — PROTECTOR ULNA NERVE - (36PR/CA)

## (undated) DEVICE — TUBE CONNECTING SUCTION - CLEAR PLASTIC STERILE 72 IN (50EA/CA)

## (undated) DEVICE — SODIUM CHL IRRIGATION 0.9% 1000ML (12EA/CA)

## (undated) DEVICE — LENS/HOOD FOR SPACESUIT - (32/PK) PEEL AWAY FACE

## (undated) DEVICE — PACK SINGLE BASIN - (6/CA)

## (undated) DEVICE — PACK DAVINCI PROSTATECTOMY - (1EA/CA)

## (undated) DEVICE — HEAD HOLDER JUNIOR/ADULT

## (undated) DEVICE — SUCTION INSTRUMENT YANKAUER BULBOUS TIP W/O VENT (50EA/CA)

## (undated) DEVICE — COVER FOOT UNIVERSAL DISP. - (25EA/CA)

## (undated) DEVICE — SET LEADWIRE 5 LEAD BEDSIDE DISPOSABLE ECG (1SET OF 5/EA)

## (undated) DEVICE — CATHETER URETHRAL FOLEY SILICONE OD20 FR 10 ML (10EA/CA)

## (undated) DEVICE — GLOVE SZ 7 BIOGEL PI MICRO - PF LF (50PR/BX 4BX/CA)

## (undated) DEVICE — IRRIGATOR SUCTION ENDOWRIST DISPOSABLE OD8 MM (6EA/BX)

## (undated) DEVICE — DRESSING POST OP BORDER 4 X 10 (5EA/BX)

## (undated) DEVICE — STOCKINETTE IMPERVIOUS 12X48 - STERILELF (10/CA)"

## (undated) DEVICE — EVACUATOR BLADDER ELLIK - (10/BX)

## (undated) DEVICE — NEPTUNE 4 PORT MANIFOLD - (20/PK)

## (undated) DEVICE — GLOVE BIOGEL INDICATOR SZ 8 SURGICAL PF LTX - (50/BX 4BX/CA)

## (undated) DEVICE — GLOVE BIOGEL SZ 7.5 SURGICAL PF LTX - (50PR/BX 4BX/CA)

## (undated) DEVICE — STAPLER SUREFORM 60 12 MM (6EA/BX)

## (undated) DEVICE — SUTURE 4-0 PROLENE RB-1 D/A 36 (36PK/BX)"

## (undated) DEVICE — SODIUM CHL. IRRIGATION 0.9% 3000ML (4EA/CA 65CA/PF)

## (undated) DEVICE — SUTURE 3-0 VICRYL PLUS SH - 27 INCH (36/BX)

## (undated) DEVICE — GOWN WARMING STANDARD FLEX - (30/CA)

## (undated) DEVICE — BAG, SPONGE COUNT 50600

## (undated) DEVICE — TUBING CLEARLINK DUO-VENT - C-FLO (48EA/CA)

## (undated) DEVICE — FORCEPS FENESTRATED BIPOLAR (14UN/EA)

## (undated) DEVICE — OBTURATOR BLADELESS STANDARD 8MM (6EA/BX)

## (undated) DEVICE — BAG DRAINAGE ANTIREFLUX TOWER SLIDE TAP 4000 ML (20EA/CA)

## (undated) DEVICE — BAG URODRAIN WITH TUBING - (20/CA)

## (undated) DEVICE — RELOAD STAPLER SUREFORM 60 WHITE (12EA/BX)

## (undated) DEVICE — SYSTEM CLEARIFY VISUALIZATION (10EA/PK)

## (undated) DEVICE — SPONGE GAUZESTER 4 X 4 4PLY - (128PK/CA)

## (undated) DEVICE — SPONGE GAUZE NON-STERILE 4X4 - (2000/CA 10PK/CA)

## (undated) DEVICE — SEAL CANNULA STAPLER 12 MM (10EA/BX)

## (undated) DEVICE — SUTURE 2-0 SILK SH 24 (36PK/BX)"

## (undated) DEVICE — ELECTRODE BIPOLAR REGULAR CUTTING LOOP URO 24/26 FR (6EA/PK)

## (undated) DEVICE — GLOVE, LITE (PAIR)

## (undated) DEVICE — STOCKINET BIAS 4 IN STERILE - (20/CA)

## (undated) DEVICE — TIP INTPLS HFLO ML ORFC BTRY - (12/CS)  FOR SURGILAV

## (undated) DEVICE — LACTATED RINGERS INJ 1000 ML - (14EA/CA 60CA/PF)

## (undated) DEVICE — SUTURE 0 VICRYL PLUS CT-2 - 27 INCH (36/BX)

## (undated) DEVICE — SUTURE 4-0 CHROMIC RB-1 27 (36PK/BX)"

## (undated) DEVICE — SET EXTENSION WITH 2 PORTS (48EA/CA) ***PART #2C8610 IS A SUBSTITUTE*****

## (undated) DEVICE — SUTURE 4-0 ETHILON FS-2 18 (36PK/BX)"

## (undated) DEVICE — SUTURE 1 VICRYL PLUS CTX - 8 X 18 INCH (12/BX)

## (undated) DEVICE — CHLORAPREP 26 ML APPLICATOR - ORANGE TINT(25/CA)

## (undated) DEVICE — KIT  I.V. START (100EA/CA)

## (undated) DEVICE — SUTURE 2-0 VICRYL PLUS SH - 27 INCH (36/BX)

## (undated) DEVICE — DRAPE COLUMN  BOX OF 20

## (undated) DEVICE — BLADE SAGITTAL SYSTEM 18MM

## (undated) DEVICE — SUTURE GENERAL

## (undated) DEVICE — SUTURE2-0 20CM STRATAFIX SPIRAL PDS CT-1 (12EA/BX)

## (undated) DEVICE — SUTURE 2-0 ETHILON FS - (36/BX) 18 INCH

## (undated) DEVICE — SUTURE 3-0 MONOCRYL PLUS PS-1 - 27 INCH (36/BX)

## (undated) DEVICE — HEMOSTAT SURG ABSORBABLE - 4 X 8 IN SURGICEL (24EA/CA)

## (undated) DEVICE — SUTURE 2-0 SILK 12 X 18" (36PK/BX)"

## (undated) DEVICE — RESERVOIR SUCTION 100 CC - SILICONE (20EA/CA)

## (undated) DEVICE — MASK ANESTHESIA ADULT  - (100/CA)

## (undated) DEVICE — NEEDLE INSFL 120MM 14GA VRRS - (20/BX)

## (undated) DEVICE — PACK CYSTO III (2EA/CA)

## (undated) DEVICE — SUTURE 2-0 SILK FS (12EA/BX)

## (undated) DEVICE — TRAY CATHETER FOLEY URINE METER W/STATLOCK 350ML (10EA/CA)

## (undated) DEVICE — PACK LOWER EXTREMITY - (2/CA)

## (undated) DEVICE — SUTURE 0 SILK TIES (36PK/BX)

## (undated) DEVICE — CONTAINER SPECIMEN BAG OR - STERILE 4 OZ W/LID (100EA/CA)

## (undated) DEVICE — MASK, LARYNGEAL AIRWAY #4

## (undated) DEVICE — GLOVE BIOGEL INDICATOR SZ 7.5 SURGICAL PF LTX - (50PR/BX 4BX/CA)

## (undated) DEVICE — BLADE SAGITTAL 34MM

## (undated) DEVICE — TOWELS CLOTH SURGICAL - (4/PK 20PK/CA)

## (undated) DEVICE — BLADE SURGICAL #15 - (50/BX 3BX/CA)

## (undated) DEVICE — GLOVE BIOGEL SZ 8 SURGICAL PF LTX - (50PR/BX 4BX/CA)

## (undated) DEVICE — COVER LIGHT HANDLE ALC PLUS DISP (18EA/BX)

## (undated) DEVICE — TUBE CONNECT SUCTION CLEAR 120 X 1/4" (50EA/CA)"

## (undated) DEVICE — PADDING CAST 6 IN STERILE - 6 X 4 YDS (24/CA)

## (undated) DEVICE — GLOVE BIOGEL SZ 8.5 SURGICAL PF LTX - (50PR/BX 4BX/CA)

## (undated) DEVICE — SENSOR OXIMETER ADULT SPO2 RD SET (20EA/BX)

## (undated) DEVICE — DRAPE ARM  BOX OF 20

## (undated) DEVICE — STAPLER 60MM ARTICULATING (3EA/BX)

## (undated) DEVICE — REDUCER XI STAPLER 12MM TO 8MM (6EA/BX)

## (undated) DEVICE — STAPLE 60MM WHTE 2.6MM - (12/BX) WAS PART #ECR60W

## (undated) DEVICE — ELECTRODE 850 FOAM ADHESIVE - HYDROGEL RADIOTRNSPRNT (50/PK)

## (undated) DEVICE — FORCEPS PROGRASP (18UN/EA)

## (undated) DEVICE — KIT ROOM DECONTAMINATION

## (undated) DEVICE — SUTURE 2-0 STRATAFIX SPIRAL PDS SH (12EA/BX)

## (undated) DEVICE — GLOVE BIOGEL ECLIPSE PF LATEX SIZE 8.0  (50PR/BX)

## (undated) DEVICE — PAD UNIVERSAL MULTI USE (1/EA)

## (undated) DEVICE — SYRINGE NON SAFETY 10 CC 20 GA X 1-1/2 IN (100/BX 4BX/CA)

## (undated) DEVICE — SET TUBING PNEUMOCLEAR HIGH FLOW SMOKE EVACUATION (10EA/BX)

## (undated) DEVICE — GLOVE BIOGEL SZ 6.5 SURGICAL PF LTX (50PR/BX 4BX/CA)

## (undated) DEVICE — ROBOTIC SURGERY SERVICES

## (undated) DEVICE — TUBE E-T HI-LO CUFF 7.5MM (10EA/PK)

## (undated) DEVICE — SCISSORS 5MM CVD (6EA/BX)

## (undated) DEVICE — BANDAGE ELASTIC STERILE VELCRO 6 X 5 YDS (25EA/CA)

## (undated) DEVICE — WATER IRRIGATION STERILE 1000ML (12EA/CA)

## (undated) DEVICE — ELECTRODE NEEDLE NON-SAFETY 2.8 IN (150EA/CT)

## (undated) DEVICE — NEEDLE NON SAFETY 25 GA X 1 1/2 IN HYPO (100EA/BX)

## (undated) DEVICE — SHEARS MONOPOLAR CURVED  DA VINCI 10X'S REUSABLE

## (undated) DEVICE — WATER IRRIG. STER 3000 ML - (4/CA)

## (undated) DEVICE — BLOCK

## (undated) DEVICE — HUMID-VENT HEAT AND MOISTURE EXCHANGE- (50/BX)

## (undated) DEVICE — STAPLER 75MM LINEAR OPEN (3EA/BX)

## (undated) DEVICE — COVER TIP ENDOWRIST HOT SHEAR - (10EA/BX) DA VINCI

## (undated) DEVICE — DRAIN JACKSON PRATT 15FR - (10EA/CA)

## (undated) DEVICE — SYRINGE 50ML CATHETER TIP (40EA/BX 4BX/CA)

## (undated) DEVICE — GLOVE BIOGEL INDICATOR SZ 8.5 SURGICAL PF LTX - (50/BX 4BX/CA)

## (undated) DEVICE — HANDPIECE 10FT INTPLS SCT PLS IRRIGATION HAND CONTROL SET (6/PK)

## (undated) DEVICE — DRIVER LARGE NEEDLE (15UN/EA)

## (undated) DEVICE — TROCAR 5X100 NON BLADED Z-TH - READ KII (6/BX)

## (undated) DEVICE — GOWN SURGEONS LARGE - (32/CA)

## (undated) DEVICE — SUTURE 3-0 SILK SH 30 (36PK/BX)

## (undated) DEVICE — BOVIE  BLADE 6 EXTENDED - (50/PK)

## (undated) DEVICE — ARMREST CRADLE FOAM - (2PR/PK 12PR/CA)

## (undated) DEVICE — GLOVE BIOGEL PI INDICATOR SZ 7.0 SURGICAL PF LF - (50/BX 4BX/CA)

## (undated) DEVICE — SUTURE 0 VICRYL PLUS CT-1 - 36 INCH (36/BX)

## (undated) DEVICE — SUTURE 2-0 VICRYL PLUS CT-1 - 8 X 18 INCH(12/BX)

## (undated) DEVICE — CATHETER URETHRAL FOLEY SILICONE OD16 FR 10 ML (10EA/CA)

## (undated) DEVICE — GLOVE BIOGEL PI INDICATOR SZ 6.5 SURGICAL PF LF - (50/BX 4BX/CA)

## (undated) DEVICE — BAG DRAINAGE URINARY CLOSED 2000ML (20EA/CA)

## (undated) DEVICE — SUTURE 4-0 VICRYL PLUS PC-3 18 (12PK/BX)"

## (undated) DEVICE — CLIP HEMOLOCK PURPLE - (14/BX)

## (undated) DEVICE — PAD OR TABLE DA VINCI 2IN X 20IN X 72IN - (12EA/CA)

## (undated) DEVICE — CATHETER IV 20 GA X 1-1/4 ---SURG.& SDS ONLY--- (50EA/BX)

## (undated) DEVICE — FORCEP CADIERE REUSABLE (18 USES)

## (undated) DEVICE — SYSTEM PREVENA DRESSING CUSTOMIZABLE (5EA/CA)

## (undated) DEVICE — SUTURE 4-0 MONOCRYL PLUS PS-1 - 27 INCH (36/BX)

## (undated) DEVICE — SYSTEM PREVENA CANISTER 45ML DISP VAC (5/CA)

## (undated) DEVICE — SUTURE 1 PDS PLUS CT-1 36IN (36EA/BX)

## (undated) DEVICE — GLOVE BIOGEL SZ 7 SURGICAL PF LTX - (50PR/BX 4BX/CA)

## (undated) DEVICE — LIGASURE VESSEL SEAL LAP 10MM - SINGLE USE (6EA/CA)

## (undated) DEVICE — BAG RETRIEVAL 10ML (10EA/BX)

## (undated) DEVICE — ELECTRODE BIPOLAR COAGULATION BALL YELLOW 24 FR (6EA/PK)